# Patient Record
Sex: MALE | Race: WHITE | NOT HISPANIC OR LATINO | Employment: OTHER | URBAN - METROPOLITAN AREA
[De-identification: names, ages, dates, MRNs, and addresses within clinical notes are randomized per-mention and may not be internally consistent; named-entity substitution may affect disease eponyms.]

---

## 2017-01-13 ENCOUNTER — APPOINTMENT (OUTPATIENT)
Dept: PULMONOLOGY | Facility: HOSPICE | Age: 77
End: 2017-01-13
Payer: MEDICARE

## 2017-03-09 ENCOUNTER — TELEPHONE (OUTPATIENT)
Dept: PULMONOLOGY | Facility: HOSPICE | Age: 77
End: 2017-03-09

## 2017-03-09 NOTE — TELEPHONE ENCOUNTER
MEDICATION PRIOR AUTHORIZATION NEEDED:    1. Name of Medication: Ipratropium/Albuterol 0.5/3mg neb soln    2. Requested By (Name of Pharmacy): [facility]     3. Is insurance on file current? yes    4. What is the name & phone number of the 3rd party payor? [facility] [phone]

## 2017-03-20 NOTE — TELEPHONE ENCOUNTER
DOCUMENTATION OF PRIOR AUTH STATUS    1. Medication name and dose: Ipratropium/Albuterol neb soln    2. Name and Phone # of Prescription coverage company: [employer] [phone]    3. Date Prior Auth was submitted: 3/17/2017    4. What information was given to obtain insurance decision: Clinical notes    5. Prior Auth letter Approved or Denied: Denied, must be billed through Medicare Part B    6. Pharmacy notified: Yes    7. Patient notified: Yes

## 2017-04-11 ENCOUNTER — OFFICE VISIT (OUTPATIENT)
Dept: PULMONOLOGY | Facility: HOSPICE | Age: 77
End: 2017-04-11
Payer: MEDICARE

## 2017-04-11 VITALS
DIASTOLIC BLOOD PRESSURE: 86 MMHG | HEART RATE: 67 BPM | HEIGHT: 71 IN | RESPIRATION RATE: 16 BRPM | WEIGHT: 146 LBS | BODY MASS INDEX: 20.44 KG/M2 | SYSTOLIC BLOOD PRESSURE: 132 MMHG | OXYGEN SATURATION: 88 %

## 2017-04-11 DIAGNOSIS — J96.11 CHRONIC RESPIRATORY FAILURE WITH HYPOXIA (HCC): ICD-10-CM

## 2017-04-11 DIAGNOSIS — R91.8 ABNORMAL CT SCAN, LUNG: ICD-10-CM

## 2017-04-11 DIAGNOSIS — J44.1 CHRONIC OBSTRUCTIVE PULMONARY DISEASE WITH ACUTE EXACERBATION (HCC): ICD-10-CM

## 2017-04-11 DIAGNOSIS — R09.02 HYPOXIA: ICD-10-CM

## 2017-04-11 DIAGNOSIS — R91.8 LUNG NODULES: ICD-10-CM

## 2017-04-11 DIAGNOSIS — I15.9 SECONDARY HYPERTENSION: ICD-10-CM

## 2017-04-11 PROCEDURE — 1036F TOBACCO NON-USER: CPT | Performed by: NURSE PRACTITIONER

## 2017-04-11 PROCEDURE — 99214 OFFICE O/P EST MOD 30 MIN: CPT | Performed by: NURSE PRACTITIONER

## 2017-04-11 PROCEDURE — 4040F PNEUMOC VAC/ADMIN/RCVD: CPT | Performed by: NURSE PRACTITIONER

## 2017-04-11 PROCEDURE — G8432 DEP SCR NOT DOC, RNG: HCPCS | Performed by: NURSE PRACTITIONER

## 2017-04-11 PROCEDURE — G8420 CALC BMI NORM PARAMETERS: HCPCS | Performed by: NURSE PRACTITIONER

## 2017-04-11 PROCEDURE — 1101F PT FALLS ASSESS-DOCD LE1/YR: CPT | Mod: 8P | Performed by: NURSE PRACTITIONER

## 2017-04-11 NOTE — PROGRESS NOTES
Chief Complaint   Patient presents with   • Follow-Up       HPI:  David Cho is a 76 y.o. year old male here today for follow-up on     Last OV 12/28/16 he had COPD exacerbation - he was treated with prednisone taper, doxycycline and started on Stiolto. CT scan of chest was ordered but not completed. CNOX was not completed. His wife has been ill. He presented today in office with low oxygen levels at 87% and patient benefited from 2-3L oxygen with O2 sats >90%. He would benefit from oxygen use 24/7. CXR 12/28/16 indicates no acute cardiopulmonary process. He quit smoking October 2016. Today he notes improved dyspnea with Stiolto use. He has been requiring nebulizer 2-3x's daily and ARNALDO 1-2x's at night. He notes continued frequent wakening at night and dyspnea. He tries to sleep elevated. We will start supplemental oxygen at this time and consider sleep study based on his symptoms. He has noted wheezing mainly at night. He denies fever, chills, night sweats, chest pain, ankle swelling or hemoptysis. He notes dyspnea on exertion/at night with chronic cough with light green phlegm production which is baseline. He denies dizziness/headaches on a regular base. He denies GERD.    Brief history from BEVERLEY Smith's last OV note:  HPI: This patient is a 76 y.o. male, who presents for hospital follow-up from recent COPD exacerbation. He has a history of abnormal CT with pulmonary nodules. He is a former smoker one pack a day for 50 years and quit in August. CT scan July 14, 2016 showed multiple pulmonary nodules. The largest of which was 10 mm in diameter at the left apex. Fortunately PET scan shows no abnormal uptake associated with these lesions. Repeat CT scan is ordered for February 2017.    In regards to the patient's recent hospitalization, he was seen at [facility] and hospitalized October 4 - October 6, 2016 for COPD exacerbation, hypoxia & hypertensive urgency. Treated with IV steroids and briefly with antibiotics.  "Chest x-ray showed no evidence of pneumonia. He was discharged with DuoNeb. PFTs September 16, 2016 indicated stage III COPD with an FEV1 of 1.56 L 53% predicted, FEV1 FVC ratio of 50, DLCO 91% predicted, no significant bronchodilator response. He has tried multiple respiratory inhalers in the past. He has discontinued them due to cost and lack of subjective benefit.       ROS: As per HPI and otherwise negative if not stated.    Past Medical History   Diagnosis Date   • Hypertension    • Enlarged prostate    • Cholesterol serum elevated    • Depression        Past Surgical History   Procedure Laterality Date   • Other  appendectomy   • Appendectomy         History reviewed. No pertinent family history.    Social History     Social History   • Marital Status:      Spouse Name: N/A   • Number of Children: N/A   • Years of Education: N/A     Occupational History   • Not on file.     Social History Main Topics   • Smoking status: Former Smoker -- 1.00 packs/day for 50 years     Types: Cigarettes     Quit date: 08/12/2016   • Smokeless tobacco: Never Used   • Alcohol Use: Yes      Comment: daily beer   • Drug Use: No   • Sexual Activity: Not on file     Other Topics Concern   • Not on file     Social History Narrative       Allergies as of 04/11/2017   • (No Known Allergies)        @Vital signs for this encounter:  Filed Vitals:    04/11/17 1111   Height: 1.803 m (5' 10.98\")   Weight: 66.225 kg (146 lb)   Weight % change since last entry.: 0 %   BP: 132/86   Pulse: 67   BMI (Calculated): 20.37   Resp: 16   O2 sat % room air: 87 %       Current medications as of today   Current Outpatient Prescriptions   Medication Sig Dispense Refill   • buPROPion (WELLBUTRIN XL) 150 MG XL tablet TK 1 T PO QD  10   • Tiotropium Bromide-Olodaterol (STIOLTO RESPIMAT) 2.5-2.5 MCG/ACT Aero Soln Take 2 Puffs by mouth every day. 1 Inhaler 5   • ipratropium-albuterol (DUONEB) 0.5-2.5 (3) MG/3ML nebulizer solution 3 mL by Nebulization " route every four hours as needed for Shortness of Breath. 360 mL 3   • albuterol (VENTOLIN HFA) 108 (90 BASE) MCG/ACT Aero Soln inhalation aerosol Inhale 2 Puffs by mouth every four hours as needed for Shortness of Breath (Wheezing). 1 Inhaler 5   • metoprolol (LOPRESSOR) 25 MG Tab Take 1 Tab by mouth 2 Times a Day. 60 Tab 11   • albuterol 108 (90 BASE) MCG/ACT Aero Soln inhalation aerosol Inhale 2 Puffs by mouth every 6 hours as needed for Shortness of Breath. 8.5 g 3   • aspirin (ASA) 81 MG Chew Tab chewable tablet Take 1 Tab by mouth every day. 100 Tab 11   • buPROPion (WELLBUTRIN XL) 300 MG XL tablet Take 300 mg by mouth every morning.     • enalapril (VASOTEC) 20 MG tablet Take 20 mg by mouth every day.     • vitamin D, Ergocalciferol, (DRISDOL) 79019 UNITS Cap capsule Take 50,000 Units by mouth every 7 days. On Mon     • lovastatin (MEVACOR) 20 MG TABS Take 20 mg by mouth every day.     • predniSONE (DELTASONE) 10 MG Tab Take 30mg x 5 days, then take 20mg x 5 days, then take 10mg x 5 days, with food, then discontinue. (Patient not taking: Reported on 4/11/2017) 30 Tab 0   • predniSONE (DELTASONE) 10 MG Tab Take 30mg x 5 days, then take 20mg x 5 days, then take 10mg x 5 days, with food, then discontinue. (Patient not taking: Reported on 4/11/2017) 30 Tab 0   • doxycycline (VIBRAMYCIN) 100 MG Cap Take 1 Cap by mouth 2 times a day. Take until gone. (Patient not taking: Reported on 4/11/2017) 20 Cap 0   • guaifenesin DM (ROBITUSSIN DM) 100-10 MG/5ML Syrup syrup Take 10 mL by mouth every 6 hours as needed for Cough. 450 mL 1   • MethylPREDNISolone (MEDROL DOSEPAK) 4 MG Tablet Therapy Pack Use as directed (Patient not taking: Reported on 4/11/2017) 1 Kit 0     No current facility-administered medications for this visit.         Physical Exam:   Gen:           Alert and oriented, No apparent distress. Mood and affect appropriate, normal interaction with examiner.  Eyes:          PERRL, EOM intact, sclere white,  conjunctive moist. Glasses.  Ears:          Not examined.   Hearing:     Grossly intact.  Nose:          Normal, no lesions or deformities.  Dentition:    Good dentition.  Oropharynx:   Tongue normal, posterior pharynx without erythema or exudate.  Mallampati Classification: 3  Neck:        Supple, trachea midline, no masses.  Respiratory Effort: No intercostal retractions or use of accessory muscles.   Lung Auscultation:      Diminished t/o but bilateral upper lobes with expiratory wheeze; no rales.  CV:            Regular rate and rhythm. No murmurs, rubs or gallops.  Abd:           Not examined. Thin.  Lymphadenopathy: Not examined.  Gait and Station: Normal.  Digits and Nails: No clubbing, cyanosis, petechiae, or nodes.   Cranial Nerves: II-XII grossly intact.  Skin:        No rashes, lesions or ulcers noted.               Ext:           No cyanosis or edema.      Assessment:  1. Chronic obstructive pulmonary disease with acute exacerbation (CMS-HCC)     2. Chronic respiratory failure with hypoxia (CMS-HCC)     3. Hypoxia     4. Lung nodules     5. Abnormal CT scan, lung     6. Secondary hypertension         Immunizations:    Flu:10/6/16  Pneumovax 23:due 10/2017  Prevnar 13:10/6/16    Plan:  1. Continue Stiolto 2 puffs QD. Add Arnuity 100 1 puff QD. Rx, samples and RX given. Rinse mouth. Use nebulizer q4hrs as needed - use first thing in AM followed by maintenance inhalers. Reviewed appropriate ARNALDO vs. Nebulizer use.  2. CT of chest now.  3. DME order STAT o2 set up for 2-3L on exertion and 3L at night. Advised patient to  pulse oximeter to watch oxygen levels and keep >90%.  4. Discussed respiratory hygiene.  5. Encouraged routine walking.  6. Follow up in 5 weeks with CT scan, sooner if needed.

## 2017-04-11 NOTE — MR AVS SNAPSHOT
"        David Cho   2017 11:20 AM   Office Visit   MRN: [id number]    Department:  [facility]   Dept Phone:  [phone]    Description:  Male : 1940   Provider:  NEW Gomez           Reason for Visit     Follow-Up           Allergies as of 2017     No Known Allergies      You were diagnosed with     Chronic obstructive pulmonary disease with acute exacerbation (CMS-[facility])   [910489]       Chronic respiratory failure with hypoxia (CMS-[facility])   [826278]       Hypoxia   [466204]       Lung nodules   [507597]       Abnormal CT scan, lung   [283144]       Secondary hypertension   [2631813]         Vital Signs     Blood Pressure Pulse Respirations Height Weight Body Mass Index    132/86 mmHg 67 16 1.803 m (5' 10.98\") 66.225 kg (146 lb) 20.37 kg/m2    Oxygen Saturation Smoking Status                88% Former Smoker          Basic Information     Date Of Birth Sex Race Ethnicity Preferred Language    1940 Male Unknown Non- English      Problem List              ICD-10-CM Priority Class Noted - Resolved    Bacteremia due to Staphylococcus R78.81 High  10/5/2016 - Present    Lung nodules R91.8 Low  10/5/2016 - Present    Dyslipidemia E78.5   10/5/2016 - Present    Chronic obstructive pulmonary disease with acute exacerbation (CMS-[facility]) J44.1   10/10/2016 - Present    Hypertension I10   10/10/2016 - Present    Snoring R06.83   10/10/2016 - Present    Abnormal CT scan, lung R91.8   2017 - Present    Hypoxia R09.02   2017 - Present    Chronic respiratory failure with hypoxia (CMS-[facility]) J96.11   2017 - Present      Health Maintenance        Date Due Completion Dates    IMM DTaP/Tdap/Td Vaccine (1 - Tdap) 10/8/1959 ---    COLONOSCOPY 10/8/1990 ---    IMM ZOSTER VACCINE 10/8/2000 ---    IMM PNEUMOCOCCAL 65+ (ADULT) LOW/MEDIUM RISK SERIES (2 of 2 - PPSV23) 10/6/2017 10/6/2016            Current Immunizations     13-VALENT PCV PREVNAR 10/6/2016  4:32 PM   " Influenza TIV (IM) 10/1/2015, 11/1/2011  6:36 AM    Influenza Vaccine Adult HD 10/6/2016  4:09 PM      Below and/or attached are the medications your provider expects you to take. Review all of your home medications and newly ordered medications with your provider and/or pharmacist. Follow medication instructions as directed by your provider and/or pharmacist. Please keep your medication list with you and share with your provider. Update the information when medications are discontinued, doses are changed, or new medications (including over-the-counter products) are added; and carry medication information at all times in the event of emergency situations     Allergies:  No Known Allergies          Medications  Valid as of: April 11, 2017 - 12:14 PM    Generic Name Brand Name Tablet Size Instructions for use    Albuterol Sulfate (Aero Soln) albuterol 108 (90 BASE) MCG/ACT Inhale 2 Puffs by mouth every 6 hours as needed for Shortness of Breath.        Albuterol Sulfate (Aero Soln) albuterol 108 (90 BASE) MCG/ACT Inhale 2 Puffs by mouth every four hours as needed for Shortness of Breath (Wheezing).        Aspirin (Chew Tab) ASA 81 MG Take 1 Tab by mouth every day.        BuPROPion HCl (TABLET SR 24 HR) WELLBUTRIN  MG Take 300 mg by mouth every morning.        BuPROPion HCl (TABLET SR 24 HR) WELLBUTRIN  MG TK 1 T PO QD        Dextromethorphan-Guaifenesin (Syrup) ROBITUSSIN -10 MG/5ML Take 10 mL by mouth every 6 hours as needed for Cough.        Doxycycline Hyclate (Cap) VIBRAMYCIN 100 MG Take 1 Cap by mouth 2 times a day. Take until gone.        Enalapril Maleate (Tab) VASOTEC 20 MG Take 20 mg by mouth every day.        Ergocalciferol (Cap) DRISDOL 13226 UNITS Take 50,000 Units by mouth every 7 days. On Mon        Fluticasone Furoate (AEROSOL POWDER, BREATH ACTIVATED) Fluticasone Furoate 100 MCG/ACT Inhale 1 Puff by mouth every day. Rinse mouth after each use.        Ipratropium-Albuterol (Solution)  DUONEB 0.5-2.5 (3) MG/3ML 3 mL by Nebulization route every four hours as needed for Shortness of Breath.        Lovastatin (Tab) MEVACOR 20 MG Take 20 mg by mouth every day.        MethylPREDNISolone (Tablet Therapy Pack) MEDROL DOSEPAK 4 MG Use as directed        Metoprolol Tartrate (Tab) LOPRESSOR 25 MG Take 1 Tab by mouth 2 Times a Day.        PredniSONE (Tab) DELTASONE 10 MG Take 30mg x 5 days, then take 20mg x 5 days, then take 10mg x 5 days, with food, then discontinue.        PredniSONE (Tab) DELTASONE 10 MG Take 30mg x 5 days, then take 20mg x 5 days, then take 10mg x 5 days, with food, then discontinue.        Tiotropium Bromide-Olodaterol (Aero Soln) Tiotropium Bromide-Olodaterol 2.5-2.5 MCG/ACT Take 2 Puffs by mouth every day.        .                 Medicines prescribed today were sent to:     [facility] [address] [place] [address] & [address] [address]    Phone: [phone] Fax: [phone]    Open 24 Hours?: No      Medication refill instructions:       If your prescription bottle indicates you have medication refills left, it is not necessary to call your provider’s office. Please contact your pharmacy and they will refill your medication.    If your prescription bottle indicates you do not have any refills left, you may request refills at any time through one of the following ways: The online [employer] system (except Urgent Care), by calling your provider’s office, or by asking your pharmacy to contact your provider’s office with a refill request. Medication refills are processed only during regular business hours and may not be available until the next business day. Your provider may request additional information or to have a follow-up visit with you prior to refilling your medication.   *Please Note: Medication refills are assigned a new Rx number when refilled electronically. Your pharmacy may indicate that no refills were  authorized even though a new prescription for the same medication is available at the pharmacy. Please request the medicine by name with the pharmacy before contacting your provider for a refill.        Instructions    1. Continue Stiolto 2 puffs once daily. Add Arnuity inhaler 1 puff once daily. RX, samples and instruction given.  2. Nebulizer first thing in the morning, followed by regular inhalers and then use every 4-6hrs as needed.  3. Complete CT scan now.  4. Start oxygen 2-3L when walking and 3L at night.  pulse oximeter to watch oxygen levels during the day.  5. Follow up after CT scan, sooner if needed.          [employer] Access Code: Activation code not generated  Current [employer] Status: Active

## 2017-04-11 NOTE — PATIENT INSTRUCTIONS
1. Continue Stiolto 2 puffs once daily. Add Arnuity inhaler 1 puff once daily. RX, samples and instruction given.  2. Nebulizer first thing in the morning, followed by regular inhalers and then use every 4-6hrs as needed.  3. Complete CT scan now.  4. Start oxygen 2-3L when walking and 3L at night.  pulse oximeter to watch oxygen levels during the day.  5. Follow up after CT scan, sooner if needed.

## 2017-04-18 DIAGNOSIS — J44.1 CHRONIC OBSTRUCTIVE PULMONARY DISEASE WITH ACUTE EXACERBATION (HCC): ICD-10-CM

## 2017-04-18 RX ORDER — GUAIFENESIN/DEXTROMETHORPHAN 100-10MG/5
10 SYRUP ORAL EVERY 8 HOURS PRN
Qty: 450 ML | Refills: 0 | Status: SHIPPED | OUTPATIENT
Start: 2017-04-18 | End: 2017-08-31 | Stop reason: SDUPTHER

## 2017-04-18 NOTE — TELEPHONE ENCOUNTER
----- Message from Your Healthcare Team sent at 4/17/2017 10:38 PM PDT -----  Regarding: Prescription Question  Contact: [phone]  guaifenesin -10 MG/5ML Syrp syrup   My pharmacy told me to contact you if I wanted a refill.

## 2017-04-18 NOTE — TELEPHONE ENCOUNTER
Have we ever prescribed this med? Yes.  If yes, what date? 12/28/16    Last OV: 04/11/17-[facility]    Next OV: None scheduled-due middle of May    DX: COPD    Medications:   Requested Prescriptions     Pending Prescriptions Disp Refills   • guaifenesin DM (ROBITUSSIN DM) 100-10 MG/5ML Syrup syrup 300 mL 0     Sig: Take 10 mL by mouth every 6 hours as needed for Cough.

## 2017-04-28 ENCOUNTER — HOSPITAL ENCOUNTER (OUTPATIENT)
Dept: RADIOLOGY | Facility: MEDICAL CENTER | Age: 77
End: 2017-04-28
Attending: NURSE PRACTITIONER
Payer: MEDICARE

## 2017-04-28 DIAGNOSIS — R91.8 LUNG NODULES: ICD-10-CM

## 2017-04-28 PROCEDURE — 71250 CT THORAX DX C-: CPT

## 2017-05-10 ENCOUNTER — OFFICE VISIT (OUTPATIENT)
Dept: PULMONOLOGY | Facility: HOSPICE | Age: 77
End: 2017-05-10
Payer: MEDICARE

## 2017-05-10 VITALS
HEIGHT: 71 IN | SYSTOLIC BLOOD PRESSURE: 104 MMHG | HEART RATE: 64 BPM | OXYGEN SATURATION: 94 % | BODY MASS INDEX: 21.7 KG/M2 | RESPIRATION RATE: 16 BRPM | WEIGHT: 155 LBS | TEMPERATURE: 98.4 F | DIASTOLIC BLOOD PRESSURE: 80 MMHG

## 2017-05-10 DIAGNOSIS — R91.8 ABNORMAL CT SCAN, LUNG: ICD-10-CM

## 2017-05-10 DIAGNOSIS — J96.11 CHRONIC RESPIRATORY FAILURE WITH HYPOXIA (HCC): ICD-10-CM

## 2017-05-10 PROCEDURE — 1036F TOBACCO NON-USER: CPT | Performed by: INTERNAL MEDICINE

## 2017-05-10 PROCEDURE — 4040F PNEUMOC VAC/ADMIN/RCVD: CPT | Performed by: INTERNAL MEDICINE

## 2017-05-10 PROCEDURE — G8420 CALC BMI NORM PARAMETERS: HCPCS | Performed by: INTERNAL MEDICINE

## 2017-05-10 PROCEDURE — G8432 DEP SCR NOT DOC, RNG: HCPCS | Performed by: INTERNAL MEDICINE

## 2017-05-10 PROCEDURE — 99214 OFFICE O/P EST MOD 30 MIN: CPT | Performed by: INTERNAL MEDICINE

## 2017-05-10 PROCEDURE — 1101F PT FALLS ASSESS-DOCD LE1/YR: CPT | Performed by: INTERNAL MEDICINE

## 2017-05-10 RX ORDER — IPRATROPIUM BROMIDE AND ALBUTEROL SULFATE 2.5; .5 MG/3ML; MG/3ML
3 SOLUTION RESPIRATORY (INHALATION) EVERY 4 HOURS PRN
Qty: 360 ML | Refills: 3 | Status: SHIPPED | OUTPATIENT
Start: 2017-05-10 | End: 2020-02-13

## 2017-05-10 NOTE — MR AVS SNAPSHOT
"        David Cho   5/10/2017 10:20 AM   Office Visit   MRN: [id number]    Department:  [facility]   Dept Phone:  [phone]    Description:  Male : 1940   Provider:  Dahiana Manzano M.D.           Reason for Visit     Follow-Up CT results      Allergies as of 5/10/2017     No Known Allergies      You were diagnosed with     Chronic respiratory failure with hypoxia (CMS-HCC)   [257134]       Abnormal CT scan, lung   [408476]         Vital Signs     Blood Pressure Pulse Temperature Respirations Height Weight    104/80 mmHg 64 36.9 °C (98.4 °F) 16 1.803 m (5' 10.98\") 70.308 kg (155 lb)    Body Mass Index Oxygen Saturation Smoking Status             21.63 kg/m2 94% Former Smoker         Basic Information     Date Of Birth Sex Race Ethnicity Preferred Language    1940 Male Unknown Non- English      Problem List              ICD-10-CM Priority Class Noted - Resolved    Bacteremia due to Staphylococcus R78.81 High  10/5/2016 - Present    Lung nodules R91.8 Low  10/5/2016 - Present    Dyslipidemia E78.5   10/5/2016 - Present    Chronic obstructive pulmonary disease with acute exacerbation (CMS-HCC) J44.1   10/10/2016 - Present    Hypertension I10   10/10/2016 - Present    Snoring R06.83   10/10/2016 - Present    Abnormal CT scan, lung R91.8   2017 - Present    Hypoxia R09.02   2017 - Present    Chronic respiratory failure with hypoxia (CMS-HCC) J96.11   2017 - Present      Health Maintenance        Date Due Completion Dates    IMM DTaP/Tdap/Td Vaccine (1 - Tdap) 10/8/1959 ---    COLONOSCOPY 10/8/1990 ---    IMM ZOSTER VACCINE 10/8/2000 ---    IMM PNEUMOCOCCAL 65+ (ADULT) LOW/MEDIUM RISK SERIES (2 of 2 - PPSV23) 10/6/2017 10/6/2016            Current Immunizations     13-VALENT PCV PREVNAR 10/6/2016  4:32 PM    Influenza TIV (IM) 10/1/2015, 2011  6:36 AM    Influenza Vaccine Adult HD 10/6/2016  4:09 PM      Below and/or attached are the medications your provider expects " you to take. Review all of your home medications and newly ordered medications with your provider and/or pharmacist. Follow medication instructions as directed by your provider and/or pharmacist. Please keep your medication list with you and share with your provider. Update the information when medications are discontinued, doses are changed, or new medications (including over-the-counter products) are added; and carry medication information at all times in the event of emergency situations     Allergies:  No Known Allergies          Medications  Valid as of: May 10, 2017 - 10:52 AM    Generic Name Brand Name Tablet Size Instructions for use    Albuterol Sulfate (Aero Soln) albuterol 108 (90 BASE) MCG/ACT Inhale 2 Puffs by mouth every 6 hours as needed for Shortness of Breath.        Albuterol Sulfate (Aero Soln) albuterol 108 (90 BASE) MCG/ACT Inhale 2 Puffs by mouth every four hours as needed for Shortness of Breath (Wheezing).        Aspirin (Chew Tab) ASA 81 MG Take 1 Tab by mouth every day.        BuPROPion HCl (TABLET SR 24 HR) WELLBUTRIN  MG Take 300 mg by mouth every morning.        BuPROPion HCl (TABLET SR 24 HR) WELLBUTRIN  MG TK 1 T PO QD        Dextromethorphan-Guaifenesin (Syrup) ROBITUSSIN -10 MG/5ML Take 10 mL by mouth every 8 hours as needed for Cough.        Doxycycline Hyclate (Cap) VIBRAMYCIN 100 MG Take 1 Cap by mouth 2 times a day. Take until gone.        Enalapril Maleate (Tab) VASOTEC 20 MG Take 20 mg by mouth every day.        Ergocalciferol (Cap) DRISDOL 42601 UNITS Take 50,000 Units by mouth every 7 days. On Mon        Fluticasone Furoate (AEROSOL POWDER, BREATH ACTIVATED) Fluticasone Furoate 100 MCG/ACT Inhale 1 Puff by mouth every day. Rinse mouth after each use.        Ipratropium-Albuterol (Solution) DUONEB 0.5-2.5 (3) MG/3ML 3 mL by Nebulization route every four hours as needed for Shortness of Breath.        Lovastatin (Tab) MEVACOR 20 MG Take 20 mg by mouth every  day.        MethylPREDNISolone (Tablet Therapy Pack) MEDROL DOSEPAK 4 MG Use as directed        Metoprolol Tartrate (Tab) LOPRESSOR 25 MG Take 1 Tab by mouth 2 Times a Day.        PredniSONE (Tab) DELTASONE 10 MG Take 30mg x 5 days, then take 20mg x 5 days, then take 10mg x 5 days, with food, then discontinue.        PredniSONE (Tab) DELTASONE 10 MG Take 30mg x 5 days, then take 20mg x 5 days, then take 10mg x 5 days, with food, then discontinue.        Tiotropium Bromide-Olodaterol (Aero Soln) Tiotropium Bromide-Olodaterol 2.5-2.5 MCG/ACT Take 2 Puffs by mouth every day.        Tiotropium Bromide-Olodaterol (Aero Soln) Tiotropium Bromide-Olodaterol 2.5-2.5 MCG/ACT Take 2 Puffs by mouth every day.        .                 Medicines prescribed today were sent to:     [facility] [address] [place] [address] & [address] [address]    Phone: [phone] Fax: [phone]    Open 24 Hours?: No      Medication refill instructions:       If your prescription bottle indicates you have medication refills left, it is not necessary to call your provider’s office. Please contact your pharmacy and they will refill your medication.    If your prescription bottle indicates you do not have any refills left, you may request refills at any time through one of the following ways: The online [employer] system (except Urgent Care), by calling your provider’s office, or by asking your pharmacy to contact your provider’s office with a refill request. Medication refills are processed only during regular business hours and may not be available until the next business day. Your provider may request additional information or to have a follow-up visit with you prior to refilling your medication.   *Please Note: Medication refills are assigned a new Rx number when refilled electronically. Your pharmacy may indicate that no refills were authorized even though a new prescription for  the same medication is available at the pharmacy. Please request the medicine by name with the pharmacy before contacting your provider for a refill.        Instructions    Refilled meds; needs Pneumovax 23 in Oct 2017; CT chest in 2018 re nodules to complete 2 yr surveillance, stable to date          MyChart Access Code: Activation code not generated  Current [employer] Status: Active

## 2017-05-10 NOTE — PATIENT INSTRUCTIONS
Refilled meds; needs Pneumovax 23 in Oct 2017; CT chest in 2018 re nodules to complete 2 yr surveillance, stable to date    This gentleman has COPD, has done very well with our Arnuity ellipta, stiolto, and nebulized albuterol all taken in the morning. Oxygen continuous nighttime and when necessary exercise and dyspnea. Self monitored saturations do drop below 90%.    I reviewed his immunization history, he will need Pneumovax 23 in the fall. He's had Prevnar. Otherwise doing well, on maintenance, recheck in 6 months.

## 2017-05-10 NOTE — PROGRESS NOTES
David Cho is a 76 y.o. male here for COPD and bronchospasm on oxygen. Patient was referred by his primary care doctor.    History of Present Illness:      This gentleman has COPD, has done very well with our Arnuity ellipta, stiolto, and nebulized albuterol all taken in the morning. Oxygen continuous nighttime and when necessary exercise and dyspnea. Self monitored saturations do drop below 90%.    I reviewed his immunization history, he will need Pneumovax 23 in the fall. He's had Prevnar. Otherwise doing well, on maintenance, recheck in 6 months.    Constitutional ROS: No unexpected change in weight, No unexplained fevers, sweats, or chills  Eyes: No change in vision or blurring or double vision  Mouth/Throat ROS: No sore throat, No recent change in voice or hoarseness  Pulmonary ROS: See present history for pertinent positives  Cardiovascular ROS: No chest pain  Gastrointestinal ROS: No abdominal pain, No abdominal bloating or early satiety  Musculoskeletal/Extremities ROS: No clubbing, No cyanosis  Hematologic/Lymphatic ROS: No abnormal bleeding  Neurologic ROS: No weakness  Psychiatric ROS: No depression  Allergic/Immunologic: No rhinitis or urticaria     Current Outpatient Prescriptions   Medication Sig Dispense Refill   • Fluticasone Furoate (ARNUITY ELLIPTA) 100 MCG/ACT AEROSOL POWDER, BREATH ACTIVATED Inhale 1 Puff by mouth every day. Rinse mouth after each use. 1 Each 5   • ipratropium-albuterol (DUONEB) 0.5-2.5 (3) MG/3ML nebulizer solution 3 mL by Nebulization route every four hours as needed for Shortness of Breath. 360 mL 3   • Tiotropium Bromide-Olodaterol (STIOLTO RESPIMAT) 2.5-2.5 MCG/ACT Aero Soln Take 2 Puffs by mouth every day. 1 Inhaler 5   • guaifenesin DM (ROBITUSSIN DM) 100-10 MG/5ML Syrup syrup Take 10 mL by mouth every 8 hours as needed for Cough. 450 mL 0   • buPROPion (WELLBUTRIN XL) 150 MG XL tablet TK 1 T PO QD  10   • Tiotropium Bromide-Olodaterol (STIOLTO RESPIMAT) 2.5-2.5  MCG/ACT Aero Soln Take 2 Puffs by mouth every day. 1 Inhaler 5   • albuterol (VENTOLIN HFA) 108 (90 BASE) MCG/ACT Aero Soln inhalation aerosol Inhale 2 Puffs by mouth every four hours as needed for Shortness of Breath (Wheezing). 1 Inhaler 5   • metoprolol (LOPRESSOR) 25 MG Tab Take 1 Tab by mouth 2 Times a Day. 60 Tab 11   • enalapril (VASOTEC) 20 MG tablet Take 20 mg by mouth every day.     • vitamin D, Ergocalciferol, (DRISDOL) 85182 UNITS Cap capsule Take 50,000 Units by mouth every 7 days. On Mon     • lovastatin (MEVACOR) 20 MG TABS Take 20 mg by mouth every day.     • predniSONE (DELTASONE) 10 MG Tab Take 30mg x 5 days, then take 20mg x 5 days, then take 10mg x 5 days, with food, then discontinue. (Patient not taking: Reported on 4/11/2017) 30 Tab 0   • predniSONE (DELTASONE) 10 MG Tab Take 30mg x 5 days, then take 20mg x 5 days, then take 10mg x 5 days, with food, then discontinue. (Patient not taking: Reported on 4/11/2017) 30 Tab 0   • doxycycline (VIBRAMYCIN) 100 MG Cap Take 1 Cap by mouth 2 times a day. Take until gone. (Patient not taking: Reported on 4/11/2017) 20 Cap 0   • albuterol 108 (90 BASE) MCG/ACT Aero Soln inhalation aerosol Inhale 2 Puffs by mouth every 6 hours as needed for Shortness of Breath. (Patient not taking: Reported on 5/10/2017) 8.5 g 3   • MethylPREDNISolone (MEDROL DOSEPAK) 4 MG Tablet Therapy Pack Use as directed (Patient not taking: Reported on 4/11/2017) 1 Kit 0   • aspirin (ASA) 81 MG Chew Tab chewable tablet Take 1 Tab by mouth every day. (Patient not taking: Reported on 5/10/2017) 100 Tab 11   • buPROPion (WELLBUTRIN XL) 300 MG XL tablet Take 300 mg by mouth every morning.       No current facility-administered medications for this visit.       Social History   Substance Use Topics   • Smoking status: Former Smoker -- 1.00 packs/day for 50 years     Types: Cigarettes     Quit date: 08/12/2016   • Smokeless tobacco: Never Used   • Alcohol Use: Yes      Comment: daily beer     "    Past Medical History   Diagnosis Date   • Hypertension    • Enlarged prostate    • Cholesterol serum elevated    • Depression        Past Surgical History   Procedure Laterality Date   • Other  appendectomy   • Appendectomy         Allergies: Review of patient's allergies indicates no known allergies.    Family History   Problem Relation Age of Onset   • Heart Disease Mother        Physical Examination    Filed Vitals:    05/10/17 1023   Height: 1.803 m (5' 10.98\")   Weight: 70.308 kg (155 lb)   Weight % change since last entry.: 0 %   BP: 104/80   Pulse: 64   BMI (Calculated): 21.63   Resp: 16   Temp: 36.9 °C (98.4 °F)       General Appearance: alert, no distress  Skin: Skin color, texture, turgor normal. No rashes or lesions.  Eyes: negative  Oropharynx: Lips, mucosa, and tongue normal. Teeth and gums normal. Oropharynx moist and without lesion  Lungs: positive findings: Diminished but clear, expiratory wheeze on forced  Heart: negative. RRR without murmur, gallop, or rubs.  No ectopy.  Abdomen: Abdomen soft, non-tender. BS normal. No masses,  No organomegaly  Extremities: Extremities normal. No deformities, edema, or skin discoloration  Peripheral Pulses: Normal  Neurologic: intact  No unusual cervical or supraclavicular adenopathy    I (soft palate, uvula, fauces, tonsillar pillars visible)    Imaging: Current CT scan shows stability of the previously identified nodules, recheck once more at one year to ensure two-year stability    PFTS: None      Assessment and Plan  1. Chronic respiratory failure with hypoxia (CMS-[facility])  - Fluticasone Furoate (ARNUITY ELLIPTA) 100 MCG/ACT AEROSOL POWDER, BREATH ACTIVATED; Inhale 1 Puff by mouth every day. Rinse mouth after each use.  Dispense: 1 Each; Refill: 5  - ipratropium-albuterol (DUONEB) 0.5-2.5 (3) MG/3ML nebulizer solution; 3 mL by Nebulization route every four hours as needed for Shortness of Breath.  Dispense: 360 mL; Refill: 3  - Tiotropium Bromide-Olodaterol " (STIOLTO RESPIMAT) 2.5-2.5 MCG/ACT Aero Soln; Take 2 Puffs by mouth every day.  Dispense: 1 Inhaler; Refill: 5    2. Abnormal CT scan, lung    This gentleman has COPD, has done very well with our Arnuity ellipta, stiolto, and nebulized albuterol all taken in the morning. Oxygen continuous nighttime and when necessary exercise and dyspnea. Self monitored saturations do drop below 90%.    I reviewed his immunization history, he will need Pneumovax 23 in the fall. He's had Prevnar. Otherwise doing well, on maintenance, recheck in 6 months.  Followup Return in about 6 months (around 11/10/2017) for With MD or APRN.

## 2017-08-31 DIAGNOSIS — J44.1 CHRONIC OBSTRUCTIVE PULMONARY DISEASE WITH ACUTE EXACERBATION (HCC): ICD-10-CM

## 2017-09-05 NOTE — TELEPHONE ENCOUNTER
Caller Name:  Peter Anderson  Call Back Number: [phone] (home)      Patient approves a detailed voicemail message: yes    Patient's symptoms    Consititutional: productive cough    Fever: absent    Cough: productive of clear sputum    Is this a new symptom: Yes    Onset of symptom: several days ago    Frequency of symptom: rare    Has the patient tried anything to resolve symptoms: Yes    Has the patient taken their nebulizer/rescue inhaler: yes    Additional details: none    Action taken per Active Symptom guide: Same day appointment scheduled    Patient agrees to recommended action per active symptom guide

## 2017-12-11 DIAGNOSIS — J96.11 CHRONIC RESPIRATORY FAILURE WITH HYPOXIA (HCC): ICD-10-CM

## 2017-12-11 RX ORDER — FLUTICASONE FUROATE 100 UG/1
POWDER RESPIRATORY (INHALATION)
Qty: 1 EACH | Refills: 1 | Status: SHIPPED | OUTPATIENT
Start: 2017-12-11 | End: 2018-01-17 | Stop reason: SDUPTHER

## 2017-12-11 NOTE — TELEPHONE ENCOUNTER
Have we ever prescribed this med? Yes.  If yes, what date? 05/10/2017    Last OV: 05/10/2017 - Dr. Manzano    Next OV: None scheduled; was to follow up November 2017    DX: Chronic Respiratory Failure    Medications: Arnuity

## 2017-12-28 DIAGNOSIS — J44.9 CHRONIC OBSTRUCTIVE PULMONARY DISEASE, UNSPECIFIED COPD TYPE (HCC): ICD-10-CM

## 2017-12-28 RX ORDER — ALBUTEROL SULFATE 90 UG/1
AEROSOL, METERED RESPIRATORY (INHALATION)
Qty: 1 INHALER | Refills: 0 | Status: SHIPPED | OUTPATIENT
Start: 2017-12-28 | End: 2018-02-25 | Stop reason: SDUPTHER

## 2017-12-28 NOTE — TELEPHONE ENCOUNTER
Have we ever prescribed this med? Yes.  If yes, what date? 12/28/2016    Last OV: 05/10/2017 - Dr. Manzano    Next OV: none scheduled; was to follow up November 2017    DX: COPD    Medications: Ventolin

## 2018-02-25 DIAGNOSIS — J44.9 CHRONIC OBSTRUCTIVE PULMONARY DISEASE, UNSPECIFIED COPD TYPE (HCC): ICD-10-CM

## 2018-02-26 RX ORDER — ALBUTEROL SULFATE 90 UG/1
AEROSOL, METERED RESPIRATORY (INHALATION)
Qty: 1 INHALER | Refills: 1 | Status: SHIPPED | OUTPATIENT
Start: 2018-02-26 | End: 2018-02-28

## 2018-02-26 NOTE — TELEPHONE ENCOUNTER
Have we ever prescribed this med? Yes.  If yes, what date? 12/28/17    Last OV: 5/10/17- 6mth fu    Next OV: No pending apt scheduled    DX: Chronic obstructive pulmonary disease, unspecified COPD type (CMS-HCC) (J44.9    Medications: VENTOLIN  (90 Base) MCG/ACT Aero Soln inhalation aerosol

## 2018-02-28 ENCOUNTER — OFFICE VISIT (OUTPATIENT)
Dept: MEDICAL GROUP | Facility: MEDICAL CENTER | Age: 78
End: 2018-02-28
Payer: MEDICARE

## 2018-02-28 VITALS
WEIGHT: 155 LBS | TEMPERATURE: 97.8 F | OXYGEN SATURATION: 93 % | HEART RATE: 70 BPM | DIASTOLIC BLOOD PRESSURE: 60 MMHG | HEIGHT: 71 IN | BODY MASS INDEX: 21.7 KG/M2 | SYSTOLIC BLOOD PRESSURE: 100 MMHG

## 2018-02-28 DIAGNOSIS — I10 ESSENTIAL HYPERTENSION: ICD-10-CM

## 2018-02-28 DIAGNOSIS — E78.5 DYSLIPIDEMIA: ICD-10-CM

## 2018-02-28 DIAGNOSIS — R35.1 NOCTURIA: ICD-10-CM

## 2018-02-28 DIAGNOSIS — J96.11 CHRONIC RESPIRATORY FAILURE WITH HYPOXIA (HCC): ICD-10-CM

## 2018-02-28 PROBLEM — F32.9 MAJOR DEPRESSIVE DISORDER: Status: ACTIVE | Noted: 2018-02-28

## 2018-02-28 PROBLEM — R09.02 HYPOXIA: Status: RESOLVED | Noted: 2017-04-11 | Resolved: 2018-02-28

## 2018-02-28 PROCEDURE — 99204 OFFICE O/P NEW MOD 45 MIN: CPT | Performed by: PHYSICIAN ASSISTANT

## 2018-02-28 RX ORDER — FINASTERIDE 5 MG/1
5 TABLET, FILM COATED ORAL DAILY
Status: SHIPPED | COMMUNITY
End: 2018-07-05 | Stop reason: SDUPTHER

## 2018-02-28 ASSESSMENT — PATIENT HEALTH QUESTIONNAIRE - PHQ9: CLINICAL INTERPRETATION OF PHQ2 SCORE: 0

## 2018-02-28 NOTE — ASSESSMENT & PLAN NOTE
Patient is currently on Wellbutrin  MG daily.  States without the medicine he is a very grumpy man. Denies any side effects from Wellbutrin. Denies any homicidal or suicidal ideations.

## 2018-02-28 NOTE — PROGRESS NOTES
David Cho is a 77 y.o. new male here for establishing care.  HPI:    Chronic respiratory failure with hypoxia (CMS-HCC)  Patient used to be a heavy smoking or 30-pack-year stopped in 2016. Patient does have COPD and is currently taking Duoneb and Arnuity daily and albuterol as needed. Patient has shortness of breath and fatigue and tiredness which are new problems. Denies any fevers or chills, new cough.    Hypertension  Pt has known HTN, controlled with current medicines enalapril 20 daily. He denies HA, blurred vision, dizziness, palpitations, or chest pain, lower extremity edema.      Dyslipidemia  Currently on lovastatin 20 mg qd w/o any SE or mylagia.  Denies SOP, CP      Major depressive disorder  Patient is currently on Wellbutrin  MG daily.  States without the medicine he is a very grumpy man. Denies any side effects from Wellbutrin. Denies any homicidal or suicidal ideations.    Current medicines (including changes today)  Current Outpatient Prescriptions   Medication Sig Dispense Refill   • finasteride (PROSCAR) 5 MG Tab Take 5 mg by mouth every day.     • ARNUITY ELLIPTA 100 MCG/ACT AEROSOL POWDER, BREATH ACTIVATED INHALE ONE PUFF BY MOUTH EVERY DAY RINSE MOUTH AFTER USE 1 Each 5   • ipratropium-albuterol (DUONEB) 0.5-2.5 (3) MG/3ML nebulizer solution 3 mL by Nebulization route every four hours as needed for Shortness of Breath. 360 mL 3   • buPROPion (WELLBUTRIN XL) 150 MG XL tablet TK 1 T PO QD  10   • albuterol 108 (90 BASE) MCG/ACT Aero Soln inhalation aerosol Inhale 2 Puffs by mouth every 6 hours as needed for Shortness of Breath. 8.5 g 3   • enalapril (VASOTEC) 20 MG tablet Take 20 mg by mouth every day.     • lovastatin (MEVACOR) 20 MG TABS Take 20 mg by mouth every day.     • Tiotropium Bromide-Olodaterol (STIOLTO RESPIMAT) 2.5-2.5 MCG/ACT Aero Soln Take 2 Puffs by mouth every day. 1 Inhaler 5   • predniSONE (DELTASONE) 10 MG Tab Take 30mg x 5 days, then take 20mg x 5 days, then take  10mg x 5 days, with food, then discontinue. (Patient not taking: Reported on 2017) 30 Tab 0   • doxycycline (VIBRAMYCIN) 100 MG Cap Take 1 Cap by mouth 2 times a day. Take until gone. (Patient not taking: Reported on 2017) 20 Cap 0   • metoprolol (LOPRESSOR) 25 MG Tab Take 1 Tab by mouth 2 Times a Day. 60 Tab 11   • aspirin (ASA) 81 MG Chew Tab chewable tablet Take 1 Tab by mouth every day. (Patient not taking: Reported on 5/10/2017) 100 Tab 11   • buPROPion (WELLBUTRIN XL) 300 MG XL tablet Take 300 mg by mouth every morning.       No current facility-administered medications for this visit.      He  has a past medical history of Cholesterol serum elevated; COPD (chronic obstructive pulmonary disease) (CMS-HCC); Depression; Enlarged prostate; and Hypertension.  He  has a past surgical history that includes other (appendectomy) and appendectomy.  Social History   Substance Use Topics   • Smoking status: Former Smoker     Packs/day: 1.00     Years: 50.00     Types: Cigarettes     Quit date: 2016   • Smokeless tobacco: Never Used   • Alcohol use Yes      Comment: daily beer     Social History     Social History Narrative   • No narrative on file     Family History   Problem Relation Age of Onset   • Heart Disease Mother      Family Status   Relation Status   • Mother    • Father        Past medical, surgical, family, and social history is reviewed in [facility] chart by me today.   Medications and allergies reviewed in Epic chart by me today.       ROS  General:  No fevers or chills,  Eyes: no change in vision.   ENT:         Ears: pos hearing loss      Nose :No epitaxis        Mouth/ throat:. No sore throat    GI: no nausea, no vomiting, no diarrhea or constipations. Bowel regular and normal. No melena or hematochezia. No hematemesis  :  pos Frequency, no dysuria, no hematuria.   MS:  Negative for muscle weakness.  Skin: no rashes or abnormal lesions.   Neuro: No seizures, no tingling or  "numbness.   Endo: no polyuria, no polydypsia, no cold intolerance, no heat intolerance  Hem: no easy bruising.    As documented in history of present illness               Objective:     Blood pressure 100/60, pulse 70, temperature 36.6 °C (97.8 °F), height 1.803 m (5' 10.98\"), weight 70.3 kg (155 lb), SpO2 93 %. Body mass index is 21.63 kg/m².  Physical Exam:    Constitutional: Well-developed and well-nourished. No distress.   Skin: Skin is warm and dry. Numerous black head comedos over face.  Nail: w/o onychomycosis   Head: Atraumatic without lesions.  Eyes: Equal, round and reactive, conjunctiva clear,sclera non icteric, lids normal.  Ears:  External ears unremarkable. Tympanic membranes clear and intact.  Nose: Nares patent.  Turbinates without erythema nor edema. No discharge.    Mouth/Throat: Tongue normal. Oropharynx is clear and moist. Posterior pharynx without erythema or exudates.  Neck: Supple, trachea midline.  No thyromegaly present. No lymphadenopathy--cervical or supraclavicular  Cardiovascular: Regular rate and rhythm, without any murmurs.  No lower extremity edema. Cap refill < 3sec  Lung:  Clear to auscultation throughout w/o any wheeze or rhonchi.   Abdomen: Soft, non tender, and without distention. No CVA tenderness  Musculoskeletal: All major joints without swelling  Neurological: speech normal, mental status intact, gait grossly normal  Psychiatric:   Alert and oriented x3, normal affect and mood and behavior    Assessment and Plan:   The following treatment plan was discussed    1. Nocturia  Continue finasteride    2. Chronic respiratory failure with hypoxia (CMS-HCC)  Stable, continue current meds    3. essential hypertension  Controlled, continue current meds      4. Dyslipidemia  Continue lovastatin    Labs were done Oct 2017.    Followup: Return if symptoms worsen or fail to improve.         Please note that this dictation was created using voice recognition software. I have made every " reasonable attempt to correct obvious errors, but I expect that there are errors of grammar and possibly content that I did not discover before finalizing the note

## 2018-02-28 NOTE — ASSESSMENT & PLAN NOTE
Patient used to be a heavy smoking or 30-pack-year stopped in 2016. Patient does have COPD and is currently taking Duoneb and Arnuity daily and albuterol as needed. Patient has shortness of breath and fatigue and tiredness which are new problems. Denies any fevers or chills, new cough.

## 2018-02-28 NOTE — ASSESSMENT & PLAN NOTE
Pt has known HTN, controlled with current medicines enalapril 20 daily. He denies HA, blurred vision, dizziness, palpitations, or chest pain, lower extremity edema.

## 2018-05-26 DIAGNOSIS — J44.1 CHRONIC OBSTRUCTIVE PULMONARY DISEASE WITH ACUTE EXACERBATION (HCC): ICD-10-CM

## 2018-05-29 RX ORDER — IPRATROPIUM BROMIDE AND ALBUTEROL SULFATE 2.5; .5 MG/3ML; MG/3ML
SOLUTION RESPIRATORY (INHALATION)
Qty: 360 ML | Refills: 0 | Status: SHIPPED | OUTPATIENT
Start: 2018-05-29 | End: 2018-08-08

## 2018-05-29 NOTE — TELEPHONE ENCOUNTER
Have we ever prescribed this med? Yes.  If yes, what date? 5/10/17    Last OV: 5/10/17    Next OV: No pending apt scheduled    DX: Chronic respiratory failure with hypoxia (HCC) (J96.11    Medications: ipratropium-albuterol (DUONEB) 0.5-2.5 (3) MG/3ML nebulizer solution

## 2018-05-31 ENCOUNTER — TELEPHONE (OUTPATIENT)
Dept: PULMONOLOGY | Facility: HOSPICE | Age: 78
End: 2018-05-31

## 2018-05-31 NOTE — TELEPHONE ENCOUNTER
DOCUMENTATION OF PRIOR AUTH STATUS    1. Medication name and dose: Duoneb    2. Name and Phone # of Prescription coverage company: [employer] [phone]    3. Date Prior Auth was submitted: 5/29/2018    4. What information was given to obtain insurance decision: Clinical notes    5. Prior Auth letter Approved or Denied: Denied, must be billed to Medicare Part B    6. Pharmacy notified: No    7. Patient notified: No

## 2018-05-31 NOTE — TELEPHONE ENCOUNTER
MEDICATION PRIOR AUTHORIZATION NEEDED:    1. Name of Medication: Duoneb    2. Requested By (Name of Pharmacy): [facility]     3. Is insurance on file current? yes    4. What is the name & phone number of the 3rd party payor? [facility] [phone]

## 2018-07-06 RX ORDER — ENALAPRIL MALEATE 20 MG/1
20 TABLET ORAL DAILY
Qty: 90 TAB | Refills: 1 | Status: SHIPPED | OUTPATIENT
Start: 2018-07-06 | End: 2019-01-06 | Stop reason: SDUPTHER

## 2018-07-06 RX ORDER — FINASTERIDE 5 MG/1
5 TABLET, FILM COATED ORAL DAILY
Qty: 90 TAB | Refills: 1 | Status: SHIPPED | OUTPATIENT
Start: 2018-07-06 | End: 2019-01-06 | Stop reason: SDUPTHER

## 2018-07-06 RX ORDER — LOVASTATIN 20 MG/1
20 TABLET ORAL DAILY
Qty: 90 TAB | Refills: 1 | Status: SHIPPED | OUTPATIENT
Start: 2018-07-06 | End: 2019-01-06 | Stop reason: SDUPTHER

## 2018-07-06 NOTE — TELEPHONE ENCOUNTER
Please inform patient that he is due for Labs and Blood work in Oct. Contact our office to order blood work before coming in for a visit in [place].     Cassie Au P.A.-C.

## 2018-07-18 DIAGNOSIS — J44.9 CHRONIC OBSTRUCTIVE PULMONARY DISEASE, UNSPECIFIED COPD TYPE (HCC): ICD-10-CM

## 2018-07-18 RX ORDER — ALBUTEROL SULFATE 90 UG/1
AEROSOL, METERED RESPIRATORY (INHALATION)
Qty: 1 INHALER | Refills: 0 | Status: SHIPPED | OUTPATIENT
Start: 2018-07-18 | End: 2018-09-20 | Stop reason: SDUPTHER

## 2018-07-18 NOTE — TELEPHONE ENCOUNTER
Have we ever prescribed this med? Yes.  If yes, what date?     Last OV: 05/10/2017 - Dr. Manzano     Next OV: none scheduled was to follow up in 6 months     DX: Chronic Resp Failure    Medications: Ventolin     1 fill - needs OV for further refills

## 2018-08-08 ENCOUNTER — OFFICE VISIT (OUTPATIENT)
Dept: PULMONOLOGY | Facility: HOSPICE | Age: 78
End: 2018-08-08
Payer: MEDICARE

## 2018-08-08 VITALS
HEART RATE: 75 BPM | HEIGHT: 71 IN | SYSTOLIC BLOOD PRESSURE: 122 MMHG | DIASTOLIC BLOOD PRESSURE: 83 MMHG | TEMPERATURE: 98.1 F | BODY MASS INDEX: 21.98 KG/M2 | WEIGHT: 157 LBS | RESPIRATION RATE: 16 BRPM | OXYGEN SATURATION: 95 %

## 2018-08-08 DIAGNOSIS — J44.9 CHRONIC OBSTRUCTIVE PULMONARY DISEASE, UNSPECIFIED COPD TYPE (HCC): ICD-10-CM

## 2018-08-08 DIAGNOSIS — G47.34 NOCTURNAL HYPOXEMIA: ICD-10-CM

## 2018-08-08 DIAGNOSIS — Z87.891 FORMER SMOKER: ICD-10-CM

## 2018-08-08 DIAGNOSIS — R91.8 ABNORMAL CT SCAN, LUNG: ICD-10-CM

## 2018-08-08 PROBLEM — J96.11 CHRONIC RESPIRATORY FAILURE WITH HYPOXIA (HCC): Status: RESOLVED | Noted: 2017-04-11 | Resolved: 2018-08-08

## 2018-08-08 PROCEDURE — 90732 PPSV23 VACC 2 YRS+ SUBQ/IM: CPT | Performed by: NURSE PRACTITIONER

## 2018-08-08 PROCEDURE — 99214 OFFICE O/P EST MOD 30 MIN: CPT | Mod: 25 | Performed by: NURSE PRACTITIONER

## 2018-08-08 PROCEDURE — G0009 ADMIN PNEUMOCOCCAL VACCINE: HCPCS | Performed by: NURSE PRACTITIONER

## 2018-08-08 NOTE — PROCEDURES
Multi-Ox Readings  Multi Ox #1 Room air   O2 sat % at rest 93   O2 sat % on exertion 90   O2 sat average on exertion 93   Multi Ox #2     O2 sat % at rest     O2 sat % on exertion     O2 sat average on exertion       Oxygen Use     Oxygen Frequency     Duration of need     Is the patient mobile within the home?     CPAP Use?     BIPAP Use?     Servo Titration

## 2018-08-08 NOTE — PROGRESS NOTES
Chief Complaint   Patient presents with   • Follow-Up     6 months/ med refill        HPI:  David Cho is a 77 y.o. year old male here today for follow-up on COPD.  Patient establish care 5/10/2017.  Former smoker, quit in 2016 with a 50-pack-year history.  PFT 2016 indicated FEV1 1.56 L or 53%, FEV1/FVC ratio 50, %, % and a DLCO of 91%.  He was placed on oxygen in the past and continues to have a concentrator. Multi ox today indicated no desaturations at rest or on exertion.  He is currently using Arnuity 1 puff QD and stopped Stiolto due to cost. He found these effective though.  He uses his nebulizer twice daily routinely.  He also has an albuterol HFA inhaler on him.  Today he notes dyspnea when going up stairs or inclines.  He tolerates flat ground well.  He notes a mild cough with clear phlegm production.  No chest tightness or wheezing.  He notes a history of sinus congestion at times due to a prior broken nose.  He denies pedal edema.  He overall feels well.  He has no family history of lung cancer.    Patient completed a CT scan of chest 2017 indicatin.  Stable ill-defined spiculated groundglass type nodule medial left lung apex with pleural extension suggesting this is post inflammatory scarring.  2.  Stable additional right upper lobe, right middle lobe and left lower lobe nodules, also probably post inflammatory.  3.  There is atherosclerosis with extensive coronary artery calcification.    He has not had follow-up on this.  We will perform CT scan of chest now.      ROS: As per HPI and otherwise negative if not stated.    Past Medical History:   Diagnosis Date   • Cholesterol serum elevated    • COPD (chronic obstructive pulmonary disease) (HCC)    • Depression    • Enlarged prostate    • Hypertension        Past Surgical History:   Procedure Laterality Date   • APPENDECTOMY     • OTHER  appendectomy       Family History   Problem Relation Age of Onset   • Heart  "Disease Mother        Social History     Social History   • Marital status:      Spouse name: N/A   • Number of children: N/A   • Years of education: N/A     Occupational History   • Not on file.     Social History Main Topics   • Smoking status: Former Smoker     Packs/day: 1.00     Years: 50.00     Types: Cigarettes     Quit date: 8/12/2016   • Smokeless tobacco: Never Used   • Alcohol use Yes      Comment: daily beer   • Drug use: No   • Sexual activity: Not on file     Other Topics Concern   • Not on file     Social History Narrative   • No narrative on file       Allergies as of 08/08/2018   • (No Known Allergies)        @Vital signs for this encounter:  Vitals:    08/08/18 0808   Height: 1.803 m (5' 10.98\")   Weight: 71.2 kg (157 lb)   Weight % change since last entry.: 0 %   BP: 122/83   Pulse: 75   BMI (Calculated): 21.91   Resp: 16   Temp: 36.7 °C (98.1 °F)   O2 sat % room air: 95 %       Current medications as of today   Current Outpatient Prescriptions   Medication Sig Dispense Refill   • Fluticasone-Umeclidin-Vilant (TRELEGY ELLIPTA) 100-62.5-25 MCG/INH AEROSOL POWDER, BREATH ACTIVATED Inhale 1 Puff by mouth every day. 1 Each 11   • VENTOLIN  (90 Base) MCG/ACT Aero Soln inhalation aerosol INHALE 2 PUFFS BY MOUTH EVERY 4 HOURS AS NEEDED FOR SHORTNESS OF BREATH 1 Inhaler 0   • finasteride (PROSCAR) 5 MG Tab Take 1 Tab by mouth every day. 90 Tab 1   • enalapril (VASOTEC) 20 MG tablet Take 1 Tab by mouth every day. 90 Tab 1   • lovastatin (MEVACOR) 20 MG Tab Take 1 Tab by mouth every day. 90 Tab 1   • ARNUITY ELLIPTA 100 MCG/ACT AEROSOL POWDER, BREATH ACTIVATED INHALE ONE PUFF BY MOUTH EVERY DAY RINSE MOUTH AFTER USE 1 Each 5   • ipratropium-albuterol (DUONEB) 0.5-2.5 (3) MG/3ML nebulizer solution 3 mL by Nebulization route every four hours as needed for Shortness of Breath. 360 mL 3   • albuterol 108 (90 BASE) MCG/ACT Aero Soln inhalation aerosol Inhale 2 Puffs by mouth every 6 hours as needed " for Shortness of Breath. 8.5 g 3   • buPROPion (WELLBUTRIN XL) 150 MG XL tablet TK 1 T PO QD  10     No current facility-administered medications for this visit.          Physical Exam:   Gen:           Alert and oriented, No apparent distress. Mood and affect appropriate, normal interaction with examiner.  Eyes:          PERRL, EOM intact, sclere white, conjunctive moist.  Ears:          Not examined.   Hearing:     Grossly intact.  Nose:          Normal, no lesions or deformities.  Dentition:    Good dentition.  Oropharynx:   Tongue normal, posterior pharynx without erythema or exudate.  Mallampati Classification: 2/3  Neck:        Supple, trachea midline, no masses.  Respiratory Effort: No intercostal retractions or use of accessory muscles.   Lung Auscultation:      Clear to auscultation bilaterally but slightly diminished t/o; no rales, rhonchi or wheezing.  CV:            Regular rate and rhythm. No murmurs, rubs or gallops.  Abd:           Not examined.   Lymphadenopathy: Not examined.  Gait and Station: Normal.  Digits and Nails: No clubbing, cyanosis, petechiae, or nodes.   Cranial Nerves: II-XII grossly intact.  Skin:        No rashes, lesions or ulcers noted.               Ext:           No cyanosis or edema.      Assessment:  1. Chronic obstructive pulmonary disease, unspecified COPD type (HCC)  PNEUMOCOCCAL POLYSACCHARIDE VACCINE 23-VALENT =>3YO SQ/IM    AMB PULMONARY FUNCTION TEST/LAB    AMB MULTIPLE OXIMETRY    AMB MULTIPLE OXIMETRY   2. Abnormal CT scan, lung  CT-CHEST (THORAX) W/O   3. Former smoker  AMB PULMONARY FUNCTION TEST/LAB   4. BMI 21.0-21.9, adult         Immunizations:    Flu:11/2017  Pneumovax 23:given today  Prevnar 13:10/2016    Plan:  1.  Pneumovax 23 given today.  2.  Start Trelegy 1 puff daily.  Stop R annuity.  Continue nebulizer and albuterol HFA inhaler as needed.  Rx with coupon given.  Patient advised to call office if he feels he is having side effects.  3.  Discussed  respiratory hygiene.  4. DME CNOX on RA now. May call results. Patient is unsure if he should be on nocturnal oxygen therapy.  5.  CT scan of chest now without contrast.  Follow-up on previous pulmonary nodules noted.  6.  PFT at next office visit.  7.  Follow-up in 6 weeks with CT scan of chest/CNOX/PFT, sooner if needed.

## 2018-08-20 ENCOUNTER — HOSPITAL ENCOUNTER (OUTPATIENT)
Dept: RADIOLOGY | Facility: MEDICAL CENTER | Age: 78
End: 2018-08-20
Attending: NURSE PRACTITIONER
Payer: MEDICARE

## 2018-08-20 DIAGNOSIS — G47.34 NOCTURNAL HYPOXIA: ICD-10-CM

## 2018-08-20 DIAGNOSIS — R91.8 ABNORMAL CT SCAN, LUNG: ICD-10-CM

## 2018-08-20 PROCEDURE — 71250 CT THORAX DX C-: CPT

## 2018-08-27 ENCOUNTER — TELEPHONE (OUTPATIENT)
Dept: PULMONOLOGY | Facility: HOSPICE | Age: 78
End: 2018-08-27

## 2018-08-27 DIAGNOSIS — J44.9 CHRONIC OBSTRUCTIVE PULMONARY DISEASE, UNSPECIFIED COPD TYPE (HCC): ICD-10-CM

## 2018-08-27 DIAGNOSIS — G47.33 OBSTRUCTIVE SLEEP APNEA: ICD-10-CM

## 2018-08-27 NOTE — TELEPHONE ENCOUNTER
Please sign for DC of daytime o2 since he did not qualify at last OV    We have send an updated order for his nocturnal but they are still requesting this.

## 2018-08-30 DIAGNOSIS — J96.11 CHRONIC RESPIRATORY FAILURE WITH HYPOXIA (HCC): ICD-10-CM

## 2018-08-31 RX ORDER — FLUTICASONE FUROATE 100 UG/1
POWDER RESPIRATORY (INHALATION)
Qty: 1 EACH | Refills: 5 | Status: SHIPPED | OUTPATIENT
Start: 2018-08-31 | End: 2018-12-23

## 2018-08-31 NOTE — TELEPHONE ENCOUNTER
Have we ever prescribed this med? Yes.  If yes, what date? 01/18/2018    Last OV: 08/08/2018-Wili     Next OV: 10/24/2018-Wili    DX: Chronic respiratory failure with hypoxia     Medications:   Requested Prescriptions     Pending Prescriptions Disp Refills   • ARNUITY ELLIPTA 100 MCG/ACT AEROSOL POWDER, BREATH ACTIVATED [Pharmacy Med Name: ARNUITY ELLIPTA 100MCG ORAL INH 30] 1 Each 0     Sig: INHALE 1 PUFF BY MOUTH EVERY DAY. RINSE MOUTH AFTER USE

## 2018-09-18 DIAGNOSIS — J44.1 CHRONIC OBSTRUCTIVE PULMONARY DISEASE WITH ACUTE EXACERBATION (HCC): ICD-10-CM

## 2018-09-18 RX ORDER — IPRATROPIUM BROMIDE AND ALBUTEROL SULFATE 2.5; .5 MG/3ML; MG/3ML
SOLUTION RESPIRATORY (INHALATION)
Qty: 360 ML | Refills: 3 | Status: SHIPPED | OUTPATIENT
Start: 2018-09-18 | End: 2018-12-23

## 2018-09-18 NOTE — TELEPHONE ENCOUNTER
Have we ever prescribed this med? Yes.  If yes, what date? 5/10/17    Last OV: 8/8/18    Next OV: 10/24/18    DX: Chronic respiratory failure with hypoxia (HCC) (J96.11)    Medications: ipratropium-albuterol (DUONEB) 0.5-2.5 (3) MG/3ML nebulizer solution

## 2018-09-20 DIAGNOSIS — J44.9 CHRONIC OBSTRUCTIVE PULMONARY DISEASE, UNSPECIFIED COPD TYPE (HCC): ICD-10-CM

## 2018-09-20 NOTE — TELEPHONE ENCOUNTER
Caller Name: Peter Javon Anderson                 Call Back Number: [phone] (home)         Patient approves a detailed voicemail message: N\A    Have we ever prescribed this med? Yes.  If yes, what date? 7/18/18    Last OV: 8/20/18 ETHEL DOCKERY     Next OV: 10/24/18 ETHEL DOCKERY     DX: COPD     Medications:  Current Outpatient Prescriptions   Medication Sig Dispense Refill   • ipratropium-albuterol (DUONEB) 0.5-2.5 (3) MG/3ML nebulizer solution INHALE 3 ML( 1 VIAL) VIA NEBULIZER EVERY 4 HOURS AS NEEDED FOR SHORTNESS OF BREATH 360 mL 3   • ARNUITY ELLIPTA 100 MCG/ACT AEROSOL POWDER, BREATH ACTIVATED INHALE 1 PUFF BY MOUTH EVERY DAY. RINSE MOUTH AFTER USE 1 Each 5   • Fluticasone-Umeclidin-Vilant (TRELEGY ELLIPTA) 100-62.5-25 MCG/INH AEROSOL POWDER, BREATH ACTIVATED Inhale 1 Puff by mouth every day. 1 Each 11   • VENTOLIN  (90 Base) MCG/ACT Aero Soln inhalation aerosol INHALE 2 PUFFS BY MOUTH EVERY 4 HOURS AS NEEDED FOR SHORTNESS OF BREATH 1 Inhaler 0   • finasteride (PROSCAR) 5 MG Tab Take 1 Tab by mouth every day. 90 Tab 1   • enalapril (VASOTEC) 20 MG tablet Take 1 Tab by mouth every day. 90 Tab 1   • lovastatin (MEVACOR) 20 MG Tab Take 1 Tab by mouth every day. 90 Tab 1   • ipratropium-albuterol (DUONEB) 0.5-2.5 (3) MG/3ML nebulizer solution 3 mL by Nebulization route every four hours as needed for Shortness of Breath. 360 mL 3   • buPROPion (WELLBUTRIN XL) 150 MG XL tablet TK 1 T PO QD  10   • albuterol 108 (90 BASE) MCG/ACT Aero Soln inhalation aerosol Inhale 2 Puffs by mouth every 6 hours as needed for Shortness of Breath. 8.5 g 3     No current facility-administered medications for this visit.

## 2018-09-21 RX ORDER — ALBUTEROL SULFATE 90 UG/1
AEROSOL, METERED RESPIRATORY (INHALATION)
Qty: 1 INHALER | Refills: 5 | Status: SHIPPED | OUTPATIENT
Start: 2018-09-21 | End: 2020-01-27

## 2018-10-24 ENCOUNTER — NON-PROVIDER VISIT (OUTPATIENT)
Dept: PULMONOLOGY | Facility: HOSPICE | Age: 78
End: 2018-10-24
Payer: MEDICARE

## 2018-10-24 ENCOUNTER — OFFICE VISIT (OUTPATIENT)
Dept: PULMONOLOGY | Facility: HOSPICE | Age: 78
End: 2018-10-24
Payer: MEDICARE

## 2018-10-24 VITALS
RESPIRATION RATE: 16 BRPM | BODY MASS INDEX: 21.56 KG/M2 | HEIGHT: 71 IN | DIASTOLIC BLOOD PRESSURE: 89 MMHG | WEIGHT: 154 LBS | OXYGEN SATURATION: 94 % | SYSTOLIC BLOOD PRESSURE: 122 MMHG | TEMPERATURE: 98.1 F | HEART RATE: 86 BPM

## 2018-10-24 DIAGNOSIS — Z87.891 FORMER SMOKER: ICD-10-CM

## 2018-10-24 DIAGNOSIS — R91.8 ABNORMAL CT SCAN, LUNG: ICD-10-CM

## 2018-10-24 DIAGNOSIS — J44.9 CHRONIC OBSTRUCTIVE PULMONARY DISEASE, UNSPECIFIED COPD TYPE (HCC): ICD-10-CM

## 2018-10-24 DIAGNOSIS — G47.34 NOCTURNAL HYPOXIA: ICD-10-CM

## 2018-10-24 DIAGNOSIS — R91.8 LUNG NODULES: ICD-10-CM

## 2018-10-24 DIAGNOSIS — J44.9 CHRONIC OBSTRUCTIVE PULMONARY DISEASE, UNSPECIFIED COPD TYPE (HCC): Chronic | ICD-10-CM

## 2018-10-24 PROCEDURE — 99214 OFFICE O/P EST MOD 30 MIN: CPT | Performed by: NURSE PRACTITIONER

## 2018-10-24 RX ORDER — INFLUENZA A VIRUS A/MICHIGAN/45/2015 X-275 (H1N1) ANTIGEN (FORMALDEHYDE INACTIVATED), INFLUENZA A VIRUS A/SINGAPORE/INFIMH-16-0019/2016 IVR-186 (H3N2) ANTIGEN (FORMALDEHYDE INACTIVATED), AND INFLUENZA B VIRUS B/MARYLAND/15/2016 BX-69A (A B/COLORADO/6/2017-LIKE VIRUS) ANTIGEN (FORMALDEHYDE INACTIVATED) 60; 60; 60 UG/.5ML; UG/.5ML; UG/.5ML
INJECTION, SUSPENSION INTRAMUSCULAR
Refills: 0 | COMMUNITY
Start: 2018-10-11 | End: 2018-12-23

## 2018-10-24 ASSESSMENT — PULMONARY FUNCTION TESTS
FEV1_PERCENT_PREDICTED: 64
FEV1: 1.71
FVC_LLN: 3.29
FEV1/FVC: 50
FEV1_PREDICTED: 2.82
FEV1/FVC_PERCENT_PREDICTED: 74
FVC_PERCENT_PREDICTED: 85
FVC_PREDICTED: 3.94
FVC: 3.39
FEV1/FVC_PERCENT_PREDICTED: 75
FVC_PERCENT_PREDICTED: 87
FEV1_PERCENT_CHANGE: -1
FEV1: 1.81
FEV1/FVC_PERCENT_PREDICTED: 69
FEV1_LLN: 2.35
FEV1/FVC_PERCENT_PREDICTED: 72
FEV1_PERCENT_PREDICTED: 60
FEV1/FVC_PERCENT_CHANGE: 7
FEV1/FVC_PERCENT_LLN: 60
FEV1/FVC: 53.39
FVC: 3.44
FEV1_PERCENT_CHANGE: 5
FEV1/FVC: 50
FEV1/FVC_PERCENT_CHANGE: -500
FEV1/FVC_PREDICTED: 72
FEV1/FVC: 53
FEV1/FVC_PERCENT_PREDICTED: 69

## 2018-10-24 NOTE — PROCEDURES
Good patient effort & cooperation.  The results of this test meet the ATS/ERS standards for acceptability and repeatability.  Predicted equations for Spirometry are N-Heidy II, ITS for lung volumes, and [facility] for DLCO.  The DLCO was uncorrected for Hgb.  A bronchodilator of Ventolin HFA- 2puffs via spacer were administered.  DLCO was performed during dilation period.    Spirometry:  FVC was 3.39 L, 85% predicted  FEV1 was 1.81 L, 64% of predicted  FEV1/FVC ratio was 53%  Flow volume loop was consistent with obstruction  There was no significant response to bronchodilators    Lung volumes showed residual volume of 4.21 L, 164% of predicted  Total lung capacity was normal at 109% predicted    Diffusion capacity was low normal at 81% predicted    Impression:  The patient has moderate obstructive lung disease probably secondary to COPD given the patient history of extensive smoking.  Clinical correlation is required.

## 2018-10-24 NOTE — PROGRESS NOTES
Chief Complaint   Patient presents with   • Follow-Up       HPI:  David Cho is a 78 y.o. year old male here today for follow-up on COPD.  Patient established care 5/10/2017.  Former smoker, quit in 2016 with a 50-pack-year history.  PFT 2016 indicated FEV1 1.56 L or 53%, FEV1/FVC ratio 50, %, % and a DLCO of 91%.  Updated PFT 10/24/2018 indicates FEV1 1.71 L or 60%, FEV1/FVC ratio 50%, %, %, and a DLCO of 81%.  Patient had significant bronchodilator response in small airways. He is currently using Arnuity 1 puff QD and stopped Stiolto due to cost. He found these effective though.  He uses his nebulizer twice daily routinely.  He also has an albuterol HFA inhaler on him.  He attempted to try Trelegy 1 puff daily with sample coupon but pharmacy denied it.  He still would like to try this. Today he notes dyspnea when going up stairs or inclines.  He tolerates flat ground well.    He denies cough, phlegm, wheezing or chest tightness.  He notes a history of sinus congestion at times due to a prior broken nose.  He denies pedal edema.  He overall feels well.  He has no family history of lung cancer.     Patient completed a CT scan of chest 2017 indicatin.  Stable ill-defined spiculated groundglass type nodule medial left lung apex with pleural extension suggesting this is post inflammatory scarring.  2.  Stable additional right upper lobe, right middle lobe and left lower lobe nodules, also probably post inflammatory.  3.  There is atherosclerosis with extensive coronary artery calcification.    Updated CT scan 2018 indicates unchanged size of multiple bilateral pulmonary nodules, largest measuring 7 mm.  Given lack of interval growth since 2016 these are likely benign.  Emphysema noted.  Atherosclerosis noted.  I reviewed findings with patient.  Due to his recent smoking cessation annual CT scan is recommended.  This will be due 2019.    Overnight  "oximetry 8/15/2018 on room air indicated persistent low oxygen levels less than 89% for 423 minutes of night.  UYEN events of 384.  Basal SPO2 of 84%.  Patient was started on 2 L oxygen nightly.  He feels he sleeps wellness.  He continues to wake 3 times nightly which she is always been.  He feels he has urinary issues with his prostate.  He denies daytime fatigue or napping.  He notes an occasional headache but not regularly in the morning.  He declines sleep testing at this point.  We reviewed pathophysiology and he will consider it.      ROS: As per HPI and otherwise negative if not stated.    Past Medical History:   Diagnosis Date   • Cholesterol serum elevated    • COPD (chronic obstructive pulmonary disease) (HCC)    • Depression    • Enlarged prostate    • Hypertension        Past Surgical History:   Procedure Laterality Date   • APPENDECTOMY     • OTHER  appendectomy       Family History   Problem Relation Age of Onset   • Heart Disease Mother        Social History     Social History   • Marital status:      Spouse name: N/A   • Number of children: N/A   • Years of education: N/A     Occupational History   • Not on file.     Social History Main Topics   • Smoking status: Former Smoker     Packs/day: 1.00     Years: 50.00     Types: Cigarettes     Quit date: 8/12/2016   • Smokeless tobacco: Never Used   • Alcohol use Yes      Comment: daily beer   • Drug use: No   • Sexual activity: Not on file     Other Topics Concern   • Not on file     Social History Narrative   • No narrative on file       Allergies as of 10/24/2018   • (No Known Allergies)        @Vital signs for this encounter:  Vitals:    10/24/18 1127   Height: 1.803 m (5' 10.98\")   Weight: 69.9 kg (154 lb)   Weight % change since last entry.: 0 %   BP: 122/89   Pulse: 86   BMI (Calculated): 21.49   Resp: 16   Temp: 36.7 °C (98.1 °F)   TempSrc: Temporal       Current medications as of today   Current Outpatient Prescriptions   Medication Sig " Dispense Refill   • umeclidinium-vilanterol (ANORO ELLIPTA) 62.5-25 MCG/INH AEROSOL POWDER, BREATH ACTIVATED inhaler Inhale 1 Puff by mouth every day. 1 Inhaler 11   • Fluticasone-Umeclidin-Vilant (TRELEGY ELLIPTA) 100-62.5-25 MCG/INH AEROSOL POWDER, BREATH ACTIVATED Inhale 1 Puff by mouth every day. 1 Each 11   • ARNUITY ELLIPTA 100 MCG/ACT AEROSOL POWDER, BREATH ACTIVATED INHALE 1 PUFF BY MOUTH EVERY DAY. RINSE MOUTH AFTER USE 1 Each 5   • finasteride (PROSCAR) 5 MG Tab Take 1 Tab by mouth every day. 90 Tab 1   • enalapril (VASOTEC) 20 MG tablet Take 1 Tab by mouth every day. 90 Tab 1   • lovastatin (MEVACOR) 20 MG Tab Take 1 Tab by mouth every day. 90 Tab 1   • buPROPion (WELLBUTRIN XL) 150 MG XL tablet TK 1 T PO QD  10   • albuterol 108 (90 BASE) MCG/ACT Aero Soln inhalation aerosol Inhale 2 Puffs by mouth every 6 hours as needed for Shortness of Breath. 8.5 g 3   • FLUZONE HIGH-DOSE 0.5 ML Suspension Prefilled Syringe injection ADM 0.5ML IM UTD  0   • VENTOLIN  (90 Base) MCG/ACT Aero Soln inhalation aerosol INHALE 2 PUFFS BY MOUTH EVERY 4 HOURS AS NEEDED SHORTNESS OF BREATH 1 Inhaler 5   • ipratropium-albuterol (DUONEB) 0.5-2.5 (3) MG/3ML nebulizer solution INHALE 3 ML( 1 VIAL) VIA NEBULIZER EVERY 4 HOURS AS NEEDED FOR SHORTNESS OF BREATH 360 mL 3   • ipratropium-albuterol (DUONEB) 0.5-2.5 (3) MG/3ML nebulizer solution 3 mL by Nebulization route every four hours as needed for Shortness of Breath. 360 mL 3     No current facility-administered medications for this visit.          Physical Exam:   Gen:           Alert and oriented, No apparent distress. Mood and affect appropriate, normal interaction with examiner.  Eyes:          PERRL, EOM intact, sclere white, conjunctive moist.  Ears:          Not examined.   Hearing:     Grossly intact.  Nose:          Normal, no lesions or deformities.  Dentition:    Good dentition.  Oropharynx:   Tongue normal, posterior pharynx without erythema or  exudate.  Mallampati Classification: 2/3  Neck:        Supple, trachea midline, no masses.  Respiratory Effort: No intercostal retractions or use of accessory muscles.   Lung Auscultation:      Clear to auscultation bilaterally; no rales, rhonchi or wheezing.  CV:            Regular rate and rhythm. No murmurs, rubs or gallops.  Abd:           Not examined.   Lymphadenopathy: Not examined.  Gait and Station: Normal.  Digits and Nails: No clubbing, cyanosis, petechiae, or nodes.   Cranial Nerves: II-XII grossly intact.  Skin:        No rashes, lesions or ulcers noted.               Ext:           No cyanosis or edema.      Assessment:  1. Chronic obstructive pulmonary disease, unspecified COPD type (HCC)     2. Abnormal CT scan, lung     3. Lung nodules     4. Nocturnal hypoxia     5. Former smoker     6. BMI 21.0-21.9, adult         Immunizations:    [id number]  Pneumovax 23:2018  Prevnar 13:2016    Plan:  1.  Rx with coupon for Trelegy 1 puff daily.  If patient finds beneficial, we will initiate prior Auth to help with coverage.  If this is denied, will start patient on Anoro 1 puff daily in R annuity 1 puff daily.  Continue nebulizer and ARNALDO as needed.  Prescriptions given to patient.  2.  Continue nocturnal oxygen at 2 L/min.  Patient noted dryness to nose.  Add saline spray prior to bedtime.  Patient will continue to benefit from oxygen therapy.  Patient will consider home sleep study.  3.  CT scan of chest due in 1 year August 2019 for continued monitoring.  Patient recently quit smoking 2016.  Nodules have been stable for 2 years.  4.  Discussed sleep nursery hygiene.  5.  Follow-up in 6 months, sooner if needed.  If patient has issues with his prescription coverage, advised to contact office right away.    Please note that this dictation was created using voice recognition software. I have made every reasonable attempt to correct obvious errors, but it is possible there are errors of grammar and possibly  content that I did not discover before finalizing the note.

## 2018-11-02 RX ORDER — BUPROPION HYDROCHLORIDE 150 MG/1
150 TABLET ORAL EVERY MORNING
Qty: 90 TAB | Refills: 1 | Status: SHIPPED | OUTPATIENT
Start: 2018-11-02 | End: 2019-04-22 | Stop reason: SDUPTHER

## 2018-11-04 PROCEDURE — 94729 DIFFUSING CAPACITY: CPT | Performed by: INTERNAL MEDICINE

## 2018-11-04 PROCEDURE — 94060 EVALUATION OF WHEEZING: CPT | Performed by: INTERNAL MEDICINE

## 2018-11-04 PROCEDURE — 94726 PLETHYSMOGRAPHY LUNG VOLUMES: CPT | Performed by: INTERNAL MEDICINE

## 2018-12-23 ENCOUNTER — APPOINTMENT (OUTPATIENT)
Dept: RADIOLOGY | Facility: MEDICAL CENTER | Age: 78
DRG: 193 | End: 2018-12-23
Payer: MEDICARE

## 2018-12-23 ENCOUNTER — APPOINTMENT (OUTPATIENT)
Dept: RADIOLOGY | Facility: MEDICAL CENTER | Age: 78
DRG: 193 | End: 2018-12-23
Attending: EMERGENCY MEDICINE
Payer: MEDICARE

## 2018-12-23 ENCOUNTER — HOSPITAL ENCOUNTER (INPATIENT)
Facility: MEDICAL CENTER | Age: 78
LOS: 5 days | DRG: 193 | End: 2018-12-28
Attending: EMERGENCY MEDICINE | Admitting: HOSPITALIST
Payer: MEDICARE

## 2018-12-23 DIAGNOSIS — J44.1 ACUTE EXACERBATION OF CHRONIC OBSTRUCTIVE PULMONARY DISEASE (COPD) (HCC): ICD-10-CM

## 2018-12-23 DIAGNOSIS — J96.21 ACUTE ON CHRONIC RESPIRATORY FAILURE WITH HYPOXIA (HCC): ICD-10-CM

## 2018-12-23 DIAGNOSIS — J18.9 PNEUMONIA OF BOTH LUNGS DUE TO INFECTIOUS ORGANISM, UNSPECIFIED PART OF LUNG: ICD-10-CM

## 2018-12-23 DIAGNOSIS — J44.1 COPD WITH ACUTE EXACERBATION (HCC): ICD-10-CM

## 2018-12-23 PROBLEM — R65.10 SIRS (SYSTEMIC INFLAMMATORY RESPONSE SYNDROME) (HCC): Status: ACTIVE | Noted: 2018-12-23

## 2018-12-23 LAB
ALBUMIN SERPL BCP-MCNC: 4 G/DL (ref 3.2–4.9)
ALBUMIN/GLOB SERPL: 1.5 G/DL
ALP SERPL-CCNC: 67 U/L (ref 30–99)
ALT SERPL-CCNC: 6 U/L (ref 2–50)
ANION GAP SERPL CALC-SCNC: 6 MMOL/L (ref 0–11.9)
AST SERPL-CCNC: 19 U/L (ref 12–45)
BASOPHILS # BLD AUTO: 0.9 % (ref 0–1.8)
BASOPHILS # BLD: 0.07 K/UL (ref 0–0.12)
BILIRUB SERPL-MCNC: 0.6 MG/DL (ref 0.1–1.5)
BLOOD CULTURE HOLD CXBCH: NORMAL
BNP SERPL-MCNC: 313 PG/ML (ref 0–100)
BUN SERPL-MCNC: 17 MG/DL (ref 8–22)
CALCIUM SERPL-MCNC: 9.5 MG/DL (ref 8.5–10.5)
CHLORIDE SERPL-SCNC: 96 MMOL/L (ref 96–112)
CO2 SERPL-SCNC: 32 MMOL/L (ref 20–33)
CREAT SERPL-MCNC: 0.89 MG/DL (ref 0.5–1.4)
EKG IMPRESSION: NORMAL
EOSINOPHIL # BLD AUTO: 0.52 K/UL (ref 0–0.51)
EOSINOPHIL NFR BLD: 6.3 % (ref 0–6.9)
ERYTHROCYTE [DISTWIDTH] IN BLOOD BY AUTOMATED COUNT: 44.5 FL (ref 35.9–50)
GLOBULIN SER CALC-MCNC: 2.6 G/DL (ref 1.9–3.5)
GLUCOSE SERPL-MCNC: 102 MG/DL (ref 65–99)
HCT VFR BLD AUTO: 38.2 % (ref 42–52)
HGB BLD-MCNC: 12.6 G/DL (ref 14–18)
IMM GRANULOCYTES # BLD AUTO: 0.05 K/UL (ref 0–0.11)
IMM GRANULOCYTES NFR BLD AUTO: 0.6 % (ref 0–0.9)
LYMPHOCYTES # BLD AUTO: 1.79 K/UL (ref 1–4.8)
LYMPHOCYTES NFR BLD: 21.8 % (ref 22–41)
MCH RBC QN AUTO: 31.6 PG (ref 27–33)
MCHC RBC AUTO-ENTMCNC: 33 G/DL (ref 33.7–35.3)
MCV RBC AUTO: 95.7 FL (ref 81.4–97.8)
MONOCYTES # BLD AUTO: 1.26 K/UL (ref 0–0.85)
MONOCYTES NFR BLD AUTO: 15.3 % (ref 0–13.4)
NEUTROPHILS # BLD AUTO: 4.53 K/UL (ref 1.82–7.42)
NEUTROPHILS NFR BLD: 55.1 % (ref 44–72)
NRBC # BLD AUTO: 0 K/UL
NRBC BLD-RTO: 0 /100 WBC
PLATELET # BLD AUTO: 326 K/UL (ref 164–446)
PMV BLD AUTO: 8.9 FL (ref 9–12.9)
POTASSIUM SERPL-SCNC: 3.7 MMOL/L (ref 3.6–5.5)
PROT SERPL-MCNC: 6.6 G/DL (ref 6–8.2)
RBC # BLD AUTO: 3.99 M/UL (ref 4.7–6.1)
SODIUM SERPL-SCNC: 134 MMOL/L (ref 135–145)
TROPONIN I SERPL-MCNC: 0.02 NG/ML (ref 0–0.04)
WBC # BLD AUTO: 8.2 K/UL (ref 4.8–10.8)

## 2018-12-23 PROCEDURE — 770006 HCHG ROOM/CARE - MED/SURG/GYN SEMI*

## 2018-12-23 PROCEDURE — 84484 ASSAY OF TROPONIN QUANT: CPT

## 2018-12-23 PROCEDURE — 96367 TX/PROPH/DG ADDL SEQ IV INF: CPT

## 2018-12-23 PROCEDURE — 96375 TX/PRO/DX INJ NEW DRUG ADDON: CPT

## 2018-12-23 PROCEDURE — 700111 HCHG RX REV CODE 636 W/ 250 OVERRIDE (IP): Performed by: EMERGENCY MEDICINE

## 2018-12-23 PROCEDURE — 96365 THER/PROPH/DIAG IV INF INIT: CPT

## 2018-12-23 PROCEDURE — 700105 HCHG RX REV CODE 258: Performed by: HOSPITALIST

## 2018-12-23 PROCEDURE — 85025 COMPLETE CBC W/AUTO DIFF WBC: CPT

## 2018-12-23 PROCEDURE — 99285 EMERGENCY DEPT VISIT HI MDM: CPT

## 2018-12-23 PROCEDURE — 93005 ELECTROCARDIOGRAM TRACING: CPT | Performed by: EMERGENCY MEDICINE

## 2018-12-23 PROCEDURE — 71045 X-RAY EXAM CHEST 1 VIEW: CPT

## 2018-12-23 PROCEDURE — 99223 1ST HOSP IP/OBS HIGH 75: CPT | Performed by: HOSPITALIST

## 2018-12-23 PROCEDURE — 93005 ELECTROCARDIOGRAM TRACING: CPT

## 2018-12-23 PROCEDURE — 304561 HCHG STAT O2

## 2018-12-23 PROCEDURE — 700105 HCHG RX REV CODE 258: Performed by: EMERGENCY MEDICINE

## 2018-12-23 PROCEDURE — 83880 ASSAY OF NATRIURETIC PEPTIDE: CPT

## 2018-12-23 PROCEDURE — 80053 COMPREHEN METABOLIC PANEL: CPT

## 2018-12-23 PROCEDURE — 87040 BLOOD CULTURE FOR BACTERIA: CPT

## 2018-12-23 PROCEDURE — 36415 COLL VENOUS BLD VENIPUNCTURE: CPT

## 2018-12-23 RX ORDER — PREDNISONE 20 MG/1
40 TABLET ORAL DAILY
Status: DISCONTINUED | OUTPATIENT
Start: 2018-12-24 | End: 2018-12-25

## 2018-12-23 RX ORDER — ONDANSETRON 2 MG/ML
4 INJECTION INTRAMUSCULAR; INTRAVENOUS EVERY 4 HOURS PRN
Status: DISCONTINUED | OUTPATIENT
Start: 2018-12-23 | End: 2018-12-28 | Stop reason: HOSPADM

## 2018-12-23 RX ORDER — AMOXICILLIN 250 MG
2 CAPSULE ORAL 2 TIMES DAILY
Status: DISCONTINUED | OUTPATIENT
Start: 2018-12-23 | End: 2018-12-28 | Stop reason: HOSPADM

## 2018-12-23 RX ORDER — GUAIFENESIN/DEXTROMETHORPHAN 100-10MG/5
10 SYRUP ORAL EVERY 6 HOURS PRN
Status: DISCONTINUED | OUTPATIENT
Start: 2018-12-23 | End: 2018-12-28 | Stop reason: HOSPADM

## 2018-12-23 RX ORDER — SODIUM CHLORIDE 9 MG/ML
1000 INJECTION, SOLUTION INTRAVENOUS ONCE
Status: COMPLETED | OUTPATIENT
Start: 2018-12-23 | End: 2018-12-23

## 2018-12-23 RX ORDER — ENALAPRIL MALEATE 10 MG/1
20 TABLET ORAL DAILY
Status: DISCONTINUED | OUTPATIENT
Start: 2018-12-24 | End: 2018-12-28 | Stop reason: HOSPADM

## 2018-12-23 RX ORDER — AZITHROMYCIN 250 MG/1
250 TABLET, FILM COATED ORAL EVERY 24 HOURS
Status: COMPLETED | OUTPATIENT
Start: 2018-12-24 | End: 2018-12-27

## 2018-12-23 RX ORDER — ACETAMINOPHEN 325 MG/1
650 TABLET ORAL EVERY 6 HOURS PRN
Status: DISCONTINUED | OUTPATIENT
Start: 2018-12-23 | End: 2018-12-28 | Stop reason: HOSPADM

## 2018-12-23 RX ORDER — METHYLPREDNISOLONE SODIUM SUCCINATE 125 MG/2ML
125 INJECTION, POWDER, LYOPHILIZED, FOR SOLUTION INTRAMUSCULAR; INTRAVENOUS ONCE
Status: COMPLETED | OUTPATIENT
Start: 2018-12-23 | End: 2018-12-23

## 2018-12-23 RX ORDER — HEPARIN SODIUM 5000 [USP'U]/ML
5000 INJECTION, SOLUTION INTRAVENOUS; SUBCUTANEOUS EVERY 8 HOURS
Status: DISCONTINUED | OUTPATIENT
Start: 2018-12-24 | End: 2018-12-28 | Stop reason: HOSPADM

## 2018-12-23 RX ORDER — ONDANSETRON 4 MG/1
4 TABLET, ORALLY DISINTEGRATING ORAL EVERY 4 HOURS PRN
Status: DISCONTINUED | OUTPATIENT
Start: 2018-12-23 | End: 2018-12-28 | Stop reason: HOSPADM

## 2018-12-23 RX ORDER — AZITHROMYCIN 500 MG/1
500 INJECTION, POWDER, LYOPHILIZED, FOR SOLUTION INTRAVENOUS ONCE
Status: COMPLETED | OUTPATIENT
Start: 2018-12-23 | End: 2018-12-23

## 2018-12-23 RX ORDER — BISACODYL 10 MG
10 SUPPOSITORY, RECTAL RECTAL
Status: DISCONTINUED | OUTPATIENT
Start: 2018-12-23 | End: 2018-12-28 | Stop reason: HOSPADM

## 2018-12-23 RX ORDER — FINASTERIDE 5 MG/1
5 TABLET, FILM COATED ORAL DAILY
Status: DISCONTINUED | OUTPATIENT
Start: 2018-12-24 | End: 2018-12-28 | Stop reason: HOSPADM

## 2018-12-23 RX ORDER — LOVASTATIN 20 MG/1
20 TABLET ORAL DAILY
Status: DISCONTINUED | OUTPATIENT
Start: 2018-12-24 | End: 2018-12-28 | Stop reason: HOSPADM

## 2018-12-23 RX ORDER — POLYETHYLENE GLYCOL 3350 17 G/17G
1 POWDER, FOR SOLUTION ORAL
Status: DISCONTINUED | OUTPATIENT
Start: 2018-12-23 | End: 2018-12-28 | Stop reason: HOSPADM

## 2018-12-23 RX ORDER — AZITHROMYCIN 250 MG/1
500 TABLET, FILM COATED ORAL ONCE
Status: DISCONTINUED | OUTPATIENT
Start: 2018-12-23 | End: 2018-12-23

## 2018-12-23 RX ORDER — BUPROPION HYDROCHLORIDE 150 MG/1
150 TABLET, EXTENDED RELEASE ORAL EVERY MORNING
Status: DISCONTINUED | OUTPATIENT
Start: 2018-12-24 | End: 2018-12-28 | Stop reason: HOSPADM

## 2018-12-23 RX ORDER — SODIUM CHLORIDE 9 MG/ML
500 INJECTION, SOLUTION INTRAVENOUS
Status: DISCONTINUED | OUTPATIENT
Start: 2018-12-23 | End: 2018-12-28 | Stop reason: HOSPADM

## 2018-12-23 RX ADMIN — AZITHROMYCIN MONOHYDRATE 500 MG: 500 INJECTION, POWDER, LYOPHILIZED, FOR SOLUTION INTRAVENOUS at 21:32

## 2018-12-23 RX ADMIN — SODIUM CHLORIDE 3 G: 900 INJECTION INTRAVENOUS at 20:52

## 2018-12-23 RX ADMIN — METHYLPREDNISOLONE SODIUM SUCCINATE 125 MG: 125 INJECTION, POWDER, FOR SOLUTION INTRAMUSCULAR; INTRAVENOUS at 20:37

## 2018-12-23 RX ADMIN — SODIUM CHLORIDE 1000 ML: 9 INJECTION, SOLUTION INTRAVENOUS at 23:08

## 2018-12-23 ASSESSMENT — PATIENT HEALTH QUESTIONNAIRE - PHQ9
2. FEELING DOWN, DEPRESSED, IRRITABLE, OR HOPELESS: NOT AT ALL
SUM OF ALL RESPONSES TO PHQ9 QUESTIONS 1 AND 2: 0
1. LITTLE INTEREST OR PLEASURE IN DOING THINGS: NOT AT ALL

## 2018-12-23 ASSESSMENT — PAIN SCALES - GENERAL: PAINLEVEL_OUTOF10: 0

## 2018-12-24 LAB
ANION GAP SERPL CALC-SCNC: 8 MMOL/L (ref 0–11.9)
BUN SERPL-MCNC: 18 MG/DL (ref 8–22)
CALCIUM SERPL-MCNC: 8.9 MG/DL (ref 8.5–10.5)
CHLORIDE SERPL-SCNC: 97 MMOL/L (ref 96–112)
CO2 SERPL-SCNC: 29 MMOL/L (ref 20–33)
CREAT SERPL-MCNC: 0.79 MG/DL (ref 0.5–1.4)
ERYTHROCYTE [DISTWIDTH] IN BLOOD BY AUTOMATED COUNT: 45.1 FL (ref 35.9–50)
GLUCOSE SERPL-MCNC: 163 MG/DL (ref 65–99)
HCT VFR BLD AUTO: 38.1 % (ref 42–52)
HGB BLD-MCNC: 12.4 G/DL (ref 14–18)
MCH RBC QN AUTO: 31.2 PG (ref 27–33)
MCHC RBC AUTO-ENTMCNC: 32.5 G/DL (ref 33.7–35.3)
MCV RBC AUTO: 96 FL (ref 81.4–97.8)
PLATELET # BLD AUTO: 330 K/UL (ref 164–446)
PMV BLD AUTO: 9.3 FL (ref 9–12.9)
POTASSIUM SERPL-SCNC: 4.1 MMOL/L (ref 3.6–5.5)
RBC # BLD AUTO: 3.97 M/UL (ref 4.7–6.1)
SODIUM SERPL-SCNC: 134 MMOL/L (ref 135–145)
WBC # BLD AUTO: 7.1 K/UL (ref 4.8–10.8)

## 2018-12-24 PROCEDURE — 700102 HCHG RX REV CODE 250 W/ 637 OVERRIDE(OP): Performed by: HOSPITALIST

## 2018-12-24 PROCEDURE — 80048 BASIC METABOLIC PNL TOTAL CA: CPT

## 2018-12-24 PROCEDURE — 99232 SBSQ HOSP IP/OBS MODERATE 35: CPT | Performed by: HOSPITALIST

## 2018-12-24 PROCEDURE — A9270 NON-COVERED ITEM OR SERVICE: HCPCS | Performed by: HOSPITALIST

## 2018-12-24 PROCEDURE — 770006 HCHG ROOM/CARE - MED/SURG/GYN SEMI*

## 2018-12-24 PROCEDURE — 85027 COMPLETE CBC AUTOMATED: CPT

## 2018-12-24 PROCEDURE — 700101 HCHG RX REV CODE 250: Performed by: HOSPITALIST

## 2018-12-24 PROCEDURE — 700111 HCHG RX REV CODE 636 W/ 250 OVERRIDE (IP): Performed by: HOSPITALIST

## 2018-12-24 PROCEDURE — 94640 AIRWAY INHALATION TREATMENT: CPT

## 2018-12-24 PROCEDURE — 94760 N-INVAS EAR/PLS OXIMETRY 1: CPT

## 2018-12-24 PROCEDURE — 700105 HCHG RX REV CODE 258: Performed by: HOSPITALIST

## 2018-12-24 PROCEDURE — 87205 SMEAR GRAM STAIN: CPT

## 2018-12-24 PROCEDURE — 87070 CULTURE OTHR SPECIMN AEROBIC: CPT

## 2018-12-24 PROCEDURE — 36415 COLL VENOUS BLD VENIPUNCTURE: CPT

## 2018-12-24 RX ORDER — IPRATROPIUM BROMIDE AND ALBUTEROL SULFATE 2.5; .5 MG/3ML; MG/3ML
3 SOLUTION RESPIRATORY (INHALATION)
Status: DISCONTINUED | OUTPATIENT
Start: 2018-12-24 | End: 2018-12-25

## 2018-12-24 RX ADMIN — AMPICILLIN SODIUM AND SULBACTAM SODIUM 3 G: 2; 1 INJECTION, POWDER, FOR SOLUTION INTRAMUSCULAR; INTRAVENOUS at 12:26

## 2018-12-24 RX ADMIN — PREDNISONE 40 MG: 20 TABLET ORAL at 05:49

## 2018-12-24 RX ADMIN — FINASTERIDE 5 MG: 5 TABLET, FILM COATED ORAL at 05:48

## 2018-12-24 RX ADMIN — LOVASTATIN 20 MG: 20 TABLET ORAL at 05:49

## 2018-12-24 RX ADMIN — STANDARDIZED SENNA CONCENTRATE AND DOCUSATE SODIUM 2 TABLET: 8.6; 5 TABLET, FILM COATED ORAL at 05:50

## 2018-12-24 RX ADMIN — AZITHROMYCIN 250 MG: 250 TABLET, FILM COATED ORAL at 05:46

## 2018-12-24 RX ADMIN — AMPICILLIN SODIUM AND SULBACTAM SODIUM 3 G: 2; 1 INJECTION, POWDER, FOR SOLUTION INTRAMUSCULAR; INTRAVENOUS at 03:48

## 2018-12-24 RX ADMIN — IPRATROPIUM BROMIDE AND ALBUTEROL SULFATE 3 ML: .5; 3 SOLUTION RESPIRATORY (INHALATION) at 11:21

## 2018-12-24 RX ADMIN — BUPROPION HYDROCHLORIDE 150 MG: 150 TABLET, EXTENDED RELEASE ORAL at 05:46

## 2018-12-24 RX ADMIN — IPRATROPIUM BROMIDE AND ALBUTEROL SULFATE 3 ML: .5; 3 SOLUTION RESPIRATORY (INHALATION) at 14:15

## 2018-12-24 RX ADMIN — HEPARIN SODIUM 5000 UNITS: 5000 INJECTION, SOLUTION INTRAVENOUS; SUBCUTANEOUS at 05:49

## 2018-12-24 RX ADMIN — IPRATROPIUM BROMIDE AND ALBUTEROL SULFATE 3 ML: .5; 3 SOLUTION RESPIRATORY (INHALATION) at 06:25

## 2018-12-24 RX ADMIN — AMPICILLIN SODIUM AND SULBACTAM SODIUM 3 G: 2; 1 INJECTION, POWDER, FOR SOLUTION INTRAMUSCULAR; INTRAVENOUS at 17:19

## 2018-12-24 RX ADMIN — IPRATROPIUM BROMIDE AND ALBUTEROL SULFATE 3 ML: .5; 3 SOLUTION RESPIRATORY (INHALATION) at 20:19

## 2018-12-24 RX ADMIN — ENALAPRIL MALEATE 20 MG: 10 TABLET ORAL at 05:48

## 2018-12-24 ASSESSMENT — ENCOUNTER SYMPTOMS
BLURRED VISION: 0
DIARRHEA: 0
VOMITING: 0
FOCAL WEAKNESS: 0
COUGH: 1
DIZZINESS: 0
DEPRESSION: 0
SPUTUM PRODUCTION: 1
CHILLS: 0
FEVER: 0
PHOTOPHOBIA: 0
TINGLING: 0
HEADACHES: 0
WHEEZING: 0
SHORTNESS OF BREATH: 1
DOUBLE VISION: 0
NAUSEA: 0
PALPITATIONS: 0
MYALGIAS: 0
SORE THROAT: 0
ABDOMINAL PAIN: 0
WHEEZING: 1

## 2018-12-24 ASSESSMENT — LIFESTYLE VARIABLES
EVER FELT BAD OR GUILTY ABOUT YOUR DRINKING: YES
EVER HAD A DRINK FIRST THING IN THE MORNING TO STEADY YOUR NERVES TO GET RID OF A HANGOVER: NO
HAVE YOU EVER FELT YOU SHOULD CUT DOWN ON YOUR DRINKING: YES
TOTAL SCORE: 3
EVER_SMOKED: YES
ALCOHOL_USE: YES
TOTAL SCORE: 3
HOW MANY TIMES IN THE PAST YEAR HAVE YOU HAD 5 OR MORE DRINKS IN A DAY: 20
ON A TYPICAL DAY WHEN YOU DRINK ALCOHOL HOW MANY DRINKS DO YOU HAVE: 3
TOTAL SCORE: 3
CONSUMPTION TOTAL: POSITIVE
HAVE PEOPLE ANNOYED YOU BY CRITICIZING YOUR DRINKING: YES
AVERAGE NUMBER OF DAYS PER WEEK YOU HAVE A DRINK CONTAINING ALCOHOL: 7
DOES PATIENT WANT TO STOP DRINKING: NO

## 2018-12-24 ASSESSMENT — COGNITIVE AND FUNCTIONAL STATUS - GENERAL
MOBILITY SCORE: 22
STANDING UP FROM CHAIR USING ARMS: A LITTLE
SUGGESTED CMS G CODE MODIFIER DAILY ACTIVITY: CH
SUGGESTED CMS G CODE MODIFIER MOBILITY: CJ
DAILY ACTIVITIY SCORE: 24
CLIMB 3 TO 5 STEPS WITH RAILING: A LITTLE

## 2018-12-24 ASSESSMENT — PATIENT HEALTH QUESTIONNAIRE - PHQ9
2. FEELING DOWN, DEPRESSED, IRRITABLE, OR HOPELESS: NOT AT ALL
1. LITTLE INTEREST OR PLEASURE IN DOING THINGS: NOT AT ALL
SUM OF ALL RESPONSES TO PHQ9 QUESTIONS 1 AND 2: 0

## 2018-12-24 ASSESSMENT — COPD QUESTIONNAIRES
DO YOU EVER COUGH UP ANY MUCUS OR PHLEGM?: NO/ONLY WITH OCCASIONAL COLDS OR INFECTIONS
DURING THE PAST 4 WEEKS HOW MUCH DID YOU FEEL SHORT OF BREATH: NONE/LITTLE OF THE TIME
HAVE YOU SMOKED AT LEAST 100 CIGARETTES IN YOUR ENTIRE LIFE: YES
COPD SCREENING SCORE: 4

## 2018-12-24 ASSESSMENT — PAIN SCALES - GENERAL
PAINLEVEL_OUTOF10: 0

## 2018-12-24 NOTE — ASSESSMENT & PLAN NOTE
-History of COPD on nocturnal O2 of 2 L nasal cannula  -IV Lasix  -Home O2 evaluation -patient qualified for continuous O2  -Inhalers  -Guaifenesin  -Singulair  -RT protocol  -IV steroids switched to PO today - Medrol dose-felisa  -ABX  -IS every hour while awake  -encourage ambulation

## 2018-12-24 NOTE — ED TRIAGE NOTES
"Chief Complaint   Patient presents with   • Shortness of Breath     hx of COPD. 84% on RA on arrival, 2L NC applied with improvement to 92%. usually wears O2 only at night.    • Cough     x1 week, productive green sputum     Pt to triage for above. Denies fevers. NAD noted.     Pt to senior lounge. Educated on triage process and to inform staff of any changes.     Pulse (!) 105   Temp 36.6 °C (97.9 °F) (Temporal)   Resp 17   Ht 1.803 m (5' 11\")   Wt 72.8 kg (160 lb 7.9 oz)   SpO2 92%   BMI 22.38 kg/m²     "

## 2018-12-24 NOTE — CARE PLAN
Problem: Safety  Goal: Will remain free from injury  Outcome: PROGRESSING AS EXPECTED  Pt educated on fall risk. Pt demonstrates understanding    Problem: Bowel/Gastric:  Goal: Normal bowel function is maintained or improved  Outcome: PROGRESSING AS EXPECTED  Pt encouraged to drink fluids and ambulate to stimulate a BM, pt verbalizes understanding.

## 2018-12-24 NOTE — ED PROVIDER NOTES
ED Provider Note    CHIEF COMPLAINT  Chief Complaint   Patient presents with   • Shortness of Breath     hx of COPD. 84% on RA on arrival, 2L NC applied with improvement to 92%. usually wears O2 only at night.    • Cough     x1 week, productive green sputum       HPI  David Cho is a 78 y.o. male who presents for evaluation of productive cough and shortness of breath present for the past week and worsening, history of severe COPD has been increasing his oxygen requirements at home reporting room air oxygen saturation is around 80% with his home pulse oximeter.  His cough is productive of a dark green sputum, he has not had a fever or chest pain, he has had no abdominal pain or vomiting.  No unilateral leg pain or swelling, he is been using his inhaler with decreasing efficacy, no other specific complaints offered by the patient at this time.    REVIEW OF SYSTEMS  Negative for fever, rash, chest pain, abdominal pain, nausea, vomiting, diarrhea, headache, focal weakness, focal numbness, focal tingling, back pain. All other systems are negative.     PAST MEDICAL HISTORY  Past Medical History:   Diagnosis Date   • Cholesterol serum elevated    • COPD (chronic obstructive pulmonary disease) (HCC)    • Depression    • Enlarged prostate    • Hypertension        FAMILY HISTORY  Family History   Problem Relation Age of Onset   • Heart Disease Mother        SOCIAL HISTORY  Social History   Substance Use Topics   • Smoking status: Former Smoker     Packs/day: 1.00     Years: 50.00     Types: Cigarettes     Quit date: 8/12/2016   • Smokeless tobacco: Never Used   • Alcohol use Yes      Comment: daily beer       SURGICAL HISTORY  Past Surgical History:   Procedure Laterality Date   • APPENDECTOMY     • OTHER  appendectomy       CURRENT MEDICATIONS  I personally reviewed the medication list in the charting documentation.     ALLERGIES  No Known Allergies    MEDICAL RECORD  I have reviewed patient's medical record and  "pertinent results are listed above.      PHYSICAL EXAM  VITAL SIGNS: /59   Pulse 84   Temp 36.6 °C (97.9 °F) (Temporal)   Resp (!) 28   Ht 1.803 m (5' 11\")   Wt 72.8 kg (160 lb 7.9 oz)   SpO2 93%   BMI 22.38 kg/m²    Constitutional: Well appearing patient in no acute distress.  Not toxic, nor ill in appearance.  HENT: Mucus membranes moist.    Eyes: No scleral icterus. Normal conjunctiva   Neck: Supple, comfortable, nonpainful range of motion.   Cardiovascular: Regular heart rate and rhythm.   Thorax & Lungs: A few scattered expiratory wheezes, slightly increased respiratory effort but no distress, scattered crackles  Abdomen: Soft, with no tenderness, rebound nor guarding.  No mass or pulsatile mass appreciated.  Skin: Warm, dry. No rash appreciated  Extremities/Musculoskeletal: No sign of trauma. No asymmetric calf tenderness, erythema or edema. Normal range of motion   Neurologic: Alert & oriented. No focal deficits observed.   Psychiatric: Normal affect appropriate for the clinical situation.    DIAGNOSTIC STUDIES / PROCEDURES    EKG  12 Lead EKG interpreted by me to show:  Rate 82  Rhythm: Normal sinus rhythm  Axis: Normal  TX and QRS Intervals: Normal  T waves: No acute changes  ST segments: No acute changes  Ectopy: None.  My impression of this EKG: Does not indicate acute ischemia at this time.      LABS  Results for orders placed or performed during the hospital encounter of 12/23/18   CBC WITH DIFFERENTIAL   Result Value Ref Range    WBC 8.2 4.8 - 10.8 K/uL    RBC 3.99 (L) 4.70 - 6.10 M/uL    Hemoglobin 12.6 (L) 14.0 - 18.0 g/dL    Hematocrit 38.2 (L) 42.0 - 52.0 %    MCV 95.7 81.4 - 97.8 fL    MCH 31.6 27.0 - 33.0 pg    MCHC 33.0 (L) 33.7 - 35.3 g/dL    RDW 44.5 35.9 - 50.0 fL    Platelet Count 326 164 - 446 K/uL    MPV 8.9 (L) 9.0 - 12.9 fL    Neutrophils-Polys 55.10 44.00 - 72.00 %    Lymphocytes 21.80 (L) 22.00 - 41.00 %    Monocytes 15.30 (H) 0.00 - 13.40 %    Eosinophils 6.30 0.00 - 6.90 " %    Basophils 0.90 0.00 - 1.80 %    Immature Granulocytes 0.60 0.00 - 0.90 %    Nucleated RBC 0.00 /100 WBC    Neutrophils (Absolute) 4.53 1.82 - 7.42 K/uL    Lymphs (Absolute) 1.79 1.00 - 4.80 K/uL    Monos (Absolute) 1.26 (H) 0.00 - 0.85 K/uL    Eos (Absolute) 0.52 (H) 0.00 - 0.51 K/uL    Baso (Absolute) 0.07 0.00 - 0.12 K/uL    Immature Granulocytes (abs) 0.05 0.00 - 0.11 K/uL    NRBC (Absolute) 0.00 K/uL   COMP METABOLIC PANEL   Result Value Ref Range    Sodium 134 (L) 135 - 145 mmol/L    Potassium 3.7 3.6 - 5.5 mmol/L    Chloride 96 96 - 112 mmol/L    Co2 32 20 - 33 mmol/L    Anion Gap 6.0 0.0 - 11.9    Glucose 102 (H) 65 - 99 mg/dL    Bun 17 8 - 22 mg/dL    Creatinine 0.89 0.50 - 1.40 mg/dL    Calcium 9.5 8.5 - 10.5 mg/dL    AST(SGOT) 19 12 - 45 U/L    ALT(SGPT) 6 2 - 50 U/L    Alkaline Phosphatase 67 30 - 99 U/L    Total Bilirubin 0.6 0.1 - 1.5 mg/dL    Albumin 4.0 3.2 - 4.9 g/dL    Total Protein 6.6 6.0 - 8.2 g/dL    Globulin 2.6 1.9 - 3.5 g/dL    A-G Ratio 1.5 g/dL   TROPONIN   Result Value Ref Range    Troponin I 0.02 0.00 - 0.04 ng/mL   ESTIMATED GFR   Result Value Ref Range    GFR If African American >60 >60 mL/min/1.73 m 2    GFR If Non African American >60 >60 mL/min/1.73 m 2   BLOOD CULTURE,HOLD   Result Value Ref Range    Blood Culture Hold Collected    EKG   Result Value Ref Range    Report       [facility] Emergency Dept.    Test Date:  2018  Pt Name:    ENRICO ZARAGOZA               Department: ER  MRN:        [id number]                      Room:        17  Gender:     Male                         Technician: [id number]  :        [id number]                   Requested By:ER TRIAGE PROTOCOL  Order #:    [id number]                    Reading MD:    Measurements  Intervals                                Axis  Rate:       82                           P:          28  MD:         168                          QRS:        24  QRSD:       104                          T:           48  QT:         396  QTc:        463    Interpretive Statements  SINUS RHYTHM  PROBABLE LEFT ATRIAL ABNORMALITY  ARTIFACT IN LEAD(S) I,II,aVR,aVL,V2,V3,V4,V5,V6  Compared to ECG 10/04/2016 21:19:17  Intraventricular conduction delay no longer present  Myocardial infarct finding no longer present          RADIOLOGY  DX-CHEST-PORTABLE (1 VIEW)   Final Result         1. Bibasilar opacities, which may represent multifocal pneumonia. No pleural effusions.   2. COPD.            COURSE & MEDICAL DECISION MAKING  I have reviewed any medical record information, laboratory studies and radiographic results as noted above.  Differential diagnoses includes: COPD with exacerbation, pneumonia, ACS, CHF, pleural effusion, fluid overload, anemia, dehydration    Encounter Summary: This is a 78 y.o. male with 1 week of progressive productive cough and shortness of breath with a history of COPD, on exam he does not appear acutely ill but does have findings concerning for pneumonia, he has had increasing oxygen requirements at home, his chest x-ray is concerning for pneumonia, he will be treated with broad-spectrum antibiotics and Solu-Medrol, he will be admitted to the hospital for further evaluation.      DISPOSITION: Admit in guarded condition      FINAL IMPRESSION  1. Pneumonia of both lungs due to infectious organism, unspecified part of lung    2. Acute exacerbation of chronic obstructive pulmonary disease (COPD) (HCC)    3. Acute on chronic respiratory failure with hypoxia (HCC)           This dictation was created using voice recognition software. The accuracy of the dictation is limited to the abilities of the software. I expect there may be some errors of grammar and possibly content. The nursing notes were reviewed and certain aspects of this information were incorporated into this note.    Electronically signed by: Etienne Horvath, 12/23/2018 8:31 PM

## 2018-12-24 NOTE — PROGRESS NOTES
[facility] Medicine Daily Progress Note    Date of Service  12/24/2018    Chief Complaint  78 y.o. male admitted 12/23/2018 with SOB and cough for 1 week.     Hospital Course    Patient presented to the emergency room with cough that is been going on for approximately a week associated with yellow sputum and worsening shortness of breath.  Patient has known history of COPD, and wears oxygen at night at 2 L.  Patient has felt short of breath during the day and has been having oxygen on continuous for the last few days, which has worried him and so he is presented to the emergency room for further evaluation.       Interval Problem Update  12/24- Patient states he feels some improvement with antibiotics, steroids and nebulizer. Continues to require increased oxygenation to maintain saturations. Will attempt to wean as tolerated. Patient states he feels he is finally starting to cough up sputum. Will send sample. Ambulate patient to determine oxygen needs. Continue overnight monitoring and treatment.     Consultants/Specialty  None     Code Status  Full    Disposition  Likely home with possible need for increased baseline O2     Review of Systems  Review of Systems   Constitutional: Negative for chills and fever.   HENT: Positive for congestion. Negative for sore throat.    Eyes: Negative for blurred vision and double vision.   Respiratory: Positive for cough, sputum production, shortness of breath and wheezing.    Cardiovascular: Negative for chest pain and leg swelling.   Gastrointestinal: Negative for abdominal pain, nausea and vomiting.   Genitourinary: Negative for dysuria, frequency and urgency.   Musculoskeletal: Negative for myalgias.   Neurological: Negative for dizziness and headaches.   Psychiatric/Behavioral: Negative for depression.        Physical Exam  Temp:  [36.3 °C (97.4 °F)-36.7 °C (98 °F)] 36.4 °C (97.5 °F)  Pulse:  [] 94  Resp:  [14-28] 18  BP: (106-152)/(59-82) 152/77    Physical Exam    Constitutional: He is oriented to person, place, and time. He appears well-developed and well-nourished. He is active and cooperative. No distress. Nasal cannula in place.   Pleasant gentleman resting in bed.    HENT:   Head: Normocephalic.   Mouth/Throat: Oropharynx is clear and moist.   Eyes: Conjunctivae and lids are normal.   Neck: Neck supple.   Cardiovascular: Normal rate, regular rhythm and normal heart sounds.  Exam reveals no gallop.    Pulmonary/Chest: No respiratory distress. He has decreased breath sounds.   Dimished and tight on auscultation    Abdominal: Soft. Normal appearance. He exhibits no distension. There is no tenderness.   Musculoskeletal:   DAMON   Neurological: He is alert and oriented to person, place, and time.   Skin: Skin is warm and dry. He is not diaphoretic.   Psychiatric: He has a normal mood and affect. His speech is normal and behavior is normal.   Nursing note and vitals reviewed.      Fluids    Intake/Output Summary (Last 24 hours) at 12/24/18 1053  Last data filed at 12/24/18 0900   Gross per 24 hour   Intake              400 ml   Output                0 ml   Net              400 ml       Laboratory  Recent Labs      12/23/18 1914 12/24/18 0226   WBC  8.2  7.1   RBC  3.99*  3.97*   HEMOGLOBIN  12.6*  12.4*   HEMATOCRIT  38.2*  38.1*   MCV  95.7  96.0   MCH  31.6  31.2   MCHC  33.0*  32.5*   RDW  44.5  45.1   PLATELETCT  326  330   MPV  8.9*  9.3     Recent Labs      12/23/18 1914 12/24/18 0226   SODIUM  134*  134*   POTASSIUM  3.7  4.1   CHLORIDE  96  97   CO2  32  29   GLUCOSE  102*  163*   BUN  17  18   CREATININE  0.89  0.79   CALCIUM  9.5  8.9         Recent Labs      12/23/18 1914   BNPBTYPENAT  313*           Imaging  DX-CHEST-PORTABLE (1 VIEW)   Final Result         1. Bibasilar opacities, which may represent multifocal pneumonia. No pleural effusions.   2. COPD.           Assessment/Plan  * COPD with acute exacerbation (HCC)   Assessment & Plan    Patient has  a known history of COPD, he quit smoking 2 years ago.     Baseline, he uses oxygen nocturnally only at 2 L.    RT protocol  Antibiotic therapy  Prednisone  Wean O2 as tolerated   Encourage ambulation and IS      SIRS (systemic inflammatory response syndrome) (HCC)   Assessment & Plan    Improving with antibiotic and steroid treatment  Afebrile  Continue to monitor   No leukocytosis      CAP (community acquired pneumonia)   Assessment & Plan    Sputum culture pending  IV antibiotics   Mucinex  O2 as needed   IS and encourage ambulation     Hypertension- (present on admission)   Assessment & Plan    Blood pressures currently normotensive, okay to continue home enalapril.     Dyslipidemia- (present on admission)   Assessment & Plan    This is chronic and stable, continue home Mevacor.          VTE prophylaxis: heparin

## 2018-12-24 NOTE — PROGRESS NOTES
· 2 RN skin check completed with MAGY Marvin.  · Devices in place: PIV.  · Skin assessed under devices : yes.  · Confirmed pressure ulcers found: none.  · New potential pressure ulcers noted: n/a.     Skin intact, no open areas noted.

## 2018-12-24 NOTE — PROGRESS NOTES
Assumed care of pt this am. Pt is A&O x4. Pt denies pain. Pt ambulates independently, ambulation observed by RN. Pt encouraged to voice needs, pt verbalized understanding.

## 2018-12-24 NOTE — H&P
Hospital Medicine History & Physical Note    Date of Service  12/23/2018    Primary Care Physician  Cassie Au P.A.-C.    Consultants  None    Code Status  Full    Chief Complaint  Chief Complaint   Patient presents with   • Shortness of Breath     hx of COPD. 84% on RA on arrival, 2L NC applied with improvement to 92%. usually wears O2 only at night.    • Cough     x1 week, productive green sputum       History of Presenting Illness  78 y.o. male who presented on 12/23/2018 with cough and shortness of breath.  The patient states that his symptoms began 1 week ago.  His wife has also been ill with similar symptoms.  He has developed a cough which has been productive of yellow sputum and along with this has had worsening shortness of breath.  He reports that he carries a history of COPD and typically uses nocturnal oxygen at 2 L.  He otherwise does not require supplemental oxygen in the daytime.  He placed himself on his oxygen at home because of his symptoms and noted that he had to increase levels gradually.  He otherwise denies any fevers or chills, headache, chest pain, stomach pain, diarrhea or dysuria.  Patient states that he has been trying to put off coming to the hospital or to be seen by physician but today, cannot tolerate the breathing issues and brought himself to be seen.      Review of Systems  Review of Systems   Constitutional: Negative for chills and fever.   HENT: Negative for congestion and sore throat.    Eyes: Negative for photophobia.   Respiratory: Positive for cough, sputum production and shortness of breath. Negative for wheezing.    Cardiovascular: Negative for chest pain and palpitations.   Gastrointestinal: Negative for abdominal pain, diarrhea, nausea and vomiting.   Genitourinary: Negative for dysuria.   Musculoskeletal: Negative for myalgias.   Skin: Negative.    Neurological: Negative for dizziness, tingling, focal weakness and headaches.   Psychiatric/Behavioral: Negative  for depression and suicidal ideas.       Past Medical History  Past Medical History:   Diagnosis Date   • Cholesterol serum elevated    • COPD (chronic obstructive pulmonary disease) (HCC)    • Depression    • Enlarged prostate    • Hypertension        Surgical History  Past Surgical History:   Procedure Laterality Date   • APPENDECTOMY     • OTHER  appendectomy       Family History  Family History   Problem Relation Age of Onset   • Heart Disease Mother        Social History  Social History   Substance Use Topics   • Smoking status: Former Smoker     Packs/day: 1.00     Years: 50.00     Types: Cigarettes     Quit date: 2016   • Smokeless tobacco: Never Used   • Alcohol use Yes      Comment: daily beer       Allergies  No Known Allergies    Medications  No current facility-administered medications on file prior to encounter.      Current Outpatient Prescriptions on File Prior to Encounter   Medication Sig Dispense Refill   • buPROPion (WELLBUTRIN XL) 150 MG XL tablet Take 1 Tab by mouth every morning. 90 Tab 1   • umeclidinium-vilanterol (ANORO ELLIPTA) 62.5-25 MCG/INH AEROSOL POWDER, BREATH ACTIVATED inhaler Inhale 1 Puff by mouth every day. 1 Inhaler 11   • VENTOLIN  (90 Base) MCG/ACT Aero Soln inhalation aerosol INHALE 2 PUFFS BY MOUTH EVERY 4 HOURS AS NEEDED SHORTNESS OF BREATH 1 Inhaler 5   • finasteride (PROSCAR) 5 MG Tab Take 1 Tab by mouth every day. 90 Tab 1   • enalapril (VASOTEC) 20 MG tablet Take 1 Tab by mouth every day. 90 Tab 1   • lovastatin (MEVACOR) 20 MG Tab Take 1 Tab by mouth every day. 90 Tab 1   • ipratropium-albuterol (DUONEB) 0.5-2.5 (3) MG/3ML nebulizer solution 3 mL by Nebulization route every four hours as needed for Shortness of Breath. 360 mL 3       Physical Exam  Hemodynamics  Temp (24hrs), Av.6 °C (97.9 °F), Min:36.6 °C (97.9 °F), Max:36.6 °C (97.9 °F)   Temperature: 36.6 °C (97.9 °F)  Pulse  Av.5  Min: 84  Max: 105 Heart Rate (Monitored): 95  Blood Pressure :  133/59, NIBP: 147/73      Respiratory      Respiration: (!) 21, Pulse Oximetry: 95 %             Physical Exam   Constitutional: He is oriented to person, place, and time. No distress.   HENT:   Head: Normocephalic and atraumatic.   Right Ear: External ear normal.   Left Ear: External ear normal.   Eyes: EOM are normal. Right eye exhibits no discharge. Left eye exhibits no discharge.   Neck: Neck supple. No JVD present.   Cardiovascular: Normal rate, regular rhythm and normal heart sounds.    Pulmonary/Chest: Effort normal. No respiratory distress. He has wheezes. He exhibits no tenderness.   Diminished diffusely   Abdominal: Soft. Bowel sounds are normal. He exhibits no distension. There is no tenderness.   Musculoskeletal: He exhibits no edema.   Neurological: He is alert and oriented to person, place, and time. No cranial nerve deficit.   Skin: Skin is dry. He is not diaphoretic. No erythema.   Psychiatric: He has a normal mood and affect. His behavior is normal.   Nursing note and vitals reviewed.    Capillary refill less than 3 seconds, distal pulses intact    Laboratory:  Recent Labs      12/23/18 1914   WBC  8.2   RBC  3.99*   HEMOGLOBIN  12.6*   HEMATOCRIT  38.2*   MCV  95.7   MCH  31.6   MCHC  33.0*   RDW  44.5   PLATELETCT  326   MPV  8.9*     Recent Labs      12/23/18 1914   SODIUM  134*   POTASSIUM  3.7   CHLORIDE  96   CO2  32   GLUCOSE  102*   BUN  17   CREATININE  0.89   CALCIUM  9.5     Recent Labs      12/23/18 1914   ALTSGPT  6   ASTSGOT  19   ALKPHOSPHAT  67   TBILIRUBIN  0.6   GLUCOSE  102*         Recent Labs      12/23/18 1914   BNPBTYPENAT  313*         Lab Results   Component Value Date    TROPONINI 0.02 12/23/2018       Imaging  Dx-chest-portable (1 View)    Result Date: 12/23/2018 12/23/2018 8:10 PM HISTORY/REASON FOR EXAM:  Shortness of Breath. TECHNIQUE/EXAM DESCRIPTION AND NUMBER OF VIEWS: Single portable view of the chest. COMPARISON: 12/28/2016 FINDINGS: LUNGS: Hyperinflation.  Bibasilar opacities. No pleural effusions. PNEUMOTHORAX: None. LINES AND TUBES: None. MEDIASTINUM: No cardiomegaly. Atherosclerosis. MUSCULOSKELETAL STRUCTURES: No acute fracture. Chronic left clavicular deformity.     1. Bibasilar opacities, which may represent multifocal pneumonia. No pleural effusions. 2. COPD.        Assessment/Plan:  Anticipate that patient will need greater than 2 midnights for management of the discussed medical issues.    * COPD with acute exacerbation (HCC)   Assessment & Plan    Patient has a known history of COPD, he quit smoking 2 years ago.  At baseline, he uses oxygen nocturnally only at 2 L.  His current exacerbation is secondary to underlying and pneumonia.  He will be admitted to the hospital and started on respiratory therapy protocol.  I will treat his underlying infection and also start him on IV steroids for his wheezing.  We will plan to wean his oxygen as tolerated.     SIRS (systemic inflammatory response syndrome) ([facility])   Assessment & Plan    The patient is tachycardic and hypoxic otherwise he has no leukocytosis, he is afebrile, and his blood pressure stable.  Continue treatment as noted.     CAP (community acquired pneumonia)   Assessment & Plan    Chest x-ray shows a multifocal pneumonia.  I am checking a sputum sample as well as a pro-calcitonin level.  He has associated COPD exacerbation with hypoxia therefore he will be treated for COPD in addition to underlying infection I started him on IV Unasyn and azithromycin and will monitor sputum cultures as well as blood cultures for speciation and sensitivities.     Hypertension- (present on admission)   Assessment & Plan    Blood pressures currently normotensive, okay to continue home enalapril.     Dyslipidemia- (present on admission)   Assessment & Plan    This is chronic and stable, continue home Mevacor.         Prophylaxis: Heparin for DVT prophylaxis, no PPI indicated, bowel protocol as needed

## 2018-12-24 NOTE — ED NOTES
Med rec complete per pt at bedside   NKDA  No oral ABX within last 30 days   Pt has been inhaler trials, currently on Anoro Ellipta but does state the Trelegy Ellipta worked best for him so far

## 2018-12-24 NOTE — FLOWSHEET NOTE
12/24/18 1415   Events/Summary/Plan   Events/Summary/Plan SVN   Non-Invasive Resp Device Site Inspection Completed Intact   Interdisciplinary Plan of Care-Goals (Indications)   Bronchodilator Indications History / Diagnosis   Interdisciplinary Plan of Care-Outcomes    Bronchodilator Outcome Improved Patient Appearance with Decreased use of Accessory Muscles   Education   Education Yes - Pt. / Family has been Instructed in use of Respiratory Medications and Adverse Reactions   RT Assessment of Delivered Medications   Evaluation of Medication Delivery Daily Yes-- Pt /Family has been Instructed in use of Respiratory Medications and Adverse Reactions   SVN Group   #SVN Performed Yes   Given By: Mouthpiece   Respiratory WDL   Respiratory (WDL) X   Chest Exam   Work Of Breathing / Effort Mild   Respiration 18   Pulse 92   Breath Sounds   RUL Breath Sounds Clear   RML Breath Sounds Clear   RLL Breath Sounds Diminished   KRISTY Breath Sounds Clear   LLL Breath Sounds Diminished;Fine Crackles   Oxygen   O2 (LPM) 1   O2 Daily Delivery Respiratory  Silicone Nasal Cannula

## 2018-12-24 NOTE — PROGRESS NOTES
LATE ENTRY:     Pt admitted from ED, on unit via wheelchair, ambulated from wheelchair to bed with steady gait. No report of pain. IVF started. IV ABX administered, no s/sx adverse rxn. Pt resting quietly in bed. Call light within reach, personal belongings available, bed in lowest position, treaded socks on, and hourly rounding in place.

## 2018-12-25 ENCOUNTER — APPOINTMENT (OUTPATIENT)
Dept: RADIOLOGY | Facility: MEDICAL CENTER | Age: 78
DRG: 193 | End: 2018-12-25
Attending: HOSPITALIST
Payer: MEDICARE

## 2018-12-25 PROBLEM — E87.6 HYPOKALEMIA: Status: ACTIVE | Noted: 2018-12-25

## 2018-12-25 PROBLEM — R00.0 TACHYCARDIA: Status: ACTIVE | Noted: 2018-12-25

## 2018-12-25 PROBLEM — E83.42 HYPOMAGNESEMIA: Status: ACTIVE | Noted: 2018-12-25

## 2018-12-25 LAB
ANION GAP SERPL CALC-SCNC: 9 MMOL/L (ref 0–11.9)
BUN SERPL-MCNC: 19 MG/DL (ref 8–22)
CALCIUM SERPL-MCNC: 9.4 MG/DL (ref 8.5–10.5)
CHLORIDE SERPL-SCNC: 99 MMOL/L (ref 96–112)
CO2 SERPL-SCNC: 28 MMOL/L (ref 20–33)
CREAT SERPL-MCNC: 0.82 MG/DL (ref 0.5–1.4)
EKG IMPRESSION: NORMAL
ERYTHROCYTE [DISTWIDTH] IN BLOOD BY AUTOMATED COUNT: 46 FL (ref 35.9–50)
GLUCOSE SERPL-MCNC: 130 MG/DL (ref 65–99)
GRAM STN SPEC: NORMAL
HCT VFR BLD AUTO: 35.1 % (ref 42–52)
HGB BLD-MCNC: 11.6 G/DL (ref 14–18)
MAGNESIUM SERPL-MCNC: 1.8 MG/DL (ref 1.5–2.5)
MCH RBC QN AUTO: 31.7 PG (ref 27–33)
MCHC RBC AUTO-ENTMCNC: 33 G/DL (ref 33.7–35.3)
MCV RBC AUTO: 95.9 FL (ref 81.4–97.8)
PLATELET # BLD AUTO: 366 K/UL (ref 164–446)
PMV BLD AUTO: 8.8 FL (ref 9–12.9)
POTASSIUM SERPL-SCNC: 3.4 MMOL/L (ref 3.6–5.5)
PROCALCITONIN SERPL-MCNC: 0.05 NG/ML
RBC # BLD AUTO: 3.66 M/UL (ref 4.7–6.1)
SIGNIFICANT IND 70042: NORMAL
SITE SITE: NORMAL
SODIUM SERPL-SCNC: 136 MMOL/L (ref 135–145)
SOURCE SOURCE: NORMAL
WBC # BLD AUTO: 11.2 K/UL (ref 4.8–10.8)

## 2018-12-25 PROCEDURE — 80048 BASIC METABOLIC PNL TOTAL CA: CPT

## 2018-12-25 PROCEDURE — 94760 N-INVAS EAR/PLS OXIMETRY 1: CPT

## 2018-12-25 PROCEDURE — 99233 SBSQ HOSP IP/OBS HIGH 50: CPT | Performed by: HOSPITALIST

## 2018-12-25 PROCEDURE — A9270 NON-COVERED ITEM OR SERVICE: HCPCS | Performed by: HOSPITALIST

## 2018-12-25 PROCEDURE — 700111 HCHG RX REV CODE 636 W/ 250 OVERRIDE (IP): Performed by: HOSPITALIST

## 2018-12-25 PROCEDURE — 700101 HCHG RX REV CODE 250: Performed by: HOSPITALIST

## 2018-12-25 PROCEDURE — 36415 COLL VENOUS BLD VENIPUNCTURE: CPT

## 2018-12-25 PROCEDURE — 770006 HCHG ROOM/CARE - MED/SURG/GYN SEMI*

## 2018-12-25 PROCEDURE — 700111 HCHG RX REV CODE 636 W/ 250 OVERRIDE (IP): Performed by: NURSE PRACTITIONER

## 2018-12-25 PROCEDURE — 700102 HCHG RX REV CODE 250 W/ 637 OVERRIDE(OP): Performed by: HOSPITALIST

## 2018-12-25 PROCEDURE — 85027 COMPLETE CBC AUTOMATED: CPT

## 2018-12-25 PROCEDURE — 84145 PROCALCITONIN (PCT): CPT

## 2018-12-25 PROCEDURE — 93010 ELECTROCARDIOGRAM REPORT: CPT | Performed by: INTERNAL MEDICINE

## 2018-12-25 PROCEDURE — 71045 X-RAY EXAM CHEST 1 VIEW: CPT

## 2018-12-25 PROCEDURE — 94640 AIRWAY INHALATION TREATMENT: CPT

## 2018-12-25 PROCEDURE — 83735 ASSAY OF MAGNESIUM: CPT

## 2018-12-25 PROCEDURE — 93005 ELECTROCARDIOGRAM TRACING: CPT | Performed by: HOSPITALIST

## 2018-12-25 PROCEDURE — 700105 HCHG RX REV CODE 258: Performed by: HOSPITALIST

## 2018-12-25 RX ORDER — POTASSIUM CHLORIDE 20 MEQ/1
20 TABLET, EXTENDED RELEASE ORAL ONCE
Status: COMPLETED | OUTPATIENT
Start: 2018-12-25 | End: 2018-12-25

## 2018-12-25 RX ORDER — MONTELUKAST SODIUM 10 MG/1
10 TABLET ORAL NIGHTLY
Status: DISCONTINUED | OUTPATIENT
Start: 2018-12-25 | End: 2018-12-28 | Stop reason: HOSPADM

## 2018-12-25 RX ORDER — MAGNESIUM SULFATE HEPTAHYDRATE 40 MG/ML
2 INJECTION, SOLUTION INTRAVENOUS ONCE
Status: COMPLETED | OUTPATIENT
Start: 2018-12-25 | End: 2018-12-25

## 2018-12-25 RX ORDER — FUROSEMIDE 10 MG/ML
40 INJECTION INTRAMUSCULAR; INTRAVENOUS
Status: DISCONTINUED | OUTPATIENT
Start: 2018-12-25 | End: 2018-12-28 | Stop reason: HOSPADM

## 2018-12-25 RX ORDER — TIOTROPIUM BROMIDE 18 UG/1
1 CAPSULE ORAL; RESPIRATORY (INHALATION) DAILY
Status: DISCONTINUED | OUTPATIENT
Start: 2018-12-25 | End: 2018-12-27

## 2018-12-25 RX ORDER — BUDESONIDE 0.5 MG/2ML
0.5 INHALANT ORAL
Status: DISCONTINUED | OUTPATIENT
Start: 2018-12-25 | End: 2018-12-27

## 2018-12-25 RX ORDER — METHYLPREDNISOLONE SODIUM SUCCINATE 125 MG/2ML
62.5 INJECTION, POWDER, LYOPHILIZED, FOR SOLUTION INTRAMUSCULAR; INTRAVENOUS EVERY 6 HOURS
Status: DISCONTINUED | OUTPATIENT
Start: 2018-12-25 | End: 2018-12-27

## 2018-12-25 RX ORDER — HYDRALAZINE HYDROCHLORIDE 20 MG/ML
10 INJECTION INTRAMUSCULAR; INTRAVENOUS EVERY 6 HOURS PRN
Status: DISCONTINUED | OUTPATIENT
Start: 2018-12-25 | End: 2018-12-28 | Stop reason: HOSPADM

## 2018-12-25 RX ORDER — FUROSEMIDE 10 MG/ML
20 INJECTION INTRAMUSCULAR; INTRAVENOUS
Status: DISCONTINUED | OUTPATIENT
Start: 2018-12-25 | End: 2018-12-25

## 2018-12-25 RX ORDER — LEVALBUTEROL INHALATION SOLUTION 0.63 MG/3ML
0.63 SOLUTION RESPIRATORY (INHALATION)
Status: DISCONTINUED | OUTPATIENT
Start: 2018-12-25 | End: 2018-12-26

## 2018-12-25 RX ADMIN — LEVALBUTEROL HYDROCHLORIDE 0.63 MG: 0.63 SOLUTION RESPIRATORY (INHALATION) at 14:49

## 2018-12-25 RX ADMIN — PREDNISONE 40 MG: 20 TABLET ORAL at 05:38

## 2018-12-25 RX ADMIN — HYDRALAZINE HYDROCHLORIDE 10 MG: 20 INJECTION INTRAMUSCULAR; INTRAVENOUS at 21:41

## 2018-12-25 RX ADMIN — BUPROPION HYDROCHLORIDE 150 MG: 150 TABLET, EXTENDED RELEASE ORAL at 05:39

## 2018-12-25 RX ADMIN — IPRATROPIUM BROMIDE AND ALBUTEROL SULFATE 3 ML: .5; 3 SOLUTION RESPIRATORY (INHALATION) at 07:34

## 2018-12-25 RX ADMIN — BUDESONIDE 0.5 MG: 0.5 SUSPENSION RESPIRATORY (INHALATION) at 20:39

## 2018-12-25 RX ADMIN — FINASTERIDE 5 MG: 5 TABLET, FILM COATED ORAL at 05:38

## 2018-12-25 RX ADMIN — AMPICILLIN SODIUM AND SULBACTAM SODIUM 3 G: 2; 1 INJECTION, POWDER, FOR SOLUTION INTRAMUSCULAR; INTRAVENOUS at 05:38

## 2018-12-25 RX ADMIN — AZITHROMYCIN 250 MG: 250 TABLET, FILM COATED ORAL at 05:39

## 2018-12-25 RX ADMIN — LOVASTATIN 20 MG: 20 TABLET ORAL at 05:39

## 2018-12-25 RX ADMIN — POTASSIUM CHLORIDE 20 MEQ: 1500 TABLET, EXTENDED RELEASE ORAL at 08:35

## 2018-12-25 RX ADMIN — METOPROLOL TARTRATE 12.5 MG: 25 TABLET, FILM COATED ORAL at 15:34

## 2018-12-25 RX ADMIN — METHYLPREDNISOLONE SODIUM SUCCINATE 62.5 MG: 125 INJECTION, POWDER, FOR SOLUTION INTRAMUSCULAR; INTRAVENOUS at 11:33

## 2018-12-25 RX ADMIN — TIOTROPIUM BROMIDE 1 CAPSULE: 18 CAPSULE ORAL; RESPIRATORY (INHALATION) at 17:26

## 2018-12-25 RX ADMIN — MAGNESIUM SULFATE 2 G: 2 INJECTION INTRAVENOUS at 14:46

## 2018-12-25 RX ADMIN — AMPICILLIN SODIUM AND SULBACTAM SODIUM 3 G: 2; 1 INJECTION, POWDER, FOR SOLUTION INTRAMUSCULAR; INTRAVENOUS at 00:00

## 2018-12-25 RX ADMIN — IPRATROPIUM BROMIDE AND ALBUTEROL SULFATE 3 ML: .5; 3 SOLUTION RESPIRATORY (INHALATION) at 01:33

## 2018-12-25 RX ADMIN — METHYLPREDNISOLONE SODIUM SUCCINATE 62.5 MG: 125 INJECTION, POWDER, FOR SOLUTION INTRAMUSCULAR; INTRAVENOUS at 17:27

## 2018-12-25 RX ADMIN — AMPICILLIN SODIUM AND SULBACTAM SODIUM 3 G: 2; 1 INJECTION, POWDER, FOR SOLUTION INTRAMUSCULAR; INTRAVENOUS at 11:41

## 2018-12-25 RX ADMIN — AMPICILLIN SODIUM AND SULBACTAM SODIUM 3 G: 2; 1 INJECTION, POWDER, FOR SOLUTION INTRAMUSCULAR; INTRAVENOUS at 17:40

## 2018-12-25 RX ADMIN — ENALAPRIL MALEATE 20 MG: 10 TABLET ORAL at 05:39

## 2018-12-25 RX ADMIN — LEVALBUTEROL HYDROCHLORIDE 0.63 MG: 0.63 SOLUTION RESPIRATORY (INHALATION) at 20:38

## 2018-12-25 RX ADMIN — FUROSEMIDE 40 MG: 10 INJECTION, SOLUTION INTRAVENOUS at 17:34

## 2018-12-25 RX ADMIN — MONTELUKAST SODIUM 10 MG: 10 TABLET, FILM COATED ORAL at 21:29

## 2018-12-25 ASSESSMENT — ENCOUNTER SYMPTOMS
SHORTNESS OF BREATH: 1
FEVER: 0
DIZZINESS: 0
VOMITING: 0
HEADACHES: 0
MUSCULOSKELETAL NEGATIVE: 1
PALPITATIONS: 1
STRIDOR: 0
SPUTUM PRODUCTION: 1
COUGH: 1
NAUSEA: 0
CHILLS: 0
ABDOMINAL PAIN: 0
WEAKNESS: 1
MEMORY LOSS: 0
WHEEZING: 0
SORE THROAT: 0
NERVOUS/ANXIOUS: 0

## 2018-12-25 ASSESSMENT — PAIN SCALES - GENERAL
PAINLEVEL_OUTOF10: 0

## 2018-12-25 ASSESSMENT — PATIENT HEALTH QUESTIONNAIRE - PHQ9
SUM OF ALL RESPONSES TO PHQ9 QUESTIONS 1 AND 2: 0
1. LITTLE INTEREST OR PLEASURE IN DOING THINGS: NOT AT ALL
2. FEELING DOWN, DEPRESSED, IRRITABLE, OR HOPELESS: NOT AT ALL

## 2018-12-25 NOTE — PROGRESS NOTES
Report received by MAGY Lua. Assumed care of pt. Assessment complete. Whiteboard updated. Pt A&Ox4, VSS. Pt in no apparent signs of distress. Plan of care discussed. Call light within reach,  bed in lowest position, and pt has no further questions at this time.

## 2018-12-25 NOTE — CARE PLAN
Problem: Communication  Goal: The ability to communicate needs accurately and effectively will improve  Outcome: PROGRESSING AS EXPECTED  Pt encouraged to voice when he is more short of breath or not feeling well, pt verbalized understanding.     Problem: Safety  Goal: Will remain free from falls  Outcome: PROGRESSING AS EXPECTED  Pt aware of fall risk, pt calls for assistance appropriately.

## 2018-12-25 NOTE — RESPIRATORY CARE
COPD Education by COPD Clinical Educator  12/24/2018 at 10:30 AM by Ashley Shanks    Patient interviewed by COPD education team.  Patient refused full COPD program at this time, but agreed to short intervention.  A comprehensive packet including information about COPD, treatments, and smoking cessation given.

## 2018-12-25 NOTE — CARE PLAN
Problem: Safety  Goal: Will remain free from injury  Outcome: PROGRESSING AS EXPECTED  Fall precautions in place. Treaded socks on pt. Appropriate signs on doorway. IV pole on same side as bathroom. Bedrails up. Bed in lowest position and locked.  Call light and phone within reach. Patient educated on importance of calling nurses before getting out of bed, verbalizes understanding.     Problem: Respiratory:  Goal: Respiratory status will improve  Outcome: PROGRESSING AS EXPECTED  Will titrate down o2 as tolerated for pt.

## 2018-12-25 NOTE — PROGRESS NOTES
"Hospital Medicine Daily Progress Note    Date of Service  12/25/2018    Chief Complaint  78 y.o. male with PMH COPD on nocturnal O2, HTN and dyslipidemia admitted 12/23/2018 with SOB and cough.    Hospital Course    Chest x-ray consistent with pneumonia.  Treated with IV antibiotics and IV steroids.      Interval Problem Update  -He reports worsening KUHN from yesterday \"I cannot even walk to the sink to brush my teeth without getting really SOB\".  Lung sounds very diminished without wheezing.  Tachycardia during activity  -PO to IV steroids  -Less sputum production  -Hypomagnesemia -replacing  -Hypokalemia -replacing    Consultants/Specialty  NA    Code Status  FULL    Disposition  Likely to return home when medically cleared for discharge.    Review of Systems  Review of Systems   Constitutional: Positive for malaise/fatigue. Negative for chills and fever.   HENT: Negative for sore throat.    Respiratory: Positive for cough, sputum production and shortness of breath. Negative for wheezing and stridor.    Cardiovascular: Positive for palpitations. Negative for chest pain.   Gastrointestinal: Negative for abdominal pain, nausea and vomiting.   Genitourinary: Negative for dysuria and hematuria.   Musculoskeletal: Negative.    Neurological: Positive for weakness. Negative for dizziness and headaches.   Psychiatric/Behavioral: Negative for memory loss. The patient is not nervous/anxious.    All other systems reviewed and are negative.       Physical Exam  Temp:  [36.3 °C (97.4 °F)-37.4 °C (99.3 °F)] 36.6 °C (97.9 °F)  Pulse:  [] 110  Resp:  [16-22] 18  BP: (119-169)/() 145/100    Physical Exam   Constitutional: He is oriented to person, place, and time. Vital signs are normal. He appears well-developed and well-nourished. He is cooperative.  Non-toxic appearance. He has a sickly appearance. No distress. Nasal cannula in place.   HENT:   Head: Normocephalic.   Right Ear: Hearing normal.   Left Ear: Hearing " normal.   Nose: Nose normal.   Mouth/Throat: Oropharynx is clear and moist and mucous membranes are normal.   Eyes: Conjunctivae are normal. No scleral icterus.   Neck: Phonation normal. No JVD present.   Cardiovascular: Intact distal pulses.  Tachycardia present.  Exam reveals distant heart sounds.    No edema   Pulmonary/Chest: He has decreased breath sounds.   Lung sounds very diminished all fields without wheezes  KUHN   Abdominal: Soft. Normal appearance. There is no tenderness. There is no rebound.   Musculoskeletal: Normal range of motion.   Neurological: He is alert and oriented to person, place, and time. He has normal strength. GCS eye subscore is 4. GCS verbal subscore is 5. GCS motor subscore is 6.   DAMON 5/5   Skin: Skin is warm and dry. No cyanosis. Nails show no clubbing.   Psychiatric: He has a normal mood and affect. Judgment normal. Cognition and memory are normal.   Nursing note and vitals reviewed.      Fluids    Intake/Output Summary (Last 24 hours) at 12/25/18 1232  Last data filed at 12/25/18 0900   Gross per 24 hour   Intake             1158 ml   Output                0 ml   Net             1158 ml       Laboratory  Recent Labs      12/23/18 1914 12/24/18 0226 12/25/18   0207   WBC  8.2  7.1  11.2*   RBC  3.99*  3.97*  3.66*   HEMOGLOBIN  12.6*  12.4*  11.6*   HEMATOCRIT  38.2*  38.1*  35.1*   MCV  95.7  96.0  95.9   MCH  31.6  31.2  31.7   MCHC  33.0*  32.5*  33.0*   RDW  44.5  45.1  46.0   PLATELETCT  326  330  366   MPV  8.9*  9.3  8.8*     Recent Labs      12/23/18 1914 12/24/18 0226  12/25/18   0207   SODIUM  134*  134*  136   POTASSIUM  3.7  4.1  3.4*   CHLORIDE  96  97  99   CO2  32  29  28   GLUCOSE  102*  163*  130*   BUN  17 18  19   CREATININE  0.89  0.79  0.82   CALCIUM  9.5  8.9  9.4         Recent Labs      12/23/18 1914   BNPBTYPENAT  313*           Imaging  DX-CHEST-PORTABLE (1 VIEW)   Final Result         1. Bibasilar opacities, which may represent multifocal  pneumonia. No pleural effusions.   2. COPD.           Assessment/Plan  * COPD with acute exacerbation (HCC)- (present on admission)   Assessment & Plan    -History of COPD on nocturnal O2 of 2 L nasal cannula  -He reports minimal improvement today.  P.o. steroids switched to IV  -RT protocol  -IV ABX for CAP  -Incentive spirometry  -Supplemental O2 to keep sats greater than 92%       SIRS (systemic inflammatory response syndrome) (HCC)- (present on admission)   Assessment & Plan    -He reports some improvement yesterday, however this morning he has increased KUHN  -P.O. steroids switched to IV  -Afebrile  -IV ABX  -Continue to monitor closely     CAP (community acquired pneumonia)- (present on admission)   Assessment & Plan    -Mild bump in WBC likely related to steroids  -Sputum culture pending  -Pro-calcitonin pending  -Continue IV ABX  -Switch p.o. to IV steroids  -Mucinex as needed  -Supplemental O2 to keep sats greater than 92%  -IS     Hypomagnesemia   Assessment & Plan    -Replacing  -Follow labs     Hypokalemia- (present on admission)   Assessment & Plan    -Replacing  -Follow labs     Tachycardia   Assessment & Plan    -Likely related to exertion and SOB.  Returns to baseline after 2-3 minutes of rest following activity  -As above     Hypertension- (present on admission)   Assessment & Plan    -Controlled on home enalapril     Dyslipidemia- (present on admission)   Assessment & Plan    -Continue home lovastatin          VTE prophylaxis: Heparin     NEW Raymundo

## 2018-12-25 NOTE — PROGRESS NOTES
MD paged at 3:08 pm to update on pt's MEWS score of 7  RR 24 /107. RN spoke to MD at 3:11 pm, Per MD orders for EKG only given. Order placed. RN will page him for updates

## 2018-12-25 NOTE — PROGRESS NOTES
Notified on call hospitalist of K+ level of 3.4. No new orders received at this time. Will let day team address.

## 2018-12-26 ENCOUNTER — APPOINTMENT (OUTPATIENT)
Dept: CARDIOLOGY | Facility: MEDICAL CENTER | Age: 78
DRG: 193 | End: 2018-12-26
Attending: HOSPITALIST
Payer: MEDICARE

## 2018-12-26 LAB
ANION GAP SERPL CALC-SCNC: 8 MMOL/L (ref 0–11.9)
BACTERIA SPEC RESP CULT: NORMAL
BASOPHILS # BLD AUTO: 0.4 % (ref 0–1.8)
BASOPHILS # BLD: 0.04 K/UL (ref 0–0.12)
BUN SERPL-MCNC: 17 MG/DL (ref 8–22)
CALCIUM SERPL-MCNC: 9.5 MG/DL (ref 8.5–10.5)
CHLORIDE SERPL-SCNC: 96 MMOL/L (ref 96–112)
CO2 SERPL-SCNC: 30 MMOL/L (ref 20–33)
CREAT SERPL-MCNC: 0.73 MG/DL (ref 0.5–1.4)
EOSINOPHIL # BLD AUTO: 0 K/UL (ref 0–0.51)
EOSINOPHIL NFR BLD: 0 % (ref 0–6.9)
ERYTHROCYTE [DISTWIDTH] IN BLOOD BY AUTOMATED COUNT: 43.8 FL (ref 35.9–50)
GLUCOSE SERPL-MCNC: 153 MG/DL (ref 65–99)
GRAM STN SPEC: NORMAL
HCT VFR BLD AUTO: 35.3 % (ref 42–52)
HGB BLD-MCNC: 12.2 G/DL (ref 14–18)
IMM GRANULOCYTES # BLD AUTO: 0.29 K/UL (ref 0–0.11)
IMM GRANULOCYTES NFR BLD AUTO: 2.8 % (ref 0–0.9)
LV EJECT FRACT  99904: 60
LV EJECT FRACT MOD 2C 99903: 54.68
LV EJECT FRACT MOD 4C 99902: 70.26
LV EJECT FRACT MOD BP 99901: 67.93
LYMPHOCYTES # BLD AUTO: 0.92 K/UL (ref 1–4.8)
LYMPHOCYTES NFR BLD: 8.9 % (ref 22–41)
MAGNESIUM SERPL-MCNC: 2.2 MG/DL (ref 1.5–2.5)
MCH RBC QN AUTO: 32.2 PG (ref 27–33)
MCHC RBC AUTO-ENTMCNC: 34.6 G/DL (ref 33.7–35.3)
MCV RBC AUTO: 93.1 FL (ref 81.4–97.8)
MONOCYTES # BLD AUTO: 0.65 K/UL (ref 0–0.85)
MONOCYTES NFR BLD AUTO: 6.3 % (ref 0–13.4)
NEUTROPHILS # BLD AUTO: 8.43 K/UL (ref 1.82–7.42)
NEUTROPHILS NFR BLD: 81.6 % (ref 44–72)
NRBC # BLD AUTO: 0 K/UL
NRBC BLD-RTO: 0 /100 WBC
PLATELET # BLD AUTO: 380 K/UL (ref 164–446)
PMV BLD AUTO: 8.6 FL (ref 9–12.9)
POTASSIUM SERPL-SCNC: 3.8 MMOL/L (ref 3.6–5.5)
RBC # BLD AUTO: 3.79 M/UL (ref 4.7–6.1)
SIGNIFICANT IND 70042: NORMAL
SITE SITE: NORMAL
SODIUM SERPL-SCNC: 134 MMOL/L (ref 135–145)
SOURCE SOURCE: NORMAL
WBC # BLD AUTO: 10.3 K/UL (ref 4.8–10.8)

## 2018-12-26 PROCEDURE — 85025 COMPLETE CBC W/AUTO DIFF WBC: CPT

## 2018-12-26 PROCEDURE — 93306 TTE W/DOPPLER COMPLETE: CPT | Mod: 26 | Performed by: INTERNAL MEDICINE

## 2018-12-26 PROCEDURE — 700111 HCHG RX REV CODE 636 W/ 250 OVERRIDE (IP): Performed by: NURSE PRACTITIONER

## 2018-12-26 PROCEDURE — 700111 HCHG RX REV CODE 636 W/ 250 OVERRIDE (IP): Performed by: HOSPITALIST

## 2018-12-26 PROCEDURE — 700101 HCHG RX REV CODE 250: Performed by: HOSPITALIST

## 2018-12-26 PROCEDURE — 83735 ASSAY OF MAGNESIUM: CPT

## 2018-12-26 PROCEDURE — 99232 SBSQ HOSP IP/OBS MODERATE 35: CPT | Performed by: HOSPITALIST

## 2018-12-26 PROCEDURE — 700102 HCHG RX REV CODE 250 W/ 637 OVERRIDE(OP): Performed by: HOSPITALIST

## 2018-12-26 PROCEDURE — 80048 BASIC METABOLIC PNL TOTAL CA: CPT

## 2018-12-26 PROCEDURE — 93306 TTE W/DOPPLER COMPLETE: CPT

## 2018-12-26 PROCEDURE — 700105 HCHG RX REV CODE 258: Performed by: HOSPITALIST

## 2018-12-26 PROCEDURE — 36415 COLL VENOUS BLD VENIPUNCTURE: CPT

## 2018-12-26 PROCEDURE — 94760 N-INVAS EAR/PLS OXIMETRY 1: CPT

## 2018-12-26 PROCEDURE — 94640 AIRWAY INHALATION TREATMENT: CPT

## 2018-12-26 PROCEDURE — A9270 NON-COVERED ITEM OR SERVICE: HCPCS | Performed by: HOSPITALIST

## 2018-12-26 PROCEDURE — 770006 HCHG ROOM/CARE - MED/SURG/GYN SEMI*

## 2018-12-26 RX ORDER — IPRATROPIUM BROMIDE AND ALBUTEROL SULFATE 2.5; .5 MG/3ML; MG/3ML
SOLUTION RESPIRATORY (INHALATION)
Status: COMPLETED
Start: 2018-12-26 | End: 2018-12-27

## 2018-12-26 RX ORDER — LEVALBUTEROL INHALATION SOLUTION 0.63 MG/3ML
0.63 SOLUTION RESPIRATORY (INHALATION)
Status: DISCONTINUED | OUTPATIENT
Start: 2018-12-26 | End: 2018-12-27

## 2018-12-26 RX ADMIN — AMPICILLIN SODIUM AND SULBACTAM SODIUM 3 G: 2; 1 INJECTION, POWDER, FOR SOLUTION INTRAMUSCULAR; INTRAVENOUS at 16:56

## 2018-12-26 RX ADMIN — LEVALBUTEROL HYDROCHLORIDE 0.63 MG: 0.63 SOLUTION RESPIRATORY (INHALATION) at 11:10

## 2018-12-26 RX ADMIN — METHYLPREDNISOLONE SODIUM SUCCINATE 62.5 MG: 125 INJECTION, POWDER, FOR SOLUTION INTRAMUSCULAR; INTRAVENOUS at 13:01

## 2018-12-26 RX ADMIN — HYDRALAZINE HYDROCHLORIDE 10 MG: 20 INJECTION INTRAMUSCULAR; INTRAVENOUS at 16:59

## 2018-12-26 RX ADMIN — HEPARIN SODIUM 5000 UNITS: 5000 INJECTION, SOLUTION INTRAVENOUS; SUBCUTANEOUS at 13:07

## 2018-12-26 RX ADMIN — FINASTERIDE 5 MG: 5 TABLET, FILM COATED ORAL at 05:30

## 2018-12-26 RX ADMIN — METHYLPREDNISOLONE SODIUM SUCCINATE 62.5 MG: 125 INJECTION, POWDER, FOR SOLUTION INTRAMUSCULAR; INTRAVENOUS at 16:56

## 2018-12-26 RX ADMIN — METOPROLOL TARTRATE 12.5 MG: 25 TABLET, FILM COATED ORAL at 16:56

## 2018-12-26 RX ADMIN — LOVASTATIN 20 MG: 20 TABLET ORAL at 05:30

## 2018-12-26 RX ADMIN — TIOTROPIUM BROMIDE 1 CAPSULE: 18 CAPSULE ORAL; RESPIRATORY (INHALATION) at 06:00

## 2018-12-26 RX ADMIN — METOPROLOL TARTRATE 12.5 MG: 25 TABLET, FILM COATED ORAL at 05:30

## 2018-12-26 RX ADMIN — ENALAPRIL MALEATE 20 MG: 10 TABLET ORAL at 05:30

## 2018-12-26 RX ADMIN — AMPICILLIN SODIUM AND SULBACTAM SODIUM 3 G: 2; 1 INJECTION, POWDER, FOR SOLUTION INTRAMUSCULAR; INTRAVENOUS at 00:00

## 2018-12-26 RX ADMIN — AMPICILLIN SODIUM AND SULBACTAM SODIUM 3 G: 2; 1 INJECTION, POWDER, FOR SOLUTION INTRAMUSCULAR; INTRAVENOUS at 05:29

## 2018-12-26 RX ADMIN — MONTELUKAST SODIUM 10 MG: 10 TABLET, FILM COATED ORAL at 21:25

## 2018-12-26 RX ADMIN — BUPROPION HYDROCHLORIDE 150 MG: 150 TABLET, EXTENDED RELEASE ORAL at 05:30

## 2018-12-26 RX ADMIN — LEVALBUTEROL HYDROCHLORIDE 0.63 MG: 0.63 SOLUTION RESPIRATORY (INHALATION) at 15:52

## 2018-12-26 RX ADMIN — METHYLPREDNISOLONE SODIUM SUCCINATE 62.5 MG: 125 INJECTION, POWDER, FOR SOLUTION INTRAMUSCULAR; INTRAVENOUS at 05:29

## 2018-12-26 RX ADMIN — AZITHROMYCIN 250 MG: 250 TABLET, FILM COATED ORAL at 05:29

## 2018-12-26 RX ADMIN — METHYLPREDNISOLONE SODIUM SUCCINATE 62.5 MG: 125 INJECTION, POWDER, FOR SOLUTION INTRAMUSCULAR; INTRAVENOUS at 00:00

## 2018-12-26 RX ADMIN — BUDESONIDE 0.5 MG: 0.5 SUSPENSION RESPIRATORY (INHALATION) at 08:53

## 2018-12-26 RX ADMIN — FUROSEMIDE 40 MG: 10 INJECTION, SOLUTION INTRAVENOUS at 05:29

## 2018-12-26 RX ADMIN — AMPICILLIN SODIUM AND SULBACTAM SODIUM 3 G: 2; 1 INJECTION, POWDER, FOR SOLUTION INTRAMUSCULAR; INTRAVENOUS at 13:00

## 2018-12-26 RX ADMIN — LEVALBUTEROL HYDROCHLORIDE 0.63 MG: 0.63 SOLUTION RESPIRATORY (INHALATION) at 08:53

## 2018-12-26 ASSESSMENT — ENCOUNTER SYMPTOMS
VOMITING: 0
SHORTNESS OF BREATH: 1
WHEEZING: 1
CHILLS: 0
WEAKNESS: 1
DIZZINESS: 0
FEVER: 0
MEMORY LOSS: 0
EYES NEGATIVE: 1
HEADACHES: 0
CLAUDICATION: 0
NAUSEA: 0
STRIDOR: 0
SORE THROAT: 0
COUGH: 1
PALPITATIONS: 0
MUSCULOSKELETAL NEGATIVE: 1
SPUTUM PRODUCTION: 1
ABDOMINAL PAIN: 0
NERVOUS/ANXIOUS: 0

## 2018-12-26 ASSESSMENT — PAIN SCALES - GENERAL
PAINLEVEL_OUTOF10: 0

## 2018-12-26 NOTE — PROGRESS NOTES
Report received by MAGY Lua. Assumed care of pt. Assessment complete. Pt A&Ox4, /105, HR=98. PRN hydralazin given. Pt in no apparent signs of distress. RT at bedside for resp. treatment. Plan of care discussed. Call light within reach, bed in lowest position, and pt has no further questions at this time.

## 2018-12-26 NOTE — PROGRESS NOTES
Report received, poc discussed, assumed care of pt.   Call light in reach, hourly rounding in place.   Pt gets up SBA.   Reg diet.  + void. LBM 12/24.   No pain.  No further needs.  Sleeping.

## 2018-12-26 NOTE — PROGRESS NOTES
"Hospital Medicine Daily Progress Note    Date of Service  12/26/2018    Chief Complaint  78 y.o. male with PMH COPD on nocturnal O2, HTN and dyslipidemia admitted 12/23/2018 with SOB and cough.    Hospital Course    Chest x-ray consistent with pneumonia.  Treated with IV antibiotics and IV steroids.      Interval Problem Update  12/26 -tachycardia resolved after starting metoprolol yesterday  -Chest x-ray showed pulmonary edema versus inflammation.  Echo showed pulmonary hypertension.  -He reported episode of wheezing at this morning, however at time of assessment his lungs were less diminished than yesterday without rhonchi, rales or wheezes  -Daily IV Lasix  -He reports much improvement of his KUHN although he still cannot ambulate around the unit without increased work of breathing and dyspnea    12/25-He reports worsening KUHN from yesterday \"I cannot even walk to the sink to brush my teeth without getting really SOB\".  Lung sounds very diminished without wheezing.  Tachycardia during activity  -PO to IV steroids  -Less sputum production  -Hypomagnesemia -replacing  -Hypokalemia -replacing    Consultants/Specialty  NA    Code Status  FULL    Disposition  Likely to DC tomorrow    Review of Systems  Review of Systems   Constitutional: Positive for malaise/fatigue. Negative for chills and fever.   HENT: Negative for sore throat.    Eyes: Negative.    Respiratory: Positive for cough, sputum production (green), shortness of breath and wheezing. Negative for stridor.    Cardiovascular: Negative for chest pain, palpitations and claudication.   Gastrointestinal: Negative for abdominal pain, nausea and vomiting.   Genitourinary: Negative for dysuria and hematuria.   Musculoskeletal: Negative.    Neurological: Positive for weakness. Negative for dizziness and headaches.   Psychiatric/Behavioral: Negative for memory loss. The patient is not nervous/anxious.    All other systems reviewed and are negative.       Physical " Exam  Temp:  [36.5 °C (97.7 °F)-37.1 °C (98.8 °F)] 36.5 °C (97.7 °F)  Pulse:  [] 84  Resp:  [16-24] 16  BP: (137-190)/() 167/90    Physical Exam   Constitutional: He is oriented to person, place, and time. Vital signs are normal. He appears well-developed and well-nourished. He is cooperative. He appears ill. No distress. Nasal cannula in place.   Sitting up at the side of the bed in no acute distress.     HENT:   Head: Normocephalic.   Right Ear: Decreased hearing is noted.   Left Ear: Decreased hearing is noted.   Nose: Nose normal.   Mouth/Throat: Oropharynx is clear and moist and mucous membranes are normal.   Eyes: Conjunctivae are normal. No scleral icterus.   Neck: Phonation normal. No JVD present.   Cardiovascular: Normal rate, regular rhythm and intact distal pulses.  Exam reveals distant heart sounds.    No edema   Pulmonary/Chest: He has decreased breath sounds. He has no wheezes. He has no rhonchi. He has no rales.   Lungs with improved aeration today compared to yesterday.    Abdominal: Soft. Normal appearance. He exhibits no distension. There is no tenderness.   Musculoskeletal: Normal range of motion.   Neurological: He is alert and oriented to person, place, and time. He has normal strength. He displays a negative Romberg sign. GCS eye subscore is 4. GCS verbal subscore is 5. GCS motor subscore is 6.   DAMON 5/5   Skin: Skin is warm and dry.   Psychiatric: He has a normal mood and affect. Cognition and memory are normal.   Nursing note and vitals reviewed.      Fluids    Intake/Output Summary (Last 24 hours) at 12/26/18 1247  Last data filed at 12/25/18 2000   Gross per 24 hour   Intake              450 ml   Output                0 ml   Net              450 ml       Laboratory  Recent Labs      12/24/18   0226  12/25/18   0207  12/26/18   0233   WBC  7.1  11.2*  10.3   RBC  3.97*  3.66*  3.79*   HEMOGLOBIN  12.4*  11.6*  12.2*   HEMATOCRIT  38.1*  35.1*  35.3*   MCV  96.0  95.9  93.1   MCH   31.2  31.7  32.2   MCHC  32.5*  33.0*  34.6   RDW  45.1  46.0  43.8   PLATELETCT  330  366  380   MPV  9.3  8.8*  8.6*     Recent Labs      12/24/18   0226  12/25/18   0207  12/26/18   0233   SODIUM  134*  136  134*   POTASSIUM  4.1  3.4*  3.8   CHLORIDE  97  99  96   CO2  29  28  30   GLUCOSE  163*  130*  153*   BUN  18  19  17   CREATININE  0.79  0.82  0.73   CALCIUM  8.9  9.4  9.5         Recent Labs      12/23/18   1914   BNPBTYPENAT  313*           Imaging  EC-ECHOCARDIOGRAM COMPLETE W/O CONT   Final Result      DX-CHEST-LIMITED (1 VIEW)   Final Result         Ill-defined opacifications in each lung have increased compared to the prior radiograph. This could indicate worsening of pulmonary edema or inflammation.      DX-CHEST-PORTABLE (1 VIEW)   Final Result         1. Bibasilar opacities, which may represent multifocal pneumonia. No pleural effusions.   2. COPD.           Assessment/Plan  * COPD with acute exacerbation (HCC)- (present on admission)   Assessment & Plan    -History of COPD on nocturnal O2 of 2 L nasal cannula  -Chest x-ray yesterday showed pulmonary edema versus inflammation.  -IV Lasix  -Home O2 evaluation  -Inhalers  -ECHO shows some pulmonary HTN  -Singulair  -RT protocol  -IV steroids  -repeat x-ray in am  -ABX  -IS every hour while awake  -Ambulate as tolerated       CAP (community acquired pneumonia)- (present on admission)   Assessment & Plan    -Chest x-ray yesterday showed pulmonary edema versus inflammation.  No crackles/rales.    -IV Lasix  -Sputum culture pending  -Pro-calcitonin (-)  -Continue IV ABX  -continue IV steroids  -Mucinex as needed  -Supplemental O2 to keep sats greater than 92%  -IS every hour while awake  -Ambulate as tolerated  -Home O2 evaluation     SIRS (systemic inflammatory response syndrome) (HCC)- (present on admission)   Assessment & Plan    -as above  -Afebrile  -No leukocytosis     Hypomagnesemia   Assessment & Plan    -Resolved after replacement  yesterday  -A.m. labs     Hypokalemia- (present on admission)   Assessment & Plan    -Stable  -IV Lasix  -A.m. labs     Tachycardia   Assessment & Plan    -Resolved after starting metoprolol yesterday     Hypertension- (present on admission)   Assessment & Plan    -Amlodipine and metoprolol     Dyslipidemia- (present on admission)   Assessment & Plan    -Continue home lovastatin          VTE prophylaxis: Heparin     NEW Raymundo

## 2018-12-26 NOTE — PROGRESS NOTES
Rechecked pt's NZ=544/82, , resp 20 and o2 sat=92% still on 2L. Pt stated that he was just back from the bathroom and was having some time to recover. Pt sitting at bed and pursed lip-breathing. Interventions all in place. Will continue to monitor.

## 2018-12-26 NOTE — PROGRESS NOTES
Walking home O2 test done. See flow sheets. Pt still requiring 2L oxygen at all times, resting and with ambulation.

## 2018-12-26 NOTE — CARE PLAN
Problem: Respiratory:  Goal: Respiratory status will improve  Outcome: PROGRESSING AS EXPECTED  Pt's lungs are diminished. Pt on 2 L N/C. RT involved. Pt mobilized up in room to promote respiratory status. Wears home O2 at night, to get walking O2 test to see if pt needs O2 during the day.     Problem: Safety  Goal: Will remain free from injury  Outcome: PROGRESSING AS EXPECTED  Pt is up self and steady.

## 2018-12-27 ENCOUNTER — PATIENT OUTREACH (OUTPATIENT)
Dept: HEALTH INFORMATION MANAGEMENT | Facility: OTHER | Age: 78
End: 2018-12-27

## 2018-12-27 PROBLEM — R00.0 TACHYCARDIA: Status: RESOLVED | Noted: 2018-12-25 | Resolved: 2018-12-27

## 2018-12-27 LAB
ANION GAP SERPL CALC-SCNC: 8 MMOL/L (ref 0–11.9)
BASOPHILS # BLD AUTO: 0.3 % (ref 0–1.8)
BASOPHILS # BLD: 0.03 K/UL (ref 0–0.12)
BUN SERPL-MCNC: 24 MG/DL (ref 8–22)
CALCIUM SERPL-MCNC: 8.9 MG/DL (ref 8.5–10.5)
CHLORIDE SERPL-SCNC: 94 MMOL/L (ref 96–112)
CO2 SERPL-SCNC: 30 MMOL/L (ref 20–33)
CREAT SERPL-MCNC: 0.74 MG/DL (ref 0.5–1.4)
EOSINOPHIL # BLD AUTO: 0.01 K/UL (ref 0–0.51)
EOSINOPHIL NFR BLD: 0.1 % (ref 0–6.9)
ERYTHROCYTE [DISTWIDTH] IN BLOOD BY AUTOMATED COUNT: 44.4 FL (ref 35.9–50)
GLUCOSE SERPL-MCNC: 144 MG/DL (ref 65–99)
HCT VFR BLD AUTO: 35.8 % (ref 42–52)
HGB BLD-MCNC: 11.8 G/DL (ref 14–18)
IMM GRANULOCYTES # BLD AUTO: 0.32 K/UL (ref 0–0.11)
IMM GRANULOCYTES NFR BLD AUTO: 2.8 % (ref 0–0.9)
LYMPHOCYTES # BLD AUTO: 1.23 K/UL (ref 1–4.8)
LYMPHOCYTES NFR BLD: 10.9 % (ref 22–41)
MAGNESIUM SERPL-MCNC: 2 MG/DL (ref 1.5–2.5)
MCH RBC QN AUTO: 31.3 PG (ref 27–33)
MCHC RBC AUTO-ENTMCNC: 33 G/DL (ref 33.7–35.3)
MCV RBC AUTO: 95 FL (ref 81.4–97.8)
MONOCYTES # BLD AUTO: 0.8 K/UL (ref 0–0.85)
MONOCYTES NFR BLD AUTO: 7.1 % (ref 0–13.4)
NEUTROPHILS # BLD AUTO: 8.86 K/UL (ref 1.82–7.42)
NEUTROPHILS NFR BLD: 78.8 % (ref 44–72)
NRBC # BLD AUTO: 0 K/UL
NRBC BLD-RTO: 0 /100 WBC
PLATELET # BLD AUTO: 403 K/UL (ref 164–446)
PMV BLD AUTO: 8.8 FL (ref 9–12.9)
POTASSIUM SERPL-SCNC: 3.7 MMOL/L (ref 3.6–5.5)
RBC # BLD AUTO: 3.77 M/UL (ref 4.7–6.1)
SODIUM SERPL-SCNC: 132 MMOL/L (ref 135–145)
WBC # BLD AUTO: 11.3 K/UL (ref 4.8–10.8)

## 2018-12-27 PROCEDURE — 99232 SBSQ HOSP IP/OBS MODERATE 35: CPT | Performed by: HOSPITALIST

## 2018-12-27 PROCEDURE — 700111 HCHG RX REV CODE 636 W/ 250 OVERRIDE (IP): Performed by: HOSPITALIST

## 2018-12-27 PROCEDURE — 770006 HCHG ROOM/CARE - MED/SURG/GYN SEMI*

## 2018-12-27 PROCEDURE — 700111 HCHG RX REV CODE 636 W/ 250 OVERRIDE (IP): Performed by: NURSE PRACTITIONER

## 2018-12-27 PROCEDURE — 700102 HCHG RX REV CODE 250 W/ 637 OVERRIDE(OP): Performed by: HOSPITALIST

## 2018-12-27 PROCEDURE — 700101 HCHG RX REV CODE 250: Performed by: HOSPITALIST

## 2018-12-27 PROCEDURE — 94640 AIRWAY INHALATION TREATMENT: CPT

## 2018-12-27 PROCEDURE — 36415 COLL VENOUS BLD VENIPUNCTURE: CPT

## 2018-12-27 PROCEDURE — 85025 COMPLETE CBC W/AUTO DIFF WBC: CPT

## 2018-12-27 PROCEDURE — A9270 NON-COVERED ITEM OR SERVICE: HCPCS | Performed by: NURSE PRACTITIONER

## 2018-12-27 PROCEDURE — A9270 NON-COVERED ITEM OR SERVICE: HCPCS | Performed by: HOSPITALIST

## 2018-12-27 PROCEDURE — 80048 BASIC METABOLIC PNL TOTAL CA: CPT

## 2018-12-27 PROCEDURE — 83735 ASSAY OF MAGNESIUM: CPT

## 2018-12-27 PROCEDURE — 700105 HCHG RX REV CODE 258: Performed by: HOSPITALIST

## 2018-12-27 PROCEDURE — 700102 HCHG RX REV CODE 250 W/ 637 OVERRIDE(OP): Performed by: NURSE PRACTITIONER

## 2018-12-27 RX ORDER — METHYLPREDNISOLONE 4 MG/1
4 TABLET ORAL
Status: DISCONTINUED | OUTPATIENT
Start: 2018-12-28 | End: 2018-12-28 | Stop reason: HOSPADM

## 2018-12-27 RX ORDER — METHYLPREDNISOLONE 4 MG/1
8 TABLET ORAL
Status: DISCONTINUED | OUTPATIENT
Start: 2018-12-27 | End: 2018-12-28 | Stop reason: HOSPADM

## 2018-12-27 RX ORDER — ALBUTEROL SULFATE 90 UG/1
2 AEROSOL, METERED RESPIRATORY (INHALATION) EVERY 4 HOURS PRN
Status: DISCONTINUED | OUTPATIENT
Start: 2018-12-27 | End: 2018-12-28 | Stop reason: HOSPADM

## 2018-12-27 RX ORDER — METHYLPREDNISOLONE 4 MG/1
4 TABLET ORAL
Status: DISCONTINUED | OUTPATIENT
Start: 2018-12-29 | End: 2018-12-28 | Stop reason: HOSPADM

## 2018-12-27 RX ORDER — IPRATROPIUM BROMIDE AND ALBUTEROL SULFATE 2.5; .5 MG/3ML; MG/3ML
3 SOLUTION RESPIRATORY (INHALATION)
Status: DISCONTINUED | OUTPATIENT
Start: 2018-12-27 | End: 2018-12-28 | Stop reason: HOSPADM

## 2018-12-27 RX ORDER — METHYLPREDNISOLONE 4 MG/1
8 TABLET ORAL
Status: COMPLETED | OUTPATIENT
Start: 2018-12-27 | End: 2018-12-27

## 2018-12-27 RX ORDER — GUAIFENESIN 600 MG/1
600 TABLET, EXTENDED RELEASE ORAL EVERY 12 HOURS
Status: DISCONTINUED | OUTPATIENT
Start: 2018-12-27 | End: 2018-12-28 | Stop reason: HOSPADM

## 2018-12-27 RX ORDER — METHYLPREDNISOLONE 4 MG/1
4 TABLET ORAL
Status: DISCONTINUED | OUTPATIENT
Start: 2018-12-27 | End: 2018-12-28 | Stop reason: HOSPADM

## 2018-12-27 RX ADMIN — METHYLPREDNISOLONE 4 MG: 4 TABLET ORAL at 21:34

## 2018-12-27 RX ADMIN — METOPROLOL TARTRATE 12.5 MG: 25 TABLET, FILM COATED ORAL at 05:35

## 2018-12-27 RX ADMIN — LOVASTATIN 20 MG: 20 TABLET ORAL at 05:34

## 2018-12-27 RX ADMIN — BUPROPION HYDROCHLORIDE 150 MG: 150 TABLET, EXTENDED RELEASE ORAL at 05:36

## 2018-12-27 RX ADMIN — METOPROLOL TARTRATE 12.5 MG: 25 TABLET, FILM COATED ORAL at 17:24

## 2018-12-27 RX ADMIN — GUAIFENESIN 600 MG: 600 TABLET, EXTENDED RELEASE ORAL at 21:41

## 2018-12-27 RX ADMIN — FINASTERIDE 5 MG: 5 TABLET, FILM COATED ORAL at 05:35

## 2018-12-27 RX ADMIN — FUROSEMIDE 40 MG: 10 INJECTION, SOLUTION INTRAVENOUS at 05:35

## 2018-12-27 RX ADMIN — BUDESONIDE 0.5 MG: 0.5 SUSPENSION RESPIRATORY (INHALATION) at 07:21

## 2018-12-27 RX ADMIN — METHYLPREDNISOLONE 4 MG: 4 TABLET ORAL at 15:43

## 2018-12-27 RX ADMIN — LEVALBUTEROL HYDROCHLORIDE 0.63 MG: 0.63 SOLUTION RESPIRATORY (INHALATION) at 07:21

## 2018-12-27 RX ADMIN — METHYLPREDNISOLONE 8 MG: 4 TABLET ORAL at 21:35

## 2018-12-27 RX ADMIN — TIOTROPIUM BROMIDE 1 CAPSULE: 18 CAPSULE ORAL; RESPIRATORY (INHALATION) at 05:34

## 2018-12-27 RX ADMIN — METHYLPREDNISOLONE SODIUM SUCCINATE 62.5 MG: 125 INJECTION, POWDER, FOR SOLUTION INTRAMUSCULAR; INTRAVENOUS at 12:07

## 2018-12-27 RX ADMIN — ENALAPRIL MALEATE 20 MG: 10 TABLET ORAL at 05:35

## 2018-12-27 RX ADMIN — MONTELUKAST SODIUM 10 MG: 10 TABLET, FILM COATED ORAL at 21:41

## 2018-12-27 RX ADMIN — AMPICILLIN SODIUM AND SULBACTAM SODIUM 3 G: 2; 1 INJECTION, POWDER, FOR SOLUTION INTRAMUSCULAR; INTRAVENOUS at 00:03

## 2018-12-27 RX ADMIN — GUAIFENESIN 600 MG: 600 TABLET, EXTENDED RELEASE ORAL at 12:08

## 2018-12-27 RX ADMIN — AMPICILLIN SODIUM AND SULBACTAM SODIUM 3 G: 2; 1 INJECTION, POWDER, FOR SOLUTION INTRAMUSCULAR; INTRAVENOUS at 05:35

## 2018-12-27 RX ADMIN — AMPICILLIN SODIUM AND SULBACTAM SODIUM 3 G: 2; 1 INJECTION, POWDER, FOR SOLUTION INTRAMUSCULAR; INTRAVENOUS at 12:06

## 2018-12-27 RX ADMIN — METHYLPREDNISOLONE 8 MG: 4 TABLET ORAL at 21:38

## 2018-12-27 RX ADMIN — AMPICILLIN SODIUM AND SULBACTAM SODIUM 3 G: 2; 1 INJECTION, POWDER, FOR SOLUTION INTRAMUSCULAR; INTRAVENOUS at 23:58

## 2018-12-27 RX ADMIN — METHYLPREDNISOLONE SODIUM SUCCINATE 62.5 MG: 125 INJECTION, POWDER, FOR SOLUTION INTRAMUSCULAR; INTRAVENOUS at 00:03

## 2018-12-27 RX ADMIN — AZITHROMYCIN 250 MG: 250 TABLET, FILM COATED ORAL at 05:34

## 2018-12-27 RX ADMIN — METHYLPREDNISOLONE SODIUM SUCCINATE 62.5 MG: 125 INJECTION, POWDER, FOR SOLUTION INTRAMUSCULAR; INTRAVENOUS at 06:00

## 2018-12-27 RX ADMIN — AMPICILLIN SODIUM AND SULBACTAM SODIUM 3 G: 2; 1 INJECTION, POWDER, FOR SOLUTION INTRAMUSCULAR; INTRAVENOUS at 17:24

## 2018-12-27 ASSESSMENT — PAIN SCALES - GENERAL
PAINLEVEL_OUTOF10: 0

## 2018-12-27 ASSESSMENT — ENCOUNTER SYMPTOMS
SPUTUM PRODUCTION: 1
CHILLS: 0
ABDOMINAL PAIN: 0
HEADACHES: 0
DIZZINESS: 0
SORE THROAT: 0
CLAUDICATION: 0
PALPITATIONS: 0
WHEEZING: 1
MUSCULOSKELETAL NEGATIVE: 1
COUGH: 1
EYES NEGATIVE: 1
MEMORY LOSS: 0
SHORTNESS OF BREATH: 1
NAUSEA: 0
NERVOUS/ANXIOUS: 0
VOMITING: 0
FEVER: 0
STRIDOR: 0
WEAKNESS: 0

## 2018-12-27 NOTE — DISCHARGE PLANNING
Received Choice form at 1525  Agency/Facility Name: [facility]   Referral sent per Choice form @ [phone]

## 2018-12-27 NOTE — DISCHARGE PLANNING
Agency/Facility Name: [facility]  Spoke To: Correspondence  Outcome: Patient has been declined stating that they do not have concentrators for patients, [facility] stated that they are already set up with the patient for night time o2, I have been trying to call them, however, there is no answer, it is stating that it is their after hours line, I have left multiple messages but still no reply.

## 2018-12-27 NOTE — PROGRESS NOTES
"Hospital Medicine Daily Progress Note    Date of Service  12/27/2018    Chief Complaint  78 y.o. male with PMH COPD on nocturnal O2, HTN and dyslipidemia admitted 12/23/2018 with SOB and cough.    Hospital Course    Chest x-ray consistent with pneumonia.  Treated with IV antibiotics and IV steroids.      Interval Problem Update  12/27 -increase in cough frequency today.  Producing light green/white thick sputum.  Guaifenesin.  -encourage ambulation  -Qualified for continuous home O2 -SS/CM to set up prior to discharge tomorrow  -IV steroids --> PO Medrol-dose JAN today    12/26 -tachycardia resolved after starting metoprolol yesterday  -Chest x-ray showed pulmonary edema versus inflammation.  Echo showed pulmonary hypertension.  -He reported episode of wheezing at this morning, however at time of assessment his lungs were less diminished than yesterday without rhonchi, rales or wheezes  -Daily IV Lasix  -He reports much improvement of his KUHN although he still cannot ambulate around the unit without increased work of breathing and dyspnea    12/25-He reports worsening KUHN from yesterday \"I cannot even walk to the sink to brush my teeth without getting really SOB\".  Lung sounds very diminished without wheezing.  Tachycardia during activity  -PO to IV steroids  -Less sputum production  -Hypomagnesemia -replacing  -Hypokalemia -replacing    Consultants/Specialty  NA    Code Status  FULL    Disposition  Plan to DC home tomorrow.     Review of Systems  Review of Systems   Constitutional: Negative for chills, fever and malaise/fatigue.   HENT: Negative for sore throat.    Eyes: Negative.    Respiratory: Positive for cough, sputum production (light green/white), shortness of breath (improving) and wheezing (mild after activity. Clears with cough.). Negative for stridor.    Cardiovascular: Negative for chest pain, palpitations and claudication.   Gastrointestinal: Negative for abdominal pain, nausea and vomiting. "   Genitourinary: Negative for dysuria and hematuria.   Musculoskeletal: Negative.    Neurological: Negative for dizziness, weakness and headaches.   Psychiatric/Behavioral: Negative for memory loss. The patient is not nervous/anxious.    All other systems reviewed and are negative.       Physical Exam  Temp:  [36.1 °C (97 °F)-36.7 °C (98.1 °F)] 36.7 °C (98 °F)  Pulse:  [73-88] 73  Resp:  [16-18] 18  BP: (119-168)/(74-92) 148/92    Physical Exam   Constitutional: He is oriented to person, place, and time. Vital signs are normal. He appears well-developed and well-nourished. He is cooperative. He appears ill. No distress. Nasal cannula in place.        HENT:   Head: Normocephalic.   Right Ear: Decreased hearing is noted.   Left Ear: Decreased hearing is noted.   Nose: Nose normal.   Mouth/Throat: Oropharynx is clear and moist and mucous membranes are normal.   Eyes: Conjunctivae and EOM are normal. No scleral icterus.   Neck: Phonation normal. No JVD present.   Cardiovascular: Normal rate, regular rhythm and intact distal pulses.  Exam reveals distant heart sounds.    No edema  Cap refill WNL   Pulmonary/Chest: No accessory muscle usage. No respiratory distress. He has decreased breath sounds. He has wheezes.   Abdominal: Soft. Normal appearance. He exhibits no distension. There is no tenderness. There is no rebound.   BM today   Musculoskeletal: Normal range of motion.   Neurological: He is alert and oriented to person, place, and time. He has normal strength. He displays a negative Romberg sign. GCS eye subscore is 4. GCS verbal subscore is 5. GCS motor subscore is 6.   DAMON 5/5   Skin: Skin is warm and dry.   Psychiatric: He has a normal mood and affect. His behavior is normal. Thought content normal. Cognition and memory are normal.   Nursing note and vitals reviewed.      Fluids    Intake/Output Summary (Last 24 hours) at 12/27/18 1409  Last data filed at 12/27/18 0830   Gross per 24 hour   Intake              640  ml   Output                0 ml   Net              640 ml       Laboratory  Recent Labs      12/25/18   0207  12/26/18   0233  12/27/18   0251   WBC  11.2*  10.3  11.3*   RBC  3.66*  3.79*  3.77*   HEMOGLOBIN  11.6*  12.2*  11.8*   HEMATOCRIT  35.1*  35.3*  35.8*   MCV  95.9  93.1  95.0   MCH  31.7  32.2  31.3   MCHC  33.0*  34.6  33.0*   RDW  46.0  43.8  44.4   PLATELETCT  366  380  403   MPV  8.8*  8.6*  8.8*     Recent Labs      12/25/18   0207  12/26/18   0233  12/27/18   0251   SODIUM  136  134*  132*   POTASSIUM  3.4*  3.8  3.7   CHLORIDE  99  96  94*   CO2  28  30  30   GLUCOSE  130*  153*  144*   BUN  19  17  24*   CREATININE  0.82  0.73  0.74   CALCIUM  9.4  9.5  8.9                   Imaging  EC-ECHOCARDIOGRAM COMPLETE W/O CONT   Final Result      DX-CHEST-LIMITED (1 VIEW)   Final Result         Ill-defined opacifications in each lung have increased compared to the prior radiograph. This could indicate worsening of pulmonary edema or inflammation.      DX-CHEST-PORTABLE (1 VIEW)   Final Result         1. Bibasilar opacities, which may represent multifocal pneumonia. No pleural effusions.   2. COPD.           Assessment/Plan  * COPD with acute exacerbation (HCC)- (present on admission)   Assessment & Plan    -History of COPD on nocturnal O2 of 2 L nasal cannula  -IV Lasix  -Home O2 evaluation -patient qualified for continuous O2  -Inhalers  -Guaifenesin  -Singulair  -RT protocol  -IV steroids switched to PO today - Medrol dose-felisa  -ABX  -IS every hour while awake  -encourage ambulation       CAP (community acquired pneumonia)- (present on admission)   Assessment & Plan    -as above     SIRS (systemic inflammatory response syndrome) (HCC)- (present on admission)   Assessment & Plan    -as above       Hypomagnesemia   Assessment & Plan    -AM labs     Hypokalemia- (present on admission)   Assessment & Plan    -Stable  -IV Lasix  -A.m. labs     Hypertension- (present on admission)   Assessment & Plan     -Amlodipine and metoprolol     Dyslipidemia- (present on admission)   Assessment & Plan    -Continue lovastatin          VTE prophylaxis: Heparin     YFN Raymundo.

## 2018-12-27 NOTE — PROGRESS NOTES
Bedside report received, pt care assumed. Pt denies any additional needs at this time. Bed in lowest position, bed alarm on pt educated on fall risk and verbalized understanding, call light within reach.

## 2018-12-27 NOTE — DISCHARGE PLANNING
Received Choice form at [phone]  Agency/Facility Name: [facility]  Referral sent per Choice form @ [phone]

## 2018-12-27 NOTE — CARE PLAN
Problem: Safety  Goal: Will remain free from injury  Outcome: PROGRESSING AS EXPECTED  Pt up to bathroom, standby assist, tolerated well, CTM    Problem: Venous Thromboembolism (VTW)/Deep Vein Thrombosis (DVT) Prevention:  Goal: Patient will participate in Venous Thrombosis (VTE)/Deep Vein Thrombosis (DVT)Prevention Measures  Outcome: PROGRESSING AS EXPECTED  Heparin implemented for DVT prophylaxis, no s/s of complication, CTM

## 2018-12-27 NOTE — FACE TO FACE
"Face to Face Note  -  Durable Medical Equipment    NEW Raymundo - NPI: [id number]  I certify that this patient is under my care and that they had a durable medical equipment(DME)face to face encounter by myself that meets the physician DME face-to-face encounter requirements with this patient on:    Date of encounter:   Patient:                    MRN:                       YOB: 2018  David Cho  [id number]  1940     The encounter with the patient was in whole, or in part, for the following medical condition, which is the primary reason for durable medical equipment:  COPD    I certify that, based on my findings, the following durable medical equipment is medically necessary:  Oxygen.    HOME O2 Saturation Measurements:(Values must be present for Home Oxygen orders)  Room air sat at rest: 86  Room air sat with amb: 85  With liters of O2: 2, O2 sat at rest with O2: 92  With Liters of O2: 3, O2 sat with amb with O2 : 90  Is the patient mobile?: Yes    My Clinical findings support the need for the above equipment due to:  Hypoxia    Supporting Symptoms: The patient requires supplemental oxygen, as the following interventions have been tried with limited or no improvement: \"Bronchodilators and/or steroid inhalers, \"Oral and/or IV steroids, \"Ambulation with oximetry and \"Incentive spirometry    "

## 2018-12-27 NOTE — CARE PLAN
Problem: Knowledge Deficit  Goal: Knowledge of disease process/condition, treatment plan, diagnostic tests, and medications will improve  Outcome: PROGRESSING AS EXPECTED      Problem: Psychosocial Needs:  Goal: Level of anxiety will decrease  Outcome: PROGRESSING AS EXPECTED

## 2018-12-28 VITALS
HEIGHT: 71 IN | SYSTOLIC BLOOD PRESSURE: 123 MMHG | HEART RATE: 80 BPM | OXYGEN SATURATION: 92 % | WEIGHT: 158.07 LBS | BODY MASS INDEX: 22.13 KG/M2 | RESPIRATION RATE: 18 BRPM | DIASTOLIC BLOOD PRESSURE: 76 MMHG | TEMPERATURE: 97.5 F

## 2018-12-28 PROBLEM — J44.1 COPD WITH ACUTE EXACERBATION (HCC): Status: RESOLVED | Noted: 2018-12-23 | Resolved: 2018-12-28

## 2018-12-28 PROBLEM — R65.10 SIRS (SYSTEMIC INFLAMMATORY RESPONSE SYNDROME) (HCC): Status: RESOLVED | Noted: 2018-12-23 | Resolved: 2018-12-28

## 2018-12-28 LAB
ANION GAP SERPL CALC-SCNC: 7 MMOL/L (ref 0–11.9)
BASOPHILS # BLD AUTO: 0.2 % (ref 0–1.8)
BASOPHILS # BLD: 0.03 K/UL (ref 0–0.12)
BUN SERPL-MCNC: 27 MG/DL (ref 8–22)
CALCIUM SERPL-MCNC: 8.9 MG/DL (ref 8.5–10.5)
CHLORIDE SERPL-SCNC: 96 MMOL/L (ref 96–112)
CO2 SERPL-SCNC: 32 MMOL/L (ref 20–33)
CREAT SERPL-MCNC: 0.87 MG/DL (ref 0.5–1.4)
EOSINOPHIL # BLD AUTO: 0.01 K/UL (ref 0–0.51)
EOSINOPHIL NFR BLD: 0.1 % (ref 0–6.9)
ERYTHROCYTE [DISTWIDTH] IN BLOOD BY AUTOMATED COUNT: 44.4 FL (ref 35.9–50)
GLUCOSE SERPL-MCNC: 127 MG/DL (ref 65–99)
HCT VFR BLD AUTO: 34.2 % (ref 42–52)
HGB BLD-MCNC: 11.6 G/DL (ref 14–18)
IMM GRANULOCYTES # BLD AUTO: 0.4 K/UL (ref 0–0.11)
IMM GRANULOCYTES NFR BLD AUTO: 3.3 % (ref 0–0.9)
LYMPHOCYTES # BLD AUTO: 1.35 K/UL (ref 1–4.8)
LYMPHOCYTES NFR BLD: 11 % (ref 22–41)
MAGNESIUM SERPL-MCNC: 2.1 MG/DL (ref 1.5–2.5)
MCH RBC QN AUTO: 32.2 PG (ref 27–33)
MCHC RBC AUTO-ENTMCNC: 33.9 G/DL (ref 33.7–35.3)
MCV RBC AUTO: 95 FL (ref 81.4–97.8)
MONOCYTES # BLD AUTO: 0.91 K/UL (ref 0–0.85)
MONOCYTES NFR BLD AUTO: 7.4 % (ref 0–13.4)
NEUTROPHILS # BLD AUTO: 9.54 K/UL (ref 1.82–7.42)
NEUTROPHILS NFR BLD: 78 % (ref 44–72)
NRBC # BLD AUTO: 0 K/UL
NRBC BLD-RTO: 0 /100 WBC
PLATELET # BLD AUTO: 421 K/UL (ref 164–446)
PMV BLD AUTO: 8.8 FL (ref 9–12.9)
POTASSIUM SERPL-SCNC: 3.8 MMOL/L (ref 3.6–5.5)
RBC # BLD AUTO: 3.6 M/UL (ref 4.7–6.1)
SODIUM SERPL-SCNC: 135 MMOL/L (ref 135–145)
WBC # BLD AUTO: 12.2 K/UL (ref 4.8–10.8)

## 2018-12-28 PROCEDURE — A9270 NON-COVERED ITEM OR SERVICE: HCPCS | Performed by: HOSPITALIST

## 2018-12-28 PROCEDURE — 700102 HCHG RX REV CODE 250 W/ 637 OVERRIDE(OP): Performed by: NURSE PRACTITIONER

## 2018-12-28 PROCEDURE — 36415 COLL VENOUS BLD VENIPUNCTURE: CPT

## 2018-12-28 PROCEDURE — 700105 HCHG RX REV CODE 258: Performed by: HOSPITALIST

## 2018-12-28 PROCEDURE — 700111 HCHG RX REV CODE 636 W/ 250 OVERRIDE (IP): Performed by: HOSPITALIST

## 2018-12-28 PROCEDURE — 99239 HOSP IP/OBS DSCHRG MGMT >30: CPT | Performed by: HOSPITALIST

## 2018-12-28 PROCEDURE — 700102 HCHG RX REV CODE 250 W/ 637 OVERRIDE(OP): Performed by: HOSPITALIST

## 2018-12-28 PROCEDURE — 83735 ASSAY OF MAGNESIUM: CPT

## 2018-12-28 PROCEDURE — 85025 COMPLETE CBC W/AUTO DIFF WBC: CPT

## 2018-12-28 PROCEDURE — 80048 BASIC METABOLIC PNL TOTAL CA: CPT

## 2018-12-28 PROCEDURE — A9270 NON-COVERED ITEM OR SERVICE: HCPCS | Performed by: NURSE PRACTITIONER

## 2018-12-28 RX ORDER — MONTELUKAST SODIUM 10 MG/1
10 TABLET ORAL DAILY
Qty: 30 TAB | Refills: 2 | Status: SHIPPED | OUTPATIENT
Start: 2018-12-28 | End: 2019-11-04

## 2018-12-28 RX ORDER — GUAIFENESIN 600 MG/1
600 TABLET, EXTENDED RELEASE ORAL 2 TIMES DAILY
Qty: 60 TAB | Refills: 2 | Status: ON HOLD | OUTPATIENT
Start: 2018-12-28 | End: 2019-02-13

## 2018-12-28 RX ORDER — METHYLPREDNISOLONE 4 MG/1
TABLET ORAL
Qty: 1 KIT | Refills: 0 | Status: ON HOLD | OUTPATIENT
Start: 2018-12-28 | End: 2019-02-13

## 2018-12-28 RX ADMIN — GUAIFENESIN 600 MG: 600 TABLET, EXTENDED RELEASE ORAL at 12:09

## 2018-12-28 RX ADMIN — FINASTERIDE 5 MG: 5 TABLET, FILM COATED ORAL at 05:15

## 2018-12-28 RX ADMIN — METHYLPREDNISOLONE 4 MG: 4 TABLET ORAL at 13:33

## 2018-12-28 RX ADMIN — ENALAPRIL MALEATE 20 MG: 10 TABLET ORAL at 05:16

## 2018-12-28 RX ADMIN — METOPROLOL TARTRATE 12.5 MG: 25 TABLET, FILM COATED ORAL at 05:15

## 2018-12-28 RX ADMIN — BUPROPION HYDROCHLORIDE 150 MG: 150 TABLET, EXTENDED RELEASE ORAL at 05:15

## 2018-12-28 RX ADMIN — FUROSEMIDE 40 MG: 10 INJECTION, SOLUTION INTRAVENOUS at 05:15

## 2018-12-28 RX ADMIN — AMPICILLIN SODIUM AND SULBACTAM SODIUM 3 G: 2; 1 INJECTION, POWDER, FOR SOLUTION INTRAMUSCULAR; INTRAVENOUS at 05:14

## 2018-12-28 RX ADMIN — AMPICILLIN SODIUM AND SULBACTAM SODIUM 3 G: 2; 1 INJECTION, POWDER, FOR SOLUTION INTRAMUSCULAR; INTRAVENOUS at 12:10

## 2018-12-28 RX ADMIN — LOVASTATIN 20 MG: 20 TABLET ORAL at 05:16

## 2018-12-28 RX ADMIN — METHYLPREDNISOLONE 4 MG: 4 TABLET ORAL at 05:20

## 2018-12-28 RX ADMIN — UMECLIDINIUM BROMIDE AND VILANTEROL TRIFENATATE 1 PUFF: 62.5; 25 POWDER RESPIRATORY (INHALATION) at 09:03

## 2018-12-28 ASSESSMENT — PAIN SCALES - GENERAL: PAINLEVEL_OUTOF10: 0

## 2018-12-28 NOTE — DISCHARGE PLANNING
Agency/Facility Name: [facility]  Outcome: Attempted to get status on o2, however, again there is no answer. Left message.

## 2018-12-28 NOTE — DISCHARGE INSTRUCTIONS
Discharge Instructions    Discharged to home by car with relative. Discharged via wheelchair, hospital escort: Yes.  Special equipment needed: Not Applicable    Be sure to schedule a follow-up appointment with your primary care doctor or any specialists as instructed.     Discharge Plan:   Influenza Vaccine Indication: Not indicated: Previously immunized this influenza season and > 8 years of age  Influenza Vaccine Given - only chart on this line when given: Influenza Vaccine Given (See MAR)    I understand that a diet low in cholesterol, fat, and sodium is recommended for good health. Unless I have been given specific instructions below for another diet, I accept this instruction as my diet prescription.   Other diet: regular      Special Instructions: None    · Is patient discharged on Warfarin / Coumadin?   No     Discharge Instructions: COPD  You have been diagnosed with chronic obstructive pulmonary disease (COPD). This is a name given to a group of diseases that limit the flow of air in and out of your lungs. This makes it harder to breathe. With COPD, you are also more likely to get lung infections. COPD includes chronic bronchitis and emphysema. COPD is most often caused by heavy, long-term cigarette smoking.  Home care  Quit smoking  · If you smoke, quit. It is the best thing you can do for your COPD and your overall health.  · Join a stop-smoking program. There are even telephone, text message, and online programs to help you quit.  · Ask your healthcare provider about medicines or other methods to help you quit.  · Ask family members to quit smoking as well.  · Don't allow people to smoke in your home, in your car, or when they are around you.  Protect yourself from infection  · Wash your hands often. Do your best to keep your hands away from your face. Most germs are spread from your hands to your mouth.  · Get a flu shot every year. Also ask your provider about pneumonia vaccines.  · Stay away from  crowds. It's especially important to do this in the winter when more people have colds and flu.  · To stay healthy, get enough sleep, exercise regularly, and eat a balanced diet. You should:  ? Get about 8 hours of sleep every night.  ? Try to exercise for at least 30 minutes on most days.  ? Have healthy foods including fruits and vegetables, 100% whole grains, lean meats and fish, and low-fat dairy products. Try to stay away from foods high in fats and sugar.  Take your medicines  Take your medicines exactly as directed. Don't skip doses.  Manage your stress  Stress can make COPD worse. Use this stress management method:  · Find a quiet place and sit or lie in a comfortable position.  · Close your eyes and do breathing exercises for several minutes. Ask your provider about the best way to breathe.  Pulmonary rehabilitation  · Pulmonary rehab can help you feel better. These programs include exercise, breathing methods, information about COPD, counseling, and help for smokers.  · Ask your provider or your local hospital about programs in your area.  When to call your healthcare provider  Call your provider right away if you have any of the following:  · Shortness of breath, wheezing, or coughing  · More mucus  · Yellow, green, bloody, or smelly mucus  · Fever or chills  · Tightness in your chest that does not go away with rest or medicine  · An irregular heartbeat or a feeling that your heart is beating very fast  · Swollen ankles      Preventing Pneumonia     Pneumonia is an infection in one or both of the lungs. Those most at risk include older adults, smokers, and people with chronic lung diseases. For example, those with conditions like chronic obstructive pulmonary disease (COPD), including emphysema or chronic bronchitis, asthma, and those with weak immune systems. There are some things you can do to lessen the chance that you will get pneumonia.  Avoid infection  · Wash your hands often:  ? Use soap and warm  water.  ? If soap and water aren't available, use a hand  with alcohol in it.  · Avoid touching your face and mouth with your hands.  · Use disposable tissues instead of a handkerchief. Throw used tissues away.  · Avoid people who have a cold or the flu.  · Try to avoid crowded places.  Get vaccinated  Talk with your healthcare provider about vaccinations and whether you should get a yearly flu shot. There are now 2 different pneumonia vaccines. Both of these are needed if you have a chronic disease or fit into the higher risk category.    · Get a flu shot every year as soon as it's available in your area. The flu shot helps prevent you from getting the flu and complications of the flu, such as pneumonia.  · Get pneumococcal pneumonia vaccines. Talk with your healthcare provider about which pneumococcal vaccines are right for you.  Do suggested breathing exercises  Deep breathing and coughing exercises can help clear your lungs. Your healthcare provider may suggest them. If so, you will be shown how to do them. Do them as often as your healthcare provider instructs.  Take care of your body  · Drink at least 6 to 8 glasses of water a day.  · Eat well-balanced, healthy meals.  · Avoid drinking alcohol.  · Don’t smoke. Avoid places where people are smoking.  · Moving around helps keep your lungs clear. Ask your healthcare provider what type of activity is best for you. Walking is often a good choice.  · Get enough rest. Sleep at least 8 hours each night. Rest or nap during the day as needed.  · Ask your healthcare provider when you should schedule follow-up care to make sure the infection is gone.      Depression / Suicide Risk    As you are discharged from this [facility] facility, it is important to learn how to keep safe from harming yourself.    Recognize the warning signs:  · Abrupt changes in personality, positive or negative- including increase in energy   · Giving away possessions  · Change in eating  patterns- significant weight changes-  positive or negative  · Change in sleeping patterns- unable to sleep or sleeping all the time   · Unwillingness or inability to communicate  · Depression  · Unusual sadness, discouragement and loneliness  · Talk of wanting to die  · Neglect of personal appearance   · Rebelliousness- reckless behavior  · Withdrawal from people/activities they love  · Confusion- inability to concentrate     If you or a loved one observes any of these behaviors or has concerns about self-harm, here's what you can do:  · Talk about it- your feelings and reasons for harming yourself  · Remove any means that you might use to hurt yourself (examples: pills, rope, extension cords, firearm)  · Get professional help from the community (Mental Health, Substance Abuse, psychological counseling)  · Do not be alone:Call your Safe Contact- someone whom you trust who will be there for you.  · Call your local CRISIS HOTLINE 686-2800 or 787-477-2770  · Call your local Children's Mobile Crisis Response Team [place] (630) 120-8435 or www.[employer]  · Call the toll free National Suicide Prevention Hotlines   · National Suicide Prevention Lifeline 570-494-GAHV (1624)  · National Hope Line Network 800-SUICIDE (835-0173)

## 2018-12-28 NOTE — CARE PLAN
Problem: Safety  Goal: Will remain free from injury  Outcome: PROGRESSING AS EXPECTED  Bed in low position, call light in reach, instructed pt to use call light for assistance.

## 2018-12-28 NOTE — DISCHARGE PLANNING
Agency/Facility Name: [facility]  Spoke To: Tiffany  Outcome: Because [facility] cannot meet the needs of the patient, [facility] is willing to take the patient, sending referral now.

## 2018-12-28 NOTE — PROGRESS NOTES
Received report from NOC RN. Pt awake, alert, up self to bathroom. Spoke with pt about the need for continuous O2 pt rolled eyes, but understands purpose of O2.

## 2018-12-28 NOTE — DISCHARGE PLANNING
Agency/Facility Name: [facility]  Spoke To: Willy  Outcome: They cannot accept patient due to him being contracted with [facility]. AMAYA Larkin notified.

## 2018-12-28 NOTE — DISCHARGE PLANNING
Agency/Facility Name: [facility]  Outcome: Attempted to get status on referral, however, I have been calling and leaving messages, but there has been no answer and no call backs.

## 2018-12-28 NOTE — DISCHARGE PLANNING
Agency/Facility Name: [facility]  Spoke To: Devang  Outcome: They are willing to  the patient from [facility]. They will be at bedside with o2 as soon as they can, they did say that they were busy but would get here when they can.

## 2018-12-28 NOTE — DISCHARGE SUMMARY
Discharge Summary    CHIEF COMPLAINT ON ADMISSION  Chief Complaint   Patient presents with   • Shortness of Breath     hx of COPD. 84% on RA on arrival, 2L NC applied with improvement to 92%. usually wears O2 only at night.    • Cough     x1 week, productive green sputum       Reason for Admission  Shortness of Breath     Admission Date  12/23/2018  Discharge Date  12/28/2018    CODE STATUS  Full Code    HPI & HOSPITAL COURSE  78 y.o. male with PMH COPD on nocturnal O2, HTN and dyslipidemia admitted 12/23/2018 with SOB and cough.  Chest x-ray consistent with CAP he completed 5-day course of IV antibiotics.  Initially, he was started on p.o. prednisone, however had minimal improvement and was transitioned to IV steroids.  He will be discharged with a Solu-Medrol Dosepak.  Follow-up chest x-ray showed inflammation versus pulmonary edema and he was treated with IV Lasix.  He qualified for continuous home O2 which was set up for him prior to discharge.  This morning he reports much improvement and feeling at or near his baseline level of functioning.  His lungs are without wheezes, rhonchi or rales. He is ambulatory independently on O2 without any issues.  He is agreeable to the plan of care and eager for discharge this afternoon.  Therefore, he is discharged in good and stable condition to home with close outpatient follow-up.    The patient met 2-midnight criteria for an inpatient stay at the time of discharge.    FOLLOW UP ITEMS POST DISCHARGE  -Follow-up with PCP  -Return to the nearest emergency department or call 911 for any worsening symptoms.    DISCHARGE DIAGNOSES  Principal Problem (Resolved):    COPD with acute exacerbation (HCC) POA: Yes  Active Problems:    CAP (community acquired pneumonia) POA: Yes    Dyslipidemia (Chronic) POA: Yes    Hypertension (Chronic) POA: Yes    Hypokalemia POA: Yes    Hypomagnesemia POA: Clinically Undetermined  Resolved Problems:    SIRS (systemic inflammatory response syndrome)  ([facility]) POA: Yes    Tachycardia POA: No      FOLLOW UP  Future Appointments  Date Time Provider Department Center   1/22/2019 8:30 AM [facility] None   4/19/2019 10:30 AM [facility] None     No follow-up provider specified.    MEDICATIONS ON DISCHARGE     Medication List      START taking these medications      Instructions   guaiFENesin  MG Tb12  Commonly known as:  MUCINEX   Take 1 Tab by mouth 2 Times a Day.  Dose:  600 mg     MethylPREDNISolone 4 MG Tbpk  Commonly known as:  MEDROL DOSEPAK   Take as prescribed on package     metoprolol 25 MG Tabs  Commonly known as:  LOPRESSOR   Take 0.5 Tabs by mouth 2 Times a Day.  Dose:  12.5 mg     montelukast 10 MG Tabs  Commonly known as:  SINGULAIR   Take 1 Tab by mouth every day.  Dose:  10 mg        CONTINUE taking these medications      Instructions   buPROPion 150 MG XL tablet  Commonly known as:  WELLBUTRIN XL   Take 1 Tab by mouth every morning.  Dose:  150 mg     enalapril 20 MG tablet  Commonly known as:  VASOTEC   Take 1 Tab by mouth every day.  Dose:  20 mg     finasteride 5 MG Tabs  Commonly known as:  PROSCAR   Take 1 Tab by mouth every day.  Dose:  5 mg     ipratropium-albuterol 0.5-2.5 (3) MG/3ML nebulizer solution  Commonly known as:  DUONEB   3 mL by Nebulization route every four hours as needed for Shortness of Breath.  Dose:  3 mL     lovastatin 20 MG Tabs  Commonly known as:  MEVACOR   Take 1 Tab by mouth every day.  Dose:  20 mg     umeclidinium-vilanterol 62.5-25 MCG/INH Aepb inhaler  Commonly known as:  ANORO ELLIPTA   Inhale 1 Puff by mouth every day.  Dose:  1 Puff     VENTOLIN  (90 Base) MCG/ACT Aers inhalation aerosol  Generic drug:  albuterol   INHALE 2 PUFFS BY MOUTH EVERY 4 HOURS AS NEEDED SHORTNESS OF BREATH            Allergies  No Known Allergies    DIET  Orders Placed This Encounter   Procedures   • Diet Order Regular     Standing Status:   Standing     Number of Occurrences:   1      Order Specific Question:   Diet:     Answer:   Regular [1]       ACTIVITY  As tolerated.  Weight bearing as tolerated    CONSULTATIONS  NA    PROCEDURES  NA    LABORATORY  Lab Results   Component Value Date    SODIUM 135 12/28/2018    POTASSIUM 3.8 12/28/2018    CHLORIDE 96 12/28/2018    CO2 32 12/28/2018    GLUCOSE 127 (H) 12/28/2018    BUN 27 (H) 12/28/2018    CREATININE 0.87 12/28/2018        Lab Results   Component Value Date    WBC 12.2 (H) 12/28/2018    HEMOGLOBIN 11.6 (L) 12/28/2018    HEMATOCRIT 34.2 (L) 12/28/2018    PLATELETCT 421 12/28/2018        Total time of the discharge process exceeds 40 minutes.  YFN Raymundo.

## 2018-12-29 ENCOUNTER — PATIENT OUTREACH (OUTPATIENT)
Dept: HEALTH INFORMATION MANAGEMENT | Facility: OTHER | Age: 78
End: 2018-12-29

## 2018-12-29 LAB
BACTERIA BLD CULT: NORMAL
BACTERIA BLD CULT: NORMAL
SIGNIFICANT IND 70042: NORMAL
SIGNIFICANT IND 70042: NORMAL
SITE SITE: NORMAL
SITE SITE: NORMAL
SOURCE SOURCE: NORMAL
SOURCE SOURCE: NORMAL

## 2019-01-10 NOTE — DOCUMENTATION QUERY
"                                                                         [address] [place] [address]                                                                         Query Response Note      PATIENT:               ENRICO ZARAGOZA  ACCT #:                  [id number]  MRN:                       [id number]  :                       1940  ADMIT DATE:       2018 6:55 PM  DISCH DATE:        2018 3:45 PM  RESPONDING  PROVIDER #:        [id number]           CDI RESPONSE TEXT:    Acute pulmonary edema, non-cardiogenic    CDI QUERY TEXT:    Pulmonary Edema [email or web address]    Pulmonary edema is documented in the Medical Record. Please further specify the chronicity and type:    NOTE:  If an appropriate response is not listed below, please respond with a new note.    The patient's Clinical Indicators include:  Clinical Findings:   \"Follow-up cxr showed inflammation vs pulmonary edema and was treated with IV Lasix\" documented in DC summary.    on 18    Treatment:   Lasix 40 mg IV given daily from  until discharge    Risk Factors:   Age, COPD w/ acute exacerbation, CAP, SIRS.  Query created by: Lillie Oneill on 2019 5:10 AM       CDI RESPONSE TEXT:    Chronic Respiratory Failure    CDI QUERY TEXT:    Acuity Specificity [email or web address]    \"On Home O2 at night\" is documented in the Medical Record. Please provide a diagnosis to support this finding.     NOTE:  If the appropriate response is not listed below, please respond with a new note.    The patient's Clinical Indicators include:  Clinical Findings:   \"Pt usually wears O2 only at night\" documented in ER.   \"Pt qualifies for continuous O2 which was set up prior to discharge\"    Treatment:   O2 2L NC throughout admission.    Risk Factors:   Age, COPD w/ exacerbation, CAP.  Query created by: Llilie Oneill on 2019 5:31 AM        Electronically signed by:  JACKY TAYLOR MD 2019 6:07 PM         "

## 2019-01-10 NOTE — ADDENDUM NOTE
Encounter addended by: Bar Bazan M.D. on: 1/9/2019  6:13 PM<BR>    Actions taken: Sign clinical note

## 2019-02-05 ENCOUNTER — OFFICE VISIT (OUTPATIENT)
Dept: MEDICAL GROUP | Facility: MEDICAL CENTER | Age: 79
End: 2019-02-05
Payer: MEDICARE

## 2019-02-05 VITALS
DIASTOLIC BLOOD PRESSURE: 82 MMHG | WEIGHT: 157 LBS | BODY MASS INDEX: 21.98 KG/M2 | HEART RATE: 82 BPM | RESPIRATION RATE: 18 BRPM | SYSTOLIC BLOOD PRESSURE: 128 MMHG | OXYGEN SATURATION: 92 % | HEIGHT: 71 IN | TEMPERATURE: 97.9 F

## 2019-02-05 DIAGNOSIS — J18.9 COMMUNITY ACQUIRED PNEUMONIA, UNSPECIFIED LATERALITY: ICD-10-CM

## 2019-02-05 DIAGNOSIS — J44.9 CHRONIC OBSTRUCTIVE PULMONARY DISEASE, UNSPECIFIED COPD TYPE (HCC): Chronic | ICD-10-CM

## 2019-02-05 PROCEDURE — 99213 OFFICE O/P EST LOW 20 MIN: CPT | Performed by: PHYSICIAN ASSISTANT

## 2019-02-05 ASSESSMENT — PATIENT HEALTH QUESTIONNAIRE - PHQ9
4. FEELING TIRED OR HAVING LITTLE ENERGY: SEVERAL DAYS
8. MOVING OR SPEAKING SO SLOWLY THAT OTHER PEOPLE COULD HAVE NOTICED. OR THE OPPOSITE, BEING SO FIGETY OR RESTLESS THAT YOU HAVE BEEN MOVING AROUND A LOT MORE THAN USUAL: SEVERAL DAYS
7. TROUBLE CONCENTRATING ON THINGS, SUCH AS READING THE NEWSPAPER OR WATCHING TELEVISION: SEVERAL DAYS
9. THOUGHTS THAT YOU WOULD BE BETTER OFF DEAD, OR OF HURTING YOURSELF: NOT AT ALL
2. FEELING DOWN, DEPRESSED, IRRITABLE, OR HOPELESS: SEVERAL DAYS
1. LITTLE INTEREST OR PLEASURE IN DOING THINGS: SEVERAL DAYS
6. FEELING BAD ABOUT YOURSELF - OR THAT YOU ARE A FAILURE OR HAVE LET YOURSELF OR YOUR FAMILY DOWN: NOT AL ALL
3. TROUBLE FALLING OR STAYING ASLEEP OR SLEEPING TOO MUCH: NOT AT ALL
5. POOR APPETITE OR OVEREATING: NOT AT ALL
SUM OF ALL RESPONSES TO PHQ9 QUESTIONS 1 AND 2: 2
SUM OF ALL RESPONSES TO PHQ QUESTIONS 1-9: 5

## 2019-02-05 NOTE — PROGRESS NOTES
"Chief Complaint   Patient presents with   • Hospital Follow-up     patient is here for a follow up from [facility] 12/2018       HPI  David Cho is a 78 y.o. male here today for hospital follow-up for CAP.  Patient was treated with 5 days of IV antibiotic and discharged with some Medrol Dosepak.  Patient has history of COPD and currently takes nocturnal oxygen 2 L.  Patient states in hospital he was put on metoprolol 25 mg he has been continuing on that.  Patient feels back to his normal, no cough, no S OB more than usual.  Fever has resolved.  No nausea or vomiting.    Past medical, surgical, family, and social history is reviewed in Epic chart by me today.   Medications and allergies reviewed and updated in Epic chart by me today.     ROS:   As documented in history of present illness above    Exam:  Blood pressure 128/82, pulse 82, temperature 36.6 °C (97.9 °F), temperature source Temporal, resp. rate 18, height 1.803 m (5' 11\"), weight 71.2 kg (157 lb), SpO2 92 %.  Constitutional: Alert, no distress, plus 3 vital signs  Skin:  Warm, dry, no rashes invisible areas  Eye: Equal, round and reactive, conjunctiva clear  Respiratory: Unlabored respiratory effort, diminished breath sounds, no wheeze or rhonchi.    Cardiovascular: RRR, no murmur  Psych: Alert, pleasant, well-groomed, normal affect    A/P:  1. Community acquired pneumonia, unspecified laterality  Resolved.    2. COPD  Patient is under care of pulmonology.    F/u prn     "

## 2019-02-13 ENCOUNTER — HOSPITAL ENCOUNTER (OUTPATIENT)
Facility: MEDICAL CENTER | Age: 79
End: 2019-02-14
Attending: EMERGENCY MEDICINE | Admitting: HOSPITALIST
Payer: MEDICARE

## 2019-02-13 ENCOUNTER — APPOINTMENT (OUTPATIENT)
Dept: RADIOLOGY | Facility: MEDICAL CENTER | Age: 79
End: 2019-02-13
Attending: EMERGENCY MEDICINE
Payer: MEDICARE

## 2019-02-13 DIAGNOSIS — J44.1 ACUTE EXACERBATION OF CHRONIC OBSTRUCTIVE PULMONARY DISEASE (COPD) (HCC): ICD-10-CM

## 2019-02-13 LAB
ALBUMIN SERPL BCP-MCNC: 4.6 G/DL (ref 3.2–4.9)
ALBUMIN/GLOB SERPL: 2 G/DL
ALP SERPL-CCNC: 68 U/L (ref 30–99)
ALT SERPL-CCNC: 6 U/L (ref 2–50)
ANION GAP SERPL CALC-SCNC: 9 MMOL/L (ref 0–11.9)
AST SERPL-CCNC: 17 U/L (ref 12–45)
BASOPHILS # BLD AUTO: 1 % (ref 0–1.8)
BASOPHILS # BLD: 0.09 K/UL (ref 0–0.12)
BILIRUB SERPL-MCNC: 0.9 MG/DL (ref 0.1–1.5)
BUN SERPL-MCNC: 12 MG/DL (ref 8–22)
CALCIUM SERPL-MCNC: 9.1 MG/DL (ref 8.5–10.5)
CHLORIDE SERPL-SCNC: 97 MMOL/L (ref 96–112)
CO2 SERPL-SCNC: 26 MMOL/L (ref 20–33)
CREAT SERPL-MCNC: 0.88 MG/DL (ref 0.5–1.4)
EOSINOPHIL # BLD AUTO: 1.55 K/UL (ref 0–0.51)
EOSINOPHIL NFR BLD: 17.4 % (ref 0–6.9)
ERYTHROCYTE [DISTWIDTH] IN BLOOD BY AUTOMATED COUNT: 49.8 FL (ref 35.9–50)
GLOBULIN SER CALC-MCNC: 2.3 G/DL (ref 1.9–3.5)
GLUCOSE SERPL-MCNC: 115 MG/DL (ref 65–99)
HCT VFR BLD AUTO: 39.9 % (ref 42–52)
HGB BLD-MCNC: 13.3 G/DL (ref 14–18)
IMM GRANULOCYTES # BLD AUTO: 0.03 K/UL (ref 0–0.11)
IMM GRANULOCYTES NFR BLD AUTO: 0.3 % (ref 0–0.9)
LYMPHOCYTES # BLD AUTO: 1.42 K/UL (ref 1–4.8)
LYMPHOCYTES NFR BLD: 15.9 % (ref 22–41)
MCH RBC QN AUTO: 32.4 PG (ref 27–33)
MCHC RBC AUTO-ENTMCNC: 33.3 G/DL (ref 33.7–35.3)
MCV RBC AUTO: 97.3 FL (ref 81.4–97.8)
MONOCYTES # BLD AUTO: 0.69 K/UL (ref 0–0.85)
MONOCYTES NFR BLD AUTO: 7.7 % (ref 0–13.4)
NEUTROPHILS # BLD AUTO: 5.14 K/UL (ref 1.82–7.42)
NEUTROPHILS NFR BLD: 57.7 % (ref 44–72)
NRBC # BLD AUTO: 0 K/UL
NRBC BLD-RTO: 0 /100 WBC
PLATELET # BLD AUTO: 241 K/UL (ref 164–446)
PMV BLD AUTO: 9.4 FL (ref 9–12.9)
POTASSIUM SERPL-SCNC: 4.2 MMOL/L (ref 3.6–5.5)
PROCALCITONIN SERPL-MCNC: <0.05 NG/ML
PROT SERPL-MCNC: 6.9 G/DL (ref 6–8.2)
RBC # BLD AUTO: 4.1 M/UL (ref 4.7–6.1)
SODIUM SERPL-SCNC: 132 MMOL/L (ref 135–145)
TROPONIN I SERPL-MCNC: 0.02 NG/ML (ref 0–0.04)
WBC # BLD AUTO: 8.9 K/UL (ref 4.8–10.8)

## 2019-02-13 PROCEDURE — 700101 HCHG RX REV CODE 250: Performed by: EMERGENCY MEDICINE

## 2019-02-13 PROCEDURE — 304561 HCHG STAT O2

## 2019-02-13 PROCEDURE — A9270 NON-COVERED ITEM OR SERVICE: HCPCS | Performed by: HOSPITALIST

## 2019-02-13 PROCEDURE — 99220 PR INITIAL OBSERVATION CARE,LEVL III: CPT | Performed by: HOSPITALIST

## 2019-02-13 PROCEDURE — 71045 X-RAY EXAM CHEST 1 VIEW: CPT

## 2019-02-13 PROCEDURE — G0378 HOSPITAL OBSERVATION PER HR: HCPCS

## 2019-02-13 PROCEDURE — 700111 HCHG RX REV CODE 636 W/ 250 OVERRIDE (IP): Performed by: EMERGENCY MEDICINE

## 2019-02-13 PROCEDURE — 84484 ASSAY OF TROPONIN QUANT: CPT

## 2019-02-13 PROCEDURE — 99285 EMERGENCY DEPT VISIT HI MDM: CPT

## 2019-02-13 PROCEDURE — 700102 HCHG RX REV CODE 250 W/ 637 OVERRIDE(OP): Performed by: HOSPITALIST

## 2019-02-13 PROCEDURE — 85025 COMPLETE CBC W/AUTO DIFF WBC: CPT

## 2019-02-13 PROCEDURE — 96374 THER/PROPH/DIAG INJ IV PUSH: CPT

## 2019-02-13 PROCEDURE — 94640 AIRWAY INHALATION TREATMENT: CPT

## 2019-02-13 PROCEDURE — 84145 PROCALCITONIN (PCT): CPT

## 2019-02-13 PROCEDURE — 80053 COMPREHEN METABOLIC PANEL: CPT

## 2019-02-13 RX ORDER — GUAIFENESIN/DEXTROMETHORPHAN 100-10MG/5
10 SYRUP ORAL EVERY 6 HOURS PRN
Status: DISCONTINUED | OUTPATIENT
Start: 2019-02-13 | End: 2019-02-14 | Stop reason: HOSPADM

## 2019-02-13 RX ORDER — FINASTERIDE 5 MG/1
5 TABLET, FILM COATED ORAL
Status: DISCONTINUED | OUTPATIENT
Start: 2019-02-13 | End: 2019-02-14

## 2019-02-13 RX ORDER — AZITHROMYCIN 250 MG/1
500 TABLET, FILM COATED ORAL ONCE
Status: COMPLETED | OUTPATIENT
Start: 2019-02-13 | End: 2019-02-13

## 2019-02-13 RX ORDER — POLYETHYLENE GLYCOL 3350 17 G/17G
1 POWDER, FOR SOLUTION ORAL
Status: DISCONTINUED | OUTPATIENT
Start: 2019-02-13 | End: 2019-02-14

## 2019-02-13 RX ORDER — BUPROPION HYDROCHLORIDE 150 MG/1
150 TABLET ORAL EVERY MORNING
Status: DISCONTINUED | OUTPATIENT
Start: 2019-02-14 | End: 2019-02-14

## 2019-02-13 RX ORDER — LOVASTATIN 20 MG/1
20 TABLET ORAL
Status: DISCONTINUED | OUTPATIENT
Start: 2019-02-13 | End: 2019-02-14

## 2019-02-13 RX ORDER — METHYLPREDNISOLONE SODIUM SUCCINATE 125 MG/2ML
125 INJECTION, POWDER, LYOPHILIZED, FOR SOLUTION INTRAMUSCULAR; INTRAVENOUS ONCE
Status: COMPLETED | OUTPATIENT
Start: 2019-02-13 | End: 2019-02-13

## 2019-02-13 RX ORDER — MONTELUKAST SODIUM 10 MG/1
10 TABLET ORAL DAILY
Status: DISCONTINUED | OUTPATIENT
Start: 2019-02-14 | End: 2019-02-14 | Stop reason: HOSPADM

## 2019-02-13 RX ORDER — AMOXICILLIN 250 MG
2 CAPSULE ORAL 2 TIMES DAILY
Status: DISCONTINUED | OUTPATIENT
Start: 2019-02-13 | End: 2019-02-14

## 2019-02-13 RX ORDER — ENALAPRIL MALEATE 20 MG/1
20 TABLET ORAL
Status: DISCONTINUED | OUTPATIENT
Start: 2019-02-13 | End: 2019-02-14

## 2019-02-13 RX ORDER — IPRATROPIUM BROMIDE AND ALBUTEROL SULFATE 2.5; .5 MG/3ML; MG/3ML
3 SOLUTION RESPIRATORY (INHALATION)
Status: DISCONTINUED | OUTPATIENT
Start: 2019-02-13 | End: 2019-02-14 | Stop reason: HOSPADM

## 2019-02-13 RX ORDER — AZITHROMYCIN 250 MG/1
250 TABLET, FILM COATED ORAL DAILY
Status: DISCONTINUED | OUTPATIENT
Start: 2019-02-14 | End: 2019-02-14 | Stop reason: HOSPADM

## 2019-02-13 RX ORDER — BISACODYL 10 MG
10 SUPPOSITORY, RECTAL RECTAL
Status: DISCONTINUED | OUTPATIENT
Start: 2019-02-13 | End: 2019-02-14

## 2019-02-13 RX ORDER — ACETAMINOPHEN 325 MG/1
650 TABLET ORAL EVERY 6 HOURS PRN
Status: DISCONTINUED | OUTPATIENT
Start: 2019-02-13 | End: 2019-02-14 | Stop reason: HOSPADM

## 2019-02-13 RX ORDER — METHYLPREDNISOLONE SODIUM SUCCINATE 40 MG/ML
40 INJECTION, POWDER, LYOPHILIZED, FOR SOLUTION INTRAMUSCULAR; INTRAVENOUS EVERY 6 HOURS
Status: DISCONTINUED | OUTPATIENT
Start: 2019-02-14 | End: 2019-02-14 | Stop reason: HOSPADM

## 2019-02-13 RX ADMIN — AZITHROMYCIN 500 MG: 250 TABLET, FILM COATED ORAL at 23:23

## 2019-02-13 RX ADMIN — ALBUTEROL SULFATE 2.5 MG: 2.5 SOLUTION RESPIRATORY (INHALATION) at 21:17

## 2019-02-13 RX ADMIN — METHYLPREDNISOLONE SODIUM SUCCINATE 125 MG: 125 INJECTION, POWDER, FOR SOLUTION INTRAMUSCULAR; INTRAVENOUS at 21:01

## 2019-02-13 ASSESSMENT — PATIENT HEALTH QUESTIONNAIRE - PHQ9
1. LITTLE INTEREST OR PLEASURE IN DOING THINGS: NOT AT ALL
2. FEELING DOWN, DEPRESSED, IRRITABLE, OR HOPELESS: NOT AT ALL
SUM OF ALL RESPONSES TO PHQ9 QUESTIONS 1 AND 2: 0

## 2019-02-13 ASSESSMENT — COPD QUESTIONNAIRES
HAVE YOU SMOKED AT LEAST 100 CIGARETTES IN YOUR ENTIRE LIFE: YES
DURING THE PAST 4 WEEKS HOW MUCH DID YOU FEEL SHORT OF BREATH: SOME OF THE TIME
COPD SCREENING SCORE: 7
DO YOU EVER COUGH UP ANY MUCUS OR PHLEGM?: YES, A FEW DAYS A WEEK OR MONTH

## 2019-02-13 ASSESSMENT — LIFESTYLE VARIABLES
EVER_SMOKED: YES
EVER_SMOKED: YES

## 2019-02-14 ENCOUNTER — PATIENT OUTREACH (OUTPATIENT)
Dept: HEALTH INFORMATION MANAGEMENT | Facility: OTHER | Age: 79
End: 2019-02-14

## 2019-02-14 VITALS
DIASTOLIC BLOOD PRESSURE: 77 MMHG | TEMPERATURE: 97.9 F | SYSTOLIC BLOOD PRESSURE: 139 MMHG | HEART RATE: 75 BPM | RESPIRATION RATE: 18 BRPM | OXYGEN SATURATION: 95 % | WEIGHT: 153.88 LBS | HEIGHT: 71 IN | BODY MASS INDEX: 21.54 KG/M2

## 2019-02-14 PROBLEM — J44.1 COPD EXACERBATION (HCC): Status: ACTIVE | Noted: 2019-02-14

## 2019-02-14 LAB
ERYTHROCYTE [DISTWIDTH] IN BLOOD BY AUTOMATED COUNT: 49.4 FL (ref 35.9–50)
FLUAV RNA SPEC QL NAA+PROBE: NEGATIVE
FLUBV RNA SPEC QL NAA+PROBE: NEGATIVE
HCT VFR BLD AUTO: 40.4 % (ref 42–52)
HGB BLD-MCNC: 13.6 G/DL (ref 14–18)
MCH RBC QN AUTO: 32.8 PG (ref 27–33)
MCHC RBC AUTO-ENTMCNC: 33.7 G/DL (ref 33.7–35.3)
MCV RBC AUTO: 97.3 FL (ref 81.4–97.8)
PLATELET # BLD AUTO: 252 K/UL (ref 164–446)
PMV BLD AUTO: 9.4 FL (ref 9–12.9)
RBC # BLD AUTO: 4.15 M/UL (ref 4.7–6.1)
WBC # BLD AUTO: 5 K/UL (ref 4.8–10.8)

## 2019-02-14 PROCEDURE — 700102 HCHG RX REV CODE 250 W/ 637 OVERRIDE(OP): Performed by: HOSPITALIST

## 2019-02-14 PROCEDURE — 700111 HCHG RX REV CODE 636 W/ 250 OVERRIDE (IP): Performed by: HOSPITALIST

## 2019-02-14 PROCEDURE — 94640 AIRWAY INHALATION TREATMENT: CPT

## 2019-02-14 PROCEDURE — 94760 N-INVAS EAR/PLS OXIMETRY 1: CPT

## 2019-02-14 PROCEDURE — 85027 COMPLETE CBC AUTOMATED: CPT

## 2019-02-14 PROCEDURE — 700101 HCHG RX REV CODE 250: Performed by: HOSPITALIST

## 2019-02-14 PROCEDURE — 96376 TX/PRO/DX INJ SAME DRUG ADON: CPT

## 2019-02-14 PROCEDURE — 87502 INFLUENZA DNA AMP PROBE: CPT

## 2019-02-14 PROCEDURE — A9270 NON-COVERED ITEM OR SERVICE: HCPCS | Performed by: HOSPITALIST

## 2019-02-14 PROCEDURE — G0378 HOSPITAL OBSERVATION PER HR: HCPCS

## 2019-02-14 PROCEDURE — 99217 PR OBSERVATION CARE DISCHARGE: CPT | Performed by: INTERNAL MEDICINE

## 2019-02-14 PROCEDURE — 36415 COLL VENOUS BLD VENIPUNCTURE: CPT

## 2019-02-14 RX ORDER — BUPROPION HYDROCHLORIDE 150 MG/1
150 TABLET, EXTENDED RELEASE ORAL DAILY
Status: DISCONTINUED | OUTPATIENT
Start: 2019-02-14 | End: 2019-02-14 | Stop reason: HOSPADM

## 2019-02-14 RX ORDER — LOVASTATIN 20 MG/1
20 TABLET ORAL DAILY
Status: DISCONTINUED | OUTPATIENT
Start: 2019-02-15 | End: 2019-02-14 | Stop reason: HOSPADM

## 2019-02-14 RX ORDER — PREDNISONE 10 MG/1
TABLET ORAL
Qty: 42 TAB | Refills: 0 | Status: SHIPPED | OUTPATIENT
Start: 2019-02-14 | End: 2019-11-04

## 2019-02-14 RX ORDER — AZITHROMYCIN 250 MG/1
TABLET, FILM COATED ORAL
Qty: 4 TAB | Refills: 0 | Status: SHIPPED | OUTPATIENT
Start: 2019-02-15 | End: 2019-11-04

## 2019-02-14 RX ORDER — FINASTERIDE 5 MG/1
5 TABLET, FILM COATED ORAL DAILY
Status: DISCONTINUED | OUTPATIENT
Start: 2019-02-14 | End: 2019-02-14 | Stop reason: HOSPADM

## 2019-02-14 RX ORDER — ENALAPRIL MALEATE 10 MG/1
20 TABLET ORAL DAILY
Status: DISCONTINUED | OUTPATIENT
Start: 2019-02-14 | End: 2019-02-14 | Stop reason: HOSPADM

## 2019-02-14 RX ADMIN — AZITHROMYCIN 250 MG: 250 TABLET, FILM COATED ORAL at 06:22

## 2019-02-14 RX ADMIN — ENALAPRIL MALEATE 20 MG: 10 TABLET ORAL at 06:23

## 2019-02-14 RX ADMIN — MONTELUKAST SODIUM 10 MG: 10 TABLET, COATED ORAL at 06:23

## 2019-02-14 RX ADMIN — UMECLIDINIUM BROMIDE AND VILANTEROL TRIFENATATE 1 PUFF: 62.5; 25 POWDER RESPIRATORY (INHALATION) at 10:17

## 2019-02-14 RX ADMIN — FINASTERIDE 5 MG: 5 TABLET, FILM COATED ORAL at 06:23

## 2019-02-14 RX ADMIN — METHYLPREDNISOLONE SODIUM SUCCINATE 40 MG: 40 INJECTION, POWDER, FOR SOLUTION INTRAMUSCULAR; INTRAVENOUS at 03:34

## 2019-02-14 RX ADMIN — METHYLPREDNISOLONE SODIUM SUCCINATE 40 MG: 40 INJECTION, POWDER, FOR SOLUTION INTRAMUSCULAR; INTRAVENOUS at 08:53

## 2019-02-14 RX ADMIN — IPRATROPIUM BROMIDE AND ALBUTEROL SULFATE 3 ML: .5; 3 SOLUTION RESPIRATORY (INHALATION) at 10:14

## 2019-02-14 RX ADMIN — IPRATROPIUM BROMIDE AND ALBUTEROL SULFATE 3 ML: .5; 3 SOLUTION RESPIRATORY (INHALATION) at 01:48

## 2019-02-14 RX ADMIN — METOPROLOL TARTRATE 12.5 MG: 25 TABLET, FILM COATED ORAL at 06:26

## 2019-02-14 RX ADMIN — BUPROPION HYDROCHLORIDE 150 MG: 150 TABLET, EXTENDED RELEASE ORAL at 06:23

## 2019-02-14 RX ADMIN — METOPROLOL TARTRATE 12.5 MG: 25 TABLET, FILM COATED ORAL at 00:50

## 2019-02-14 RX ADMIN — LOVASTATIN 20 MG: 20 TABLET ORAL at 06:25

## 2019-02-14 ASSESSMENT — ENCOUNTER SYMPTOMS
MYALGIAS: 0
PHOTOPHOBIA: 0
DEPRESSION: 0
HEADACHES: 0
VOMITING: 0
COUGH: 1
FEVER: 0
SHORTNESS OF BREATH: 1
SORE THROAT: 0
CHILLS: 0
PALPITATIONS: 0
NAUSEA: 0
WHEEZING: 1
DIZZINESS: 0
TINGLING: 0
ABDOMINAL PAIN: 0
FOCAL WEAKNESS: 0
DIARRHEA: 0

## 2019-02-14 ASSESSMENT — LIFESTYLE VARIABLES: ALCOHOL_USE: NO

## 2019-02-14 NOTE — PROGRESS NOTES
Received from yellow  pod, aox4,  steady on his feet. Denies pain, with sob. Call light within reach. Needs attended. Plan of care discussed and understood.

## 2019-02-14 NOTE — PROGRESS NOTES
IV dc'd.  Discharge instructions given to patient; patient verbalizes understanding, all questions answered.  Copy of DC summary provided, signed copy in chart.  2 prescriptions electronically sent to pt's pharmacy.   Pt awaiting ride

## 2019-02-14 NOTE — ED NOTES
Pt resting on cart in low position with call light in reach. Pt denies needs at this. Will continue to monitor.

## 2019-02-14 NOTE — FACE TO FACE
Face to Face Supporting Documentation - Home Health    The encounter with this patient was in whole or in part the primary reason for home health admission.    Date of encounter:   Patient:                    MRN:                       YOB: 2019  David Cho  [id number]  1940     Home health to see patient for:  Skilled Nursing care for assessment, interventions & education, Physical Therapy evaluation and treatment and Occupational therapy evaluation and treatment    Skilled need for:  Exacerbation of Chronic Disease State COPD    Skilled nursing interventions to include:  Comment: Med admin/education/IS use    Homebound status evidenced by:  Needs the assistance of another person in order to leave the home. Leaving home requires a considerable and taxing effort. There is a normal inability to leave the home.    Community Physician to provide follow up care: Cassie Au P.A.-C.     Optional Interventions? No      I certify the face to face encounter for this home health care referral meets the CMS requirements and the encounter/clinical assessment with the patient was, in whole, or in part, for the medical condition(s) listed above, which is the primary reason for home health care. Based on my clinical findings: the service(s) are medically necessary, support the need for home health care, and the homebound criteria are met.  I certify that this patient has had a face to face encounter by myself.  EMERSON Banks. - NPI: [id number]

## 2019-02-14 NOTE — PROGRESS NOTES
I have personally seen and examined / evaluated the patient, discussed the patient's evaluation & management plan with ETHEL Nevarez and I have reviewed the note below.     I agree with the findings, history, examination and assessment / plan as listed below with changes/addendum as listed below by me in my addendum / attestation separetely.       Presenting with COPD exacerbation, admitted to the CDU overnight.  Wheezing at home, low oxygen saturations.  Oxygen tank was empty.  Admitted with COPD exacerbation, wheezing.  Upon evaluation, his wheezing has resolved.  Supposed to follow-up with pulmonology tomorrow, recommend ambulatory oxygen desaturation study today, evaluation for viral influenza.  Can discharge home on oral doxycycline x 7 days, prednisone taper over the course of 10 days.  Continue his LABA/inhaled corticosteroid, add Spiriva on discharge.  Social work consult, arrangements for home oxygen and recommendation for home health care.    Esperanza Mendoza M.D.  02/14/19  9:40 AM

## 2019-02-14 NOTE — DISCHARGE INSTRUCTIONS
Discharge Instructions    Discharged to home by car with relative. Discharged via wheelchair, hospital escort: Yes.  Special equipment needed: Not Applicable    Be sure to schedule a follow-up appointment with your primary care doctor or any specialists as instructed.     Discharge Plan:   Diet Plan: Discussed  Activity Level: Discussed  Confirmed Follow up Appointment: Patient to Call and Schedule Appointment  Confirmed Symptoms Management: Discussed  Medication Reconciliation Updated: Yes  Influenza Vaccine Indication: Not indicated: Previously immunized this influenza season and > 8 years of age    I understand that a diet low in cholesterol, fat, and sodium is recommended for good health. Unless I have been given specific instructions below for another diet, I accept this instruction as my diet prescription.   Other diet: Heart healthy     Special Instructions: None    · Is patient discharged on Warfarin / Coumadin?   No     Depression / Suicide Risk    As you are discharged from this [facility] facility, it is important to learn how to keep safe from harming yourself.    Recognize the warning signs:  · Abrupt changes in personality, positive or negative- including increase in energy   · Giving away possessions  · Change in eating patterns- significant weight changes-  positive or negative  · Change in sleeping patterns- unable to sleep or sleeping all the time   · Unwillingness or inability to communicate  · Depression  · Unusual sadness, discouragement and loneliness  · Talk of wanting to die  · Neglect of personal appearance   · Rebelliousness- reckless behavior  · Withdrawal from people/activities they love  · Confusion- inability to concentrate     If you or a loved one observes any of these behaviors or has concerns about self-harm, here's what you can do:  · Talk about it- your feelings and reasons for harming yourself  · Remove any means that you might use to hurt yourself (examples: pills, rope,  extension cords, firearm)  · Get professional help from the community (Mental Health, Substance Abuse, psychological counseling)  · Do not be alone:Call your Safe Contact- someone whom you trust who will be there for you.  · Call your local CRISIS HOTLINE 772-9551 or 155-642-2785  · Call your local Children's Mobile Crisis Response Team [place] (280) 606-9384 or www.[facility]  · Call the toll free National Suicide Prevention Hotlines   · National Suicide Prevention Lifeline 533-312-BVRQ (2879)  · National Hope Line Network 800-SUICIDE (146-9762)

## 2019-02-14 NOTE — PROGRESS NOTES
Assessment completed.  Pt A&Ox4.  Mild WOB noted with expiratory wheezes heard, oxygen saturation 95% on 2L O2.  RT involved in pt's care.  Pt denies any pain at this time.   Call light within reach.  Pt updated on POC, updated communication board.  Needs met, will continue to monitor.  Hot meal provided

## 2019-02-14 NOTE — DISCHARGE PLANNING
Care Transition Team Assessment    Information Source  Orientation : Oriented x 4  Information Given By: Patient  Informant's Name:  (David Cho)  Who is responsible for making decisions for patient? : Patient    Readmission Evaluation  Is this a readmission?: No    Elopement Risk  Legal Hold: No  Ambulatory or Self Mobile in Wheelchair: No-Not an Elopement Risk  Disoriented: No  Elopement Risk: Not at Risk for Elopement    Interdisciplinary Discharge Planning  Does Admitting Nurse Feel This Could be a Complex Discharge?: No  Primary Care Physician:  (Cassie HULL)  Lives with - Patient's Self Care Capacity: Spouse  Patient or legal guardian wants to designate a caregiver (see row info): No  Support Systems: Spouse / Significant Other  Housing / Facility: 2 Story Apartment / Condo  Do You Take your Prescribed Medications Regularly:  ([facility]: [facility])  Able to Return to Previous ADL's: Yes  Mobility Issues: No  Prior Services: None  Patient Expects to be Discharged to:: home  Assistance Needed: No  Durable Medical Equipment: Home Oxygen  DME Provider / Phone:  (Unknown)    Discharge Preparedness  What is your plan after discharge?: Home with help  What are your discharge supports?: Spouse  Prior Functional Level: Ambulatory  Difficulity with ADLs: None  Difficulity with IADLs: None    Functional Assesment  Prior Functional Level: Ambulatory    Finances  Financial Barriers to Discharge: No  Prescription Coverage: No    Vision / Hearing Impairment  Vision Impairment : Yes  Right Eye Vision: Wears Glasses  Left Eye Vision: Wears Glasses  Hearing Impairment : Yes  Hearing Impairment: Both Ears, Hearing Device Not Available  Does Pt Need Special Equipment for the Hearing Impaired?: No    Values / Beliefs / Concerns  Values / Beliefs Concerns : No    Advance Directive  Advance Directive?: None  Advance Directive offered?: AD Booklet refused    Domestic Abuse  Have you ever been the victim of  abuse or violence?: No  Physical Abuse or Sexual Abuse: No  Verbal Abuse or Emotional Abuse: No  Possible Abuse Reported to:: Not Applicable    Psychological Assessment  History of Substance Abuse: None  History of Psychiatric Problems: No  Newly Diagnosed Illness: No    Discharge Risks or Barriers  Discharge risks or barriers?: No    Anticipated Discharge Information  Anticipated discharge disposition: Home  Discharge Address:  ([address] [place] [address])  Discharge Contact Phone Number:  (Caterina Cho (spouse)  [phone])

## 2019-02-14 NOTE — ASSESSMENT & PLAN NOTE
This is a mild exacerbation, the patient is not hypoxic when on his home 2 L of oxygen.  However, despite breathing treatments he still has increased work of breathing and expiratory wheezes.  Therefore he will be admitted overnight for aggressive respiratory therapy and placed on respiratory therapy protocol.  I started him on IV steroids and if he had clinical improvement in the morning, we will plan to transition to oral medications for discharge home.  Given his cough, I will also start him on azithromycin for possible underlying bronchitis as well as its anti-inflammatory effects.  Continue home inhalers and offer symptomatic management for cough.

## 2019-02-14 NOTE — DISCHARGE SUMMARY
Discharge Summary    CHIEF COMPLAINT ON ADMISSION  Chief Complaint   Patient presents with   • Shortness of Breath       Reason for Admission  Shortness of Breath      Admission Date  2/13/2019    CODE STATUS  Full Code    HPI & HOSPITAL COURSE  This is a 78 y.o. male here with acute exacerbation of chronic obstructive pulmonary disease.  Please see the dictated history of present illness 2/13/2019 for complete details.  In short, the patient developed progressive shortness of breath over the last several days.  In short, patient called EMS and was found to have a nasal cannula in place but hooked up to an empty oxygen tank.  He was brought to the emergency room where chest x-ray was nonacute.  Pro-calcitonin was negative.  White blood cell count was normal.  He was admitted to the observation unit for IV steroids, nebulizer therapy, and O2 support.  He normally takes 2 L of oxygen around-the-clock and was at his baseline requirement but did have market expiratory wheezes on exam.    Following morning the patient has market reduction in his expiratory wheezes.  He has been hemodynamically stable on 2 L of supplemental oxygen overnight.  His influenza swab is negative.  He has been set up for home health but has declined this stating lack of need.  He lives with his wife who assists with his care. He reports outpatient follow-up with his pulmonologist on 2/15/2019.  He will be given a prescription for prednisone with a prolonged taper.  He does endorse using additional inhaled therapies, but given his close outpatient follow-up with pulmonology I will defer further prescriptions to that provider.     Therefore, he is discharged in good and stable condition to home with close outpatient follow-up    Discharge Date  2/14/2019    FOLLOW UP ITEMS POST DISCHARGE  None    DISCHARGE DIAGNOSES  Principal Problem:    COPD exacerbation (HCC) POA: Unknown  Active Problems:    Dyslipidemia (Chronic) POA: Yes    Hypertension  (Chronic) POA: Yes  Resolved Problems:    * No resolved hospital problems. *      FOLLOW UP  Future Appointments  Date Time Provider Department Center   2/15/2019 8:30 AM [facility] None   2/19/2019 10:00 AM [facility]   4/19/2019 10:30 AM [facility] None     No follow-up provider specified.    MEDICATIONS ON DISCHARGE     Medication List      START taking these medications      Instructions   azithromycin 250 MG Tabs  Start taking on:  2/15/2019  Commonly known as:  ZITHROMAX   1 tab PO x 4 days     predniSONE 10 MG Tabs  Commonly known as:  DELTASONE   1 tab PO QID x 4d; then 1 TID x 4d, then 1 BID x 4d, then 1 QD x 4d, then 0.5 QD x 4d, then stop        CONTINUE taking these medications      Instructions   buPROPion 150 MG XL tablet  Commonly known as:  WELLBUTRIN XL   Take 1 Tab by mouth every morning.  Dose:  150 mg     enalapril 20 MG tablet  Commonly known as:  VASOTEC   TAKE 1 TABLET BY MOUTH EVERY DAY     finasteride 5 MG Tabs  Commonly known as:  PROSCAR   TAKE 1 TABLET BY MOUTH EVERY DAY     ipratropium-albuterol 0.5-2.5 (3) MG/3ML nebulizer solution  Commonly known as:  DUONEB   3 mL by Nebulization route every four hours as needed for Shortness of Breath.  Dose:  3 mL     lovastatin 20 MG Tabs  Commonly known as:  MEVACOR   TAKE 1 TABLET BY MOUTH EVERY DAY     metoprolol 25 MG Tabs  Commonly known as:  LOPRESSOR   Take 0.5 Tabs by mouth 2 Times a Day.  Dose:  12.5 mg     montelukast 10 MG Tabs  Commonly known as:  SINGULAIR   Take 1 Tab by mouth every day.  Dose:  10 mg     umeclidinium-vilanterol 62.5-25 MCG/INH Aepb inhaler  Commonly known as:  ANORO ELLIPTA   Inhale 1 Puff by mouth every day.  Dose:  1 Puff     VENTOLIN  (90 Base) MCG/ACT Aers inhalation aerosol  Generic drug:  albuterol   INHALE 2 PUFFS BY MOUTH EVERY 4 HOURS AS NEEDED SHORTNESS OF BREATH            Allergies  No Known Allergies    DIET  Orders Placed  This Encounter   Procedures   • Diet Order Regular     Standing Status:   Standing     Number of Occurrences:   1     Order Specific Question:   Diet:     Answer:   Regular [1]       ACTIVITY  As tolerated.  Weight bearing as tolerated    CONSULTATIONS  None    PROCEDURES  None    LABORATORY  Lab Results   Component Value Date    SODIUM 132 (L) 02/13/2019    POTASSIUM 4.2 02/13/2019    CHLORIDE 97 02/13/2019    CO2 26 02/13/2019    GLUCOSE 115 (H) 02/13/2019    BUN 12 02/13/2019    CREATININE 0.88 02/13/2019        Lab Results   Component Value Date    WBC 5.0 02/14/2019    HEMOGLOBIN 13.6 (L) 02/14/2019    HEMATOCRIT 40.4 (L) 02/14/2019    PLATELETCT 252 02/14/2019        Total time of the discharge process exceeds 36 minutes.

## 2019-02-14 NOTE — CARE PLAN
Problem: Safety  Goal: Will remain free from injury  Outcome: PROGRESSING AS EXPECTED  Anticipate patient's needs. Keep belongings within reach.    Problem: Respiratory:  Goal: Respiratory status will improve  Outcome: PROGRESSING AS EXPECTED  RT protocol. Administer steriods.

## 2019-02-14 NOTE — H&P
Hospital Medicine History & Physical Note    Date of Service  2/13/2019    Primary Care Physician  Cassie Au P.A.-C.    Consultants  None    Code Status  Full    Chief Complaint  Chief Complaint   Patient presents with   • Shortness of Breath       History of Presenting Illness  78 y.o. male who presented on 2/13/2019 with shortness of breath.  This is a pleasant gentleman with a history of tobacco abuse who quit smoking approximately 3 years ago and carries known oxygen dependent COPD diagnosis who comes in with complaints of shortness of breath and wheezing.  The patient states that his symptoms started rather quickly in the last 2 days.  His recent medical history is positive for hospitalization in December secondary to community-acquired pneumonia.  The patient reports that he has had a cough productive of scant sputum but denies any fevers, chills, nausea, vomiting, chest pain, abdominal pain, diarrhea or dysuria.  He is unsure of sick contacts.  He reports that he uses his home oxygen and inhalers at home as prescribed however he continues to have shortness of breath and wheezing.  When EMS was notified, they arrived to find that his oxygen tank at home was empty and that he was saturating at 88% on room air.  He typically requires 2 L of oxygen at all times.    Review of Systems  Review of Systems   Constitutional: Negative for chills and fever.   HENT: Negative for congestion and sore throat.    Eyes: Negative for photophobia.   Respiratory: Positive for cough, shortness of breath and wheezing.    Cardiovascular: Negative for chest pain and palpitations.   Gastrointestinal: Negative for abdominal pain, diarrhea, nausea and vomiting.   Genitourinary: Negative for dysuria.   Musculoskeletal: Negative for myalgias.   Skin: Negative.    Neurological: Negative for dizziness, tingling, focal weakness and headaches.   Psychiatric/Behavioral: Negative for depression and suicidal ideas.       Past Medical  History  Past Medical History:   Diagnosis Date   • Cholesterol serum elevated    • COPD (chronic obstructive pulmonary disease) (HCC)    • Depression    • Enlarged prostate    • Hypertension        Surgical History  Past Surgical History:   Procedure Laterality Date   • APPENDECTOMY     • OTHER  appendectomy       Family History  Family History   Problem Relation Age of Onset   • Heart Disease Mother        Social History  Social History   Substance Use Topics   • Smoking status: Former Smoker     Packs/day: 1.00     Years: 50.00     Types: Cigarettes     Quit date: 2016   • Smokeless tobacco: Never Used   • Alcohol use Yes      Comment: daily beer       Allergies  No Known Allergies    Medications  No current facility-administered medications on file prior to encounter.      Current Outpatient Prescriptions on File Prior to Encounter   Medication Sig Dispense Refill   • lovastatin (MEVACOR) 20 MG Tab TAKE 1 TABLET BY MOUTH EVERY DAY 90 Tab 0   • enalapril (VASOTEC) 20 MG tablet TAKE 1 TABLET BY MOUTH EVERY DAY 90 Tab 0   • finasteride (PROSCAR) 5 MG Tab TAKE 1 TABLET BY MOUTH EVERY DAY 90 Tab 0   • montelukast (SINGULAIR) 10 MG Tab Take 1 Tab by mouth every day. 30 Tab 2   • metoprolol (LOPRESSOR) 25 MG Tab Take 0.5 Tabs by mouth 2 Times a Day. 60 Tab 2   • buPROPion (WELLBUTRIN XL) 150 MG XL tablet Take 1 Tab by mouth every morning. 90 Tab 1   • umeclidinium-vilanterol (ANORO ELLIPTA) 62.5-25 MCG/INH AEROSOL POWDER, BREATH ACTIVATED inhaler Inhale 1 Puff by mouth every day. 1 Inhaler 11   • VENTOLIN  (90 Base) MCG/ACT Aero Soln inhalation aerosol INHALE 2 PUFFS BY MOUTH EVERY 4 HOURS AS NEEDED SHORTNESS OF BREATH 1 Inhaler 5   • ipratropium-albuterol (DUONEB) 0.5-2.5 (3) MG/3ML nebulizer solution 3 mL by Nebulization route every four hours as needed for Shortness of Breath. 360 mL 3       Physical Exam  Hemodynamics  Temp (24hrs), Av.1 °C (98.7 °F), Min:37 °C (98.6 °F), Max:37.1 °C (98.8 °F)    Temperature: 37 °C (98.6 °F)  Pulse  Av.9  Min: 95  Max: 115 Heart Rate (Monitored): (!) 105  Blood Pressure : (!) 174/105 (RN aware)      Respiratory      Respiration: 18, Pulse Oximetry: 95 %, O2 Daily Delivery Respiratory : Silicone Nasal Cannula     Given By:: Mouthpiece  RUL Breath Sounds: Diminished, RML Breath Sounds: Diminished, RLL Breath Sounds: Diminished, KRISTY Breath Sounds: Diminished, LLL Breath Sounds: Diminished    Physical Exam   Constitutional: He is oriented to person, place, and time. No distress.   HENT:   Head: Normocephalic and atraumatic.   Right Ear: External ear normal.   Left Ear: External ear normal.   Eyes: EOM are normal. Right eye exhibits no discharge. Left eye exhibits no discharge.   Neck: Neck supple. No JVD present.   Cardiovascular: Normal rate, regular rhythm and normal heart sounds.    Pulmonary/Chest: Effort normal. No respiratory distress. He has wheezes. He exhibits no tenderness.   Abdominal: Soft. Bowel sounds are normal. He exhibits no distension. There is no tenderness.   Musculoskeletal: He exhibits no edema.   Neurological: He is alert and oriented to person, place, and time. No cranial nerve deficit.   Skin: Skin is dry. He is not diaphoretic. No erythema.   Psychiatric: He has a normal mood and affect. His behavior is normal.   Nursing note and vitals reviewed.    Capillary refill less than 3 seconds, distal pulses intact    Laboratory:  Recent Labs      19   WBC  8.9   RBC  4.10*   HEMOGLOBIN  13.3*   HEMATOCRIT  39.9*   MCV  97.3   MCH  32.4   MCHC  33.3*   RDW  49.8   PLATELETCT  241   MPV  9.4     Recent Labs      19   SODIUM  132*   POTASSIUM  4.2   CHLORIDE  97   CO2  26   GLUCOSE  115*   BUN  12   CREATININE  0.88   CALCIUM  9.1     Recent Labs      19   ALTSGPT  6   ASTSGOT  17   ALKPHOSPHAT  68   TBILIRUBIN  0.9   GLUCOSE  115*                 Lab Results   Component Value Date    TROPONINI 0.02 2019        Imaging  Dx-chest-portable (1 View)    Result Date: 2/13/2019 2/13/2019 7:58 PM HISTORY/REASON FOR EXAM:  Chest Pain. TECHNIQUE/EXAM DESCRIPTION AND NUMBER OF VIEWS: Single portable view of the chest. COMPARISON: 12/25/2018 FINDINGS: LUNGS: Hyperinflation. No focal consolidation. No effusions. PNEUMOTHORAX: None. LINES AND TUBES: None. MEDIASTINUM: No cardiomegaly. Atherosclerosis. MUSCULOSKELETAL STRUCTURES: No acute fracture.     COPD. No acute cardiopulmonary abnormality.        Assessment/Plan:  Anticipate that patient will need less than 2 midnights for management of the discussed medical issues.    * COPD exacerbation (HCC)   Assessment & Plan    This is a mild exacerbation, the patient is not hypoxic when on his home 2 L of oxygen.  However, despite breathing treatments he still has increased work of breathing and expiratory wheezes.  Therefore he will be admitted overnight for aggressive respiratory therapy and placed on respiratory therapy protocol.  I started him on IV steroids and if he had clinical improvement in the morning, we will plan to transition to oral medications for discharge home.  Given his cough, I will also start him on azithromycin for possible underlying bronchitis as well as its anti-inflammatory effects.  Continue home inhalers and offer symptomatic management for cough.     Hypertension- (present on admission)   Assessment & Plan    This is chronic, blood pressure currently well controlled on home metoprolol and Vasotec.  Continue and monitor.     Dyslipidemia- (present on admission)   Assessment & Plan    This is chronic and stable, continue home Mevacor.         Prophylaxis: Sequential compression devices for DVT prophylaxis, no PPI indicated, bowel protocol as needed

## 2019-02-14 NOTE — ASSESSMENT & PLAN NOTE
This is chronic, blood pressure currently well controlled on home metoprolol and Vasotec.  Continue and monitor.

## 2019-02-14 NOTE — ED PROVIDER NOTES
ED Provider Note    CHIEF COMPLAINT  Chief Complaint   Patient presents with   • Shortness of Breath       HPI  David Cho is a 78 y.o. male here for evaluation of shortness of breath and productive cough.  The patient states that he had a similar episode at the end of December in 2018, which resulted in his having to be admitted to the hospital for pneumonia.  The patient states that he has had a productive cough, of green sputum.  He has no cp, and no back pain.  The pt states that walking around exacerbates his symptoms, and remaining still alleviates them.  He is on home oxygen of 2L all the time, and has not had to increase this.     PAST MEDICAL HISTORY   has a past medical history of Cholesterol serum elevated; COPD (chronic obstructive pulmonary disease) (HCC); Depression; Enlarged prostate; and Hypertension.    SOCIAL HISTORY  Social History     Social History Main Topics   • Smoking status: Former Smoker     Packs/day: 1.00     Years: 50.00     Types: Cigarettes     Quit date: 8/12/2016   • Smokeless tobacco: Never Used   • Alcohol use Yes      Comment: daily beer   • Drug use: No   • Sexual activity: Not on file       Family History  No bleeding disorders.     SURGICAL HISTORY   has a past surgical history that includes other (appendectomy) and appendectomy.    CURRENT MEDICATIONS  Home Medications     Reviewed by Cathy Tran R.N. (Registered Nurse) on 02/13/19 at 1936  Med List Status: Partial   Medication Last Dose Status   buPROPion (WELLBUTRIN XL) 150 MG XL tablet  Active   enalapril (VASOTEC) 20 MG tablet  Active   finasteride (PROSCAR) 5 MG Tab  Active   guaiFENesin LA (MUCINEX) 600 MG TABLET SR 12 HR  Active   ipratropium-albuterol (DUONEB) 0.5-2.5 (3) MG/3ML nebulizer solution  Active   lovastatin (MEVACOR) 20 MG Tab  Active   MethylPREDNISolone (MEDROL DOSEPAK) 4 MG Tablet Therapy Pack  Active   metoprolol (LOPRESSOR) 25 MG Tab  Active   montelukast (SINGULAIR) 10 MG Tab  Active    umeclidinium-vilanterol (ANORO ELLIPTA) 62.5-25 MCG/INH AEROSOL POWDER, BREATH ACTIVATED inhaler  Active   VENTOLIN  (90 Base) MCG/ACT Aero Soln inhalation aerosol  Active                ALLERGIES  No Known Allergies    REVIEW OF SYSTEMS  See HPI for further details. Review of systems as above, otherwise all other systems are negative.     PHYSICAL EXAM  Constitutional: Well developed, well nourished. mild acute distress.  HEENT: Normocephalic, atraumatic. Posterior pharynx clear and moist.  Eyes:  EOMI. Normal sclera.  Neck: Supple, Full range of motion, nontender.  Chest/Pulmonary:   Expiratory wheeze, equal expansion  Cardio: Regular rate and rhythm with no murmur.   Abdomen: Soft, nontender. No peritoneal signs. No guarding. No palpable masses.  Musculoskeletal: No deformity, no edema, neurovascular intact.   Neuro: Clear speech, appropriate, cooperative, cranial nerves II-XII grossly intact.  Psych: Normal mood and affect    Results for orders placed or performed during the hospital encounter of 02/13/19   CBC w/ Differential   Result Value Ref Range    WBC 8.9 4.8 - 10.8 K/uL    RBC 4.10 (L) 4.70 - 6.10 M/uL    Hemoglobin 13.3 (L) 14.0 - 18.0 g/dL    Hematocrit 39.9 (L) 42.0 - 52.0 %    MCV 97.3 81.4 - 97.8 fL    MCH 32.4 27.0 - 33.0 pg    MCHC 33.3 (L) 33.7 - 35.3 g/dL    RDW 49.8 35.9 - 50.0 fL    Platelet Count 241 164 - 446 K/uL    MPV 9.4 9.0 - 12.9 fL    Neutrophils-Polys 57.70 44.00 - 72.00 %    Lymphocytes 15.90 (L) 22.00 - 41.00 %    Monocytes 7.70 0.00 - 13.40 %    Eosinophils 17.40 (H) 0.00 - 6.90 %    Basophils 1.00 0.00 - 1.80 %    Immature Granulocytes 0.30 0.00 - 0.90 %    Nucleated RBC 0.00 /100 WBC    Neutrophils (Absolute) 5.14 1.82 - 7.42 K/uL    Lymphs (Absolute) 1.42 1.00 - 4.80 K/uL    Monos (Absolute) 0.69 0.00 - 0.85 K/uL    Eos (Absolute) 1.55 (H) 0.00 - 0.51 K/uL    Baso (Absolute) 0.09 0.00 - 0.12 K/uL    Immature Granulocytes (abs) 0.03 0.00 - 0.11 K/uL    NRBC (Absolute)  0.00 K/uL   Complete Metabolic Panel (CMP)   Result Value Ref Range    Sodium 132 (L) 135 - 145 mmol/L    Potassium 4.2 3.6 - 5.5 mmol/L    Chloride 97 96 - 112 mmol/L    Co2 26 20 - 33 mmol/L    Anion Gap 9.0 0.0 - 11.9    Glucose 115 (H) 65 - 99 mg/dL    Bun 12 8 - 22 mg/dL    Creatinine 0.88 0.50 - 1.40 mg/dL    Calcium 9.1 8.5 - 10.5 mg/dL    AST(SGOT) 17 12 - 45 U/L    ALT(SGPT) 6 2 - 50 U/L    Alkaline Phosphatase 68 30 - 99 U/L    Total Bilirubin 0.9 0.1 - 1.5 mg/dL    Albumin 4.6 3.2 - 4.9 g/dL    Total Protein 6.9 6.0 - 8.2 g/dL    Globulin 2.3 1.9 - 3.5 g/dL    A-G Ratio 2.0 g/dL   Troponin STAT   Result Value Ref Range    Troponin I 0.02 0.00 - 0.04 ng/mL   ESTIMATED GFR   Result Value Ref Range    GFR If African American >60 >60 mL/min/1.73 m 2    GFR If Non African American >60 >60 mL/min/1.73 m 2     DX-CHEST-PORTABLE (1 VIEW)   Final Result      COPD. No acute cardiopulmonary abnormality.          PROCEDURES     MEDICAL RECORD  I have reviewed patient's medical record and pertinent results are listed above.    COURSE & MEDICAL DECISION MAKING  I have reviewed any medical record information, laboratory studies and radiographic results as noted above.    9:05 PM  The patient will be admitted for COPD exacerbation.  A breathing treatment has been ordered, in addition to some steroids.  He has some mild wheezing on reexam, and will be treated.  Dr. Martinez will kindly admit.  He will be admitted in guarded condition.       FINAL IMPRESSION  Copd exacerbation       Electronically signed by: Jorge Ybarra, 2/13/2019 7:49 PM

## 2019-02-14 NOTE — ED TRIAGE NOTES
Pt bib ems with c/o sob. Pt has hx of copd and uses home O2 2L NC at baseline. Pts O2 tank was empty on ems arrival and patient O2 sat was 88%. Pt noted to have wheezes and given a duoneb and was 99% after tx and feeling better but still had some wheezing. Pt noted to have productive cough per ems. Recent admission for pna in dec 2018. Pt speaking in full sentences. Placed on ccm/pulse ox. VSS pt with hx of htn and is taking his medications as ordered.

## 2019-02-15 ENCOUNTER — OFFICE VISIT (OUTPATIENT)
Dept: PULMONOLOGY | Facility: HOSPICE | Age: 79
End: 2019-02-15
Payer: MEDICARE

## 2019-02-15 VITALS
RESPIRATION RATE: 16 BRPM | SYSTOLIC BLOOD PRESSURE: 120 MMHG | TEMPERATURE: 97.7 F | BODY MASS INDEX: 22.12 KG/M2 | HEIGHT: 71 IN | HEART RATE: 68 BPM | DIASTOLIC BLOOD PRESSURE: 70 MMHG | OXYGEN SATURATION: 95 % | WEIGHT: 158 LBS

## 2019-02-15 DIAGNOSIS — R06.02 SHORTNESS OF BREATH: ICD-10-CM

## 2019-02-15 DIAGNOSIS — J44.1 COPD EXACERBATION (HCC): ICD-10-CM

## 2019-02-15 DIAGNOSIS — R09.02 HYPOXIA: ICD-10-CM

## 2019-02-15 PROCEDURE — 99213 OFFICE O/P EST LOW 20 MIN: CPT | Performed by: PHYSICIAN ASSISTANT

## 2019-02-15 PROCEDURE — 94761 N-INVAS EAR/PLS OXIMETRY MLT: CPT | Performed by: PHYSICIAN ASSISTANT

## 2019-02-15 NOTE — PATIENT INSTRUCTIONS
1-multi oximetry performed in office   Last checked prior to hospitalization, EMS ride, etc  2-[facility]ing [facility]  3-continue oxygen use at night, add 2L with exertion  4-follow up 3 months   5-plan follow-up CT and PFT in August

## 2019-02-15 NOTE — PROGRESS NOTES
CC: Breathing easier on antibiotics and steroids    HPI:  David Cho is a 78 y.o. year old male here today for follow-up on COPD exacerbation.  Patient was hospitalized in December for pneumonia and was transported 3 days ago via EMS to the emergency department for COPD exacerbation.  At this time he is feeling much improved on prednisone and azithromycin.  Last seen in clinic on 10/24/2018.  Patient did trial Trelegy, felt significant benefit and is requesting [facility] at this time.    Reviewed in clinic vitals including blood pressure of 120/70, heart rate of 68, respiratory rate of 16, SPO2 of 95% on room air, BMI of 22.04.  Patient is a former smoker with a 50+ pack year history of smoking who quit in 2016.      Reviewed imaging, had chest x-ray on 2/13/2019, reviewed, hyperinflation seen, no focal consolidation or effusion seen no acute cardiopulmonary abnormality, COPD present.  Patient had x-ray on the 25th of December which demonstrated increased ill defined opacifications in each lung which could be indicative of worsening pulmonary edema or inflammation.     Had echo completed 12/26/2018, results reviewed including per cardiology interpretation: Normal left ventricular systolic function with LVEF estimated at 60%, mildly dilated left atrium, grade 2 diastolic dysfunction, moderate to severe mitral annular calcification, mild aortic insufficiency, aortic sclerosis without stenosis, moderate to severe mitral annular calcification, right atrial pressure estimated at 3 mmHg while RVSP is estimated at 55 mmHg, plaque seen in ascending aorta.     Had abnormal CT in 4/28/2017 demonstrating lucencies within upper lobe most consistent with centrilobular emphysema, 10 mm ill-defined spiculated density in the medial left lung apex with pleural extension suggesting postinflammatory scarring which is unchanged from previous CT, stable additional right upper lobe, right middle  lobe and left lower lobe nodules also probably postinflammatory.  Last CT scan performed in August 2018 again demonstrated multiple bilateral pulmonary nodules with the largest measuring up to 7 mm but unchanged size since previous makes these likely benign, emphysema again demonstrated.  Follow-up recommended 1 year from this exam given patient extensive history of smoking.    Last pulmonary function test completed in October 2018.  FVC was 3.39 L or 85% predicted, FEV1 of 1.81 L or 64% predicted, FEV1/FVC ratio was 53%, flow volume loop consistent with obstruction without significant response to bronchodilators, residual volume was 164% predicted while total lung capacity was near normal at 109% addicted, his DLCO was low normal at 81% predicted.  Moderate obstructive lung disease probably COPD given patient history.    ROS:   Constitutional: Denies fevers, chills, night sweats or weight loss, some increase in fatigue  Skin: Rashes, hair or nail changes, lumps or sores   Eyes: Wears reading glasses, status post cataract surgery  Ears, Nose, Throat: Does not wear dentures, denies history of allergies or sinus infections, persistent hoarseness or sore throat as well as earaches, reports he has difficulty hearing, reports occasional nasal congestion and/or runny nose   GI: Denies heartburn/reflux, denies difficulty swallowing  Respiratory: cough with production of green secretions which subsequently cleared and stopped, intermittent wheezing, no shortness of breath at rest, moderate shortness of breath with activity, history of pneumonia and bronchitis, denies history of TB, reports occupational exposure as a  to asbestos, lead and dust  CV: Denies chest pain, tightness, palpitations, heart murmur, orthopnea or edema      Past Medical History:   Diagnosis Date   • Cholesterol serum elevated    • COPD (chronic obstructive pulmonary disease) (HCC)    • Depression    • Enlarged prostate    • Hypertension   "      Past Surgical History:   Procedure Laterality Date   • APPENDECTOMY     • OTHER  appendectomy       Family History   Problem Relation Age of Onset   • Heart Disease Mother        Social History     Social History   • Marital status:      Spouse name: N/A   • Number of children: N/A   • Years of education: N/A     Occupational History   • Not on file.     Social History Main Topics   • Smoking status: Former Smoker     Packs/day: 1.00     Years: 50.00     Types: Cigarettes     Quit date: 8/12/2016   • Smokeless tobacco: Never Used      Comment: continued abstinance   • Alcohol use Yes      Comment: daily beer   • Drug use: No   • Sexual activity: Not on file     Other Topics Concern   • Not on file     Social History Narrative   • No narrative on file       Allergies as of 02/15/2019   • (No Known Allergies)        @Vital signs for this encounter:  Vitals:    02/15/19 0840 02/15/19 0841   Height: 1.803 m (5' 11\")    Weight: 71.7 kg (158 lb)    Weight % change since last entry.: 0 %    BP: 120/70    Pulse: 68    BMI (Calculated): 22.04    Resp: 16    Temp: 36.5 °C (97.7 °F)    TempSrc: Temporal    O2 sat % room air:  95 %       Current medications as of today   Current Outpatient Prescriptions   Medication Sig Dispense Refill   • Fluticasone-Umeclidin-Vilant (TRELEGY ELLIPTA) 100-62.5-25 MCG/INH AEROSOL POWDER, BREATH ACTIVATED Inhale 1 Puff by mouth every day. Rinse mouth after use 1 Each 0   • azithromycin (ZITHROMAX) 250 MG Tab 1 tab PO x 4 days 4 Tab 0   • predniSONE (DELTASONE) 10 MG Tab 1 tab PO QID x 4d; then 1 TID x 4d, then 1 BID x 4d, then 1 QD x 4d, then 0.5 QD x 4d, then stop 42 Tab 0   • lovastatin (MEVACOR) 20 MG Tab TAKE 1 TABLET BY MOUTH EVERY DAY 90 Tab 0   • enalapril (VASOTEC) 20 MG tablet TAKE 1 TABLET BY MOUTH EVERY DAY 90 Tab 0   • finasteride (PROSCAR) 5 MG Tab TAKE 1 TABLET BY MOUTH EVERY DAY 90 Tab 0   • montelukast (SINGULAIR) 10 MG Tab Take 1 Tab by mouth every day. 30 Tab 2   • " metoprolol (LOPRESSOR) 25 MG Tab Take 0.5 Tabs by mouth 2 Times a Day. 60 Tab 2   • buPROPion (WELLBUTRIN XL) 150 MG XL tablet Take 1 Tab by mouth every morning. 90 Tab 1   • umeclidinium-vilanterol (ANORO ELLIPTA) 62.5-25 MCG/INH AEROSOL POWDER, BREATH ACTIVATED inhaler Inhale 1 Puff by mouth every day. 1 Inhaler 11   • VENTOLIN  (90 Base) MCG/ACT Aero Soln inhalation aerosol INHALE 2 PUFFS BY MOUTH EVERY 4 HOURS AS NEEDED SHORTNESS OF BREATH 1 Inhaler 5   • ipratropium-albuterol (DUONEB) 0.5-2.5 (3) MG/3ML nebulizer solution 3 mL by Nebulization route every four hours as needed for Shortness of Breath. 360 mL 3     No current facility-administered medications for this visit.          Physical Exam:   Gen:           Alert and oriented, No apparent distress. Mood and affect appropriate, normal interaction with provider.  Eyes:          sclere white, conjunctive moist.  Hearing:     Grossly intact.  Dentition:    Fair dentition.  Oropharynx:   Tongue normal, posterior pharynx without erythema or exudate.  Mallampati Classification: I  Neck:        Supple, trachea midline, no masses.  Respiratory Effort: No intercostal retractions or use of accessory muscles.   Lung Auscultation:      Diminished throughout; no rales, rhonchi or wheezing.  CV:            Regular rate and rhythm. No murmurs, rubs or gallops appreciated.  No edema  Digits, Nails, Ext: No clubbing, cyanosis, petechiae, or nodes.   Skin:        No rashes, lesions or ulcers noted on back or exposed skin surfaces.                     Assessment:  1. COPD exacerbation (HCC)   on antibiotics and steroids, [facility]   2. Shortness of breath  Multiple Oximetry   3. Hypoxia   currently using O2 at night will need oxygen with exertion as well       Immunizations:    Flu: 10/11/2018  Pneumovax 23: 8/8/2018  Prevnar 13: 10/6/2016    Plan:  1-multi oximetry performed in office   Last checked prior to hospitalization, EMS ride,  etc  2-[facility]ing [facility]  3-continue oxygen use at night, add 2L with exertion  4-follow up 3 months   5-plan follow-up CT and PFT in August or September    This dictation was created using voice recognition software. The accuracy of the dictation is limited to the abilities of the software. I expect there may be some errors of grammar and possibly content.

## 2019-02-15 NOTE — PROCEDURES
Multi-Ox Readings  Multi Ox #1 Room air   O2 sat % at rest 94   O2 sat % on exertion 87   O2 sat average on exertion     Multi Ox #2 2 LPM   O2 sat % at rest 95   O2 sat % on exertion 94   O2 sat average on exertion       Oxygen Use 2   Oxygen Frequency Nocturnal   Duration of need     Is the patient mobile within the home?     CPAP Use?     BIPAP Use?     Servo Titration

## 2019-03-18 DIAGNOSIS — J44.1 COPD EXACERBATION (HCC): ICD-10-CM

## 2019-03-18 NOTE — TELEPHONE ENCOUNTER
Have we ever prescribed this med? Yes.  If yes, what date? 02/15/19 Dennis PAC    Last OV: 02/15/19 Dennis OSMAN    Next OV: 05/15/19 Dennis OSMAN    DX:    COPD exacerbation (HCC)          Medications:Fluticasone-Umeclidin-Vilant (TRELEGY ELLIPTA) 100-62.5-25 MCG/INH AEROSOL POWDER, BREATH ACTIVATED    Patient is requesting sample

## 2019-04-05 RX ORDER — LOVASTATIN 20 MG/1
TABLET ORAL
Qty: 90 TAB | Refills: 3 | Status: SHIPPED | OUTPATIENT
Start: 2019-04-05 | End: 2020-06-17 | Stop reason: SDUPTHER

## 2019-04-05 RX ORDER — FINASTERIDE 5 MG/1
TABLET, FILM COATED ORAL
Qty: 90 TAB | Refills: 3 | Status: SHIPPED | OUTPATIENT
Start: 2019-04-05 | End: 2020-06-17 | Stop reason: SDUPTHER

## 2019-04-05 RX ORDER — ENALAPRIL MALEATE 20 MG/1
TABLET ORAL
Qty: 90 TAB | Refills: 3 | Status: SHIPPED | OUTPATIENT
Start: 2019-04-05 | End: 2020-06-17 | Stop reason: SDUPTHER

## 2019-04-18 DIAGNOSIS — J44.1 COPD EXACERBATION (HCC): ICD-10-CM

## 2019-04-18 NOTE — TELEPHONE ENCOUNTER
Have we ever prescribed this med? Yes.  If yes, what date? 03/18/2019    Last OV: 02/15/2019-Dennis    Next OV: 05/15/2019-Dennis     DX: COPD exacerbation (HCC) (J44.1)    Medications:   Requested Prescriptions     Pending Prescriptions Disp Refills   • Fluticasone-Umeclidin-Vilant (TRELEGY ELLIPTA) 100-62.5-25 MCG/INH AEROSOL POWDER, BREATH ACTIVATED 1 Each 0     Sig: Inhale 1 Puff by mouth every day. Rinse mouth after use

## 2019-04-23 RX ORDER — BUPROPION HYDROCHLORIDE 150 MG/1
150 TABLET ORAL EVERY MORNING
Qty: 90 TAB | Refills: 1 | Status: SHIPPED | OUTPATIENT
Start: 2019-04-23 | End: 2019-10-21 | Stop reason: SDUPTHER

## 2019-05-15 ENCOUNTER — OFFICE VISIT (OUTPATIENT)
Dept: PULMONOLOGY | Facility: HOSPICE | Age: 79
End: 2019-05-15
Payer: MEDICARE

## 2019-05-15 VITALS
SYSTOLIC BLOOD PRESSURE: 155 MMHG | OXYGEN SATURATION: 93 % | WEIGHT: 161 LBS | HEART RATE: 99 BPM | BODY MASS INDEX: 22.54 KG/M2 | RESPIRATION RATE: 16 BRPM | DIASTOLIC BLOOD PRESSURE: 88 MMHG | HEIGHT: 71 IN

## 2019-05-15 DIAGNOSIS — R25.2 LEG CRAMPS: ICD-10-CM

## 2019-05-15 DIAGNOSIS — J44.9 CHRONIC OBSTRUCTIVE PULMONARY DISEASE, UNSPECIFIED COPD TYPE (HCC): ICD-10-CM

## 2019-05-15 DIAGNOSIS — R91.1 SOLITARY PULMONARY NODULE: ICD-10-CM

## 2019-05-15 PROCEDURE — 99213 OFFICE O/P EST LOW 20 MIN: CPT | Performed by: PHYSICIAN ASSISTANT

## 2019-05-15 ASSESSMENT — ENCOUNTER SYMPTOMS
CHILLS: 0
PALPITATIONS: 0
FEVER: 0
HEARTBURN: 1
WEIGHT LOSS: 0
SORE THROAT: 0
WHEEZING: 0
DIAPHORESIS: 0
COUGH: 1
SHORTNESS OF BREATH: 1
SPUTUM PRODUCTION: 1

## 2019-05-15 NOTE — PATIENT INSTRUCTIONS
1-experiencing intermittent leg cramps, encourage less drinking or some salt/bananas vs cutting back on alcohol  2-continue same meds  3-follow up in 4 months with PFT and CT  4-follow up 4 months results

## 2019-05-16 NOTE — PROGRESS NOTES
CC: Intermittent leg cramps    HPI:  David Cho is a 78 y.o. year old male here today for follow-up on COPD.  Patient last seen in clinic on 2/15/2019.  He is a former smoker with a 50+-pack-year history who quit in 2016.  Continued cessation advised.    Reviewed in clinic vitals including blood pressure of 155/88, heart rate of 3% and BMI of 22.45 kg/m².  Discussed elevated blood pressure, reports he forgot to take his blood pressure medicine this morning, continue to monitor and follow-up with PCP.    Reviewed home medication regimen including albuterol rescue inhaler use at 1-2 times a week, once daily DuoNeb.  Patient does wear 2 L O2 with exertion and at night.  Patient is not taking Singulair states was not sure why that was started when he was in the hospital nor did he perceive benefit from use.  Has been taking Trelegy through [facility] pharmacy with perceived benefit and will check with his pharmacy regarding cost.      Reviewed most recent imaging including chest x-ray obtain 2/13/2019 which demonstrated hyperinflation and COPD but no acute pulmonary process, no cardiomegaly.  Patient did have CT scan completed 8/20/2018 which demonstrated multiple bilateral pulmonary nodules with the largest measuring up to 7 mm, lack of interval growth since July 2016 likely benign, emphysema, atherosclerosis.  Repeat CT scan for surveillance.    Patient did have an echocardiogram completed 12/25/2018 which demonstrated normal left ventricular systolic function, LVEF estimated at 60%, grade 2 diastolic dysfunction, mildly dilated left atrium, moderate to severe mitral annular calcification, aortic sclerosis without stenosis, mild aortic insufficiency, estimated RVSP of 55 mmHg.    Patient did have pulmonary function testing obtained 10/24/2018 demonstrating an FVC of 3.39 L or 85% predicted, FEV1 of 1.81 L or 64% predicted, FEV1/FVC ratio of 53% predicted, flow volume loop consistent with obstruction and no  significant response to bronchodilator noted, lung volumes showed residual volume of 4.21 L or 164% predicted, total lung capacity normal at 109% predicted, diffusion capacity low normal at 81% predicted.  Per pulmonologist interpretation patient has moderate obstructive lung disease probably secondary to COPD given patient history of extensive smoking.    Patient did report intermittent late evening shin splint like leg cramps anteriorly/anterior lateral but not posteriorly or calf.  Cramps are relieved with 2-3 saunders pickle ingestion.  Reviewed recent lab work including BMP obtained 12/28/2018 which showed sodium of 134 and potassium of 3.8.  Patient does drink 4-5 beers per night.  We discussed decreased alcohol intake patient did not exhibit interest.  No change talk.  May benefit from dietary changes.      Review of Systems   Constitutional: Positive for malaise/fatigue. Negative for chills, diaphoresis, fever and weight loss.   HENT: Positive for congestion and hearing loss. Negative for sore throat and tinnitus.         Denies allergies, no runny nose, reports frequent sneezing   Eyes:        Wears reading glasses, denies new or sudden vision changes   Respiratory: Positive for cough, sputum production and shortness of breath. Negative for wheezing.         Shortness of breath with activity, white to light green production   Cardiovascular: Negative for chest pain, palpitations and leg swelling.   Gastrointestinal: Positive for heartburn.   Skin: Negative.        Past Medical History:   Diagnosis Date   • Cholesterol serum elevated    • COPD (chronic obstructive pulmonary disease) (HCC)    • Depression    • Enlarged prostate    • Hypertension        Past Surgical History:   Procedure Laterality Date   • APPENDECTOMY     • OTHER  appendectomy       Family History   Problem Relation Age of Onset   • Heart Disease Mother        Social History     Social History   • Marital status:      Spouse name: N/A  "  • Number of children: N/A   • Years of education: N/A     Occupational History   • Not on file.     Social History Main Topics   • Smoking status: Former Smoker     Packs/day: 1.00     Years: 50.00     Types: Cigarettes     Quit date: 8/12/2016   • Smokeless tobacco: Never Used      Comment: continued abstinance   • Alcohol use Yes      Comment: daily beer   • Drug use: No   • Sexual activity: Not on file     Other Topics Concern   • Not on file     Social History Narrative   • No narrative on file       Allergies as of 05/15/2019   • (No Known Allergies)        @Vital signs for this encounter:  Vitals:    05/15/19 1100   Height: 1.803 m (5' 11\")   Weight: 73 kg (161 lb)   Weight % change since last entry.: 0 %   BP: 155/88   Pulse: 99   BMI (Calculated): 22.45   Resp: 16   O2 sat % room air: 93 %       Current medications as of today   Current Outpatient Prescriptions   Medication Sig Dispense Refill   • Fluticasone-Umeclidin-Vilant (TRELEGY ELLIPTA) 100-62.5-25 MCG/INH AEROSOL POWDER, BREATH ACTIVATED Inhale 1 Puff by mouth every day. Rinse mouth after use 1 Each 0   • buPROPion (WELLBUTRIN XL) 150 MG XL tablet Take 1 Tab by mouth every morning. 90 Tab 1   • lovastatin (MEVACOR) 20 MG Tab TAKE 1 TABLET BY MOUTH EVERY DAY 90 Tab 3   • enalapril (VASOTEC) 20 MG tablet TAKE 1 TABLET BY MOUTH EVERY DAY 90 Tab 3   • finasteride (PROSCAR) 5 MG Tab TAKE 1 TABLET BY MOUTH EVERY DAY 90 Tab 3   • metoprolol (LOPRESSOR) 25 MG Tab Take 0.5 Tabs by mouth 2 Times a Day. 60 Tab 2   • azithromycin (ZITHROMAX) 250 MG Tab 1 tab PO x 4 days (Patient not taking: Reported on 5/15/2019) 4 Tab 0   • predniSONE (DELTASONE) 10 MG Tab 1 tab PO QID x 4d; then 1 TID x 4d, then 1 BID x 4d, then 1 QD x 4d, then 0.5 QD x 4d, then stop (Patient not taking: Reported on 5/15/2019) 42 Tab 0   • montelukast (SINGULAIR) 10 MG Tab Take 1 Tab by mouth every day. (Patient not taking: Reported on 5/15/2019) 30 Tab 2   • umeclidinium-vilanterol (ANORO " ELLIPTA) 62.5-25 MCG/INH AEROSOL POWDER, BREATH ACTIVATED inhaler Inhale 1 Puff by mouth every day. (Patient not taking: Reported on 5/15/2019) 1 Inhaler 11   • VENTOLIN  (90 Base) MCG/ACT Aero Soln inhalation aerosol INHALE 2 PUFFS BY MOUTH EVERY 4 HOURS AS NEEDED SHORTNESS OF BREATH 1 Inhaler 5   • ipratropium-albuterol (DUONEB) 0.5-2.5 (3) MG/3ML nebulizer solution 3 mL by Nebulization route every four hours as needed for Shortness of Breath. 360 mL 3     No current facility-administered medications for this visit.          Physical Exam:   Gen:           Alert and oriented, No apparent distress. Mood and affect     appropriate, normal interaction with provider.  Eyes:          sclere white, conjunctive moist.  Hearing:     Grossly intact.  Dentition:    Poor dentition.  Oropharynx:   Tongue normal, posterior pharynx without erythema or exudate.  Neck:        Supple, trachea midline, no masses.  Respiratory Effort: No intercostal retractions or use of accessory muscles.   Lung Auscultation:      Few crackles left base, decreased slightly right base, otherwise clear CV:            Regular rate and rhythm.  Trace pretibial edema. No murmurs, rubs or gallops.  Digits, Nails, Ext: No clubbing, cyanosis, petechiae, or nodes.   Skin:        No rashes, lesions or ulcers noted on back or exposed skin    surfaces.                     Assessment:  1. COPD, unspecified COPD type (HCC) Fluticasone-Umeclidin-Vilant (TRELEGY ELLIPTA) 100-62.5-25 MCG/INH AEROSOL POWDER, BREATH ACTIVATED    PULMONARY FUNCTION TESTS -Test requested: Complete Pulmonary Function Test   2.  pulmonary nodules CT-CHEST (THORAX) W/O   3. Leg cramps   recommend decreased alcohol intake, dietary changes, follow-up with PCP if continues       Immunizations:    Flu: 10/11/2018  Pneumovax 23: 8/8/2018  Prevnar 13: 10/6/2016    Plan:  1-experiencing intermittent leg cramps, encourage less drinking or some increase salt/bananas vs cutting back on  alcohol  2-continue same meds  3-follow up in 4 months with PFT and CT  4-follow up 4 months results     This dictation was created using voice recognition software. The accuracy of the dictation is limited to the abilities of the software. I expect there may be some errors of grammar and possibly content.

## 2019-06-19 DIAGNOSIS — J44.9 CHRONIC OBSTRUCTIVE PULMONARY DISEASE, UNSPECIFIED COPD TYPE (HCC): ICD-10-CM

## 2019-06-19 NOTE — TELEPHONE ENCOUNTER
Patient was last seen 5/15/19 with Kenyatta HULL. Patient requesting samples of Trelegy from the [facility]. Next visit scheduled for 9/16/19. Please authorize, thank you.

## 2019-08-15 DIAGNOSIS — J44.9 CHRONIC OBSTRUCTIVE PULMONARY DISEASE, UNSPECIFIED COPD TYPE (HCC): ICD-10-CM

## 2019-08-15 NOTE — TELEPHONE ENCOUNTER
Pt called in requesting samples of TRELEGY ELLIPTA. I advised pt he should try and fill out an assistance form but he advised he has and he was told he doesn't qualify. Please advise

## 2019-08-16 NOTE — TELEPHONE ENCOUNTER
Pt notified and advised of different options for affordability in the future. Pt relayed understanding. Coupons mailed to verified address.

## 2019-09-09 ENCOUNTER — HOSPITAL ENCOUNTER (OUTPATIENT)
Dept: RADIOLOGY | Facility: MEDICAL CENTER | Age: 79
End: 2019-09-09
Attending: PHYSICIAN ASSISTANT
Payer: MEDICARE

## 2019-09-09 DIAGNOSIS — R91.1 SOLITARY PULMONARY NODULE: ICD-10-CM

## 2019-09-09 PROCEDURE — 71250 CT THORAX DX C-: CPT

## 2019-09-10 ENCOUNTER — TELEPHONE (OUTPATIENT)
Dept: PULMONOLOGY | Facility: HOSPICE | Age: 79
End: 2019-09-10

## 2019-09-10 DIAGNOSIS — J44.9 CHRONIC OBSTRUCTIVE PULMONARY DISEASE, UNSPECIFIED COPD TYPE (HCC): ICD-10-CM

## 2019-09-10 DIAGNOSIS — R06.02 SOB (SHORTNESS OF BREATH): ICD-10-CM

## 2019-09-10 DIAGNOSIS — R06.02 SHORTNESS OF BREATH: ICD-10-CM

## 2019-09-16 ENCOUNTER — NON-PROVIDER VISIT (OUTPATIENT)
Dept: PULMONOLOGY | Facility: HOSPICE | Age: 79
End: 2019-09-16
Payer: MEDICARE

## 2019-09-16 ENCOUNTER — OFFICE VISIT (OUTPATIENT)
Dept: PULMONOLOGY | Facility: HOSPICE | Age: 79
End: 2019-09-16
Payer: MEDICARE

## 2019-09-16 VITALS
BODY MASS INDEX: 21.98 KG/M2 | RESPIRATION RATE: 16 BRPM | HEART RATE: 71 BPM | HEIGHT: 71 IN | DIASTOLIC BLOOD PRESSURE: 72 MMHG | SYSTOLIC BLOOD PRESSURE: 114 MMHG | OXYGEN SATURATION: 93 % | WEIGHT: 157 LBS

## 2019-09-16 VITALS — WEIGHT: 157 LBS | BODY MASS INDEX: 21.9 KG/M2

## 2019-09-16 DIAGNOSIS — J44.9 CHRONIC OBSTRUCTIVE PULMONARY DISEASE, UNSPECIFIED COPD TYPE (HCC): ICD-10-CM

## 2019-09-16 DIAGNOSIS — R06.02 SOB (SHORTNESS OF BREATH): ICD-10-CM

## 2019-09-16 DIAGNOSIS — R91.8 PULMONARY NODULES: ICD-10-CM

## 2019-09-16 DIAGNOSIS — Z23 NEED FOR VACCINATION: ICD-10-CM

## 2019-09-16 PROCEDURE — 94729 DIFFUSING CAPACITY: CPT | Performed by: INTERNAL MEDICINE

## 2019-09-16 PROCEDURE — G0008 ADMIN INFLUENZA VIRUS VAC: HCPCS | Performed by: PHYSICIAN ASSISTANT

## 2019-09-16 PROCEDURE — 94726 PLETHYSMOGRAPHY LUNG VOLUMES: CPT | Performed by: INTERNAL MEDICINE

## 2019-09-16 PROCEDURE — 94060 EVALUATION OF WHEEZING: CPT | Performed by: INTERNAL MEDICINE

## 2019-09-16 PROCEDURE — 90662 IIV NO PRSV INCREASED AG IM: CPT | Performed by: PHYSICIAN ASSISTANT

## 2019-09-16 PROCEDURE — 99214 OFFICE O/P EST MOD 30 MIN: CPT | Mod: 25 | Performed by: PHYSICIAN ASSISTANT

## 2019-09-16 ASSESSMENT — PULMONARY FUNCTION TESTS
FEV1/FVC_PERCENT_PREDICTED: 88
FEV1: 2
FEV1/FVC: 63.69
FEV1/FVC_PERCENT_PREDICTED: 71
FVC: 3.02
FEV1/FVC: 61
FEV1_PERCENT_CHANGE: 8
FEV1/FVC_PERCENT_CHANGE: 267
FEV1: 1.85
FEV1/FVC: 61
FEV1_PERCENT_PREDICTED: 66
FEV1_LLN: 2.32
FEV1/FVC_PERCENT_CHANGE: 3
FVC_PERCENT_PREDICTED: 80
FEV1/FVC: 64
FVC_LLN: 3.26
FEV1/FVC_PERCENT_PREDICTED: 86
FEV1/FVC_PERCENT_PREDICTED: 89
FEV1/FVC_PERCENT_PREDICTED: 84
FEV1_PERCENT_PREDICTED: 71
FEV1/FVC_PREDICTED: 72
FEV1_PREDICTED: 2.78
FVC: 3.14
FVC_PERCENT_PREDICTED: 77
FEV1_PERCENT_CHANGE: 3
FEV1/FVC_PERCENT_LLN: 60
FVC_PREDICTED: 3.91

## 2019-09-16 ASSESSMENT — ENCOUNTER SYMPTOMS
SPUTUM PRODUCTION: 1
ORTHOPNEA: 0
DIZZINESS: 0
INSOMNIA: 0
COUGH: 0
ROS GI COMMENTS: UPPER DENTURES
TREMORS: 1
WEIGHT LOSS: 0
SHORTNESS OF BREATH: 1
CLAUDICATION: 0
EYES NEGATIVE: 1
SINUS PAIN: 0
HEADACHES: 0
SORE THROAT: 0
PALPITATIONS: 0
CHILLS: 0
FEVER: 0
WHEEZING: 0

## 2019-09-16 NOTE — PATIENT INSTRUCTIONS
1-reviewed pulmonary function test  2-[facility] pharmacy info given  3-reviewed CT results  4-follow up scan indicated  5-obtain in February with follow up after  6-flu shot today  7-follow up sooner if needed     COMEODONE KIT < $10.00

## 2019-09-16 NOTE — PROCEDURES
Technician: REDDY Jama    Technician Comment:  Good patient effort & cooperation.  The results of this test meet the ATS/ERS standards for acceptability & reproducibility.  Test was performed on the [employer] Body Plethysmograph-Elite DX system.  Predicted values were [facility]-3 for spirometry, [facility] for DLCO, ITS for Lung Volumes.  The DLCO was uncorrected for Hgb.  A bronchodilator of Ventolin HFA -2puffs via spacer administered.  DLCO performed during dilation period.    1. Baseline spirometry demonstrates a mild to moderate reduction in FEV1 at 1.85 L which is 66% of predicted. FEV1/FVC ratio is reduced at 61%.    2. After administration of an inhaled bronchodilator there is 8% improvement in FEV1.    3. Lung volumes demonstrate mild hyperinflation.    4. Gas exchange as estimated by DLCO is mildly reduced at 73% of predicted.    5. Airway resistance is normal.      Impression:    This study demonstrates the presence of mild to moderate obstructive lung disease.  8% reversibility is noted on the study.

## 2019-09-16 NOTE — PROGRESS NOTES
CC:  Feeling better since starting Trelegy    HPI:  David Cho is a 78 y.o. year old male here today for follow-up on COPD and PFT results.  Patient last seen in clinic 5/15/2019.  Patient is a former smoker with a reported quit date in 2016 and 50-pack-year history.  Continued abstinence advised.    Reviewed in clinic vitals including blood pressure 114/72, heart rate is 71, O2 sat of 93% and BMI of 21.9 kg/m².    Reviewed home medication regimen including trilogy, Ventolin, DuoNeb, montelukast which patient is currently not taking.  Patient reports significant benefit with Trelegy.  Cost is an issue and patient requests paper copy of prescription in order to shop pharmacies for best price.  Interested in [facility] pharmacy information.  He has not been requiring rescue inhaler use.    Reviewed most recent imaging including CT of the chest obtained 9/9/2019 demonstrated diffuse emphysematous changes, unchanged ill-defined 7 mm opacity left lung apex, unchanged 5 mm nodule inferior right upper lobe, unchanged 7 x 5 mm nodule right middle lobe, unchanged 4 mm nodule right upper lobe, unchanged 5 mm nodule right lower lobe unchanged 4 mm nodule left lower lobe, did have a new 8 mm ill-defined groundglass opacity right posterior upper lobe.  No enlarged mediastinal or hilar lymph nodes, severe coronary artery calcification, moderate degenerative changes thoracic spine.    Reviewed PFT results from today as compared to prior study obtained 10/24/2018, FEV1 1.85 L or 66% predicted was 60% prior, FVC 3.02 L or 77% predicted was 87% prior, FEV1/FVC ratio of 61 was 50 prior, no significant postbronchodilator response, residual volume 142% predicted was 164 prior, total lung capacity 100% predicted was 109% prior, DLCO 73% predicted was 81% prior.    Patient did have an echocardiogram obtained 12/25/2018 demonstrating normal left ventricular systolic function, LVEF estimated at 60%, grade 2 diastolic dysfunction,  mildly dilated left atrium, moderate to severe mitral annular calcification, mild aortic insufficiency, estimated RVSP 55 mmHg, plaque seen in the ascending aorta.  Estimated RVSP significantly increased from 2016 estimation of 25 mmHg.    Patient denies day time fatigue, morning headache and/or snoring.      Review of Systems   Constitutional: Negative for chills, fever, malaise/fatigue (improved) and weight loss.   HENT: Positive for congestion (slight), hearing loss and nosebleeds (few nose bleed, better with saline rinse). Negative for sinus pain, sore throat and tinnitus.    Eyes: Negative.    Respiratory: Positive for sputum production (slight, clear) and shortness of breath (slight with exertion ). Negative for cough and wheezing.    Cardiovascular: Negative for chest pain, palpitations, orthopnea, claudication and leg swelling.   Gastrointestinal:        Upper dentures   Musculoskeletal:        Denies muscle cramps he was experiencing at last visit   Skin: Negative.    Neurological: Positive for tremors (right hand only ). Negative for dizziness and headaches.   Psychiatric/Behavioral: The patient does not have insomnia.        Past Medical History:   Diagnosis Date   • Cholesterol serum elevated    • COPD (chronic obstructive pulmonary disease) (HCC)    • Depression    • Enlarged prostate    • Hypertension        Past Surgical History:   Procedure Laterality Date   • APPENDECTOMY     • OTHER  appendectomy       Family History   Problem Relation Age of Onset   • Heart Disease Mother        Social History     Socioeconomic History   • Marital status:      Spouse name: Not on file   • Number of children: Not on file   • Years of education: Not on file   • Highest education level: Not on file   Occupational History   • Not on file   Social Needs   • Financial resource strain: Not on file   • Food insecurity:     Worry: Not on file     Inability: Not on file   • Transportation needs:     Medical: Not on  "file     Non-medical: Not on file   Tobacco Use   • Smoking status: Former Smoker     Packs/day: 1.00     Years: 50.00     Pack years: 50.00     Types: Cigarettes     Last attempt to quit: 8/12/2016     Years since quitting: 3.0   • Smokeless tobacco: Never Used   • Tobacco comment: continued abstinance   Substance and Sexual Activity   • Alcohol use: Yes     Comment: daily beer   • Drug use: No   • Sexual activity: Not on file   Lifestyle   • Physical activity:     Days per week: Not on file     Minutes per session: Not on file   • Stress: Not on file   Relationships   • Social connections:     Talks on phone: Not on file     Gets together: Not on file     Attends Orthodoxy service: Not on file     Active member of club or organization: Not on file     Attends meetings of clubs or organizations: Not on file     Relationship status: Not on file   • Intimate partner violence:     Fear of current or ex partner: Not on file     Emotionally abused: Not on file     Physically abused: Not on file     Forced sexual activity: Not on file   Other Topics Concern   • Not on file   Social History Narrative   • Not on file       Allergies as of 09/16/2019   • (No Known Allergies)        @Vital signs for this encounter:  Vitals:    09/16/19 1056 09/16/19 1059   Height: 1.803 m (5' 11\")    Weight: 71.2 kg (157 lb)    Weight % change since last entry.: 0 %    BP: 114/72    Pulse: 71    BMI (Calculated): 21.9    Resp: 16    O2 sat % room air:  93 %       Current medications as of today   Current Outpatient Medications   Medication Sig Dispense Refill   • Fluticasone-Umeclidin-Vilant (TRELEGY ELLIPTA) 100-62.5-25 MCG/INH AEROSOL POWDER, BREATH ACTIVATED Inhale 1 Puff by mouth every day. Rinse mouth after use 2 Each 0   • buPROPion (WELLBUTRIN XL) 150 MG XL tablet Take 1 Tab by mouth every morning. 90 Tab 1   • lovastatin (MEVACOR) 20 MG Tab TAKE 1 TABLET BY MOUTH EVERY DAY 90 Tab 3   • enalapril (VASOTEC) 20 MG tablet TAKE 1 TABLET " BY MOUTH EVERY DAY 90 Tab 3   • finasteride (PROSCAR) 5 MG Tab TAKE 1 TABLET BY MOUTH EVERY DAY 90 Tab 3   • VENTOLIN  (90 Base) MCG/ACT Aero Soln inhalation aerosol INHALE 2 PUFFS BY MOUTH EVERY 4 HOURS AS NEEDED SHORTNESS OF BREATH 1 Inhaler 5   • ipratropium-albuterol (DUONEB) 0.5-2.5 (3) MG/3ML nebulizer solution 3 mL by Nebulization route every four hours as needed for Shortness of Breath. 360 mL 3   • azithromycin (ZITHROMAX) 250 MG Tab 1 tab PO x 4 days (Patient not taking: Reported on 9/16/2019) 4 Tab 0   • predniSONE (DELTASONE) 10 MG Tab 1 tab PO QID x 4d; then 1 TID x 4d, then 1 BID x 4d, then 1 QD x 4d, then 0.5 QD x 4d, then stop (Patient not taking: Reported on 5/15/2019) 42 Tab 0   • montelukast (SINGULAIR) 10 MG Tab Take 1 Tab by mouth every day. (Patient not taking: Reported on 5/15/2019) 30 Tab 2   • metoprolol (LOPRESSOR) 25 MG Tab Take 0.5 Tabs by mouth 2 Times a Day. (Patient not taking: Reported on 9/16/2019) 60 Tab 2   • umeclidinium-vilanterol (ANORO ELLIPTA) 62.5-25 MCG/INH AEROSOL POWDER, BREATH ACTIVATED inhaler Inhale 1 Puff by mouth every day. (Patient not taking: Reported on 5/15/2019) 1 Inhaler 11     No current facility-administered medications for this visit.          Physical Exam:   Gen:           Alert and oriented, No apparent distress. Mood and affect     appropriate, normal interaction with provider.  Eyes:          sclere white, conjunctive moist.  Hearing:     Grossly intact.  Dentition:    Upper dentures, poor lower dentition  Oropharynx:   Tongue normal, posterior pharynx without erythema or exudate.  Neck:        Supple, trachea midline, no masses.  Respiratory Effort: No intercostal retractions or use of accessory muscles.   Lung Auscultation:      Decreased slightly bases otherwise clear to auscultation bilaterally; no rales, rhonchi or wheezing.  CV:            Regular rate and rhythm.  Trace pretibial edema. No murmurs, rubs or gallops.  Digits, Nails, Ext: No  clubbing, cyanosis, petechiae, or nodes.   Skin:        No rashes, lesions or ulcers noted on back or exposed skin surfaces.                     Assessment:  1. Chronic obstructive pulmonary disease, unspecified COPD type (HCC)  Fluticasone-Umeclidin-Vilant (TRELEGY ELLIPTA) 100-62.5-25 MCG/INH AEROSOL POWDER, BREATH ACTIVATED   2. Pulmonary nodules  CT-CHEST (THORAX) W/O   3. Need for vaccination  INFLUENZA VACCINE, HIGH DOSE (65+ ONLY)       Immunizations:    Flu:  September 16, 2019  Pneumovax 23: 8/8/2018  Prevnar 13:10/6/16    Plan:  1-reviewed pulmonary function test  2-[place] pharmacy info given  3-reviewed CT results  4-follow up scan indicated  5-obtain in February with follow up after  6-flu shot today  7-follow up sooner if needed     This dictation was created using voice recognition software. The accuracy of the dictation is limited to the abilities of the software. I expect there may be some errors of grammar and possibly content.

## 2019-10-22 RX ORDER — BUPROPION HYDROCHLORIDE 150 MG/1
150 TABLET ORAL EVERY MORNING
Qty: 90 TAB | Refills: 1 | Status: SHIPPED | OUTPATIENT
Start: 2019-10-22 | End: 2020-06-17 | Stop reason: SDUPTHER

## 2019-11-04 ENCOUNTER — OFFICE VISIT (OUTPATIENT)
Dept: URGENT CARE | Facility: MEDICAL CENTER | Age: 79
End: 2019-11-04
Payer: MEDICARE

## 2019-11-04 VITALS
TEMPERATURE: 98.4 F | RESPIRATION RATE: 14 BRPM | OXYGEN SATURATION: 92 % | SYSTOLIC BLOOD PRESSURE: 124 MMHG | WEIGHT: 158 LBS | HEART RATE: 81 BPM | HEIGHT: 71 IN | DIASTOLIC BLOOD PRESSURE: 78 MMHG | BODY MASS INDEX: 22.12 KG/M2

## 2019-11-04 DIAGNOSIS — J20.9 ACUTE BRONCHITIS WITH CHRONIC OBSTRUCTIVE PULMONARY DISEASE (COPD) (HCC): ICD-10-CM

## 2019-11-04 DIAGNOSIS — J44.0 ACUTE BRONCHITIS WITH CHRONIC OBSTRUCTIVE PULMONARY DISEASE (COPD) (HCC): ICD-10-CM

## 2019-11-04 PROCEDURE — 99214 OFFICE O/P EST MOD 30 MIN: CPT | Performed by: NURSE PRACTITIONER

## 2019-11-04 RX ORDER — DOXYCYCLINE HYCLATE 100 MG
100 TABLET ORAL 2 TIMES DAILY
Qty: 20 TAB | Refills: 0 | Status: SHIPPED | OUTPATIENT
Start: 2019-11-04 | End: 2019-11-14

## 2019-11-04 RX ORDER — PREDNISONE 10 MG/1
20 TABLET ORAL 2 TIMES DAILY
Qty: 20 TAB | Refills: 0 | Status: SHIPPED | OUTPATIENT
Start: 2019-11-04 | End: 2019-11-09

## 2019-11-04 RX ORDER — BENZONATATE 100 MG/1
100 CAPSULE ORAL 3 TIMES DAILY PRN
Qty: 60 CAP | Refills: 0 | Status: SHIPPED | OUTPATIENT
Start: 2019-11-04 | End: 2020-06-17

## 2019-11-04 NOTE — PROGRESS NOTES
Chief Complaint   Patient presents with   • URI     cough, phlem,x1week concerned because he has COPD       HISTORY OF PRESENT ILLNESS: Patient is a 79 y.o. male who presents today due to symptoms that started seven days ago. Pt reports a productive cough. Reports associated nasal congestion, fatigue, malaise. Denies sore throat, congestion, chest pain, shortness of breath, or wheezing. Admits to both a history of COPD and pneumonia. Last had pneumonia last year requiring hospitalization. He has not needed his rescue inhaler or nebulizer more since onset. Has tried OTC cold medications without significant relief of symptoms. No recent ABX use. No other aggravating or alleviating factors.     Patient Active Problem List    Diagnosis Date Noted   • COPD exacerbation (HCC) 02/14/2019     Priority: High   • CAP (community acquired pneumonia) 12/23/2018     Priority: High   • Lung nodules 10/05/2016     Priority: Low   • Hypokalemia 12/25/2018   • Hypomagnesemia 12/25/2018   • Former smoker 08/08/2018   • Nocturia 02/28/2018   • Major depressive disorder 02/28/2018   • Abnormal CT scan, lung 04/11/2017   • Nocturnal hypoxia 04/11/2017   • Chronic obstructive pulmonary disease (HCC) 10/10/2016   • Hypertension 10/10/2016   • Snoring 10/10/2016   • Dyslipidemia 10/05/2016       Allergies:Patient has no known allergies.    Current Outpatient Medications Ordered in Epic   Medication Sig Dispense Refill   • benzonatate (TESSALON) 100 MG Cap Take 1 Cap by mouth 3 times a day as needed for Cough. 60 Cap 0   • predniSONE (DELTASONE) 10 MG Tab Take 2 Tabs by mouth 2 times a day for 5 days. Take with food. 20 Tab 0   • doxycycline (VIBRAMYCIN) 100 MG Tab Take 1 Tab by mouth 2 times a day for 10 days. 20 Tab 0   • buPROPion (WELLBUTRIN XL) 150 MG XL tablet Take 1 Tab by mouth every morning. 90 Tab 1   • Fluticasone-Umeclidin-Vilant (TRELEGY ELLIPTA) 100-62.5-25 MCG/INH AEROSOL POWDER, BREATH ACTIVATED Inhale 1 Puff by mouth every  day. Rinse mouth after use 1 Each 11   • lovastatin (MEVACOR) 20 MG Tab TAKE 1 TABLET BY MOUTH EVERY DAY 90 Tab 3   • enalapril (VASOTEC) 20 MG tablet TAKE 1 TABLET BY MOUTH EVERY DAY 90 Tab 3   • finasteride (PROSCAR) 5 MG Tab TAKE 1 TABLET BY MOUTH EVERY DAY 90 Tab 3   • VENTOLIN  (90 Base) MCG/ACT Aero Soln inhalation aerosol INHALE 2 PUFFS BY MOUTH EVERY 4 HOURS AS NEEDED SHORTNESS OF BREATH 1 Inhaler 5   • ipratropium-albuterol (DUONEB) 0.5-2.5 (3) MG/3ML nebulizer solution 3 mL by Nebulization route every four hours as needed for Shortness of Breath. 360 mL 3     No current [facility]-ordered facility-administered medications on file.        Past Medical History:   Diagnosis Date   • Cholesterol serum elevated    • COPD (chronic obstructive pulmonary disease) (HCC)    • Depression    • Enlarged prostate    • Hypertension        Social History     Tobacco Use   • Smoking status: Former Smoker     Packs/day: 1.00     Years: 50.00     Pack years: 50.00     Types: Cigarettes     Last attempt to quit: 2016     Years since quitting: 3.2   • Smokeless tobacco: Never Used   • Tobacco comment: continued abstinance   Substance Use Topics   • Alcohol use: Yes     Comment: daily beer   • Drug use: No       Family Status   Relation Name Status   • Mo     • Fa       Family History   Problem Relation Age of Onset   • Heart Disease Mother        ROS:  Review of Systems   Constitutional: Positive for fatigue, malaise. Negative for weight loss.  HENT:  Negative for sore throat, congestion, ear pain, nosebleeds, and neck pain.    Eyes: Negative for vision changes.   Cardiovascular: Negative for chest pain, palpitations, orthopnea and leg swelling.   Respiratory: Positive for cough and sputum production. Negative for shortness of breath and wheezing.   Gastrointestinal: Negative for abdominal pain, nausea, vomiting or diarrhea.   Skin: Negative for rash, diaphoresis.     Exam:  /78   Pulse 81   Temp  "36.9 °C (98.4 °F) (Temporal)   Resp 14   Ht 1.803 m (5' 11\")   Wt 71.7 kg (158 lb)   SpO2 92%   General: well-nourished, well-developed male in NAD  Head: normocephalic, atraumatic  Eyes: PERRLA, EOM within normal limits, no conjunctival injection, no scleral icterus, visual fields and acuity grossly intact.  Ears: normal shape and symmetry, no tenderness, no discharge. External canals are without any significant edema or erythema. Tympanic membranes are without any inflammation, no effusion. Gross auditory acuity is intact.  Nose: symmetrical without tenderness, mild discharge, erythema present bilateral nares.  Mouth/Throat: reasonable hygiene, no exudates or tonsillar enlargement. Mild erythema present.   Neck: no masses, range of motion within normal limits, no tracheal deviation.  Lymph: mild cervical adenopathy. No supraclavicular adenopathy.   Neuro: alert and oriented. Cranial nerves 1-12 grossly intact.   Cardiovascular: regular rate and rhythm without murmurs, rubs, or gallops. No edema.   Pulmonary: no distress. Chest is symmetrical with respiration. No wheezes, crackles. Scattered rhonchi at bases.   Musculoskeletal: appropriate muscle tone, gait is stable.  Skin: warm, dry, intact, no clubbing, no cyanosis.   Psych: appropriate mood, affect, judgement.         Assessment/Plan:  1. Acute bronchitis with chronic obstructive pulmonary disease (COPD) ([facility])  benzonatate (TESSALON) 100 MG Cap    predniSONE (DELTASONE) 10 MG Tab    doxycycline (VIBRAMYCIN) 100 MG Tab         Doxy and prednisone as directed. Tessalon for cough. COPD medications at home as directed.   Rest, increase fluids, hand and respiratory hygiene.   Supportive care, differential diagnoses, and indications for immediate follow-up discussed with patient.   Pathogenesis of diagnosis discussed including typical length and natural progression.  Instructed to return to clinic or nearest emergency department for any change in condition, " further concerns, or worsening of symptoms.  Patient states understanding of the plan of care and discharge instructions.  Instructed to make an appointment with his primary care provider in the next 3-5 days if not significantly improving and for further care.         Please note that this dictation was created using voice recognition software. I have made every reasonable attempt to correct obvious errors, but I expect that there are errors of grammar and possibly content that I did not discover before finalizing the note.  A mask was worn for the entire visit with the patient.     YFN Casas.

## 2020-01-25 DIAGNOSIS — J44.9 CHRONIC OBSTRUCTIVE PULMONARY DISEASE, UNSPECIFIED COPD TYPE (HCC): ICD-10-CM

## 2020-01-27 DIAGNOSIS — J44.9 CHRONIC OBSTRUCTIVE PULMONARY DISEASE, UNSPECIFIED COPD TYPE (HCC): ICD-10-CM

## 2020-01-27 RX ORDER — ALBUTEROL SULFATE 90 UG/1
AEROSOL, METERED RESPIRATORY (INHALATION)
Qty: 1 INHALER | Refills: 5 | Status: SHIPPED | OUTPATIENT
Start: 2020-01-27 | End: 2020-01-28

## 2020-01-27 NOTE — TELEPHONE ENCOUNTER
Have we ever prescribed this med? Yes.  If yes, what date? 09/21/18([facility])    Last OV: 09/16/19 with Kenyatta Beasley PA-C    Next OV: 02/18/2020 with Kenyatta Beasley PA-C    DX: Chronic obstructive pulmonary disease, unspecified COPD type (HCC) (J44.9)    Medications:  Requested Prescriptions     Pending Prescriptions Disp Refills   • albuterol 108 (90 Base) MCG/ACT Aero Soln inhalation aerosol [Pharmacy Med Name: ALBUTEROL HFA INH (200 PUFFS) 18GM] 18 g      Sig: INHALE 2 PUFFS BY MOUTH EVERY 4 HOURS AS NEEDED FOR SHORTNESS OF BREATH

## 2020-01-28 RX ORDER — ALBUTEROL SULFATE 90 UG/1
AEROSOL, METERED RESPIRATORY (INHALATION)
Qty: 3 INHALER | Refills: 3 | Status: SHIPPED | OUTPATIENT
Start: 2020-01-28 | End: 2021-05-27

## 2020-02-12 DIAGNOSIS — J96.11 CHRONIC RESPIRATORY FAILURE WITH HYPOXIA (HCC): ICD-10-CM

## 2020-02-13 RX ORDER — IPRATROPIUM BROMIDE AND ALBUTEROL SULFATE 2.5; .5 MG/3ML; MG/3ML
SOLUTION RESPIRATORY (INHALATION)
Qty: 360 ML | Refills: 3 | Status: SHIPPED | OUTPATIENT
Start: 2020-02-13 | End: 2022-01-10

## 2020-02-13 NOTE — TELEPHONE ENCOUNTER
Have we ever prescribed this med? Yes.  If yes, what date? 01/28/20    Last OV: 09/16/19    Next OV: 02/18/20    DX: COPD    Medications: albuterol 108

## 2020-02-18 ENCOUNTER — APPOINTMENT (OUTPATIENT)
Dept: PULMONOLOGY | Facility: HOSPICE | Age: 80
End: 2020-02-18
Payer: MEDICARE

## 2020-02-21 ENCOUNTER — OFFICE VISIT (OUTPATIENT)
Dept: PULMONOLOGY | Facility: HOSPICE | Age: 80
End: 2020-02-21
Payer: MEDICARE

## 2020-02-21 VITALS
SYSTOLIC BLOOD PRESSURE: 140 MMHG | BODY MASS INDEX: 20.76 KG/M2 | HEART RATE: 101 BPM | WEIGHT: 145 LBS | DIASTOLIC BLOOD PRESSURE: 80 MMHG | OXYGEN SATURATION: 94 % | HEIGHT: 70 IN | RESPIRATION RATE: 16 BRPM

## 2020-02-21 DIAGNOSIS — J96.11 CHRONIC RESPIRATORY FAILURE WITH HYPOXIA (HCC): ICD-10-CM

## 2020-02-21 DIAGNOSIS — R91.8 LUNG NODULES: ICD-10-CM

## 2020-02-21 DIAGNOSIS — J44.9 CHRONIC OBSTRUCTIVE PULMONARY DISEASE, UNSPECIFIED COPD TYPE (HCC): ICD-10-CM

## 2020-02-21 DIAGNOSIS — R09.02 HYPOXIA: ICD-10-CM

## 2020-02-21 PROCEDURE — 99214 OFFICE O/P EST MOD 30 MIN: CPT | Performed by: PHYSICIAN ASSISTANT

## 2020-02-21 PROCEDURE — 94761 N-INVAS EAR/PLS OXIMETRY MLT: CPT | Performed by: PHYSICIAN ASSISTANT

## 2020-02-21 ASSESSMENT — ENCOUNTER SYMPTOMS
FEVER: 0
SHORTNESS OF BREATH: 1
INSOMNIA: 0
SORE THROAT: 0
DIZZINESS: 0
EYES NEGATIVE: 1
ORTHOPNEA: 0
SINUS PAIN: 0
HEARTBURN: 0
TREMORS: 0
WEIGHT LOSS: 1
COUGH: 1
WHEEZING: 1
SPUTUM PRODUCTION: 1
HEADACHES: 0
CHILLS: 0
PALPITATIONS: 0

## 2020-02-21 NOTE — PROGRESS NOTES
CC: Cannot afford Trelegy    HPI:  David Coh is a 79 y.o. year old male here today for follow-up on COPD. Last seen in clinic 9/16/2019. Patient is a former smoker with reported 50-pack-year history.  Quit date in 2016, continued abstinence advised.    Reviewed in clinic vitals including blood pressure 126/84, heart rate of 66, O2 sat of 96% and BMI of 29.13 kg/m².    Reviewed home medication regimen including DuoNeb, albuterol, Mucinex as needed.  Patient states significant benefit with Trelegy but is unable to afford the co-pay.  Sample provided in clinic today and will check with insurance regarding whether coverage is better for an alternative product.    Reviewed most recent imaging including chest CT obtained 9/9/2019 demonstrating diffuse emphysematous changes, unchanged ill-defined 7 mm opacity left lung apex, unchanged 5 mm nodule inferior right upper lobe, unchanged 7 x 5 mm nodule right middle lobe, unchanged 4 mm nodule right upper lobe, unchanged 5 mm nodule right lower lobe, unchanged 4 mm nodule left lower lobe, new 8 mm ill-defined groundglass opacity right posterior upper lobe.  Follow-up CT scan was advised due to new 8 mm nodule but had not obtained at this time.    Echocardiogram obtained 12/25/2018 demonstrated normal left ventricular systolic function, LVEF estimated 60%, grade 2 diastolic dysfunction, mildly dilated left atrium, moderate to severe mitral annular calcification, aortic sclerosis without stenosis, mild aortic insufficiency, normal right ventricular size and systolic function, normal right atrial size.  Estimated RVSP of 55 mmHg.  Plaque seen in a sending aorta.    Reviewed PFT results from 9/16/2019 including FEV1 1.85 L or 66% predicted, FVC 3.02 L or 77% predicted, FEV1/FVC ratio of 61, no significant postbronchodilator response, residual volume 142% predicted, total lung capacity 100% predicted, DLCO 73% predicted.  Per pulmonologist interpretation study  demonstrates presence of mild to moderate obstructive lung disease.      Review of Systems   Constitutional: Positive for malaise/fatigue and weight loss (since may of 2019 16 lbs.  ). Negative for chills and fever.   HENT: Positive for congestion (intermittent) and hearing loss. Negative for nosebleeds, sinus pain, sore throat and tinnitus.    Eyes: Negative.         Prescription readers, s/p cataract surgery   Respiratory: Positive for cough, sputum production (dark green ), shortness of breath (with exertion ) and wheezing.    Cardiovascular: Negative for chest pain, palpitations, orthopnea and leg swelling.   Gastrointestinal: Negative for heartburn.        New upper dentures Sept 19, no difficulty swallowing but having difficulty chewing    Skin: Negative.    Neurological: Negative for dizziness, tremors and headaches.   Psychiatric/Behavioral: The patient does not have insomnia.        Past Medical History:   Diagnosis Date   • Cholesterol serum elevated    • COPD (chronic obstructive pulmonary disease) (HCC)    • Depression    • Enlarged prostate    • Hypertension        Past Surgical History:   Procedure Laterality Date   • APPENDECTOMY     • OTHER  appendectomy       Family History   Problem Relation Age of Onset   • Heart Disease Mother        Social History     Socioeconomic History   • Marital status:      Spouse name: Not on file   • Number of children: Not on file   • Years of education: Not on file   • Highest education level: Not on file   Occupational History   • Not on file   Social Needs   • Financial resource strain: Not on file   • Food insecurity     Worry: Not on file     Inability: Not on file   • Transportation needs     Medical: Not on file     Non-medical: Not on file   Tobacco Use   • Smoking status: Former Smoker     Packs/day: 1.00     Years: 50.00     Pack years: 50.00     Types: Cigarettes     Last attempt to quit: 8/12/2016     Years since quitting: 3.5   • Smokeless tobacco:  "Never Used   • Tobacco comment: continued abstinance   Substance and Sexual Activity   • Alcohol use: Yes     Comment:  6 daily beer and Vodka   • Drug use: No   • Sexual activity: Not on file   Lifestyle   • Physical activity     Days per week: Not on file     Minutes per session: Not on file   • Stress: Not on file   Relationships   • Social connections     Talks on phone: Not on file     Gets together: Not on file     Attends [employer] service: Not on file     Active member of club or organization: Not on file     Attends meetings of clubs or organizations: Not on file     Relationship status: Not on file   • Intimate partner violence     Fear of current or ex partner: Not on file     Emotionally abused: Not on file     Physically abused: Not on file     Forced sexual activity: Not on file   Other Topics Concern   • Not on file   Social History Narrative   • Not on file       Allergies as of 02/21/2020   • (No Known Allergies)        @Vital signs for this encounter:  Vitals:    02/21/20 1511 02/21/20 1514   Height:  1.778 m (5' 10\")   Weight:  65.8 kg (145 lb)   Weight % change since last entry.:  0 %   BP:  140/80   Pulse:  (!) 101   BMI (Calculated):  20.81   Resp:  16   O2 sat % room air: 94 %        Current medications as of today   Current Outpatient Medications   Medication Sig Dispense Refill   • ipratropium-albuterol (DUONEB) 0.5-2.5 (3) MG/3ML nebulizer solution INHALE 3 MLS VIA NEBULIZER EVERY 4 HOURS AS NEEDED FOR SHORTNESS OF BREATH 360 mL 3   • albuterol 108 (90 Base) MCG/ACT Aero Soln inhalation aerosol INHALE 2 PUFFS BY MOUTH EVERY 4 HOURS AS NEEDED FOR SHORTNESS OF BREATH 3 Inhaler 3   • buPROPion (WELLBUTRIN XL) 150 MG XL tablet Take 1 Tab by mouth every morning. 90 Tab 1   • lovastatin (MEVACOR) 20 MG Tab TAKE 1 TABLET BY MOUTH EVERY DAY 90 Tab 3   • benzonatate (TESSALON) 100 MG Cap Take 1 Cap by mouth 3 times a day as needed for Cough. (Patient not taking: Reported on 2/21/2020) 60 Cap 0   • " Fluticasone-Umeclidin-Vilant (TRELEGY ELLIPTA) 100-62.5-25 MCG/INH AEROSOL POWDER, BREATH ACTIVATED Inhale 1 Puff by mouth every day. Rinse mouth after use (Patient not taking: Reported on 2/21/2020) 1 Each 11   • enalapril (VASOTEC) 20 MG tablet TAKE 1 TABLET BY MOUTH EVERY DAY (Patient not taking: Reported on 2/21/2020) 90 Tab 3   • finasteride (PROSCAR) 5 MG Tab TAKE 1 TABLET BY MOUTH EVERY DAY (Patient not taking: Reported on 2/21/2020) 90 Tab 3     No current facility-administered medications for this visit.          Physical Exam:   Gen:           Alert and oriented, No apparent distress. Mood and affect     appropriate, normal interaction with provider.  Eyes:          sclere white, conjunctive moist.  Hearing:     Grossly intact.  Dentition:    Upper dentures, poor lower dentition  Oropharynx:   Tongue normal, posterior pharynx without erythema or exudate.  Neck:        Supple, trachea midline, no masses.  Respiratory Effort: No intercostal retractions or use of accessory muscles.   Lung Auscultation:      Clear to auscultation bilaterally; no rales, rhonchi or wheezing.  CV:            Regular rate and rhythm. No edema. No murmurs, rubs or gallops.  Digits, Nails, Ext: No clubbing, cyanosis, petechiae, or nodes.   Skin:        No rashes, lesions or ulcers noted on back or exposed skin    surfaces.                     Assessment:  1. Hypoxia  Multiple Oximetry   2. Chronic respiratory failure with hypoxia (HCC)  DME O2 New Set Up   3. Chronic obstructive pulmonary disease, unspecified COPD type (HCC)   sample Trelegy   4. Lung nodules   follow-up CT scan       Immunizations:    Flu: 9/16/2019  Pneumovax 23: 8/8/2018  Prevnar 13: 10/6/2016    Plan:  1-would like to resume Trelegy   Increased shortness of breath and wheezing since stopping  2-cost a factor, will provide sample today  3-reviewed oral hygiene   -dentures out to take   -swish and spit   -swish and swallow   -brush teeth and tongue  4-follow up in  3 months or so after CT scan      This dictation was created using voice recognition software. The accuracy of the dictation is limited to the abilities of the software. I expect there may be some errors of grammar and possibly content.

## 2020-02-21 NOTE — PROCEDURES
Multi-Ox Readings  Multi Ox #1 Room air   O2 sat % at rest 92   O2 sat % on exertion 86   O2 sat average on exertion     Multi Ox #2 2 LPM   O2 sat % at rest 92   O2 sat % on exertion 90   O2 sat average on exertion       Oxygen Use 2   Oxygen Frequency Nocturnal   Duration of need     Is the patient mobile within the home?     CPAP Use?     BIPAP Use?     Servo Titration

## 2020-02-21 NOTE — PATIENT INSTRUCTIONS
1-would like to resume Trelegy   Increased shortness of breath and wheezing since stopping  2-cost a factor, will provide sample today  3-reviewed oral hygiene   -dentures out to take   -swish and spit   -swish and swallow   -brush teeth and tongue  4-follow up in 3 months or so after CT scan

## 2020-02-24 ENCOUNTER — PATIENT MESSAGE (OUTPATIENT)
Dept: PULMONOLOGY | Facility: HOSPICE | Age: 80
End: 2020-02-24

## 2020-02-24 NOTE — TELEPHONE ENCOUNTER
From: David Cho  To: Kenyatta Beasley P.A.-C.  Sent: 2/24/2020 1:39 PM PST  Subject: Non-Urgent Medical Question    Am I going to be contacted by a DME provider about portable oxygen need?

## 2020-06-01 ENCOUNTER — HOSPITAL ENCOUNTER (OUTPATIENT)
Dept: RADIOLOGY | Facility: MEDICAL CENTER | Age: 80
End: 2020-06-01
Attending: PHYSICIAN ASSISTANT
Payer: MEDICARE

## 2020-06-01 DIAGNOSIS — R91.8 PULMONARY NODULES: ICD-10-CM

## 2020-06-01 PROCEDURE — 71250 CT THORAX DX C-: CPT

## 2020-06-17 ENCOUNTER — TELEMEDICINE (OUTPATIENT)
Dept: MEDICAL GROUP | Facility: MEDICAL CENTER | Age: 80
End: 2020-06-17
Payer: MEDICARE

## 2020-06-17 VITALS
HEIGHT: 70 IN | BODY MASS INDEX: 21.76 KG/M2 | HEART RATE: 89 BPM | WEIGHT: 152 LBS | SYSTOLIC BLOOD PRESSURE: 104 MMHG | DIASTOLIC BLOOD PRESSURE: 68 MMHG

## 2020-06-17 DIAGNOSIS — F32.9 MAJOR DEPRESSIVE DISORDER WITH CURRENT ACTIVE EPISODE, UNSPECIFIED DEPRESSION EPISODE SEVERITY, UNSPECIFIED WHETHER RECURRENT: ICD-10-CM

## 2020-06-17 DIAGNOSIS — E78.5 DYSLIPIDEMIA: Chronic | ICD-10-CM

## 2020-06-17 DIAGNOSIS — R35.1 NOCTURIA: ICD-10-CM

## 2020-06-17 DIAGNOSIS — I10 ESSENTIAL HYPERTENSION: Chronic | ICD-10-CM

## 2020-06-17 DIAGNOSIS — E87.1 HYPONATREMIA: ICD-10-CM

## 2020-06-17 PROCEDURE — 99214 OFFICE O/P EST MOD 30 MIN: CPT | Mod: 95,CR | Performed by: FAMILY MEDICINE

## 2020-06-17 RX ORDER — ENALAPRIL MALEATE 20 MG/1
20 TABLET ORAL
Qty: 90 TAB | Refills: 0 | Status: SHIPPED | OUTPATIENT
Start: 2020-06-17 | End: 2020-10-20

## 2020-06-17 RX ORDER — BUPROPION HYDROCHLORIDE 150 MG/1
150 TABLET ORAL EVERY MORNING
Qty: 90 TAB | Refills: 1 | Status: SHIPPED | OUTPATIENT
Start: 2020-06-17 | End: 2020-12-16 | Stop reason: SDUPTHER

## 2020-06-17 RX ORDER — LOVASTATIN 20 MG/1
20 TABLET ORAL
Qty: 90 TAB | Refills: 0 | Status: SHIPPED | OUTPATIENT
Start: 2020-06-17 | End: 2020-09-11

## 2020-06-17 RX ORDER — FINASTERIDE 5 MG/1
5 TABLET, FILM COATED ORAL
Qty: 90 TAB | Refills: 3 | Status: SHIPPED | OUTPATIENT
Start: 2020-06-17 | End: 2021-08-09

## 2020-06-17 SDOH — HEALTH STABILITY: MENTAL HEALTH: HOW OFTEN DO YOU HAVE A DRINK CONTAINING ALCOHOL?: 4 OR MORE TIMES A WEEK

## 2020-06-17 SDOH — HEALTH STABILITY: MENTAL HEALTH: HOW MANY STANDARD DRINKS CONTAINING ALCOHOL DO YOU HAVE ON A TYPICAL DAY?: 5 OR 6

## 2020-06-17 ASSESSMENT — FIBROSIS 4 INDEX: FIB4 SCORE: 2.18

## 2020-06-17 NOTE — ASSESSMENT & PLAN NOTE
Chronic, well controlled at home with enalapril 20mg POD.  Home BP readings: 104-138/-68-90 HR 83-89.  No chest pain, palpitations, or SOB. No headaches or dizziness.

## 2020-06-17 NOTE — PROGRESS NOTES
Telemedicine Visit: New Patient     This encounter was conducted via Zoom .   Verbal consent was obtained. Patient's identity was verified.    Subjective:     CC: refills of enalapril, finasteride, bupropion, lovastatin  David Cho is a 79 y.o. male presenting to [facility] care and to discuss the evaluation and management of    Major depressive disorder  Chronic, stable.  He has been on wellbutrin xl 150mg POD.  No negative side effects.  States he never was on other mood medication in the past.  Without it he states he is grouchy.    Hypertension  Chronic, well controlled at home with enalapril 20mg POD.  Home BP readings: 104-138/-68-90 HR 83-89.  No chest pain, palpitations, or SOB. No headaches or dizziness.    Nocturia  Chronic.  Previously was was placed on finasteride by his urologist.  Urinates 2-3 times per night.  No pain with urination.  No urgency.  No blood in the urine.    Dyslipidemia  Chronic, stable.  States he is not eating a low fat diet.      ROS  See HPI  Constitutional: Negative for fever, chills and malaise/fatigue.   HENT: Negative for congestion.    Eyes: Negative for pain.   Respiratory: Negative for cough and shortness of breath.    Cardiovascular: Negative for leg swelling.   Gastrointestinal: Negative for nausea, vomiting, abdominal pain and diarrhea.   Genitourinary: Negative for dysuria and hematuria.   Skin: Negative for rash.   Neurological: Negative for dizziness, focal weakness and headaches.   Endo/Heme/Allergies: Does not bruise/bleed easily.   Psychiatric/Behavioral: Negative for depression.  The patient is not nervous/anxious.      No Known Allergies    Current medicines (including changes today)  Current Outpatient Medications   Medication Sig Dispense Refill   • buPROPion (WELLBUTRIN XL) 150 MG XL tablet Take 1 Tab by mouth every morning. 90 Tab 1   • enalapril (VASOTEC) 20 MG tablet Take 1 Tab by mouth every day. 90 Tab 0   • finasteride (PROSCAR) 5 MG Tab Take 1  "Tab by mouth every day. 90 Tab 3   • lovastatin (MEVACOR) 20 MG Tab Take 1 Tab by mouth every day. 90 Tab 0   • ipratropium-albuterol (DUONEB) 0.5-2.5 (3) MG/3ML nebulizer solution INHALE 3 MLS VIA NEBULIZER EVERY 4 HOURS AS NEEDED FOR SHORTNESS OF BREATH 360 mL 3   • albuterol 108 (90 Base) MCG/ACT Aero Soln inhalation aerosol INHALE 2 PUFFS BY MOUTH EVERY 4 HOURS AS NEEDED FOR SHORTNESS OF BREATH 3 Inhaler 3   • Fluticasone-Umeclidin-Vilant (TRELEGY ELLIPTA) 100-62.5-25 MCG/INH AEROSOL POWDER, BREATH ACTIVATED Inhale 1 Puff by mouth every day. Rinse mouth after use 1 Each 11     No current facility-administered medications for this visit.        He  has a past medical history of Cholesterol serum elevated, COPD (chronic obstructive pulmonary disease) (HCC), Depression, Enlarged prostate, and Hypertension.  He  has a past surgical history that includes other (appendectomy) and appendectomy.      Family History   Problem Relation Age of Onset   • Heart Disease Mother    • Other Mother         turberculosis   • Cancer Father         colon cancer   • Heart Disease Step-Sister      Family Status   Relation Name Status   • Mo     • Fa         Patient Active Problem List    Diagnosis Date Noted   • COPD exacerbation (HCC) 2019     Priority: High   • CAP (community acquired pneumonia) 2018     Priority: High   • Lung nodules 10/05/2016     Priority: Low   • Hypokalemia 2018   • Hypomagnesemia 2018   • Former smoker 2018   • Nocturia 2018   • Major depressive disorder 2018   • Abnormal CT scan, lung 2017   • Nocturnal hypoxia 2017   • Chronic obstructive pulmonary disease (HCC) 10/10/2016   • Hypertension 10/10/2016   • Snoring 10/10/2016   • Dyslipidemia 10/05/2016          Objective:   /68 (BP Location: Left arm, Patient Position: Sitting)   Pulse 89   Ht 1.778 m (5' 10\")   Wt 68.9 kg (152 lb)   BMI 21.81 kg/m²     Physical " Exam:  Constitutional: Alert, no distress, well-groomed.  Skin: No rashes in visible areas.  Eye: Round. Conjunctiva clear, lids normal. No icterus.   ENMT: Lips pink without lesions, good dentition, moist mucous membranes. Phonation normal.  Neck: No masses, no thyromegaly. Moves freely without pain.  CV: Pulse as reported by patient  Respiratory: Unlabored respiratory effort, no cough or audible wheeze  Psych: Alert and oriented x3, normal affect and mood.       Assessment and Plan:   The following treatment plan was discussed:     1. Major depressive disorder with current active episode, unspecified depression episode severity, unspecified whether recurrent  Chronic, stable, well controlled.  No SI/HI.  Pt denies negative SEs and would like to continue wellbutrin.  Refill given  - buPROPion (WELLBUTRIN XL) 150 MG XL tablet; Take 1 Tab by mouth every morning.  Dispense: 90 Tab; Refill: 1    2. Essential hypertension  Chronic, stable, well controlled.  Labs ordered and temporary refill given pending lab results.  - CBC WITH DIFFERENTIAL; Future  - Comp Metabolic Panel; Future  - enalapril (VASOTEC) 20 MG tablet; Take 1 Tab by mouth every day.  Dispense: 90 Tab; Refill: 0    3. Nocturia  Chronic, stable.  Pt continues to urinate 2-3 times per night, however he drinks 6 beers per night.  Recommended slowly cutting back on alcohol to avoid withdrawal.  Will check PSA (risks and benefits of testing discussed).  Continue finasteride  - PROSTATE SPECIFIC AG SCREENING; Future  - finasteride (PROSCAR) 5 MG Tab; Take 1 Tab by mouth every day.  Dispense: 90 Tab; Refill: 3    4. Dyslipidemia  Chronic, unclear control.  Will check labs.  Continue lovastatin pending results. Patient expressed understanding and agreement with plan.  - Lipid Profile; Future  - lovastatin (MEVACOR) 20 MG Tab; Take 1 Tab by mouth every day.  Dispense: 90 Tab; Refill: 0      Follow-up: Return in about 6 months (around 12/17/2020) for Medication  review.    Addendum: 6/19/2020  Called pt and reviewed his CMP, CBC, PSA, lipids with him.  He reports being asymptomatic except for a new onset rash across his chest, back and arms that is red, raised, crusty, but not painful or itchy.  Recommended UCC for rash.  Re: his hyponatremia, he has had chronic mild hyponatremia, which has worsened.  His AST is slightly elevated and he drinks about 6 beers per day and drinks coffee constantly throughout the day from the time he wakes up until right before bed, so this could be the result of his fluid intake.  However, he also has restricted sodium intake.  Recommended repeat labs within the next 2-3 days for recheck (CMP, urine osmolality, urine sodium).  Recommended he slowly cut back on alcohol and caffeine.  UCC/ER precautions discussed.  Will plan to follow-up with him after repeat labs obtained. Patient expressed understanding and agreement with plan.  -Dr. Katelyn Olea

## 2020-06-17 NOTE — ASSESSMENT & PLAN NOTE
Chronic, stable.  He has been on wellbutrin xl 150mg POD.  No negative side effects.  States he never was on other mood medication in the past.  Without it he states he is grouchy.

## 2020-06-19 ENCOUNTER — HOSPITAL ENCOUNTER (OUTPATIENT)
Dept: LAB | Facility: MEDICAL CENTER | Age: 80
End: 2020-06-19
Attending: FAMILY MEDICINE
Payer: MEDICARE

## 2020-06-19 DIAGNOSIS — E78.5 DYSLIPIDEMIA: Chronic | ICD-10-CM

## 2020-06-19 DIAGNOSIS — I10 ESSENTIAL HYPERTENSION: Chronic | ICD-10-CM

## 2020-06-19 DIAGNOSIS — R35.1 NOCTURIA: ICD-10-CM

## 2020-06-19 LAB
ALBUMIN SERPL BCP-MCNC: 4.8 G/DL (ref 3.2–4.9)
ALBUMIN/GLOB SERPL: 2.1 G/DL
ALP SERPL-CCNC: 75 U/L (ref 30–99)
ALT SERPL-CCNC: 25 U/L (ref 2–50)
ANION GAP SERPL CALC-SCNC: 12 MMOL/L (ref 7–16)
AST SERPL-CCNC: 58 U/L (ref 12–45)
BASOPHILS # BLD AUTO: 0.7 % (ref 0–1.8)
BASOPHILS # BLD: 0.04 K/UL (ref 0–0.12)
BILIRUB SERPL-MCNC: 0.7 MG/DL (ref 0.1–1.5)
BUN SERPL-MCNC: 6 MG/DL (ref 8–22)
CALCIUM SERPL-MCNC: 9.6 MG/DL (ref 8.5–10.5)
CHLORIDE SERPL-SCNC: 91 MMOL/L (ref 96–112)
CHOLEST SERPL-MCNC: 194 MG/DL (ref 100–199)
CO2 SERPL-SCNC: 25 MMOL/L (ref 20–33)
CREAT SERPL-MCNC: 0.82 MG/DL (ref 0.5–1.4)
EOSINOPHIL # BLD AUTO: 0.53 K/UL (ref 0–0.51)
EOSINOPHIL NFR BLD: 8.6 % (ref 0–6.9)
ERYTHROCYTE [DISTWIDTH] IN BLOOD BY AUTOMATED COUNT: 44.9 FL (ref 35.9–50)
FASTING STATUS PATIENT QL REPORTED: NORMAL
GLOBULIN SER CALC-MCNC: 2.3 G/DL (ref 1.9–3.5)
GLUCOSE SERPL-MCNC: 99 MG/DL (ref 65–99)
HCT VFR BLD AUTO: 40.4 % (ref 42–52)
HDLC SERPL-MCNC: 106 MG/DL
HGB BLD-MCNC: 14.4 G/DL (ref 14–18)
IMM GRANULOCYTES # BLD AUTO: 0.02 K/UL (ref 0–0.11)
IMM GRANULOCYTES NFR BLD AUTO: 0.3 % (ref 0–0.9)
LDLC SERPL CALC-MCNC: 78 MG/DL
LYMPHOCYTES # BLD AUTO: 2.13 K/UL (ref 1–4.8)
LYMPHOCYTES NFR BLD: 34.6 % (ref 22–41)
MCH RBC QN AUTO: 34.3 PG (ref 27–33)
MCHC RBC AUTO-ENTMCNC: 35.6 G/DL (ref 33.7–35.3)
MCV RBC AUTO: 96.2 FL (ref 81.4–97.8)
MONOCYTES # BLD AUTO: 0.64 K/UL (ref 0–0.85)
MONOCYTES NFR BLD AUTO: 10.4 % (ref 0–13.4)
NEUTROPHILS # BLD AUTO: 2.79 K/UL (ref 1.82–7.42)
NEUTROPHILS NFR BLD: 45.4 % (ref 44–72)
NRBC # BLD AUTO: 0 K/UL
NRBC BLD-RTO: 0 /100 WBC
PLATELET # BLD AUTO: 304 K/UL (ref 164–446)
PMV BLD AUTO: 9.5 FL (ref 9–12.9)
POTASSIUM SERPL-SCNC: 4.7 MMOL/L (ref 3.6–5.5)
PROT SERPL-MCNC: 7.1 G/DL (ref 6–8.2)
PSA SERPL-MCNC: 1.35 NG/ML (ref 0–4)
RBC # BLD AUTO: 4.2 M/UL (ref 4.7–6.1)
SODIUM SERPL-SCNC: 128 MMOL/L (ref 135–145)
TRIGL SERPL-MCNC: 49 MG/DL (ref 0–149)
WBC # BLD AUTO: 6.2 K/UL (ref 4.8–10.8)

## 2020-06-19 PROCEDURE — 80053 COMPREHEN METABOLIC PANEL: CPT

## 2020-06-19 PROCEDURE — 85025 COMPLETE CBC W/AUTO DIFF WBC: CPT

## 2020-06-19 PROCEDURE — 80061 LIPID PANEL: CPT

## 2020-06-19 PROCEDURE — 84153 ASSAY OF PSA TOTAL: CPT

## 2020-06-19 PROCEDURE — 36415 COLL VENOUS BLD VENIPUNCTURE: CPT

## 2020-06-22 ENCOUNTER — OFFICE VISIT (OUTPATIENT)
Dept: URGENT CARE | Facility: CLINIC | Age: 80
End: 2020-06-22
Payer: MEDICARE

## 2020-06-22 ENCOUNTER — HOSPITAL ENCOUNTER (OUTPATIENT)
Dept: LAB | Facility: MEDICAL CENTER | Age: 80
End: 2020-06-22
Attending: FAMILY MEDICINE
Payer: MEDICARE

## 2020-06-22 VITALS
TEMPERATURE: 98.6 F | BODY MASS INDEX: 21.67 KG/M2 | DIASTOLIC BLOOD PRESSURE: 70 MMHG | RESPIRATION RATE: 18 BRPM | SYSTOLIC BLOOD PRESSURE: 104 MMHG | HEART RATE: 80 BPM | OXYGEN SATURATION: 95 % | WEIGHT: 151 LBS

## 2020-06-22 DIAGNOSIS — R21 RASH AND NONSPECIFIC SKIN ERUPTION: ICD-10-CM

## 2020-06-22 DIAGNOSIS — E87.1 HYPONATREMIA: ICD-10-CM

## 2020-06-22 LAB
OSMOLALITY UR: 344 MOSM/KG H2O (ref 300–900)
SODIUM UR-SCNC: 53 MMOL/L

## 2020-06-22 PROCEDURE — 80053 COMPREHEN METABOLIC PANEL: CPT

## 2020-06-22 PROCEDURE — 84300 ASSAY OF URINE SODIUM: CPT

## 2020-06-22 PROCEDURE — 36415 COLL VENOUS BLD VENIPUNCTURE: CPT

## 2020-06-22 PROCEDURE — 83935 ASSAY OF URINE OSMOLALITY: CPT

## 2020-06-22 PROCEDURE — 99214 OFFICE O/P EST MOD 30 MIN: CPT | Performed by: PHYSICIAN ASSISTANT

## 2020-06-22 RX ORDER — PREDNISONE 20 MG/1
20 TABLET ORAL DAILY
Qty: 5 TAB | Refills: 0 | Status: SHIPPED | OUTPATIENT
Start: 2020-06-22 | End: 2020-06-27

## 2020-06-22 ASSESSMENT — ENCOUNTER SYMPTOMS
SHORTNESS OF BREATH: 0
WEAKNESS: 0
FEVER: 0
PALPITATIONS: 0
BLURRED VISION: 0
TINGLING: 0
MYALGIAS: 0
WEIGHT LOSS: 0
COUGH: 0
SORE THROAT: 0
NAUSEA: 0
FLANK PAIN: 0
DIZZINESS: 0
TREMORS: 0
DIARRHEA: 0
RHINORRHEA: 0
CHILLS: 0
VOMITING: 0
DOUBLE VISION: 0
DIAPHORESIS: 0
HEADACHES: 0
ABDOMINAL PAIN: 0
ANOREXIA: 0
WHEEZING: 0
FATIGUE: 0

## 2020-06-22 ASSESSMENT — FIBROSIS 4 INDEX: FIB4 SCORE: 3.01

## 2020-06-22 NOTE — PROGRESS NOTES
Subjective:   David Cho is a 79 y.o. male who presents for Rash (chest,stomach,legs-spreading-x10 days)      Rash   Chronicity: Started 10 days ago.  Started on stomach spread to chest and legs. The problem has been gradually worsening since onset. The affected locations include the abdomen, back, torso, chest, right upper leg and left upper leg. The rash is characterized by redness (Patient states the rash is not painful and is not itchy.  He has not noticed any draining). He was exposed to nothing. Pertinent negatives include no anorexia, congestion, cough, diarrhea, facial edema, fatigue, fever, joint pain, rhinorrhea, shortness of breath, sore throat or vomiting. Past treatments include nothing.       Review of Systems   Constitutional: Negative for chills, diaphoresis, fatigue, fever, malaise/fatigue and weight loss.   HENT: Negative for congestion, rhinorrhea and sore throat.    Eyes: Negative for blurred vision and double vision.   Respiratory: Negative for cough, shortness of breath and wheezing.    Cardiovascular: Negative for chest pain, palpitations and leg swelling.   Gastrointestinal: Negative for abdominal pain, anorexia, diarrhea, nausea and vomiting.   Genitourinary: Negative for dysuria and flank pain.   Musculoskeletal: Negative for joint pain and myalgias.   Skin: Positive for rash (Diffuse rash that started on the patient's abdomen spread to chest back and lower legs.  States the rash does not itch and is not painful.). Negative for itching.   Neurological: Negative for dizziness, tingling, tremors, weakness and headaches.       Medications:    • albuterol Aers  • buPROPion  • enalapril  • finasteride Tabs  • Fluticasone-Umeclidin-Vilant Aepb  • ipratropium-albuterol  • lovastatin Tabs    Allergies: Patient has no known allergies.    Problem List: David Cho has Lung nodules; Dyslipidemia; Chronic obstructive pulmonary disease (HCC); Hypertension; Snoring; Abnormal CT scan,  lung; Nocturnal hypoxia; Nocturia; Major depressive disorder; Former smoker; CAP (community acquired pneumonia); Hypokalemia; Hypomagnesemia; and COPD exacerbation (HCC) on their problem list.    Surgical History:  Past Surgical History:   Procedure Laterality Date   • APPENDECTOMY     • OTHER  appendectomy       Past Social Hx: David Cho  reports that he quit smoking about 3 years ago. His smoking use included cigarettes. He has a 50.00 pack-year smoking history. He has never used smokeless tobacco. He reports current alcohol use. He reports that he does not use drugs.     Past Family Hx:  David Cho family history includes Cancer in his father; Heart Disease in his mother and step-sister; Other in his mother.     Problem list, medications, and allergies reviewed by myself today in Epic.     Objective:     /70 (BP Location: Left arm)   Pulse 80   Temp 37 °C (98.6 °F) (Temporal)   Resp 18   Wt 68.5 kg (151 lb)   SpO2 95%   BMI 21.67 kg/m²     Physical Exam  Constitutional:       General: He is not in acute distress.     Appearance: Normal appearance. He is not ill-appearing, toxic-appearing or diaphoretic.   HENT:      Head: Normocephalic and atraumatic.      Right Ear: Tympanic membrane, ear canal and external ear normal.      Left Ear: Tympanic membrane, ear canal and external ear normal.      Nose: Nose normal. No congestion or rhinorrhea.      Mouth/Throat:      Mouth: Mucous membranes are moist.      Pharynx: No oropharyngeal exudate or posterior oropharyngeal erythema.   Eyes:      Conjunctiva/sclera: Conjunctivae normal.   Neck:      Musculoskeletal: Normal range of motion. No muscular tenderness.   Cardiovascular:      Rate and Rhythm: Normal rate and regular rhythm.      Pulses: Normal pulses.      Heart sounds: Normal heart sounds.   Pulmonary:      Effort: Pulmonary effort is normal.      Breath sounds: Normal breath sounds. No wheezing.   Abdominal:      Palpations:  Abdomen is soft.      Tenderness: There is no abdominal tenderness.   Lymphadenopathy:      Cervical: No cervical adenopathy.   Skin:     General: Skin is warm and dry.      Capillary Refill: Capillary refill takes less than 2 seconds.      Findings: Rash present. Rash is papular and urticarial.      Comments: Diffuse rash throughout the patient's abdomen chest back upper arms and lower legs.  No face or palmar involvement.  Rash is papular and slightly urticarial.  Does not look fungal or bacterial in nature.  No drainage from the lesions no crusting noted.   Neurological:      Mental Status: He is alert.   Psychiatric:         Mood and Affect: Mood normal.         Thought Content: Thought content normal.           Assessment/Plan:     Diagnosis and associated orders:     1. Rash and nonspecific skin eruption        Comments/MDM:       Differential diagnoses and treatment options were discussed with the patient at length today.  The rash is diffuse throughout his entire body.  There are no crusting or drainage from the lesions.  Multiple small papules/urticarial lesions were noted.  He denies any pain or itchiness to the lesions.  He denies any new medications, detergents, soaps, contacts.  At this time the rash ideology is unclear we elected to start a low dose oral prednisone and closely monitor symptoms.  Advised the patient to watch symptoms over the next 2 to 3 days if no improvement is noted or any worsening of symptoms return to the clinic or nearest emergency department.  Advised the patient to call with any questions or concerns.             Differential diagnosis, natural history, supportive care, and indications for immediate follow-up discussed.    Advised the patient to follow-up with the primary care physician for recheck, reevaluation, and consideration of further management.    Please note that this dictation was created using voice recognition software. I have made reasonable attempt to correct  obvious errors, but I expect that there are errors of grammar and possibly content that I did not discover before finalizing the note.    This note was electronically signed by PUMA Lentz PA-C

## 2020-06-23 LAB
ALBUMIN SERPL BCP-MCNC: 4.6 G/DL (ref 3.2–4.9)
ALBUMIN/GLOB SERPL: 2.2 G/DL
ALP SERPL-CCNC: 70 U/L (ref 30–99)
ALT SERPL-CCNC: 20 U/L (ref 2–50)
ANION GAP SERPL CALC-SCNC: 13 MMOL/L (ref 7–16)
AST SERPL-CCNC: 37 U/L (ref 12–45)
BILIRUB SERPL-MCNC: 0.6 MG/DL (ref 0.1–1.5)
BUN SERPL-MCNC: 7 MG/DL (ref 8–22)
CALCIUM SERPL-MCNC: 9.2 MG/DL (ref 8.5–10.5)
CHLORIDE SERPL-SCNC: 94 MMOL/L (ref 96–112)
CO2 SERPL-SCNC: 25 MMOL/L (ref 20–33)
CREAT SERPL-MCNC: 0.69 MG/DL (ref 0.5–1.4)
FASTING STATUS PATIENT QL REPORTED: NORMAL
GLOBULIN SER CALC-MCNC: 2.1 G/DL (ref 1.9–3.5)
GLUCOSE SERPL-MCNC: 83 MG/DL (ref 65–99)
POTASSIUM SERPL-SCNC: 4.8 MMOL/L (ref 3.6–5.5)
PROT SERPL-MCNC: 6.7 G/DL (ref 6–8.2)
SODIUM SERPL-SCNC: 132 MMOL/L (ref 135–145)

## 2020-07-17 ENCOUNTER — OFFICE VISIT (OUTPATIENT)
Dept: PULMONOLOGY | Facility: HOSPICE | Age: 80
End: 2020-07-17
Payer: MEDICARE

## 2020-07-17 VITALS
HEART RATE: 72 BPM | OXYGEN SATURATION: 93 % | WEIGHT: 150 LBS | BODY MASS INDEX: 21.47 KG/M2 | DIASTOLIC BLOOD PRESSURE: 80 MMHG | SYSTOLIC BLOOD PRESSURE: 120 MMHG | HEIGHT: 70 IN | RESPIRATION RATE: 16 BRPM

## 2020-07-17 DIAGNOSIS — J44.9 CHRONIC OBSTRUCTIVE PULMONARY DISEASE, UNSPECIFIED COPD TYPE (HCC): ICD-10-CM

## 2020-07-17 PROCEDURE — 99213 OFFICE O/P EST LOW 20 MIN: CPT | Performed by: PHYSICIAN ASSISTANT

## 2020-07-17 ASSESSMENT — ENCOUNTER SYMPTOMS
WHEEZING: 0
ORTHOPNEA: 0
WEIGHT LOSS: 0
SPUTUM PRODUCTION: 0
TREMORS: 0
HEARTBURN: 0
FEVER: 0
SHORTNESS OF BREATH: 1
DIZZINESS: 1
SINUS PAIN: 0
ROS GI COMMENTS: UPPER DENTURES, NO DIFFICULTY SWALLOWING
COUGH: 0
CHILLS: 0
SORE THROAT: 0
HEADACHES: 0
INSOMNIA: 0
PALPITATIONS: 0

## 2020-07-17 ASSESSMENT — FIBROSIS 4 INDEX: FIB4 SCORE: 2.15

## 2020-07-17 NOTE — PROGRESS NOTES
CC: Continues to practice social distancing    HPI:  David Cho is a 79 y.o. year old male here today for follow-up on COPD and CT results.  Patient is a former smoker with reported quit date in 2016 and 50-pack-year history.  Continued abstinence advised.  Patient last seen in clinic on 9/16/2019.    Pertinent past medical history includes nocturnal hypoxia, community-acquired pneumonia, previous abnormal CT scan, depression.    Reviewed in clinic vitals including blood pressure of 120/80, heart rate of 72, O2 sat of 93% on room air and BMI of 21.52 kg/m².    Reviewed most recent imaging including chest CT obtained 6/1/2020 which demonstrated resolution of right upper lobe groundglass opacity and stable nodules including right middle lobe measuring 7 x 4 mm, 4 mm right upper and left lower nodules unchanged, additional smaller nodules.  Architectural distortion present right upper lobe.  No lymphadenopathy.  Severe atherosclerosis, pulmonary arterial hypertension and right renal cyst as before.  Recommendation to follow-up screen in 1 year.    Reviewed home medication regimen including Trelegy, Ventolin.  He has not required rescue inhaler in approximately 6 months.    Reviewed PFT results from 2/21/2020, with at the FEV1 of 1.85 L or 66% predicted, FVC of 3.02 L or 77% predicted, FEV1/FVC ratio of 61, no significant postbronchodilator response, residual volume 142% predicted, total lung capacity 100% predicted, DLCO 73% predicted.  Per pulmonologist interpretation baseline spirometry demonstrated mild to moderate reduction in FEV1 with reduced FEV1/FVC ratio, 8% improvement in FEV1 postbronchodilator, mild hyperinflation, mildly reduced gas exchange, normal airway resistance.  Study demonstrates presence of mild to moderate obstructive lung disease.    Patient did have an echocardiogram obtained 12/25/2018 demonstrating normal left ventricular systolic function, LVEF estimated at 60%, grade 2 diastolic  dysfunction, mildly dilated left atrium, moderate to severe mitral annular calcification, mild aortic insufficiency, estimated RVSP 55 mmHg, plaque seen in the ascending aorta.  Estimated RVSP significantly increased from 2016 estimation of 25 mmHg.      Review of Systems   Constitutional: Negative for chills, fever, malaise/fatigue and weight loss.   HENT: Positive for hearing loss. Negative for congestion, nosebleeds, sinus pain, sore throat and tinnitus.    Eyes:        Prescription eyeglasses    Respiratory: Positive for shortness of breath (rare, no rescue inhaler use). Negative for cough, sputum production and wheezing.    Cardiovascular: Negative for chest pain, palpitations, orthopnea and leg swelling.   Gastrointestinal: Negative for heartburn.        Upper dentures, no difficulty swallowing    Skin: Negative.    Neurological: Positive for dizziness (with dehydration only ). Negative for tremors and headaches.   Psychiatric/Behavioral: The patient does not have insomnia.        Past Medical History:   Diagnosis Date   • Cholesterol serum elevated    • COPD (chronic obstructive pulmonary disease) (HCC)    • Depression    • Enlarged prostate    • Hypertension        Past Surgical History:   Procedure Laterality Date   • APPENDECTOMY     • OTHER  appendectomy       Family History   Problem Relation Age of Onset   • Heart Disease Mother    • Other Mother         turberculosis   • Cancer Father         colon cancer   • Heart Disease Step-Sister        Social History     Socioeconomic History   • Marital status:      Spouse name: Not on file   • Number of children: Not on file   • Years of education: Not on file   • Highest education level: Not on file   Occupational History   • Not on file   Social Needs   • Financial resource strain: Not on file   • Food insecurity     Worry: Not on file     Inability: Not on file   • Transportation needs     Medical: Not on file     Non-medical: Not on file   Tobacco  "Use   • Smoking status: Former Smoker     Packs/day: 1.00     Years: 50.00     Pack years: 50.00     Types: Cigarettes     Last attempt to quit: 8/12/2016     Years since quitting: 3.9   • Smokeless tobacco: Never Used   • Tobacco comment: continued abstinance   Substance and Sexual Activity   • Alcohol use: Yes     Frequency: 4 or more times a week     Drinks per session: 5 or 6     Comment:  6 daily beer    • Drug use: No   • Sexual activity: Not on file   Lifestyle   • Physical activity     Days per week: Not on file     Minutes per session: Not on file   • Stress: Not on file   Relationships   • Social connections     Talks on phone: Not on file     Gets together: Not on file     Attends Orthodox service: Not on file     Active member of club or organization: Not on file     Attends meetings of clubs or organizations: Not on file     Relationship status: Not on file   • Intimate partner violence     Fear of current or ex partner: Not on file     Emotionally abused: Not on file     Physically abused: Not on file     Forced sexual activity: Not on file   Other Topics Concern   • Not on file   Social History Narrative   • Not on file       Allergies as of 07/17/2020   • (No Known Allergies)        @Vital signs for this encounter:  Vitals:    07/17/20 1343   Height: 1.778 m (5' 10\")   Weight: 68 kg (150 lb)   Weight % change since last entry.: 0 %   BP: 120/80   Pulse: 72   BMI (Calculated): 21.52   Resp: 16       Current medications as of today   Current Outpatient Medications   Medication Sig Dispense Refill   • buPROPion (WELLBUTRIN XL) 150 MG XL tablet Take 1 Tab by mouth every morning. 90 Tab 1   • enalapril (VASOTEC) 20 MG tablet Take 1 Tab by mouth every day. 90 Tab 0   • finasteride (PROSCAR) 5 MG Tab Take 1 Tab by mouth every day. 90 Tab 3   • lovastatin (MEVACOR) 20 MG Tab Take 1 Tab by mouth every day. 90 Tab 0   • ipratropium-albuterol (DUONEB) 0.5-2.5 (3) MG/3ML nebulizer solution INHALE 3 MLS VIA " NEBULIZER EVERY 4 HOURS AS NEEDED FOR SHORTNESS OF BREATH 360 mL 3   • albuterol 108 (90 Base) MCG/ACT Aero Soln inhalation aerosol INHALE 2 PUFFS BY MOUTH EVERY 4 HOURS AS NEEDED FOR SHORTNESS OF BREATH 3 Inhaler 3   • Fluticasone-Umeclidin-Vilant (TRELEGY ELLIPTA) 100-62.5-25 MCG/INH AEROSOL POWDER, BREATH ACTIVATED Inhale 1 Puff by mouth every day. Rinse mouth after use 1 Each 11     No current facility-administered medications for this visit.          Physical Exam:   Gen:           Alert and oriented, No apparent distress. Mood and affect     appropriate, normal interaction with provider.  Eyes:          sclere white, conjunctive moist.  Hearing:     Grossly intact.  Dentition:    Upper dentures  Neck:        Supple, trachea midline, no masses.  Respiratory Effort: No intercostal retractions or use of accessory muscles.   Lung Auscultation:      Decreased bases otherwise clear to auscultation bilaterally; no rales, rhonchi or wheezing.  CV:            Regular rate and rhythm. No edema. No murmurs, rubs or gallops.  Digits, Nails, Ext: Mild clubbing, cyanosis, petechiae, or nodes.   Skin:        No rashes, lesions or ulcers noted on back or exposed skin surfaces.                     Assessment:  1. Chronic obstructive pulmonary disease, unspecified COPD type (HCC)  Fluticasone-Umeclidin-Vilant (TRELEGY ELLIPTA) 100-62.5-25 MCG/INH AEROSOL POWDER, BREATH ACTIVATED    PULMONARY FUNCTION TESTS -Test requested: Complete Pulmonary Function Test   2. Abnormal chest CT    reviewed, improved, follow-up 1 year    Immunizations:    Flu: 9/16/2019  Pneumovax 23: 8/8/2018  Prevnar 13: 10/6/2016    Plan:  David Cho is a 79 y.o. year old male here today for follow-up on COPD and CT results.  Patient is a former smoker with reported quit date in 2016 and 50-pack-year history.  Continued abstinence advised.  Patient last seen in clinic on 9/16/2019.    Pertinent past medical history includes nocturnal hypoxia for  which she continues to wear 2 L O2 at night, community-acquired pneumonia, previous abnormal CT scan, depression.  Patient reports weight has stabilized with 1 protein shake per day, encouraged to maintain current weight.    Reviewed CT scan results showing resolution of groundglass opacity in the right upper lobe but continue to monitor during is indicated for multiple nodules less than 1 mm.    Patient continues to do well on Trelegy and has not required an inhaler or nebulizer in greater than 6 months.  Encouraged to obtain flu shot this fall.  Follow-up in 6 months with pulmonary function testing sooner if needed.        This dictation was created using voice recognition software. The accuracy of the dictation is limited to the abilities of the software. I expect there may be some errors of grammar and possibly content.

## 2020-07-17 NOTE — PATIENT INSTRUCTIONS
1-doing well on Trelegy not requiring inhaler or nebulizer at this time  2-continue Trelegy, reordered  3-flu shot this fall  4-encouraged to maintain weight  5-follow up in 6 months with PFT

## 2020-09-11 DIAGNOSIS — E78.5 DYSLIPIDEMIA: Chronic | ICD-10-CM

## 2020-09-11 RX ORDER — LOVASTATIN 20 MG/1
TABLET ORAL
Qty: 90 TAB | Refills: 0 | Status: SHIPPED | OUTPATIENT
Start: 2020-09-11 | End: 2020-12-16 | Stop reason: SDUPTHER

## 2020-10-19 DIAGNOSIS — I10 ESSENTIAL HYPERTENSION: Chronic | ICD-10-CM

## 2020-10-20 RX ORDER — ENALAPRIL MALEATE 20 MG/1
TABLET ORAL
Qty: 90 TAB | Refills: 0 | Status: SHIPPED | OUTPATIENT
Start: 2020-10-20 | End: 2020-12-16 | Stop reason: SDUPTHER

## 2020-12-15 NOTE — PROGRESS NOTES
Subjective:     CC: follow-up HTN, lab review re: hyponatremia    HPI:   David presents today with     Hypertension  Chronic, well controlled at home with -140/80-90.  He is taking his enapril as prescribed.  No chest pain, palpitations or SOB.  No headaches or dizziness.    Major depressive disorder  Chronic, well controlled with wellbutrin without negative SEs.  States his dose keeps him from being grouchy and no negative SE.  No SI/HI.    Screening for colorectal cancer  Pt's father had colon cancer and he has hx of colon polyps.  He would like to continue to have colonoscopies for evaluation for possible colon cancer and states he would want treatment if cancer is present.  No blood in stools or abnormal stooling.    Hyponatremia  Pt feels well without weakness, fatigue, numbness, lightheadedness.  He drinks alcohol (6 beers daily) and declines cutting back on alcohol intake.  He has not gotten hormone testing as previously ordered 2020.      Past Medical History:   Diagnosis Date   • Cholesterol serum elevated    • COPD (chronic obstructive pulmonary disease) (HCC)    • Depression    • Enlarged prostate    • Hypertension        Social History     Tobacco Use   • Smoking status: Former Smoker     Packs/day: 1.00     Years: 50.00     Pack years: 50.00     Types: Cigarettes     Quit date: 2016     Years since quittin.3   • Smokeless tobacco: Never Used   • Tobacco comment: continued abstinance   Substance Use Topics   • Alcohol use: Yes     Frequency: 4 or more times a week     Drinks per session: 5 or 6     Comment:  6 daily beer    • Drug use: No       Current Outpatient Medications Ordered in Epic   Medication Sig Dispense Refill   • enalapril (VASOTEC) 20 MG tablet Take 1 Tab by mouth every day. 90 Tab 1   • buPROPion (WELLBUTRIN XL) 150 MG XL tablet Take 1 Tab by mouth every morning. 90 Tab 1   • lovastatin (MEVACOR) 20 MG Tab Take 1 Tab by mouth every day. 90 Tab 1   •  "Fluticasone-Umeclidin-Vilant (TRELEGY ELLIPTA) 100-62.5-25 MCG/INH AEROSOL POWDER, BREATH ACTIVATED Inhale 1 Puff by mouth every day. Rinse mouth after use 1 Each 11   • finasteride (PROSCAR) 5 MG Tab Take 1 Tab by mouth every day. 90 Tab 3   • ipratropium-albuterol (DUONEB) 0.5-2.5 (3) MG/3ML nebulizer solution INHALE 3 MLS VIA NEBULIZER EVERY 4 HOURS AS NEEDED FOR SHORTNESS OF BREATH (Patient not taking: Reported on 12/16/2020) 360 mL 3   • albuterol 108 (90 Base) MCG/ACT Aero Soln inhalation aerosol INHALE 2 PUFFS BY MOUTH EVERY 4 HOURS AS NEEDED FOR SHORTNESS OF BREATH (Patient not taking: Reported on 12/16/2020) 3 Inhaler 3     No current Epic-ordered facility-administered medications on file.        Allergies:  Patient has no known allergies.    Health Maintenance: recommend Tdap, shingles vaccines recommended    ROS:  Gen: no fevers/chills, no changes in weight  Eyes: no changes in vision  ENT: no sore throat, no hearing loss, no bloody nose  Pulm: no sob, no cough  CV: no chest pain, no palpitations  GI: no nausea/vomiting, no diarrhea  : no dysuria  MSk: no myalgias  Skin: no rash  Neuro: no headaches, no numbness/tingling  Heme/Lymph: no easy bruising      Objective:       Exam:  /80 (BP Location: Left arm, Patient Position: Sitting, BP Cuff Size: Adult)   Pulse 75   Temp 36.8 °C (98.3 °F) (Temporal)   Resp 12   Ht 1.803 m (5' 11\")   Wt 71.2 kg (157 lb)   SpO2 94%   BMI 21.90 kg/m²  Body mass index is 21.9 kg/m².    Gen: Alert and oriented, No apparent distress.  Neck: Neck is supple without lymphadenopathy.  Lungs: Normal effort, CTA bilaterally, no wheezes, rhonchi, or rales  CV: Regular rate and rhythm. No murmurs, rubs, or gallops.  Ext: No clubbing, cyanosis, edema.  Psych Mood good.  Affect normal.  No Si/HI.  Good eye contact.  Well groomed.  Linear thought process. Good insight.    Assessment & Plan:     80 y.o. male with the following -     1. Essential hypertension  Chronic, " stable, well controlled.  Continue current medications.  Dietary and lifestyle modifications discussed.  - enalapril (VASOTEC) 20 MG tablet; Take 1 Tab by mouth every day.  Dispense: 90 Tab; Refill: 1    2. Major depressive disorder with current active episode, unspecified depression episode severity, unspecified whether recurrent  Chronic, well controlled.  Continue current medications.  Will monitor.  - buPROPion (WELLBUTRIN XL) 150 MG XL tablet; Take 1 Tab by mouth every morning.  Dispense: 90 Tab; Refill: 1    3. Dyslipidemia  Chronic, stable, well controlled.  Continue current medications with plan for repeat labs after next visit.  - lovastatin (MEVACOR) 20 MG Tab; Take 1 Tab by mouth every day.  Dispense: 90 Tab; Refill: 1    4. Hyponatremia  Chronic, stable, asymptomatic.  Will continue to monitor and recommended he slowly cut back on alcohol.  Encouraged him to get labs as previously ordered on 6/2020 as well.  Follow-up precautions given. Patient expressed understanding and agreement with plan.  - Basic Metabolic Panel; Future      Return in about 6 months (around 6/16/2021) for Medication review.    Please note that this dictation was created using voice recognition software. I have made every reasonable attempt to correct obvious errors, but I expect that there are errors of grammar and possibly content that I did not discover before finalizing the note.

## 2020-12-16 ENCOUNTER — OFFICE VISIT (OUTPATIENT)
Dept: MEDICAL GROUP | Facility: MEDICAL CENTER | Age: 80
End: 2020-12-16
Payer: MEDICARE

## 2020-12-16 VITALS
HEIGHT: 71 IN | OXYGEN SATURATION: 94 % | DIASTOLIC BLOOD PRESSURE: 80 MMHG | TEMPERATURE: 98.3 F | WEIGHT: 157 LBS | SYSTOLIC BLOOD PRESSURE: 134 MMHG | BODY MASS INDEX: 21.98 KG/M2 | RESPIRATION RATE: 12 BRPM | HEART RATE: 75 BPM

## 2020-12-16 DIAGNOSIS — I10 ESSENTIAL HYPERTENSION: Chronic | ICD-10-CM

## 2020-12-16 DIAGNOSIS — F32.9 MAJOR DEPRESSIVE DISORDER WITH CURRENT ACTIVE EPISODE, UNSPECIFIED DEPRESSION EPISODE SEVERITY, UNSPECIFIED WHETHER RECURRENT: ICD-10-CM

## 2020-12-16 DIAGNOSIS — Z12.11 SCREENING FOR COLORECTAL CANCER: ICD-10-CM

## 2020-12-16 DIAGNOSIS — E87.1 HYPONATREMIA: ICD-10-CM

## 2020-12-16 DIAGNOSIS — E78.5 DYSLIPIDEMIA: Chronic | ICD-10-CM

## 2020-12-16 DIAGNOSIS — Z12.12 SCREENING FOR COLORECTAL CANCER: ICD-10-CM

## 2020-12-16 PROCEDURE — 99214 OFFICE O/P EST MOD 30 MIN: CPT | Performed by: FAMILY MEDICINE

## 2020-12-16 RX ORDER — BUPROPION HYDROCHLORIDE 150 MG/1
150 TABLET ORAL EVERY MORNING
Qty: 90 TAB | Refills: 1 | Status: SHIPPED | OUTPATIENT
Start: 2020-12-16 | End: 2021-02-12

## 2020-12-16 RX ORDER — LOVASTATIN 20 MG/1
20 TABLET ORAL
Qty: 90 TAB | Refills: 1 | Status: SHIPPED | OUTPATIENT
Start: 2020-12-16 | End: 2021-07-12

## 2020-12-16 RX ORDER — ENALAPRIL MALEATE 20 MG/1
20 TABLET ORAL
Qty: 90 TAB | Refills: 1 | Status: SHIPPED | OUTPATIENT
Start: 2020-12-16 | End: 2021-08-09

## 2020-12-16 ASSESSMENT — FIBROSIS 4 INDEX: FIB4 SCORE: 2.18

## 2020-12-16 NOTE — ASSESSMENT & PLAN NOTE
Pt feels well without weakness, fatigue, numbness, lightheadedness.  He drinks alcohol (6 beers daily) and declines cutting back on alcohol intake.  He has not gotten hormone testing as previously ordered 6/2020.

## 2020-12-16 NOTE — ASSESSMENT & PLAN NOTE
Chronic, well controlled at home with -140/80-90.  He is taking his enapril as prescribed.  No chest pain, palpitations or SOB.  No headaches or dizziness.

## 2020-12-16 NOTE — ASSESSMENT & PLAN NOTE
Chronic, well controlled with wellbutrin without negative SEs.  States his dose keeps him from being grouchy and no negative SE.  No SI/HI.

## 2020-12-16 NOTE — ASSESSMENT & PLAN NOTE
Pt's father had colon cancer and he has hx of colon polyps.  He would like to continue to have colonoscopies for evaluation for possible colon cancer and states he would want treatment if cancer is present.  No blood in stools or abnormal stooling.

## 2021-01-04 ENCOUNTER — HOSPITAL ENCOUNTER (OUTPATIENT)
Dept: LAB | Facility: MEDICAL CENTER | Age: 81
End: 2021-01-04
Attending: FAMILY MEDICINE
Payer: MEDICARE

## 2021-01-04 DIAGNOSIS — E87.1 HYPONATREMIA: ICD-10-CM

## 2021-01-04 LAB
ANION GAP SERPL CALC-SCNC: 11 MMOL/L (ref 7–16)
BUN SERPL-MCNC: 7 MG/DL (ref 8–22)
CALCIUM SERPL-MCNC: 9.7 MG/DL (ref 8.5–10.5)
CHLORIDE SERPL-SCNC: 94 MMOL/L (ref 96–112)
CO2 SERPL-SCNC: 25 MMOL/L (ref 20–33)
CORTIS SERPL-MCNC: 8.3 UG/DL (ref 0–23)
CREAT SERPL-MCNC: 0.81 MG/DL (ref 0.5–1.4)
GLUCOSE SERPL-MCNC: 86 MG/DL (ref 65–99)
POTASSIUM SERPL-SCNC: 5.3 MMOL/L (ref 3.6–5.5)
SODIUM SERPL-SCNC: 130 MMOL/L (ref 135–145)
TSH SERPL DL<=0.005 MIU/L-ACNC: 0.97 UIU/ML (ref 0.38–5.33)

## 2021-01-04 PROCEDURE — 82024 ASSAY OF ACTH: CPT

## 2021-01-04 PROCEDURE — 80048 BASIC METABOLIC PNL TOTAL CA: CPT

## 2021-01-04 PROCEDURE — 82533 TOTAL CORTISOL: CPT

## 2021-01-04 PROCEDURE — 36415 COLL VENOUS BLD VENIPUNCTURE: CPT

## 2021-01-04 PROCEDURE — 84443 ASSAY THYROID STIM HORMONE: CPT

## 2021-01-06 LAB — ACTH PLAS-MCNC: 32.1 PG/ML (ref 7.2–63.3)

## 2021-01-11 DIAGNOSIS — Z23 NEED FOR VACCINATION: ICD-10-CM

## 2021-02-12 DIAGNOSIS — F32.9 MAJOR DEPRESSIVE DISORDER WITH CURRENT ACTIVE EPISODE, UNSPECIFIED DEPRESSION EPISODE SEVERITY, UNSPECIFIED WHETHER RECURRENT: ICD-10-CM

## 2021-02-12 RX ORDER — BUPROPION HYDROCHLORIDE 150 MG/1
TABLET ORAL
Qty: 100 TABLET | Refills: 1 | Status: SHIPPED | OUTPATIENT
Start: 2021-02-12 | End: 2021-07-07 | Stop reason: SDUPTHER

## 2021-02-23 ENCOUNTER — IMMUNIZATION (OUTPATIENT)
Dept: FAMILY PLANNING/WOMEN'S HEALTH CLINIC | Facility: IMMUNIZATION CENTER | Age: 81
End: 2021-02-23
Attending: INTERNAL MEDICINE
Payer: MEDICARE

## 2021-02-23 DIAGNOSIS — Z23 ENCOUNTER FOR VACCINATION: Primary | ICD-10-CM

## 2021-02-23 DIAGNOSIS — Z23 NEED FOR VACCINATION: ICD-10-CM

## 2021-02-23 PROCEDURE — 0001A PFIZER SARS-COV-2 VACCINE: CPT | Performed by: INTERNAL MEDICINE

## 2021-02-23 PROCEDURE — 91300 PFIZER SARS-COV-2 VACCINE: CPT | Performed by: INTERNAL MEDICINE

## 2021-03-17 ENCOUNTER — IMMUNIZATION (OUTPATIENT)
Dept: FAMILY PLANNING/WOMEN'S HEALTH CLINIC | Facility: IMMUNIZATION CENTER | Age: 81
End: 2021-03-17
Attending: INTERNAL MEDICINE
Payer: MEDICARE

## 2021-03-17 DIAGNOSIS — Z23 ENCOUNTER FOR VACCINATION: Primary | ICD-10-CM

## 2021-03-17 PROCEDURE — 91300 PFIZER SARS-COV-2 VACCINE: CPT

## 2021-03-17 PROCEDURE — 0002A PFIZER SARS-COV-2 VACCINE: CPT

## 2021-05-27 DIAGNOSIS — J44.9 CHRONIC OBSTRUCTIVE PULMONARY DISEASE, UNSPECIFIED COPD TYPE (HCC): ICD-10-CM

## 2021-05-27 RX ORDER — ALBUTEROL SULFATE 90 UG/1
AEROSOL, METERED RESPIRATORY (INHALATION)
Qty: 1 EACH | Refills: 5 | Status: ON HOLD | OUTPATIENT
Start: 2021-05-27 | End: 2022-01-18

## 2021-05-27 NOTE — TELEPHONE ENCOUNTER
Have we ever prescribed this med? Yes.  If yes, what date? 01/28/2020    Last OV: 07/17/2020 - Kenyatta Beasley    Next OV: 06/11/2021 - Kenyatta Beasley    DX: COPD    Medications: Albuterol

## 2021-06-11 ENCOUNTER — OFFICE VISIT (OUTPATIENT)
Dept: SLEEP MEDICINE | Facility: MEDICAL CENTER | Age: 81
End: 2021-06-11
Payer: MEDICARE

## 2021-06-11 ENCOUNTER — NON-PROVIDER VISIT (OUTPATIENT)
Dept: SLEEP MEDICINE | Facility: MEDICAL CENTER | Age: 81
End: 2021-06-11
Attending: PHYSICIAN ASSISTANT
Payer: MEDICARE

## 2021-06-11 VITALS
DIASTOLIC BLOOD PRESSURE: 90 MMHG | HEIGHT: 69 IN | WEIGHT: 147 LBS | HEART RATE: 74 BPM | RESPIRATION RATE: 16 BRPM | SYSTOLIC BLOOD PRESSURE: 140 MMHG | OXYGEN SATURATION: 95 % | BODY MASS INDEX: 21.77 KG/M2

## 2021-06-11 VITALS — WEIGHT: 147 LBS | BODY MASS INDEX: 20.5 KG/M2

## 2021-06-11 DIAGNOSIS — I10 HYPERTENSION, UNSPECIFIED TYPE: ICD-10-CM

## 2021-06-11 DIAGNOSIS — J44.9 CHRONIC OBSTRUCTIVE PULMONARY DISEASE, UNSPECIFIED COPD TYPE (HCC): ICD-10-CM

## 2021-06-11 DIAGNOSIS — R91.8 LUNG NODULES: ICD-10-CM

## 2021-06-11 DIAGNOSIS — G47.34 NOCTURNAL HYPOXIA: ICD-10-CM

## 2021-06-11 PROCEDURE — 99214 OFFICE O/P EST MOD 30 MIN: CPT | Performed by: PHYSICIAN ASSISTANT

## 2021-06-11 PROCEDURE — 94729 DIFFUSING CAPACITY: CPT | Performed by: INTERNAL MEDICINE

## 2021-06-11 PROCEDURE — 94010 BREATHING CAPACITY TEST: CPT | Performed by: INTERNAL MEDICINE

## 2021-06-11 PROCEDURE — 94726 PLETHYSMOGRAPHY LUNG VOLUMES: CPT | Performed by: INTERNAL MEDICINE

## 2021-06-11 ASSESSMENT — ENCOUNTER SYMPTOMS
PALPITATIONS: 0
FEVER: 0
SHORTNESS OF BREATH: 1
SINUS PAIN: 0
WEIGHT LOSS: 0
HEARTBURN: 0
ORTHOPNEA: 0
COUGH: 0
INSOMNIA: 0
TREMORS: 0
HEADACHES: 0
SORE THROAT: 0
DIZZINESS: 0
SPUTUM PRODUCTION: 0
WHEEZING: 0
CHILLS: 0

## 2021-06-11 ASSESSMENT — PULMONARY FUNCTION TESTS
FEV1: 1.64
FEV1/FVC_PERCENT_LLN: 62
FEV1_PREDICTED: 2.79
FEV1/FVC: 58
FVC_PERCENT_PREDICTED: 75
FVC_LLN: 3.14
FEV1_LLN: 2.33
FEV1_PERCENT_PREDICTED: 58
FEV1/FVC: 58
FEV1/FVC_PREDICTED: 75
FEV1/FVC_PERCENT_PREDICTED: 74
FVC: 2.84
FEV1/FVC_PERCENT_PREDICTED: 77
FVC_PREDICTED: 3.76
FEV1/FVC_PERCENT_PREDICTED: 77

## 2021-06-11 ASSESSMENT — FIBROSIS 4 INDEX
FIB4 SCORE: 2.18
FIB4 SCORE: 2.18

## 2021-06-11 NOTE — PROGRESS NOTES
CC: Follow-up COPD and PFTs    HPI:  David Cho is a 80 y.o. year old male here today for follow-up on COPD and pulmonary function testing results.  Patient is a former smoker with reported 50-pack-year history and quit date in 2016.  Patient last seen in clinic 7/17/2020.    Pertinent past medical history includes nocturnal hypoxia, community-acquired pneumonia, previous abnormal CT scan, hypertension.    Reviewed in clinic vitals including blood pressure 140/90, HR 74, O2 sat 95% on room air and BMI 21.71 kg per square.  Patient is on O2 at 2 L at night.    Reviewed home medication regimen including Trelegy, DuoNeb, albuterol inhaler.    Reviewed most recent imaging including chest CT obtained 6/1/2020 demonstrating severe emphysema, right middle lobe nodule measuring 7 x 4 mm unchanged, multiple additional nodules including 4 mm right upper and left lower lobe nodule unchanged.  Groundglass opacity right upper lobe in the area of severe architectural distortion has resolved but distortion remains.  Severe atherosclerosis, pulmonary arterial hypertension.    Echocardiogram obtained 12/25/2018 demonstrating normal left ventricular systolic function, LVEF estimated at 60%, grade 2 diastolic dysfunction, mildly dilated left atrium, moderate to severe mitral annular calcification, mild aortic insufficiency, estimated RVSP 55 mmHg, plaque seen in the ascending aorta.  Estimated RVSP significantly increased from 2016 estimation of 25 mmHg.     Pulmonary function testing obtained 6/11/2021 as compared to 9/18/2019 demonstrated an FEV1 of 1.64 L or 58% predicted was 66% prior, FVC of 2.84 L or 75% predicted was 77% predicted prior, FEV1/FVC ratio 58, residual volume 119% predicted value 142 prior total lung capacity 90% predicted was 100% prior, DLCO 66% predicted with 73% prior.  Per pulmonologist interpretation baseline spirometry shows airflow obstruction, borderline air trapping, reduced diffusion capacity  consistent with diagnosis of COPD.     Review of Systems   Constitutional: Negative for chills, fever, malaise/fatigue and weight loss.   HENT: Positive for congestion and hearing loss. Negative for nosebleeds, sinus pain, sore throat and tinnitus.         Runny nose occasionally maybe allergies   Respiratory: Positive for shortness of breath (occasionally with strenuous exertion ). Negative for cough, sputum production and wheezing.    Cardiovascular: Negative for chest pain, palpitations, orthopnea and leg swelling.   Gastrointestinal: Negative for heartburn.        Upper dentures, missing a few teeth bottom, no difficulty swallowing   Neurological: Negative for dizziness, tremors and headaches.   Psychiatric/Behavioral: The patient does not have insomnia.        Past Medical History:   Diagnosis Date   • Cholesterol serum elevated    • COPD (chronic obstructive pulmonary disease) (HCC)    • Depression    • Enlarged prostate    • Hypertension        Past Surgical History:   Procedure Laterality Date   • APPENDECTOMY     • OTHER  appendectomy       Family History   Problem Relation Age of Onset   • Heart Disease Mother    • Other Mother         turberculosis   • Cancer Father         colon cancer   • Heart Disease Step-Sister        Social History     Socioeconomic History   • Marital status:      Spouse name: Not on file   • Number of children: Not on file   • Years of education: Not on file   • Highest education level: Not on file   Occupational History   • Not on file   Tobacco Use   • Smoking status: Former Smoker     Packs/day: 1.00     Years: 50.00     Pack years: 50.00     Types: Cigarettes     Quit date: 2016     Years since quittin.8   • Smokeless tobacco: Never Used   • Tobacco comment: continued abstinance   Vaping Use   • Vaping Use: Never used   Substance and Sexual Activity   • Alcohol use: Yes     Comment:  6 daily beer    • Drug use: No   • Sexual activity: Not on file   Other Topics  "Concern   • Not on file   Social History Narrative   • Not on file     Social Determinants of Health     Financial Resource Strain:    • Difficulty of Paying Living Expenses:    Food Insecurity:    • Worried About Running Out of Food in the Last Year:    • Ran Out of Food in the Last Year:    Transportation Needs:    • Lack of Transportation (Medical):    • Lack of Transportation (Non-Medical):    Physical Activity:    • Days of Exercise per Week:    • Minutes of Exercise per Session:    Stress:    • Feeling of Stress :    Social Connections:    • Frequency of Communication with Friends and Family:    • Frequency of Social Gatherings with Friends and Family:    • Attends [employer] Services:    • Active Member of Clubs or Organizations:    • Attends Club or Organization Meetings:    • Marital Status:    Intimate Partner Violence:    • Fear of Current or Ex-Partner:    • Emotionally Abused:    • Physically Abused:    • Sexually Abused:        Allergies as of 06/11/2021   • (No Known Allergies)        @Vital signs for this encounter:  Vitals:    06/11/21 0917 06/11/21 0918   Height: 1.753 m (5' 9\")    Weight: 66.7 kg (147 lb)    Weight % change since last entry.: 0 %    BP: 140/90    Pulse: 74    BMI (Calculated): 21.71    Resp: 16    O2 sat % room air:  95 %       Current medications as of today   Current Outpatient Medications   Medication Sig Dispense Refill   • albuterol 108 (90 Base) MCG/ACT Aero Soln inhalation aerosol INHALE 2 PUFFS BY MOUTH EVERY 4 HOURS AS NEEDED FOR SHORTNESS OF BREATH 1 Each 5   • buPROPion (WELLBUTRIN XL) 150 MG XL tablet TAKE 1 TABLET BY MOUTH EVERY MORNING 100 tablet 1   • enalapril (VASOTEC) 20 MG tablet Take 1 Tab by mouth every day. 90 Tab 1   • lovastatin (MEVACOR) 20 MG Tab Take 1 Tab by mouth every day. 90 Tab 1   • Fluticasone-Umeclidin-Vilant (TRELEGY ELLIPTA) 100-62.5-25 MCG/INH AEROSOL POWDER, BREATH ACTIVATED Inhale 1 Puff by mouth every day. Rinse mouth after use 1 Each 11   • " finasteride (PROSCAR) 5 MG Tab Take 1 Tab by mouth every day. 90 Tab 3   • ipratropium-albuterol (DUONEB) 0.5-2.5 (3) MG/3ML nebulizer solution INHALE 3 MLS VIA NEBULIZER EVERY 4 HOURS AS NEEDED FOR SHORTNESS OF BREATH (Patient not taking: Reported on 12/16/2020) 360 mL 3     No current facility-administered medications for this visit.         Physical Exam:   Gen:           Alert and oriented, No apparent distress. Mood and affect appropriate, normal interaction with provider.  Eyes:          sclere white, conjunctive moist.  Hearing:     Grossly intact.  Dentition:    Upper dentures  Oropharynx:   Tongue normal, posterior pharynx without erythema or exudate.  Neck:        Supple, trachea midline, no masses.  Respiratory Effort: No intercostal retractions or use of accessory muscles.   Lung Auscultation:      Decreased greater bases; no rales, rhonchi or wheezing.  CV:            Regular rate and rhythm. No edema. No murmurs, rubs or gallops.  Digits, Nails, Ext: Mild clubbing, cyanosis, petechiae, or nodes.   Skin:        No rashes, lesions or ulcers noted on exposed skin surfaces.                     Assessment:  1. Chronic obstructive pulmonary disease, unspecified COPD type (HCC)  Fluticasone-Umeclidin-Vilant (TRELEGY ELLIPTA) 100-62.5-25 MCG/INH AEROSOL POWDER, BREATH ACTIVATED   2. Lung nodules  CT-CHEST (THORAX) W/O   3. Hypertension, unspecified type     4. Nocturnal hypoxia         Immunizations:    Flu: 10/1/2020  Pneumovax 23: 8/8/2018  Prevnar 13: 10/1/2020  Pfizer SARS CoV-2 vaccine: 3/17/2021, 2/23/2021     Plan:    80 y.o. year old male here today for follow-up on COPD and pulmonary function testing results.  Patient is a former smoker with reported 50-pack-year history and quit date in 2016.  Patient last seen in clinic 7/17/2020.    Pertinent past medical history includes nocturnal hypoxia, community-acquired pneumonia, previous abnormal CT scan, hypertension.    Reviewed in clinic vitals including  blood pressure 140/90, HR 74, O2 sat 95% on room air and BMI 21.71 kg per square.  Patient is on O2 at 2 L at night.    Nocturnal hypoxia: Patient continues to use and benefit from nighttime oxygen at 2 L.    Reviewed home medication regimen including Trelegy, DuoNeb, albuterol inhaler.    COPD: Reviewed pulmonary function test results with patient.  Refill Trelegy.  Continue current regimen.    Hypertension: Increased blood pressure in clinic today.  Follow-up with primary care provider.    Reviewed most recent imaging including chest CT obtained 6/1/2020 demonstrating severe emphysema, right middle lobe nodule measuring 7 x 4 mm unchanged, multiple additional nodules including 4 mm right upper and left lower lobe nodule unchanged.  Groundglass opacity right upper lobe in the area of severe architectural distortion has resolved but distortion remains.  Severe atherosclerosis, pulmonary arterial hypertension.    Pulmonary nodules: Follow-up chest CT in 6 months.    Had Covid vaccine.  Patient to follow-up in 6 months with CT results, sooner if needed.    This dictation was created using voice recognition software. The accuracy of the dictation is limited to the abilities of the software. I expect there may be some errors of grammar and possibly content.

## 2021-06-11 NOTE — PATIENT INSTRUCTIONS
1-continue Trelegy with stated benefit  2-follow up chest CT in 6 months  3-reviewed pulmonary function test  4-follow up in 6 months

## 2021-06-11 NOTE — PROCEDURES
Technician: REDDY Jama    Technician Comment:  Good patient effort & cooperation.  The results of this test meet the ATS/ERS standards for acceptability & reproducibility.  Test was performed on the Chroma Body Plethysmograph-Elite DX system.  Predicted values were GLI-2012 for spirometry, GLI-2017 for DLCO, ITS for Lung Volumes.  The DLCO was uncorrected for Hgb.      Interpretation:  Baseline spirometry shows airflow obstruction with FEV1/FVC ratio 58 and FEV1 of 1.64 L or 58% predicted.  Bronchodilator testing was not performed.  Total lung capacity is within normal limits.  There is borderline air trapping with residual volume at 119% predicted.  Diffusion capacity is reduced at 66% predicted.  Pulmonary function testing shows airflow obstruction with borderline air trapping and reduced DLCO consistent with a diagnosis of COPD.

## 2021-07-07 ENCOUNTER — OFFICE VISIT (OUTPATIENT)
Dept: MEDICAL GROUP | Facility: MEDICAL CENTER | Age: 81
End: 2021-07-07
Payer: MEDICARE

## 2021-07-07 VITALS
BODY MASS INDEX: 21.05 KG/M2 | HEIGHT: 70 IN | WEIGHT: 147 LBS | SYSTOLIC BLOOD PRESSURE: 134 MMHG | HEART RATE: 64 BPM | RESPIRATION RATE: 12 BRPM | TEMPERATURE: 98.4 F | DIASTOLIC BLOOD PRESSURE: 74 MMHG | OXYGEN SATURATION: 95 %

## 2021-07-07 DIAGNOSIS — E78.5 HYPERLIPIDEMIA, UNSPECIFIED HYPERLIPIDEMIA TYPE: ICD-10-CM

## 2021-07-07 DIAGNOSIS — Z00.00 MEDICARE ANNUAL WELLNESS VISIT, SUBSEQUENT: ICD-10-CM

## 2021-07-07 DIAGNOSIS — Z12.12 SCREENING FOR COLORECTAL CANCER: ICD-10-CM

## 2021-07-07 DIAGNOSIS — Z12.5 ENCOUNTER FOR SCREENING FOR MALIGNANT NEOPLASM OF PROSTATE: ICD-10-CM

## 2021-07-07 DIAGNOSIS — E87.1 CHRONIC HYPONATREMIA: ICD-10-CM

## 2021-07-07 DIAGNOSIS — J44.9 CHRONIC OBSTRUCTIVE PULMONARY DISEASE, UNSPECIFIED COPD TYPE (HCC): Chronic | ICD-10-CM

## 2021-07-07 DIAGNOSIS — Z78.9 ALCOHOL USE: ICD-10-CM

## 2021-07-07 DIAGNOSIS — I10 HYPERTENSION, UNSPECIFIED TYPE: Chronic | ICD-10-CM

## 2021-07-07 DIAGNOSIS — R79.89 ABNORMAL CBC: ICD-10-CM

## 2021-07-07 DIAGNOSIS — F32.9 MAJOR DEPRESSIVE DISORDER WITH CURRENT ACTIVE EPISODE, UNSPECIFIED DEPRESSION EPISODE SEVERITY, UNSPECIFIED WHETHER RECURRENT: ICD-10-CM

## 2021-07-07 DIAGNOSIS — J96.11 CHRONIC RESPIRATORY FAILURE WITH HYPOXIA (HCC): ICD-10-CM

## 2021-07-07 DIAGNOSIS — E78.5 DYSLIPIDEMIA: Chronic | ICD-10-CM

## 2021-07-07 DIAGNOSIS — H91.93 HEARING DIFFICULTY OF BOTH EARS: ICD-10-CM

## 2021-07-07 DIAGNOSIS — Z91.81 RISK FOR FALLS: ICD-10-CM

## 2021-07-07 DIAGNOSIS — Z12.11 SCREENING FOR COLORECTAL CANCER: ICD-10-CM

## 2021-07-07 PROBLEM — J44.1 COPD EXACERBATION (HCC): Status: RESOLVED | Noted: 2019-02-14 | Resolved: 2021-07-07

## 2021-07-07 PROCEDURE — G0439 PPPS, SUBSEQ VISIT: HCPCS | Performed by: FAMILY MEDICINE

## 2021-07-07 RX ORDER — BUPROPION HYDROCHLORIDE 150 MG/1
TABLET ORAL
Qty: 100 TABLET | Refills: 1 | Status: SHIPPED | OUTPATIENT
Start: 2021-07-07 | End: 2021-08-11

## 2021-07-07 ASSESSMENT — PATIENT HEALTH QUESTIONNAIRE - PHQ9
7. TROUBLE CONCENTRATING ON THINGS, SUCH AS READING THE NEWSPAPER OR WATCHING TELEVISION: NOT AT ALL
1. LITTLE INTEREST OR PLEASURE IN DOING THINGS: NOT AT ALL
SUM OF ALL RESPONSES TO PHQ9 QUESTIONS 1 AND 2: 0
CLINICAL INTERPRETATION OF PHQ2 SCORE: 0
5. POOR APPETITE OR OVEREATING: NOT AT ALL
4. FEELING TIRED OR HAVING LITTLE ENERGY: NOT AT ALL
2. FEELING DOWN, DEPRESSED, IRRITABLE, OR HOPELESS: NOT AT ALL
9. THOUGHTS THAT YOU WOULD BE BETTER OFF DEAD, OR OF HURTING YOURSELF: NOT AT ALL
3. TROUBLE FALLING OR STAYING ASLEEP OR SLEEPING TOO MUCH: NOT AT ALL
8. MOVING OR SPEAKING SO SLOWLY THAT OTHER PEOPLE COULD HAVE NOTICED. OR THE OPPOSITE, BEING SO FIGETY OR RESTLESS THAT YOU HAVE BEEN MOVING AROUND A LOT MORE THAN USUAL: NOT AT ALL
SUM OF ALL RESPONSES TO PHQ QUESTIONS 1-9: 0
6. FEELING BAD ABOUT YOURSELF - OR THAT YOU ARE A FAILURE OR HAVE LET YOURSELF OR YOUR FAMILY DOWN: NOT AL ALL

## 2021-07-07 ASSESSMENT — ENCOUNTER SYMPTOMS: GENERAL WELL-BEING: FAIR

## 2021-07-07 ASSESSMENT — FIBROSIS 4 INDEX: FIB4 SCORE: 2.18

## 2021-07-07 ASSESSMENT — ACTIVITIES OF DAILY LIVING (ADL): BATHING_REQUIRES_ASSISTANCE: 0

## 2021-07-07 NOTE — PROGRESS NOTES
Chief Complaint   Patient presents with   • Annual Exam       HPI:  David Cho is a 80 y.o. here for Medicare Annual Wellness Visit    HTN- home blood pressure are erratic at home (106-172/) and he thinks his blood pressure cuff is broken.  No chest pain, palpitations or SOB.  No headaches or dizziness.  States he was worried at his recent pulmonary appointment and states his BP at that time was elevated due to worry.    Chronic obstructive pulmonary disease (HCC)  Chronic issue. Uses his albuterol less than once weekly.  He is taking trelegy as prescribed without negative SEs.  No SOB or wheezing unless he does excessive exercise.        Patient Active Problem List    Diagnosis Date Noted   • Risk for falls 07/07/2021   • Hearing difficulty of both ears 07/07/2021   • Alcohol use 07/07/2021   • Hyponatremia 12/16/2020   • Screening for colorectal cancer 12/16/2020   • Hypokalemia 12/25/2018   • Hypomagnesemia 12/25/2018   • CAP (community acquired pneumonia) 12/23/2018   • Former smoker 08/08/2018   • Nocturia 02/28/2018   • Major depressive disorder 02/28/2018   • Abnormal CT scan, lung 04/11/2017   • Nocturnal hypoxia 04/11/2017   • Chronic obstructive pulmonary disease (HCC) 10/10/2016   • Hypertension 10/10/2016   • Snoring 10/10/2016   • Lung nodules 10/05/2016   • Dyslipidemia 10/05/2016       Current Outpatient Medications   Medication Sig Dispense Refill   • buPROPion (WELLBUTRIN XL) 150 MG XL tablet TAKE 1 TABLET BY MOUTH EVERY MORNING 100 tablet 1   • Fluticasone-Umeclidin-Vilant (TRELEGY ELLIPTA) 100-62.5-25 MCG/INH AEROSOL POWDER, BREATH ACTIVATED Inhale 1 Puff every day. Rinse mouth after use 1 Each 11   • albuterol 108 (90 Base) MCG/ACT Aero Soln inhalation aerosol INHALE 2 PUFFS BY MOUTH EVERY 4 HOURS AS NEEDED FOR SHORTNESS OF BREATH 1 Each 5   • enalapril (VASOTEC) 20 MG tablet Take 1 Tab by mouth every day. 90 Tab 1   • lovastatin (MEVACOR) 20 MG Tab Take 1 Tab by mouth every day.  90 Tab 1   • finasteride (PROSCAR) 5 MG Tab Take 1 Tab by mouth every day. 90 Tab 3   • ipratropium-albuterol (DUONEB) 0.5-2.5 (3) MG/3ML nebulizer solution INHALE 3 MLS VIA NEBULIZER EVERY 4 HOURS AS NEEDED FOR SHORTNESS OF BREATH (Patient not taking: Reported on 12/16/2020) 360 mL 3     No current facility-administered medications for this visit.          Current supplements as per medication list.     Allergies: Patient has no known allergies.    Current social contact/activities:     He  reports that he quit smoking about 4 years ago. His smoking use included cigarettes. He has a 50.00 pack-year smoking history. He has never used smokeless tobacco. He reports current alcohol use. He reports that he does not use drugs.  Counseling given: Not Answered  Comment: continued abstinance      DPA/Advanced Directive:  Patient does not have an Advanced Directive.  A packet and workshop information was given on Advanced Directives.    ROS:    Gait: Uses no assistive device  Ostomy: No  Other tubes: No  Amputations: No  Chronic oxygen use: Yes- uses 2L at night when sleeping.  Oxygen upon awakening is 96-98%  Last eye exam: 2018  Wears hearing aids: No   : Denies any urinary leakage during the last 6 months    Screening:    Depression Screening    Little interest or pleasure in doing things?  0 - not at all  Feeling down, depressed , or hopeless? 0 - not at all  Patient Health Questionnaire Score: 0     If depressive symptoms identified deferred to follow up visit unless specifically addressed in assessment and plan.    Interpretation of PHQ-9 Total Score   Score Severity   1-4 No Depression   5-9 Mild Depression   10-14 Moderate Depression   15-19 Moderately Severe Depression   20-27 Severe Depression    Screening for Cognitive Impairment    Three Minute Recall (captain, garden, picture) 2/3  Duarte clock face with all 12 numbers and set the hands to show 5 past 8.  Yes 5/5  Cognitive concerns identified deferred for  follow up unless specifically addressed in assessment and plan.    Fall Risk Assessment    Has the patient had two or more falls in the last year or any fall with injury in the last year?  Yes    Safety Assessment    Throw rugs on floor.  Yes  Handrails on all stairs.  Yes  Good lighting in all hallways.  Yes  Difficulty hearing.  Yes  Patient counseled about all safety risks that were identified.    Functional Assessment ADLs    Are there any barriers preventing you from cooking for yourself or meeting nutritional needs?  No.    Are there any barriers preventing you from driving safely or obtaining transportation?  No.    Are there any barriers preventing you from using a telephone or calling for help?  No.    Are there any barriers preventing you from shopping?  No.    Are there any barriers preventing you from taking care of your own finances?  No.    Are there any barriers preventing you from managing your medications?  No.    Are there any barriers preventing you from showering, bathing or dressing yourself?  No.    Are you currently engaging in any exercise or physical activity?  No.     What is your perception of your health?  Fair.      Health Maintenance Summary                IMM DTaP/Tdap/Td Vaccine Overdue 10/8/1959     COLONOSCOPY Overdue 10/8/1990     IMM ZOSTER VACCINES Overdue 10/8/1990     IMM INFLUENZA Next Due 9/1/2021      Done 10/1/2020 Imm Admin: Influenza Vaccine Adult HD     Patient has more history with this topic...    Annual Pulmonary Function Test / Spirometry Next Due 6/11/2022      Done 6/11/2021 PULMONARY FUNCTION TESTS     Patient has more history with this topic...          Patient Care Team:  Katelyn Olea D.O. as PCP - General (Family Medicine)  Dahiana Manzano M.D. (Pulmonary Medicine)  [facility]        Social History     Tobacco Use   • Smoking status: Former Smoker     Packs/day: 1.00     Years: 50.00     Pack years: 50.00     Types: Cigarettes     Quit date: 8/12/2016      "Years since quittin.9   • Smokeless tobacco: Never Used   • Tobacco comment: continued abstinance   Vaping Use   • Vaping Use: Never used   Substance Use Topics   • Alcohol use: Yes     Comment:  3 daily beer    • Drug use: No     Family History   Problem Relation Age of Onset   • Heart Disease Mother    • Other Mother         turberculosis   • Cancer Father         colon cancer   • Heart Disease Step-Sister      He  has a past medical history of Cholesterol serum elevated, COPD (chronic obstructive pulmonary disease) (HCC), Depression, Enlarged prostate, and Hypertension.   Past Surgical History:   Procedure Laterality Date   • APPENDECTOMY     • OTHER  appendectomy       Exam:   /74 (BP Location: Right arm, Patient Position: Sitting, BP Cuff Size: Adult)   Pulse 64   Temp 36.9 °C (98.4 °F) (Temporal)   Resp 12   Ht 1.778 m (5' 10\")   Wt 66.7 kg (147 lb)   SpO2 95%  Body mass index is 21.09 kg/m².    Hearing poor.    Dentition nasal and OP examinations deferred given COVID19 exposure risk  Alert, oriented in no acute distress.  Eye contact is good, speech goal directed, affect calm    Assessment and Plan. The following treatment and monitoring plan is recommended:      1. Medicare annual wellness visit, subsequent  Pt is presenting for his medical AWV.  He reports no changes in memory but was only able to remember 2/3 words with the 3 minute recall test.  Plan to retest next visit for stability.  HM per below.    2. Chronic obstructive pulmonary disease, unspecified COPD type (HCC)  Chronic, well controlled.  Continue current medications and pt to continue to follow-up with pulmonary for monitoring and medication adjustments.    3. Major depressive disorder with current active episode, unspecified depression episode severity, unspecified whether recurrent  Chronic issue, slightly worsening recently per patient report due to his wife being ill and the patient having more home responsibilities.  He is " also hard of hearing and gets frustrated easily with his partner due to not being able to hear her.  He would like a trial of lifestyle modifications, exercise, dietary changes and hearing evaluation prior to adjusting medications.  No SI/HI.  Will continue current medications and monitor  - buPROPion (WELLBUTRIN XL) 150 MG XL tablet; TAKE 1 TABLET BY MOUTH EVERY MORNING  Dispense: 100 tablet; Refill: 1    4. Risk for falls  Pt had a recent fall due to his glasses falling off his shirt and into a trash can.  When the patient went to retrieve his glasses he fell into the trash can.  Recommended he use a grabber tool when reaching for objects that could cause him to become off balanced.  He has a normal gait and normal strength, so will defer PT at this time and montior.  - Patient identified as fall risk.  Appropriate orders and counseling given.    5. Screening for colorectal cancer  Pt desires colonoscopy for cancer screening given hx of polyps in the past.  Referral given.  - REFERRAL TO GI FOR COLONOSCOPY    6. Chronic respiratory failure with hypoxia (HCC)  Chronic issue, stable. Pt requires 2L of supplemental O2 by NC when sleeping. Continue supplemental nocturnal oxygen and pt to follow-up with pulmonary for monitoring and adjustments.    7. Hearing difficulty of both ears  New issue.  Ear canals and TMs are normal.  Audiology referral given.  - REFERRAL TO AUDIOLOGY    8. Alcohol use  Chronic issue, improving.  Pt has reduced beer use from 6 beers to 3 beers daily since last visit.  Recommended continued cutting back, no drinking while intoxicated and labs ordered.  - FOLATE; Future  - VITAMIN B12; Future  - VITAMIN B1; Future    9. Chronic hyponatremia  Chronic issue, asymptomatic.  Encouraged cutting back on alcohol and repeat labs ordered.  - Comp Metabolic Panel; Future    10. Hyperlipidemia, unspecified hyperlipidemia type  Chronic issue, unclear control.  Will recheck labs and adjust statin prn.  -  Lipid Profile; Future    11. Encounter for screening for malignant neoplasm of prostate  Pt is asymptomatic.  Will obtain screening PSA and treat based on results.  - PROSTATE SPECIFIC AG SCREENING; Future    12. Abnormal CBC  Chronic issue with mild anemia on recent labs.  Will recheck labs and further evaluate and treat based on results.  - CBC WITH DIFFERENTIAL; Future    13. Hypertension, unspecified type  Chronic, labile control noted at home but asymptomatic. Goal BP less than 150/90.  Continue current medications.  Encouraged cutting back on alcohol, low salt diet. Will monitor.      Services suggested: No services needed at this time  Health Care Screening: Age-appropriate preventive services recommended by USPTF and ACIP covered by Medicare were discussed today. Services ordered if indicated and agreed upon by the patient.  Referrals offered: Community-based lifestyle interventions to reduce health risks and promote self-management and wellness, fall prevention, nutrition, physical activity, tobacco-use cessation, weight loss, and mental health services as per orders if indicated.    Discussion today about general wellness and lifestyle habits:    · Prevent falls and reduce trip hazards; Cautioned about securing or removing rugs.  · Have a working fire alarm and carbon monoxide detector;   · Engage in regular physical activity and social activities     Follow-up: Return in about 4 weeks (around 8/4/2021) for HTN follow-up.

## 2021-07-08 NOTE — ASSESSMENT & PLAN NOTE
Chronic issue. Uses his albuterol less than once weekly.  He is taking trelegy as prescribed without negative SEs.  No SOB or wheezing unless he does excessive exercise.

## 2021-07-12 DIAGNOSIS — E78.5 DYSLIPIDEMIA: Chronic | ICD-10-CM

## 2021-07-12 RX ORDER — LOVASTATIN 20 MG/1
TABLET ORAL
Qty: 90 TABLET | Refills: 1 | Status: ON HOLD | OUTPATIENT
Start: 2021-07-12 | End: 2022-01-12

## 2021-08-09 DIAGNOSIS — R35.1 NOCTURIA: ICD-10-CM

## 2021-08-09 DIAGNOSIS — I10 ESSENTIAL HYPERTENSION: Chronic | ICD-10-CM

## 2021-08-09 RX ORDER — ENALAPRIL MALEATE 20 MG/1
TABLET ORAL
Qty: 90 TABLET | Refills: 1 | Status: ON HOLD | OUTPATIENT
Start: 2021-08-09 | End: 2022-01-18

## 2021-08-09 RX ORDER — FINASTERIDE 5 MG/1
TABLET, FILM COATED ORAL
Qty: 90 TABLET | Refills: 3 | Status: ON HOLD | OUTPATIENT
Start: 2021-08-09 | End: 2022-01-18

## 2021-08-10 ENCOUNTER — HOSPITAL ENCOUNTER (OUTPATIENT)
Dept: LAB | Facility: MEDICAL CENTER | Age: 81
End: 2021-08-10
Attending: FAMILY MEDICINE
Payer: MEDICARE

## 2021-08-10 DIAGNOSIS — Z12.5 ENCOUNTER FOR SCREENING FOR MALIGNANT NEOPLASM OF PROSTATE: ICD-10-CM

## 2021-08-10 DIAGNOSIS — Z78.9 ALCOHOL USE: ICD-10-CM

## 2021-08-10 DIAGNOSIS — E87.1 CHRONIC HYPONATREMIA: ICD-10-CM

## 2021-08-10 DIAGNOSIS — R79.89 ABNORMAL CBC: ICD-10-CM

## 2021-08-10 DIAGNOSIS — E78.5 HYPERLIPIDEMIA, UNSPECIFIED HYPERLIPIDEMIA TYPE: ICD-10-CM

## 2021-08-10 LAB
ALBUMIN SERPL BCP-MCNC: 4.3 G/DL (ref 3.2–4.9)
ALBUMIN/GLOB SERPL: 1.8 G/DL
ALP SERPL-CCNC: 89 U/L (ref 30–99)
ALT SERPL-CCNC: 9 U/L (ref 2–50)
ANION GAP SERPL CALC-SCNC: 10 MMOL/L (ref 7–16)
AST SERPL-CCNC: 20 U/L (ref 12–45)
BASOPHILS # BLD AUTO: 0.5 % (ref 0–1.8)
BASOPHILS # BLD: 0.03 K/UL (ref 0–0.12)
BILIRUB SERPL-MCNC: 1.1 MG/DL (ref 0.1–1.5)
BUN SERPL-MCNC: 8 MG/DL (ref 8–22)
CALCIUM SERPL-MCNC: 9.5 MG/DL (ref 8.5–10.5)
CHLORIDE SERPL-SCNC: 93 MMOL/L (ref 96–112)
CHOLEST SERPL-MCNC: 143 MG/DL (ref 100–199)
CO2 SERPL-SCNC: 26 MMOL/L (ref 20–33)
CREAT SERPL-MCNC: 0.82 MG/DL (ref 0.5–1.4)
EOSINOPHIL # BLD AUTO: 0.17 K/UL (ref 0–0.51)
EOSINOPHIL NFR BLD: 2.9 % (ref 0–6.9)
ERYTHROCYTE [DISTWIDTH] IN BLOOD BY AUTOMATED COUNT: 49.1 FL (ref 35.9–50)
FASTING STATUS PATIENT QL REPORTED: NORMAL
FOLATE SERPL-MCNC: 13 NG/ML
GLOBULIN SER CALC-MCNC: 2.4 G/DL (ref 1.9–3.5)
GLUCOSE SERPL-MCNC: 97 MG/DL (ref 65–99)
HCT VFR BLD AUTO: 40.9 % (ref 42–52)
HDLC SERPL-MCNC: 74 MG/DL
HGB BLD-MCNC: 13.9 G/DL (ref 14–18)
IMM GRANULOCYTES # BLD AUTO: 0.03 K/UL (ref 0–0.11)
IMM GRANULOCYTES NFR BLD AUTO: 0.5 % (ref 0–0.9)
LDLC SERPL CALC-MCNC: 54 MG/DL
LYMPHOCYTES # BLD AUTO: 2.05 K/UL (ref 1–4.8)
LYMPHOCYTES NFR BLD: 35 % (ref 22–41)
MCH RBC QN AUTO: 33.1 PG (ref 27–33)
MCHC RBC AUTO-ENTMCNC: 34 G/DL (ref 33.7–35.3)
MCV RBC AUTO: 97.4 FL (ref 81.4–97.8)
MONOCYTES # BLD AUTO: 0.57 K/UL (ref 0–0.85)
MONOCYTES NFR BLD AUTO: 9.7 % (ref 0–13.4)
NEUTROPHILS # BLD AUTO: 3 K/UL (ref 1.82–7.42)
NEUTROPHILS NFR BLD: 51.4 % (ref 44–72)
NRBC # BLD AUTO: 0 K/UL
NRBC BLD-RTO: 0 /100 WBC
PLATELET # BLD AUTO: 263 K/UL (ref 164–446)
PMV BLD AUTO: 9.5 FL (ref 9–12.9)
POTASSIUM SERPL-SCNC: 4.9 MMOL/L (ref 3.6–5.5)
PROT SERPL-MCNC: 6.7 G/DL (ref 6–8.2)
PSA SERPL-MCNC: 1.27 NG/ML (ref 0–4)
RBC # BLD AUTO: 4.2 M/UL (ref 4.7–6.1)
SODIUM SERPL-SCNC: 129 MMOL/L (ref 135–145)
TRIGL SERPL-MCNC: 74 MG/DL (ref 0–149)
VIT B12 SERPL-MCNC: 519 PG/ML (ref 211–911)
WBC # BLD AUTO: 5.9 K/UL (ref 4.8–10.8)

## 2021-08-10 PROCEDURE — 84153 ASSAY OF PSA TOTAL: CPT | Mod: GA

## 2021-08-10 PROCEDURE — 36415 COLL VENOUS BLD VENIPUNCTURE: CPT

## 2021-08-10 PROCEDURE — 82746 ASSAY OF FOLIC ACID SERUM: CPT

## 2021-08-10 PROCEDURE — 85025 COMPLETE CBC W/AUTO DIFF WBC: CPT

## 2021-08-10 PROCEDURE — 80053 COMPREHEN METABOLIC PANEL: CPT

## 2021-08-10 PROCEDURE — 80061 LIPID PANEL: CPT

## 2021-08-10 PROCEDURE — 84425 ASSAY OF VITAMIN B-1: CPT

## 2021-08-10 PROCEDURE — 82607 VITAMIN B-12: CPT

## 2021-08-10 NOTE — PROGRESS NOTES
Subjective:     CC: HTN follow-up    HPI:   David presents today with     Pt had multiple dental extractions this week with plan for fillings in 3 weeks.  He has a partial being made with plan to rebuild his upper dentures.  He has not been eating well for the last 2 years due to dental pain.    HTN- home blood pressures are ranging 100-160s/.  He did not bring his BP cuff with him today.  No chest pain, palpitations, SOB, headaches, or dizziness.    He is going to pulmonary rehab for his COPD and states his breathing is doing well.    Major depressive disorder  Chronic issue, well controlled with wellbutrin without negative SEs.  He feels this medication helps keep him from feeling grouchy.  No negative Se reported.  No SI/HI.      Past Medical History:   Diagnosis Date   • Cholesterol serum elevated    • COPD (chronic obstructive pulmonary disease) (HCC)    • Depression    • Enlarged prostate    • Hypertension        Social History     Tobacco Use   • Smoking status: Former Smoker     Packs/day: 1.00     Years: 50.00     Pack years: 50.00     Types: Cigarettes     Quit date: 2016     Years since quittin.0   • Smokeless tobacco: Never Used   • Tobacco comment: continued abstinance   Vaping Use   • Vaping Use: Never used   Substance Use Topics   • Alcohol use: Yes     Comment:  3 daily beer    • Drug use: No       Current Outpatient Medications Ordered in Epic   Medication Sig Dispense Refill   • buPROPion (WELLBUTRIN) 75 MG Tab Take 1 Tablet by mouth every day. 30 Tablet 1   • enalapril (VASOTEC) 20 MG tablet TAKE 1 TABLET BY MOUTH EVERY DAY 90 tablet 1   • finasteride (PROSCAR) 5 MG Tab TAKE 1 TABLET BY MOUTH EVERY DAY( GENERIC EQUIVALENT FOR PROSCAR) 90 tablet 3   • lovastatin (MEVACOR) 20 MG Tab TAKE 1 TABLET BY MOUTH EVERY DAY 90 tablet 1   • Fluticasone-Umeclidin-Vilant (TRELEGY ELLIPTA) 100-62.5-25 MCG/INH AEROSOL POWDER, BREATH ACTIVATED Inhale 1 Puff every day. Rinse mouth after use 1 Each 11  "  • albuterol 108 (90 Base) MCG/ACT Aero Soln inhalation aerosol INHALE 2 PUFFS BY MOUTH EVERY 4 HOURS AS NEEDED FOR SHORTNESS OF BREATH 1 Each 5   • ipratropium-albuterol (DUONEB) 0.5-2.5 (3) MG/3ML nebulizer solution INHALE 3 MLS VIA NEBULIZER EVERY 4 HOURS AS NEEDED FOR SHORTNESS OF BREATH (Patient not taking: Reported on 12/16/2020) 360 mL 3     No current [facility]-ordered facility-administered medications on file.       Allergies:  Patient has no known allergies.    Health Maintenance:FIT previously ordered.  Plan to discuss vaccines next visit    ROS:  Gen: no fevers/chills, no changes in weight  Eyes: no changes in vision  ENT: no sore throat, no hearing loss, no bloody nose  Pulm: no sob,no cough  CV: no chest pain, no palpitations  GI: no nausea/vomiting, no diarrhea  MSk: no myalgias  Neuro: no headaches, no numbness/tingling  Heme/Lymph: no easy bruising      Objective:     Exam:  /62 (BP Location: Right arm, Patient Position: Sitting, BP Cuff Size: Adult)   Pulse 62   Temp 36.7 °C (98.1 °F) (Temporal)   Resp 14   Ht 1.778 m (5' 10\")   Wt 64.9 kg (143 lb)   SpO2 92%   BMI 20.52 kg/m²  Body mass index is 20.52 kg/m².    Gen: Alert and oriented, No apparent distress.  Normal mentation  Neck: Neck is supple without lymphadenopathy.  Lungs: Normal effort, CTA bilaterally, no wheezes, rhonchi, or rales  Abd: Soft, nontender, nondistended  CV: Regular rate and rhythm. No murmurs, rubs, or gallops.  Ext: No clubbing, cyanosis, edema.  Psych Normal affect and mood.  Very pleasant. Well groomed. Linear thought process, good insight.  No SI/HI.    Labs: 8/10/21 labs reviewed    Assessment & Plan:     80 y.o. male with the following -     1. Major depressive disorder with current active episode, unspecified depression episode severity, unspecified whether recurrent  Chronic issue, well controlled, however his wellbutrin is likely causing worsening of his hyponatremia.  Encouraged him to cut back on alcohol, " increase electrolyte hydration by 8oz per day, cut back on wellbutrin by 50% and get repeat labs in 2 days.  Pt to follow-up for mood recheck in 1 week for further evaluation and treatment.  UCC/ER precautions given.  - buPROPion (WELLBUTRIN) 75 MG Tab; Take 1 Tablet by mouth every day.  Dispense: 30 Tablet; Refill: 1    2. Alcohol use  Chronic issue, improved with pt reporting 3 alcoholic beverages per day.  This is likely triggering his hyponatremia given his sodium improved in the past with cutting back on alcohol and hydrating with non-alcoholic beverages.  Encouraged slow cut back on alcohol and RUQ US ordered. Will monitor.  - US-RUQ; Future    3. Chronic hyponatremia  Chronic, worsening issue with sodium on 129 on labs yesterday.  Asymptomatic. Previous labs were consistent with hypotonic hyponatremia likely secondary to beer intake vs SIADH likely due to his wellbutrin.  Recommended cutting back on wellbutrin, cutting back on alcohol, increasing sodium intake with 8oz of electrolyte fluid intake to replace the losses from alcohol use (of note he is only drinking 8oz of water daily currently).  Will recheck labs in 2 days with close follow-up after labs.  UCC/ER precautions given for worsening symptoms.  - Basic Metabolic Panel; Future  - US-RUQ; Future    4. Anemia, unspecified type  Chronic issue, overall stable decreased RBC with normal MCV.  Recent vitamin B12 and folate levels are normal.  He reports no abnormal bleeding.  He was previously referred for colonoscopy and is pending GI appointment.  Recommended he get a colonoscopy for further evaluation and will check RUQ as per above.  Will monitor.          Return in about 1 week (around 8/18/2021).    Please note that this dictation was created using voice recognition software. I have made every reasonable attempt to correct obvious errors, but I expect that there are errors of grammar and possibly content that I did not discover before finalizing the  note.

## 2021-08-11 ENCOUNTER — OFFICE VISIT (OUTPATIENT)
Dept: MEDICAL GROUP | Facility: MEDICAL CENTER | Age: 81
End: 2021-08-11
Payer: MEDICARE

## 2021-08-11 VITALS
HEIGHT: 70 IN | OXYGEN SATURATION: 92 % | HEART RATE: 62 BPM | RESPIRATION RATE: 14 BRPM | SYSTOLIC BLOOD PRESSURE: 130 MMHG | WEIGHT: 143 LBS | BODY MASS INDEX: 20.47 KG/M2 | TEMPERATURE: 98.1 F | DIASTOLIC BLOOD PRESSURE: 62 MMHG

## 2021-08-11 DIAGNOSIS — E87.1 CHRONIC HYPONATREMIA: ICD-10-CM

## 2021-08-11 DIAGNOSIS — Z78.9 ALCOHOL USE: ICD-10-CM

## 2021-08-11 DIAGNOSIS — F32.9 MAJOR DEPRESSIVE DISORDER WITH CURRENT ACTIVE EPISODE, UNSPECIFIED DEPRESSION EPISODE SEVERITY, UNSPECIFIED WHETHER RECURRENT: ICD-10-CM

## 2021-08-11 DIAGNOSIS — D64.9 ANEMIA, UNSPECIFIED TYPE: ICD-10-CM

## 2021-08-11 PROCEDURE — 99214 OFFICE O/P EST MOD 30 MIN: CPT | Performed by: FAMILY MEDICINE

## 2021-08-11 RX ORDER — BUPROPION HYDROCHLORIDE 75 MG/1
75 TABLET ORAL DAILY
Qty: 30 TABLET | Refills: 1 | Status: SHIPPED | OUTPATIENT
Start: 2021-08-11 | End: 2021-10-05

## 2021-08-11 ASSESSMENT — FIBROSIS 4 INDEX: FIB4 SCORE: 2.03

## 2021-08-12 ENCOUNTER — PATIENT MESSAGE (OUTPATIENT)
Dept: MEDICAL GROUP | Facility: MEDICAL CENTER | Age: 81
End: 2021-08-12

## 2021-08-12 PROBLEM — D64.9 ANEMIA: Status: ACTIVE | Noted: 2021-08-12

## 2021-08-12 NOTE — ASSESSMENT & PLAN NOTE
Chronic issue, well controlled with wellbutrin without negative SEs.  He feels this medication helps keep him from feeling grouchy.  No negative Se reported.  No SI/HI.

## 2021-08-14 DIAGNOSIS — E51.9 VITAMIN B1 DEFICIENCY: ICD-10-CM

## 2021-08-14 LAB — VIT B1 BLD-MCNC: 55 NMOL/L (ref 70–180)

## 2021-08-14 RX ORDER — UREA 10 %
1 LOTION (ML) TOPICAL DAILY
Qty: 90 TABLET | Refills: 2 | Status: SHIPPED | OUTPATIENT
Start: 2021-08-14 | End: 2021-08-19 | Stop reason: SDUPTHER

## 2021-08-16 ENCOUNTER — HOSPITAL ENCOUNTER (OUTPATIENT)
Dept: LAB | Facility: MEDICAL CENTER | Age: 81
End: 2021-08-16
Attending: FAMILY MEDICINE
Payer: MEDICARE

## 2021-08-16 DIAGNOSIS — E87.1 CHRONIC HYPONATREMIA: ICD-10-CM

## 2021-08-16 LAB
ANION GAP SERPL CALC-SCNC: 11 MMOL/L (ref 7–16)
BUN SERPL-MCNC: 8 MG/DL (ref 8–22)
CALCIUM SERPL-MCNC: 9.2 MG/DL (ref 8.5–10.5)
CHLORIDE SERPL-SCNC: 96 MMOL/L (ref 96–112)
CO2 SERPL-SCNC: 25 MMOL/L (ref 20–33)
CREAT SERPL-MCNC: 0.81 MG/DL (ref 0.5–1.4)
GLUCOSE SERPL-MCNC: 102 MG/DL (ref 65–99)
POTASSIUM SERPL-SCNC: 5.1 MMOL/L (ref 3.6–5.5)
SODIUM SERPL-SCNC: 132 MMOL/L (ref 135–145)

## 2021-08-16 PROCEDURE — 36415 COLL VENOUS BLD VENIPUNCTURE: CPT

## 2021-08-16 PROCEDURE — 80048 BASIC METABOLIC PNL TOTAL CA: CPT

## 2021-08-17 NOTE — PROGRESS NOTES
Subjective:     CC: lab results follow-up, B1 deficiency    HPI:   David presents today with     Vitamin B1 deficiency  States he only has difficulty walking in one pair of his shoes, but when barefoot or wearing other shoes no falls or difficulty walking.  +fatigue at baseline but no recent changes.  States he feels like his memory is worsening intermittently and he has trouble remember what he was planning to do when he enters a room.  No chest pain, palpitations, SOB, headaches or dizziness.      Past Medical History:   Diagnosis Date   • Cholesterol serum elevated    • COPD (chronic obstructive pulmonary disease) (HCC)    • Depression    • Enlarged prostate    • Hypertension        Social History     Tobacco Use   • Smoking status: Former Smoker     Packs/day: 1.00     Years: 50.00     Pack years: 50.00     Types: Cigarettes     Quit date: 2016     Years since quittin.0   • Smokeless tobacco: Never Used   • Tobacco comment: continued abstinance   Vaping Use   • Vaping Use: Never used   Substance Use Topics   • Alcohol use: Yes     Comment:  3 daily beer    • Drug use: No       Current Outpatient Medications Ordered in Epic   Medication Sig Dispense Refill   • Thiamine HCl (VITAMIN B1) 50 MG Tab Take 2 Tablets by mouth every day. 180 Tablet 2   • buPROPion (WELLBUTRIN) 75 MG Tab Take 1 Tablet by mouth every day. 30 Tablet 1   • enalapril (VASOTEC) 20 MG tablet TAKE 1 TABLET BY MOUTH EVERY DAY 90 tablet 1   • finasteride (PROSCAR) 5 MG Tab TAKE 1 TABLET BY MOUTH EVERY DAY( GENERIC EQUIVALENT FOR PROSCAR) 90 tablet 3   • lovastatin (MEVACOR) 20 MG Tab TAKE 1 TABLET BY MOUTH EVERY DAY 90 tablet 1   • Fluticasone-Umeclidin-Vilant (TRELEGY ELLIPTA) 100-62.5-25 MCG/INH AEROSOL POWDER, BREATH ACTIVATED Inhale 1 Puff every day. Rinse mouth after use 1 Each 11   • albuterol 108 (90 Base) MCG/ACT Aero Soln inhalation aerosol INHALE 2 PUFFS BY MOUTH EVERY 4 HOURS AS NEEDED FOR SHORTNESS OF BREATH 1 Each 5   •  "ipratropium-albuterol (DUONEB) 0.5-2.5 (3) MG/3ML nebulizer solution INHALE 3 MLS VIA NEBULIZER EVERY 4 HOURS AS NEEDED FOR SHORTNESS OF BREATH (Patient not taking: Reported on 12/16/2020) 360 mL 3     No current Epic-ordered facility-administered medications on file.       Allergies:  Patient has no known allergies.    Health Maintenance: Tdap and shingles recommended to be done at his pharmacy.    ROS:  Gen: no fevers/chills  Pulm: no sob, no cough  CV: no chest pain, no palpitations  GI: no nausea/vomiting, no diarrhea  MSk: no myalgias  Neuro: no headaches, no numbness/tingling  Heme/Lymph: no easy bruising      Objective:       Exam:  /82 (BP Location: Left arm, Patient Position: Sitting, BP Cuff Size: Adult)   Pulse (!) 58   Temp 36.2 °C (97.2 °F) (Temporal)   Resp 14   Ht 1.778 m (5' 10\")   Wt 66.2 kg (146 lb)   SpO2 97%   BMI 20.95 kg/m²  Body mass index is 20.95 kg/m².    Gen: Alert and oriented, No apparent distress.  Neck: Neck is supple without lymphadenopathy.  Lungs: Normal effort, CTA bilaterally, no wheezes, rhonchi, or rales  CV: Regular rate and rhythm. No murmurs, rubs, or gallops.  Ext: No clubbing, cyanosis, edema.  Neuro: AOx 3.  Normal clock drawing.  Normal 3 minute recall.  Normal gait.  Strength 5/5 UE and LE.      Labs: 8/10/2021and 8/16/21 labs reviewed with patient    Assessment & Plan:     80 y.o. male with the following -     1. Vitamin B1 deficiency  Newly noted issue.  Given his fatigue and intermittent memory issues, recommended ER for IV thiamine infusion but he declines.  Will instead start oral thiamine with close follow-up and repeat labs.  Encouraged cutting back on alcohol intake.  ER precautions given for worsening symptoms.  - Thiamine HCl (VITAMIN B1) 50 MG Tab; Take 2 Tablets by mouth every day.  Dispense: 180 Tablet; Refill: 2  - VITAMIN B1; Future    2. Hyponatremia  Improved on recheck with patient drinking one gatoraide per day.  Likely this is due to " cirrhosis vs poor electrolyte intake.  Recommended continuing daily gatoraide, cut back on alcohol use, RUQ US to evaluate for cirrhosis.  Will monitor.  - Comp Metabolic Panel; Future    3. Anemia, unspecified type  Chronic issue, stable.   Overall stable decrased RBC with normal MCV.  Possibly due to B1 deficiency which will be treated per above.   Possibly due to cirrhosis from chronic alcohol use, pt to get RUQ as previously ordered.  Pt due to colon tumor given hx of polyp for which he has not had follow-up.  Will monitor and pt to follow-up with GI for colonoscopy for further evaluation. Will monitor.    - CBC WITH DIFFERENTIAL; Future    4. Hypertension, unspecified type  Chronic, stable, asymptomatic, well controlled.  His home BP cuff measurements are labile and not consistent with comparison BP measurements done at our office (he brought his cuff in for comparison Bps today).  Continue current medications.  Will monitor    Return in about 3 months (around 11/19/2021) for  medication review or sooner if symptoms worsen.    Please note that this dictation was created using voice recognition software. I have made every reasonable attempt to correct obvious errors, but I expect that there are errors of grammar and possibly content that I did not discover before finalizing the note.

## 2021-08-19 ENCOUNTER — OFFICE VISIT (OUTPATIENT)
Dept: MEDICAL GROUP | Facility: MEDICAL CENTER | Age: 81
End: 2021-08-19
Payer: MEDICARE

## 2021-08-19 VITALS
OXYGEN SATURATION: 97 % | WEIGHT: 146 LBS | RESPIRATION RATE: 14 BRPM | SYSTOLIC BLOOD PRESSURE: 138 MMHG | HEIGHT: 70 IN | BODY MASS INDEX: 20.9 KG/M2 | TEMPERATURE: 97.2 F | DIASTOLIC BLOOD PRESSURE: 82 MMHG | HEART RATE: 58 BPM

## 2021-08-19 DIAGNOSIS — D64.9 ANEMIA, UNSPECIFIED TYPE: ICD-10-CM

## 2021-08-19 DIAGNOSIS — E87.1 HYPONATREMIA: ICD-10-CM

## 2021-08-19 DIAGNOSIS — I10 HYPERTENSION, UNSPECIFIED TYPE: Chronic | ICD-10-CM

## 2021-08-19 DIAGNOSIS — E51.9 VITAMIN B1 DEFICIENCY: ICD-10-CM

## 2021-08-19 PROCEDURE — 99214 OFFICE O/P EST MOD 30 MIN: CPT | Performed by: FAMILY MEDICINE

## 2021-08-19 RX ORDER — UREA 10 %
2 LOTION (ML) TOPICAL DAILY
Qty: 180 TABLET | Refills: 2 | Status: ON HOLD | OUTPATIENT
Start: 2021-08-19 | End: 2022-01-18

## 2021-08-19 RX ORDER — UREA 10 %
1 LOTION (ML) TOPICAL DAILY
Qty: 90 TABLET | Refills: 2 | Status: SHIPPED | OUTPATIENT
Start: 2021-08-19 | End: 2021-08-19 | Stop reason: SDUPTHER

## 2021-08-19 ASSESSMENT — FIBROSIS 4 INDEX: FIB4 SCORE: 2.03

## 2021-08-19 NOTE — ASSESSMENT & PLAN NOTE
States he only has difficulty walking in one pair of his shoes, but when barefoot or wearing other shoes no falls or difficulty walking.  +fatigue at baseline but no recent changes.  States he feels like his memory is worsening intermittently and he has trouble remember what he was planning to do when he enters a room.  No chest pain, palpitations, SOB, headaches or dizziness.

## 2021-08-19 NOTE — PATIENT INSTRUCTIONS
Start taking thiamine 100mg every day.  Continue to cut back on alcohol.  Seek immediate medical attention if symptoms worsen.    I recommend you get your tetanus and shingles vaccines at the pharmacy.

## 2021-09-02 ENCOUNTER — HOSPITAL ENCOUNTER (OUTPATIENT)
Dept: RADIOLOGY | Facility: MEDICAL CENTER | Age: 81
End: 2021-09-02
Attending: FAMILY MEDICINE
Payer: MEDICARE

## 2021-09-02 DIAGNOSIS — E87.1 CHRONIC HYPONATREMIA: ICD-10-CM

## 2021-09-02 DIAGNOSIS — Z78.9 ALCOHOL USE: ICD-10-CM

## 2021-09-24 ENCOUNTER — HOSPITAL ENCOUNTER (OUTPATIENT)
Dept: RADIOLOGY | Facility: MEDICAL CENTER | Age: 81
End: 2021-09-24
Attending: FAMILY MEDICINE
Payer: MEDICARE

## 2021-09-24 PROCEDURE — 76705 ECHO EXAM OF ABDOMEN: CPT

## 2021-09-30 ENCOUNTER — TELEPHONE (OUTPATIENT)
Dept: MEDICAL GROUP | Facility: MEDICAL CENTER | Age: 81
End: 2021-09-30

## 2021-09-30 NOTE — TELEPHONE ENCOUNTER
Phone Number Called: [phone] (home)     Call outcome: Spoke to patient regarding message below.    Message: Informed pt about US results. Pt understood and he stated he already saw Dr. Olea's comment. Pt will just follow up with Dr. Olea at his upcoming appointment on 11/18/21 at 10 am.

## 2021-10-05 DIAGNOSIS — F32.9 MAJOR DEPRESSIVE DISORDER WITH CURRENT ACTIVE EPISODE, UNSPECIFIED DEPRESSION EPISODE SEVERITY, UNSPECIFIED WHETHER RECURRENT: ICD-10-CM

## 2021-10-05 RX ORDER — BUPROPION HYDROCHLORIDE 75 MG/1
75 TABLET ORAL DAILY
Qty: 30 TABLET | Refills: 1 | Status: SHIPPED | OUTPATIENT
Start: 2021-10-05 | End: 2022-01-10

## 2021-11-16 NOTE — PROGRESS NOTES
Subjective:     CC: medication follow-up for B1 deficiency, HTN    HPI:   David presents today with     HTN- no chest pain, palpitations, SOB, lightheadedness or dizziness.  He is taking his medication as prescribed.    B1 deficiency- taking OTC supplement since insurance won't cover this medications.  No new weakness, numbness.  He is urinating and stooling normally.  Normal cognition.  He continues to drink alcohol regularly without recent changes and declines intervention at this time.    Hyponatremia- is drinking one gatoraide per day.  Still drinking daily alcohol.    Cerumen impaction- he recently went to do his hearing test and had loaner hearing aids which he lost.  He went back and got a new one.  States he was told he needs ears flushed to retest his hearing last week.    Has pulmonary follow-up 2021 with CT scan prior.    Small infrarenal aortic aneurysm and right iliac artery aneurysms- no nausea, vomiting or abdominal pain.        Past Medical History:   Diagnosis Date   • Cholesterol serum elevated    • COPD (chronic obstructive pulmonary disease) (HCC)    • Depression    • Enlarged prostate    • Hypertension        Social History     Tobacco Use   • Smoking status: Former Smoker     Packs/day: 1.00     Years: 50.00     Pack years: 50.00     Types: Cigarettes     Quit date: 2016     Years since quittin.2   • Smokeless tobacco: Never Used   • Tobacco comment: continued abstinance   Vaping Use   • Vaping Use: Never used   Substance Use Topics   • Alcohol use: Yes     Comment:  3 daily beer    • Drug use: No       Current Outpatient Medications Ordered in Epic   Medication Sig Dispense Refill   • buPROPion (WELLBUTRIN) 75 MG Tab TAKE 1 TABLET BY MOUTH EVERY DAY 30 Tablet 1   • Thiamine HCl (VITAMIN B1) 50 MG Tab Take 2 Tablets by mouth every day. 180 Tablet 2   • enalapril (VASOTEC) 20 MG tablet TAKE 1 TABLET BY MOUTH EVERY DAY 90 tablet 1   • finasteride (PROSCAR) 5 MG Tab TAKE 1 TABLET BY  "MOUTH EVERY DAY( GENERIC EQUIVALENT FOR PROSCAR) 90 tablet 3   • lovastatin (MEVACOR) 20 MG Tab TAKE 1 TABLET BY MOUTH EVERY DAY 90 tablet 1   • Fluticasone-Umeclidin-Vilant (TRELEGY ELLIPTA) 100-62.5-25 MCG/INH AEROSOL POWDER, BREATH ACTIVATED Inhale 1 Puff every day. Rinse mouth after use 1 Each 11   • albuterol 108 (90 Base) MCG/ACT Aero Soln inhalation aerosol INHALE 2 PUFFS BY MOUTH EVERY 4 HOURS AS NEEDED FOR SHORTNESS OF BREATH 1 Each 5   • ipratropium-albuterol (DUONEB) 0.5-2.5 (3) MG/3ML nebulizer solution INHALE 3 MLS VIA NEBULIZER EVERY 4 HOURS AS NEEDED FOR SHORTNESS OF BREATH (Patient not taking: Reported on 12/16/2020) 360 mL 3     No current [facility]-ordered facility-administered medications on file.       Allergies:  Patient has no known allergies.    Health Maintenance: Tdap and shingrix recommended to be done at his pharmacy.  Pt is declining colonoscopy and has spoken to GI regarding this matter.    ROS:  Gen: no fevers/chills, no changes in weight  Eyes: no changes in vision  ENT: no sore throat, + hearing loss, no bloody nose  Pulm: no sob, no cough  CV: no chest pain, no palpitations  GI: no nausea/vomiting, no diarrhea  : no dysuria  MSk: no myalgias  Neuro: no headaches, no numbness/tingling      Objective:       Exam:  /60 (BP Location: Left arm, Patient Position: Sitting, BP Cuff Size: Adult)   Pulse 81   Temp 36.6 °C (97.9 °F) (Temporal)   Resp 14   Ht 1.778 m (5' 10\")   Wt 66.7 kg (147 lb)   SpO2 94%   BMI 21.09 kg/m²  Body mass index is 21.09 kg/m².    Gen: Alert and oriented, No apparent distress.  HEENT: Bilateral cerumen impaction, right more than left  Neck: Neck is supple without lymphadenopathy.  Lungs: Normal effort, CTA bilaterally, no wheezes, rhonchi, or rales  Abd: Soft, nontender, nondistended  CV: Regular rate and rhythm. No murmurs, rubs, or gallops.  Ext: No clubbing, cyanosis, edema.    Labs: 11/17/21 labs reviewed with patient    Procedure: [place] " Removal  Risks and benefits of procedure discussed with patient.  Cerumen removed with  lavage   Patient tolerated the procedure well  Post lavage curette performed by provider, Post procedure exam with clear canal and normal TM.        Assessment & Plan:     81 y.o. male with the following -     1. Bilateral impacted cerumen  New issue. Cerumen removal bilaterally completed.  Pt tolerated procedure well and TMs were normal post-procedure.  No acute complications. Pt educated about proper care of ear canal. Q-tip cleaning discouraged, use of debrox and warm water lavage discussed.  - US-RUQ; Future    2. Hyponatremia  Chronic, mild, asymptomatic.  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 Encouraged cutting back on alcohol, well balanced diet. Will monitor  - Basic Metabolic Panel; Future    3. Hypertension, unspecified type  Chronic, well controlled, asymptomatic. Continue current medications.  Will monitor  - Basic Metabolic Panel; Future  - CBC WITH DIFFERENTIAL; Future    4. Dyslipidemia  Chronic, well controlled, asymptomatic.  Continue statin.  Encouraged low cholesterol diet.    5. Vitamin B1 deficiency  Chronic issue secondary to alcohol intake.  Encouraged cutting back and abstaining from alcohol.  Pt declines assistance in cutting back.  Continue MV and daily thiamine supplement.  Will monitor.  - VITAMIN B1; Future    6. Lung nodules  Chronic issue, asymptomatic.  Pt has monitoring CT scan ordered and will follow-up with pulmonary s/p imaging.    7. Aneurysm of infrarenal abdominal aorta (HCC)  Newly noted issue on recent US, asymptomatic.  Continue to optimize cholesterol and HTN.  Plan for repeat US in 6 months or sooner if symptomatic.  Follow-up and Er precautions given. Patient expressed understanding and agreement with plan.    8. 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HCC)  Newly noted issue on recent US, asymptomatic.  Continue to optimize cholesterol and HTN.  Plan for repeat US in 6 months or sooner if  symptomatic.  Follow-up and Er precautions given. Patient expressed understanding and agreement with plan.      Return in about 3 months (around 2/18/2022) for Medication review.    Please note that this dictation was created using voice recognition software. I have made every reasonable attempt to correct obvious errors, but I expect that there are errors of grammar and possibly content that I did not discover before finalizing the note.

## 2021-11-17 ENCOUNTER — HOSPITAL ENCOUNTER (OUTPATIENT)
Dept: LAB | Facility: MEDICAL CENTER | Age: 81
End: 2021-11-17
Attending: FAMILY MEDICINE
Payer: MEDICARE

## 2021-11-17 DIAGNOSIS — E51.9 VITAMIN B1 DEFICIENCY: ICD-10-CM

## 2021-11-17 LAB
ALBUMIN SERPL BCP-MCNC: 4.7 G/DL (ref 3.2–4.9)
ALBUMIN/GLOB SERPL: 2.2 G/DL
ALP SERPL-CCNC: 101 U/L (ref 30–99)
ALT SERPL-CCNC: 12 U/L (ref 2–50)
ANION GAP SERPL CALC-SCNC: 9 MMOL/L (ref 7–16)
AST SERPL-CCNC: 24 U/L (ref 12–45)
BASOPHILS # BLD AUTO: 0.9 % (ref 0–1.8)
BASOPHILS # BLD: 0.05 K/UL (ref 0–0.12)
BILIRUB SERPL-MCNC: 0.8 MG/DL (ref 0.1–1.5)
BUN SERPL-MCNC: 8 MG/DL (ref 8–22)
CALCIUM SERPL-MCNC: 9.2 MG/DL (ref 8.5–10.5)
CHLORIDE SERPL-SCNC: 97 MMOL/L (ref 96–112)
CO2 SERPL-SCNC: 26 MMOL/L (ref 20–33)
CREAT SERPL-MCNC: 0.76 MG/DL (ref 0.5–1.4)
EOSINOPHIL # BLD AUTO: 0.3 K/UL (ref 0–0.51)
EOSINOPHIL NFR BLD: 5.2 % (ref 0–6.9)
ERYTHROCYTE [DISTWIDTH] IN BLOOD BY AUTOMATED COUNT: 47.2 FL (ref 35.9–50)
FASTING STATUS PATIENT QL REPORTED: NORMAL
GLOBULIN SER CALC-MCNC: 2.1 G/DL (ref 1.9–3.5)
GLUCOSE SERPL-MCNC: 79 MG/DL (ref 65–99)
HCT VFR BLD AUTO: 43.2 % (ref 42–52)
HGB BLD-MCNC: 14.4 G/DL (ref 14–18)
IMM GRANULOCYTES # BLD AUTO: 0.01 K/UL (ref 0–0.11)
IMM GRANULOCYTES NFR BLD AUTO: 0.2 % (ref 0–0.9)
LYMPHOCYTES # BLD AUTO: 2.32 K/UL (ref 1–4.8)
LYMPHOCYTES NFR BLD: 40.5 % (ref 22–41)
MCH RBC QN AUTO: 32.2 PG (ref 27–33)
MCHC RBC AUTO-ENTMCNC: 33.3 G/DL (ref 33.7–35.3)
MCV RBC AUTO: 96.6 FL (ref 81.4–97.8)
MONOCYTES # BLD AUTO: 0.49 K/UL (ref 0–0.85)
MONOCYTES NFR BLD AUTO: 8.6 % (ref 0–13.4)
NEUTROPHILS # BLD AUTO: 2.56 K/UL (ref 1.82–7.42)
NEUTROPHILS NFR BLD: 44.6 % (ref 44–72)
NRBC # BLD AUTO: 0 K/UL
NRBC BLD-RTO: 0 /100 WBC
PLATELET # BLD AUTO: 220 K/UL (ref 164–446)
PMV BLD AUTO: 10 FL (ref 9–12.9)
POTASSIUM SERPL-SCNC: 4.7 MMOL/L (ref 3.6–5.5)
PROT SERPL-MCNC: 6.8 G/DL (ref 6–8.2)
RBC # BLD AUTO: 4.47 M/UL (ref 4.7–6.1)
SODIUM SERPL-SCNC: 132 MMOL/L (ref 135–145)
WBC # BLD AUTO: 5.7 K/UL (ref 4.8–10.8)

## 2021-11-17 PROCEDURE — 85025 COMPLETE CBC W/AUTO DIFF WBC: CPT

## 2021-11-17 PROCEDURE — 84425 ASSAY OF VITAMIN B-1: CPT

## 2021-11-17 PROCEDURE — 80053 COMPREHEN METABOLIC PANEL: CPT

## 2021-11-17 PROCEDURE — 36415 COLL VENOUS BLD VENIPUNCTURE: CPT

## 2021-11-18 ENCOUNTER — OFFICE VISIT (OUTPATIENT)
Dept: MEDICAL GROUP | Facility: MEDICAL CENTER | Age: 81
End: 2021-11-18
Payer: MEDICARE

## 2021-11-18 VITALS
BODY MASS INDEX: 21.05 KG/M2 | SYSTOLIC BLOOD PRESSURE: 120 MMHG | HEIGHT: 70 IN | OXYGEN SATURATION: 94 % | HEART RATE: 81 BPM | WEIGHT: 147 LBS | TEMPERATURE: 97.9 F | RESPIRATION RATE: 14 BRPM | DIASTOLIC BLOOD PRESSURE: 60 MMHG

## 2021-11-18 DIAGNOSIS — E51.9 VITAMIN B1 DEFICIENCY: ICD-10-CM

## 2021-11-18 DIAGNOSIS — I71.43 ANEURYSM OF INFRARENAL ABDOMINAL AORTA (HCC): ICD-10-CM

## 2021-11-18 DIAGNOSIS — E87.1 HYPONATREMIA: ICD-10-CM

## 2021-11-18 DIAGNOSIS — I72.3 ILIAC ARTERY ANEURYSM (HCC): ICD-10-CM

## 2021-11-18 DIAGNOSIS — I10 HYPERTENSION, UNSPECIFIED TYPE: Chronic | ICD-10-CM

## 2021-11-18 DIAGNOSIS — R91.8 LUNG NODULES: ICD-10-CM

## 2021-11-18 DIAGNOSIS — H61.23 BILATERAL IMPACTED CERUMEN: ICD-10-CM

## 2021-11-18 DIAGNOSIS — E78.5 DYSLIPIDEMIA: Chronic | ICD-10-CM

## 2021-11-18 PROCEDURE — 99214 OFFICE O/P EST MOD 30 MIN: CPT | Performed by: FAMILY MEDICINE

## 2021-11-18 ASSESSMENT — FIBROSIS 4 INDEX: FIB4 SCORE: 2.55

## 2021-11-23 LAB — VIT B1 BLD-MCNC: 170 NMOL/L (ref 70–180)

## 2021-12-07 ENCOUNTER — HOSPITAL ENCOUNTER (OUTPATIENT)
Dept: RADIOLOGY | Facility: MEDICAL CENTER | Age: 81
End: 2021-12-07
Attending: PHYSICIAN ASSISTANT
Payer: MEDICARE

## 2021-12-07 DIAGNOSIS — R91.8 LUNG NODULES: ICD-10-CM

## 2021-12-07 PROCEDURE — 71250 CT THORAX DX C-: CPT | Mod: ME

## 2021-12-13 ENCOUNTER — OFFICE VISIT (OUTPATIENT)
Dept: SLEEP MEDICINE | Facility: MEDICAL CENTER | Age: 81
End: 2021-12-13
Payer: MEDICARE

## 2021-12-13 VITALS
SYSTOLIC BLOOD PRESSURE: 120 MMHG | RESPIRATION RATE: 16 BRPM | HEART RATE: 81 BPM | DIASTOLIC BLOOD PRESSURE: 80 MMHG | BODY MASS INDEX: 20.9 KG/M2 | WEIGHT: 146 LBS | HEIGHT: 70 IN | OXYGEN SATURATION: 93 %

## 2021-12-13 DIAGNOSIS — J44.9 CHRONIC OBSTRUCTIVE PULMONARY DISEASE, UNSPECIFIED COPD TYPE (HCC): ICD-10-CM

## 2021-12-13 DIAGNOSIS — G47.34 NOCTURNAL HYPOXIA: ICD-10-CM

## 2021-12-13 DIAGNOSIS — R91.8 ABNORMAL CT SCAN, LUNG: ICD-10-CM

## 2021-12-13 DIAGNOSIS — Z87.891 FORMER SMOKER: ICD-10-CM

## 2021-12-13 PROCEDURE — 99214 OFFICE O/P EST MOD 30 MIN: CPT | Performed by: PHYSICIAN ASSISTANT

## 2021-12-13 ASSESSMENT — ENCOUNTER SYMPTOMS
INSOMNIA: 0
PALPITATIONS: 0
SINUS PAIN: 0
FEVER: 0
TREMORS: 0
WEIGHT LOSS: 0
DIZZINESS: 0
HEADACHES: 0
CHILLS: 0
SHORTNESS OF BREATH: 1
WHEEZING: 0
COUGH: 1
SORE THROAT: 0
ORTHOPNEA: 0
HEARTBURN: 0
SPUTUM PRODUCTION: 0

## 2021-12-13 ASSESSMENT — FIBROSIS 4 INDEX: FIB4 SCORE: 2.55

## 2021-12-13 NOTE — PROGRESS NOTES
CC: Follow-up abnormal CT    HPI:  David Cho is a 81 y.o. year old male here today for follow-up on COPD.  Patient last seen in clinic 6/11/2021.  He is a former smoker with reported 50-pack-year history and quit date in 2016.    Pertinent past medical history includes nocturnal hypoxia, community-acquired pneumonia, abnormal CT scan, hypertension, infrarenal abdominal aortic aneurysm.    Reviewed in clinic vitals including /80, HR 80, O2 sat of 93% on room air and BMI of 20.95 kg/m².    Reviewed home medication regimen including Trelegy, albuterol.  Patient does wear O2 at 2 L at night.    Reviewed most recent imaging including CT scan obtained 12/7/2021 demonstrating previously seen presumed bullous change with in superior segment right lower lobe has increased in size now ill-defined bilobe cavitary mass superior measuring 2.8 x 2.9 cm.  Some interval increase in surrounding consolidation and thickening of the cavitary wall.  Calcified granuloma within the right lung apex, stable 3 mm nodule right upper lobe, stable 4 mm nodule within right upper lobe, stable 6 mm nodule within right middle lobe, stable 4 mm nodule right upper lobe anteriorly and inferiorly, other smaller nodules within the right middle lobe, emphysematous changes right middle lobe, stable 3 mm nodule left lower lobe, multiple areas of bullous changes.  Multiple mediastinal nodes present largest measures 9 mm short axis.  Mildly enlarged heart, mitral annular calcification.  Prominent central pulmonary arteries.    Echocardiogram obtained 12/25/2018 demonstrating normal left ventricular systolic function, LVEF estimated at 60%, grade 2 diastolic dysfunction, mildly dilated left atrium, moderate to severe mitral annular calcification, mild aortic insufficiency, estimated RVSP 55 mmHg, plaque seen in the ascending aorta.  Estimated RVSP significantly increased from 2016 estimation of 25 mmHg.     Pulmonary function testing obtained  6/11/2021 as compared to 9/18/2019 demonstrated an FEV1 of 1.64 L or 58% predicted was 66% prior, FVC of 2.84 L or 75% predicted was 77% predicted prior, FEV1/FVC ratio 58, residual volume 119% predicted value 142 prior total lung capacity 90% predicted was 100% prior, DLCO 66% predicted with 73% prior.  Per pulmonologist interpretation baseline spirometry shows airflow obstruction, borderline air trapping, reduced diffusion capacity consistent with diagnosis of COPD.     Review of Systems   Constitutional: Positive for malaise/fatigue. Negative for chills, fever and weight loss.   HENT: Positive for hearing loss. Negative for congestion, nosebleeds, sinus pain, sore throat and tinnitus.    Respiratory: Positive for cough (occasional ) and shortness of breath (with exertion). Negative for sputum production and wheezing.    Cardiovascular: Negative for chest pain, palpitations, orthopnea and leg swelling.   Gastrointestinal: Negative for heartburn.        Upper dentures, stuff gets caught in throat   Neurological: Negative for dizziness, tremors and headaches.   Psychiatric/Behavioral: The patient does not have insomnia.        Past Medical History:   Diagnosis Date   • Cholesterol serum elevated    • COPD (chronic obstructive pulmonary disease) (HCC)    • Depression    • Enlarged prostate    • Hypertension        Past Surgical History:   Procedure Laterality Date   • APPENDECTOMY     • OTHER  appendectomy       Family History   Problem Relation Age of Onset   • Heart Disease Mother    • Other Mother         turberculosis   • Cancer Father         colon cancer   • Heart Disease Step-Sister        Social History     Socioeconomic History   • Marital status:      Spouse name: Not on file   • Number of children: Not on file   • Years of education: Not on file   • Highest education level: Not on file   Occupational History   • Not on file   Tobacco Use   • Smoking status: Former Smoker     Packs/day: 1.00      "Years: 50.00     Pack years: 50.00     Types: Cigarettes     Quit date: 2016     Years since quittin.3   • Smokeless tobacco: Never Used   • Tobacco comment: continued abstinance   Vaping Use   • Vaping Use: Never used   Substance and Sexual Activity   • Alcohol use: Yes     Comment:  3 daily beer    • Drug use: No   • Sexual activity: Not on file   Other Topics Concern   • Not on file   Social History Narrative   • Not on file     Social Determinants of Health     Financial Resource Strain:    • Difficulty of Paying Living Expenses: Not on file   Food Insecurity:    • Worried About Running Out of Food in the Last Year: Not on file   • Ran Out of Food in the Last Year: Not on file   Transportation Needs:    • Lack of Transportation (Medical): Not on file   • Lack of Transportation (Non-Medical): Not on file   Physical Activity:    • Days of Exercise per Week: Not on file   • Minutes of Exercise per Session: Not on file   Stress:    • Feeling of Stress : Not on file   Social Connections:    • Frequency of Communication with Friends and Family: Not on file   • Frequency of Social Gatherings with Friends and Family: Not on file   • Attends Denominational Services: Not on file   • Active Member of Clubs or Organizations: Not on file   • Attends Club or Organization Meetings: Not on file   • Marital Status: Not on file   Intimate Partner Violence:    • Fear of Current or Ex-Partner: Not on file   • Emotionally Abused: Not on file   • Physically Abused: Not on file   • Sexually Abused: Not on file   Housing Stability:    • Unable to Pay for Housing in the Last Year: Not on file   • Number of Places Lived in the Last Year: Not on file   • Unstable Housing in the Last Year: Not on file       Allergies as of 2021   • (No Known Allergies)        @Vital signs for this encounter:  /80   Pulse 81   Resp 16   Ht 1.778 m (5' 10\")   Wt 66.2 kg (146 lb)   SpO2 93%     Current medications as of today   Current " Outpatient Medications   Medication Sig Dispense Refill   • buPROPion (WELLBUTRIN) 75 MG Tab TAKE 1 TABLET BY MOUTH EVERY DAY 30 Tablet 1   • Thiamine HCl (VITAMIN B1) 50 MG Tab Take 2 Tablets by mouth every day. 180 Tablet 2   • enalapril (VASOTEC) 20 MG tablet TAKE 1 TABLET BY MOUTH EVERY DAY 90 tablet 1   • finasteride (PROSCAR) 5 MG Tab TAKE 1 TABLET BY MOUTH EVERY DAY( GENERIC EQUIVALENT FOR PROSCAR) 90 tablet 3   • lovastatin (MEVACOR) 20 MG Tab TAKE 1 TABLET BY MOUTH EVERY DAY 90 tablet 1   • Fluticasone-Umeclidin-Vilant (TRELEGY ELLIPTA) 100-62.5-25 MCG/INH AEROSOL POWDER, BREATH ACTIVATED Inhale 1 Puff every day. Rinse mouth after use 1 Each 11   • albuterol 108 (90 Base) MCG/ACT Aero Soln inhalation aerosol INHALE 2 PUFFS BY MOUTH EVERY 4 HOURS AS NEEDED FOR SHORTNESS OF BREATH 1 Each 5   • ipratropium-albuterol (DUONEB) 0.5-2.5 (3) MG/3ML nebulizer solution INHALE 3 MLS VIA NEBULIZER EVERY 4 HOURS AS NEEDED FOR SHORTNESS OF BREATH (Patient not taking: Reported on 12/16/2020) 360 mL 3     No current facility-administered medications for this visit.         Physical Exam:   Gen:           Alert and oriented, No apparent distress. Mood and affect appropriate, normal interaction with provider.  Eyes:          sclere white, conjunctive moist.  Hearing:     Grossly intact.  Dentition:    Upper dentures, fair lower dentition  Oropharynx:   Tongue normal, posterior pharynx without erythema or exudate.  Neck:        Supple, trachea midline, no masses.  Respiratory Effort: No intercostal retractions or use of accessory muscles.   Lung Auscultation:      Decreased bases; no rales, rhonchi or wheezing.  CV:            Regular rate and rhythm. No edema. No murmurs, rubs or gallops.  Digits, Nails, Ext: No clubbing, cyanosis, petechiae, or nodes.   Skin:        No rashes, lesions or ulcers noted on exposed skin surfaces.                     Assessment:  1. Abnormal CT scan, lung  Bronchoscopy   2. Chronic obstructive  pulmonary disease, unspecified COPD type (HCC)  Fluticasone-Umeclidin-Vilant (TRELEGY ELLIPTA) 100-62.5-25 MCG/INH AEROSOL POWDER, BREATH ACTIVATED inhalation   3. Nocturnal hypoxia     4. Former smoker         Immunizations:    Flu: 10/8/2021  Pneumovax 23: 8/8/2018  Prevnar 13: 10/1/2020  Pfizer SARS-CoV-2 vaccine: 10/8/2021, 3/17/2021, 2/23/2021    Plan:  81 y.o. year old male here today for follow-up on CT results and COPD.  Patient last seen in clinic 6/11/2021.  He is a former smoker with reported 50-pack-year history and quit date in 2016.    Former smoker: Remains abstinent of tobacco since 2016.  Abnormal CT scan.    Pertinent past medical history includes nocturnal hypoxia, community-acquired pneumonia, abnormal CT scan, hypertension, infrarenal abdominal aortic aneurysm.    Reviewed in clinic vitals including /80, HR 80, O2 sat of 93% on room air and BMI of 20.95 kg/m².    Reviewed home medication regimen including Trelegy, albuterol.  Patient does wear O2 at 2 L at night.    Nocturnal hypoxia: Patient continues to use and benefit from oxygen at night.    COPD: Continues to use Trelegy with stated benefit. Sample provided.  Let office know if 3-month prescription is needed.    Abnormal CT scan: Now with bilobed cavitary mass concerning for malignancy versus atypical infection.  Reviewed with pulmonologist Dr. Abraham.  Bronchoscopy is recommended. Order placed.    Patient to follow-up in 3 months, sooner if needed.      This dictation was created using voice recognition software. The accuracy of the dictation is limited to the abilities of the software. I expect there may be some errors of grammar and possibly content.

## 2021-12-13 NOTE — PATIENT INSTRUCTIONS
1-reviewed CT imaging with pulmonologist  2-recommendation for bronchoscopy  3-our office will reach out to you for scheduling  4-sample of Trelegy  5-let us know if 3 month presc needed   6-follow up in 3 months

## 2021-12-27 ENCOUNTER — APPOINTMENT (OUTPATIENT)
Dept: RADIOLOGY | Facility: MEDICAL CENTER | Age: 81
End: 2021-12-27
Attending: EMERGENCY MEDICINE
Payer: MEDICARE

## 2021-12-27 ENCOUNTER — HOSPITAL ENCOUNTER (EMERGENCY)
Facility: MEDICAL CENTER | Age: 81
End: 2021-12-28
Attending: EMERGENCY MEDICINE
Payer: MEDICARE

## 2021-12-27 DIAGNOSIS — S09.90XA CLOSED HEAD INJURY, INITIAL ENCOUNTER: ICD-10-CM

## 2021-12-27 DIAGNOSIS — S01.01XA LACERATION OF SCALP, INITIAL ENCOUNTER: ICD-10-CM

## 2021-12-27 DIAGNOSIS — F10.929 ACUTE ALCOHOLIC INTOXICATION WITH COMPLICATION (HCC): ICD-10-CM

## 2021-12-27 PROCEDURE — 99284 EMERGENCY DEPT VISIT MOD MDM: CPT

## 2021-12-27 PROCEDURE — 90471 IMMUNIZATION ADMIN: CPT

## 2021-12-27 PROCEDURE — 700102 HCHG RX REV CODE 250 W/ 637 OVERRIDE(OP): Performed by: EMERGENCY MEDICINE

## 2021-12-27 PROCEDURE — 305308 HCHG STAPLER,SKIN,DISP.

## 2021-12-27 PROCEDURE — 700111 HCHG RX REV CODE 636 W/ 250 OVERRIDE (IP): Performed by: EMERGENCY MEDICINE

## 2021-12-27 PROCEDURE — 90715 TDAP VACCINE 7 YRS/> IM: CPT | Performed by: EMERGENCY MEDICINE

## 2021-12-27 PROCEDURE — 304999 HCHG REPAIR-SIMPLE/INTERMED LEVEL 1

## 2021-12-27 PROCEDURE — 700101 HCHG RX REV CODE 250

## 2021-12-27 PROCEDURE — 304217 HCHG IRRIGATION SYSTEM

## 2021-12-27 PROCEDURE — A9270 NON-COVERED ITEM OR SERVICE: HCPCS | Performed by: EMERGENCY MEDICINE

## 2021-12-27 PROCEDURE — 70450 CT HEAD/BRAIN W/O DYE: CPT | Mod: ME

## 2021-12-27 RX ORDER — LIDOCAINE HYDROCHLORIDE AND EPINEPHRINE 10; 10 MG/ML; UG/ML
INJECTION, SOLUTION INFILTRATION; PERINEURAL
Status: COMPLETED
Start: 2021-12-27 | End: 2021-12-27

## 2021-12-27 RX ORDER — LIDOCAINE HCL/EPINEPHRINE/PF 2%-1:200K
10 VIAL (ML) INJECTION ONCE
Status: DISCONTINUED | OUTPATIENT
Start: 2021-12-27 | End: 2021-12-28 | Stop reason: HOSPADM

## 2021-12-27 RX ORDER — ACETAMINOPHEN 500 MG
1000 TABLET ORAL ONCE
Status: COMPLETED | OUTPATIENT
Start: 2021-12-27 | End: 2021-12-27

## 2021-12-27 RX ADMIN — ACETAMINOPHEN 1000 MG: 500 TABLET, FILM COATED ORAL at 22:50

## 2021-12-27 RX ADMIN — LIDOCAINE HYDROCHLORIDE AND EPINEPHRINE: 10; 10 INJECTION, SOLUTION INFILTRATION; PERINEURAL at 23:00

## 2021-12-27 RX ADMIN — CLOSTRIDIUM TETANI TOXOID ANTIGEN (FORMALDEHYDE INACTIVATED), CORYNEBACTERIUM DIPHTHERIAE TOXOID ANTIGEN (FORMALDEHYDE INACTIVATED), BORDETELLA PERTUSSIS TOXOID ANTIGEN (GLUTARALDEHYDE INACTIVATED), BORDETELLA PERTUSSIS FILAMENTOUS HEMAGGLUTININ ANTIGEN (FORMALDEHYDE INACTIVATED), BORDETELLA PERTUSSIS PERTACTIN ANTIGEN, AND BORDETELLA PERTUSSIS FIMBRIAE 2/3 ANTIGEN 0.5 ML: 5; 2; 2.5; 5; 3; 5 INJECTION, SUSPENSION INTRAMUSCULAR at 22:40

## 2021-12-27 ASSESSMENT — FIBROSIS 4 INDEX: FIB4 SCORE: 2.55

## 2021-12-28 VITALS
WEIGHT: 150 LBS | BODY MASS INDEX: 21.47 KG/M2 | HEART RATE: 69 BPM | HEIGHT: 70 IN | RESPIRATION RATE: 18 BRPM | SYSTOLIC BLOOD PRESSURE: 148 MMHG | TEMPERATURE: 97.8 F | DIASTOLIC BLOOD PRESSURE: 84 MMHG | OXYGEN SATURATION: 94 %

## 2021-12-28 PROCEDURE — A9270 NON-COVERED ITEM OR SERVICE: HCPCS | Performed by: EMERGENCY MEDICINE

## 2021-12-28 PROCEDURE — 700102 HCHG RX REV CODE 250 W/ 637 OVERRIDE(OP): Performed by: EMERGENCY MEDICINE

## 2021-12-28 RX ORDER — IBUPROFEN 600 MG/1
600 TABLET ORAL ONCE
Status: COMPLETED | OUTPATIENT
Start: 2021-12-28 | End: 2021-12-28

## 2021-12-28 RX ADMIN — IBUPROFEN 600 MG: 600 TABLET, FILM COATED ORAL at 03:35

## 2021-12-28 NOTE — DISCHARGE INSTRUCTIONS
Please discuss the following findings with your regular doctor:  CT-HEAD W/O   Final Result      No noncontrast CT evidence of acute intracranial hemorrhage.      Moderate to severe white matter hypodensity is present.  This is a nonspecific finding which usually is found to represent chronic microvascular disease in patient's of this demographic.  Demyelination, age indeterminant ischemia and gliosis are also    common possibilities.      Age-appropriate atrophy

## 2021-12-28 NOTE — ED TRIAGE NOTES
"Chief Complaint   Patient presents with   • Fall     Patient brought in by EMS. Patient admits to being intoxicated and went take a step out his house. He missed the last step and had a fall. Denies blood thinners or LOC.        BP (!) 163/79   Pulse 65   Temp 36.6 °C (97.8 °F) (Temporal)   Resp 18   Ht 1.778 m (5' 10\")   Wt 68 kg (150 lb)   SpO2 95%     "

## 2021-12-28 NOTE — ED PROVIDER NOTES
ED Provider Note    Scribed for Israel Wilkerson M.D. by Rodríguez Brown. 2021  10:00 PM    Primary care provider: Katelyn Olea D.O.  Means of arrival: EMS  History obtained from: Patient  History limited by: None    CHIEF COMPLAINT  Chief Complaint   Patient presents with   • Fall     Patient brought in by EMS. Patient admits to being intoxicated and went take a step out his house. He missed the last step and had a fall. Denies blood thinners or LOC.        HPI  David Cho is a 81 y.o. male who presents to the Emergency Department via EMS for evaluation following a fall that occurred this evening. He had been drinking a beer and tequila this evening and while walking down the stairs he missed the last step. He fell and hit his forehead on the tile floor. No loss of consciousness, neck pain, back pain, hip pain, or chest pain. No blood thinners. He is unsure of his last tetanus.     REVIEW OF SYSTEMS  Pertinent positives include: fall, intoxication, and head injury. Pertinent negatives include: no loss of consciousness, neck pain, back pain, hip pain, or chest pain. See history of present illness. All other systems are negative.     PAST MEDICAL HISTORY   has a past medical history of Cholesterol serum elevated, COPD (chronic obstructive pulmonary disease) (HCC), Depression, Enlarged prostate, and Hypertension.    SURGICAL HISTORY   has a past surgical history that includes other (appendectomy) and appendectomy.    SOCIAL HISTORY  Social History     Tobacco Use   • Smoking status: Former Smoker     Packs/day: 1.00     Years: 50.00     Pack years: 50.00     Types: Cigarettes     Quit date: 2016     Years since quittin.3   • Smokeless tobacco: Never Used   • Tobacco comment: continued abstinance   Vaping Use   • Vaping Use: Never used   Substance Use Topics   • Alcohol use: Yes     Comment:  3 daily beer, currently intoxicated   • Drug use: No      Social History     Substance and Sexual  "Activity   Drug Use No       FAMILY HISTORY  Family History   Problem Relation Age of Onset   • Heart Disease Mother    • Other Mother         turberculosis   • Cancer Father         colon cancer   • Heart Disease Step-Sister        CURRENT MEDICATIONS  Home Medications     Reviewed by Angus Duvall R.N. (Registered Nurse) on 12/27/21 at 2142  Med List Status: Not Addressed   Medication Last Dose Status   albuterol 108 (90 Base) MCG/ACT Aero Soln inhalation aerosol  Active   buPROPion (WELLBUTRIN) 75 MG Tab  Active   enalapril (VASOTEC) 20 MG tablet  Active   finasteride (PROSCAR) 5 MG Tab  Active   Fluticasone-Umeclidin-Vilant (TRELEGY ELLIPTA) 100-62.5-25 MCG/INH AEROSOL POWDER, BREATH ACTIVATED  Active   Fluticasone-Umeclidin-Vilant (TRELEGY ELLIPTA) 100-62.5-25 MCG/INH AEROSOL POWDER, BREATH ACTIVATED inhalation  Active   ipratropium-albuterol (DUONEB) 0.5-2.5 (3) MG/3ML nebulizer solution  Active   lovastatin (MEVACOR) 20 MG Tab  Active   Thiamine HCl (VITAMIN B1) 50 MG Tab  Active                ALLERGIES  No Known Allergies    PHYSICAL EXAM  VITAL SIGNS: BP (!) 163/79   Pulse 65   Temp 36.6 °C (97.8 °F) (Temporal)   Resp 18   Ht 1.778 m (5' 10\")   Wt 68 kg (150 lb)   SpO2 95%   BMI 21.52 kg/m²     Constitutional: Alert in no apparent distress.  HENT: 4.5 cm linear laceration to forehead, Bilateral external ears normal, Nose normal. Uvula midline.   Eyes: Pupils are equal and reactive, Conjunctiva normal, Non-icteric.   Neck: Normal range of motion, No tenderness, Supple, No stridor.   Lymphatic: No lymphadenopathy noted.   Cardiovascular: Regular rate and rhythm, no murmurs.   Thorax & Lungs: Normal breath sounds, No respiratory distress, No wheezing, No chest tenderness.   Abdomen:  Soft, No tenderness, No peritoneal signs, No masses, No pulsatile masses.   Skin: Warm, Dry, No erythema, No rash.   Back: No bony tenderness, No CVA tenderness.   Extremities: Intact distal pulses, No edema, No tenderness, " No cyanosis.  Musculoskeletal: Good range of motion in all major joints. No tenderness to palpation or major deformities noted. Small abrasion to dorsum left 3rd finger, Skin tear lateral left forearm, Abrasion to right knee, Skin tear over left great toe  Neurologic: Alert , Normal motor function, Normal sensory function, No focal deficits noted.   Psychiatric: Affect normal, Judgment normal, Mood normal.     DIAGNOSTIC STUDIES / PROCEDURES    RADIOLOGY  CT-HEAD W/O   Final Result      No noncontrast CT evidence of acute intracranial hemorrhage.      Moderate to severe white matter hypodensity is present.  This is a nonspecific finding which usually is found to represent chronic microvascular disease in patient's of this demographic.  Demyelination, age indeterminant ischemia and gliosis are also    common possibilities.      Age-appropriate atrophy        The radiologist's interpretation of all radiological studies have been reviewed by me.    LACERATION REPAIR PROCEDURE NOTE  The patient's identification was confirmed and consent was obtained.  This procedure was performed by Dr. Wilkerson at 11:17 PM.  Site: Head  Sterile procedures observed  Anesthetic used (type and amt): Lidocaine-epinephrine 1%  Suture type/size: Staples  Length: 4.5 cm  # of Sutures: 9  Tetanus ordered  Site anesthetized, irrigated with NS, explored without evidence of foreign body, wound well approximated, site covered with dry, sterile dressing. Patient tolerated procedure well without complications. Instructions for care discussed verbally and patient provided with additional written instructions for homecare and f/u.    COURSE & MEDICAL DECISION MAKING  Nursing notes, VS, PMSFHx reviewed in chart.    81 y.o. male p/w chief complaint of a fall that occurred prior to arrival.    10:00 PM Patient seen and examined at bedside.      I verified that the patient was wearing a mask and I was wearing appropriate PPE every time I entered the room. The  patient's mask was on the patient at all times during my encounter except for a brief view of the oropharynx.     The differential diagnoses include but are not limited to:     #fall   Given close head injury and [place] head CT rule positive, CT head ordered for patient with no evidence of ICH  Patient not anticoagulated  Patient has no neck or back pain    Pt treated with Tylenol and tetanus updated  Head laceration requiring sutures, see procedure note above    Patient has no abdominal tenderness palpation  Patient ambulates with steady gait without assistance upon discharge home.    11:17 PM Performed laceration repair procedure. See above for details. His other wounds will be dressed in bandages as they are non suturable.     The patient will return for new or worsening symptoms and is stable at the time of discharge.    The patient is referred to a primary physician for blood pressure management, diabetic screening, and for all other preventative health concerns.    DISPOSITION:  Patient will be discharged home in stable condition.    FOLLOW UP:  Katelyn Olea D.O.  [address]  [address]  [place] [address]  [phone]    In 1 week  staple removal    [facility], Emergency Dept  [address]  [facility] [address] [phone]    If symptoms worsen      OUTPATIENT MEDICATIONS:  New Prescriptions    No medications on file     FINAL IMPRESSION  1. Closed head injury, initial encounter    2. Acute alcoholic intoxication with complication (HCC)    3. Laceration of scalp, initial encounter          Rodríguez COX (Trishibjuan c), am scribing for, and in the presence of, Israel Wilkerson M.D..    Electronically signed by: Rodríguez Brown (Devon), 12/27/2021    IIsrael M.D. personally performed the services described in this documentation, as scribed by Rodríguez Brown in my presence, and it is both accurate and complete.    The note accurately reflects work and  decisions made by me.  Israel Wilkerson M.D.  12/28/2021  2:59 AM

## 2021-12-29 ENCOUNTER — TELEPHONE (OUTPATIENT)
Dept: SLEEP MEDICINE | Facility: MEDICAL CENTER | Age: 81
End: 2021-12-29

## 2021-12-29 NOTE — TELEPHONE ENCOUNTER
Bronch ordered by Kenyatta Beasley. Mychart message sent to patient to see what dates upcoming would work best for him.

## 2022-01-04 NOTE — PROGRESS NOTES
Subjective:     CC: staple removal s/p ER laceration repair 21    HPI:   David presents today with     Pt was seen in the ER on 21 after falling while intoxicated.  He hit his forehead on a tile floor but had no LOC, neck pain, back pain, hip pain or chest pain.  He is not on blood thinners. CT head showed no hemorrhage.  He had 9 staples applied to his scalp and Td given.  States his arm scrapes and facial scrapes are healing.  No headaches or dizziness.  He has been applying antibiotic ointment to his scalp lesions 2-3 times per day.  No fevers, chills.  He feels that he tripped due to his shoes being sticky on the floor, but also dose endorse possible relationship to alcohol use.    Suspected COVID-19 virus infection  Onset: about 7 days ago.  Has been having congestion, runny nose.  Initially had cough but that has resolved. No wheezing.  No fevers or chills.  No nausea or vomiting.  He has been doing deep breathing and using his supplemental O2 to help improve his symptoms.  He was using 2L of supplemental O2 but is now using 2.5L.  Hoome O2s go less than 88% if off oxygen, but this improves when on oxygen.  He is taking his trelagy as prescribed.  States his symptoms are improving but not resolved.    Past Medical History:   Diagnosis Date   • Cholesterol serum elevated    • COPD (chronic obstructive pulmonary disease) (HCC)    • Depression    • Enlarged prostate    • Hypertension        Social History     Tobacco Use   • Smoking status: Former Smoker     Packs/day: 1.00     Years: 50.00     Pack years: 50.00     Types: Cigarettes     Quit date: 2016     Years since quittin.4   • Smokeless tobacco: Never Used   • Tobacco comment: continued abstinance   Vaping Use   • Vaping Use: Never used   Substance Use Topics   • Alcohol use: Yes     Comment:  3 daily beer, currently intoxicated   • Drug use: No       Current Outpatient Medications Ordered in Epic   Medication Sig Dispense Refill   •  amoxicillin-clavulanate (AUGMENTIN) 875-125 MG Tab Take 1 Tablet by mouth 2 times a day for 5 days. 10 Tablet 0   • predniSONE (DELTASONE) 20 MG Tab Take 1 Tablet by mouth 2 times a day for 5 days. 10 Tablet 0   • Fluticasone-Umeclidin-Vilant (TRELEGY ELLIPTA) 100-62.5-25 MCG/INH AEROSOL POWDER, BREATH ACTIVATED inhalation Inhale 1 Inhalation every day. Rinse mouth after use 1 Each 0   • buPROPion (WELLBUTRIN) 75 MG Tab TAKE 1 TABLET BY MOUTH EVERY DAY 30 Tablet 1   • Thiamine HCl (VITAMIN B1) 50 MG Tab Take 2 Tablets by mouth every day. 180 Tablet 2   • enalapril (VASOTEC) 20 MG tablet TAKE 1 TABLET BY MOUTH EVERY DAY 90 tablet 1   • finasteride (PROSCAR) 5 MG Tab TAKE 1 TABLET BY MOUTH EVERY DAY( GENERIC EQUIVALENT FOR PROSCAR) 90 tablet 3   • lovastatin (MEVACOR) 20 MG Tab TAKE 1 TABLET BY MOUTH EVERY DAY 90 tablet 1   • Fluticasone-Umeclidin-Vilant (TRELEGY ELLIPTA) 100-62.5-25 MCG/INH AEROSOL POWDER, BREATH ACTIVATED Inhale 1 Puff every day. Rinse mouth after use 1 Each 11   • albuterol 108 (90 Base) MCG/ACT Aero Soln inhalation aerosol INHALE 2 PUFFS BY MOUTH EVERY 4 HOURS AS NEEDED FOR SHORTNESS OF BREATH 1 Each 5   • ipratropium-albuterol (DUONEB) 0.5-2.5 (3) MG/3ML nebulizer solution INHALE 3 MLS VIA NEBULIZER EVERY 4 HOURS AS NEEDED FOR SHORTNESS OF BREATH (Patient not taking: Reported on 12/16/2020) 360 mL 3     No current [facility]-ordered facility-administered medications on file.       Allergies:  Patient has no known allergies.    Health Maintenance: plan to discuss zoster vaccine next visit.    ROS:  Gen: no changes in weight  Eyes: no changes in vision  ENT: no sore throat, no hearing loss, no bloody nose  Pulm: no wheezing  CV: no chest pain, no palpitations  GI: no nausea/vomiting, no diarrhea  : no dysuria  MSk: no myalgias  Skin: +abrasion on his right cheek, elbows are all improving.  Scalp lesion without discharge or bleeding  Neuro: no headaches, no numbness/tingling  Heme/Lymph: no easy  "bruising      Objective:       Exam:  /62 (BP Location: Right arm, Patient Position: Sitting, BP Cuff Size: Adult)   Pulse 72   Temp 37 °C (98.6 °F) (Temporal)   Resp 12   Ht 1.778 m (5' 10\")   Wt 66.2 kg (146 lb)   SpO2 88%   BMI 20.95 kg/m²  Body mass index is 20.95 kg/m².    Gen: Alert and oriented, No apparent distress.  HEENT: +9 staples located on his forehead with scab located along the staple line.  Good wound approximation.  No drainage, erythema or increased warmth.  +abrasion of his right cheek without drainage or swelling.  OP normal.  TMs boggy and erythematous.  TMs normal bilaterally.  Neck: Neck is supple without lymphadenopathy.  Lungs: Normal effort, CTA bilaterally, no wheezes, rhonchi, or rales  CV: Regular rate and rhythm. No murmurs, rubs, or gallops.  Ext: No clubbing, cyanosis, edema.    Staple removal: Risks and benefits of procedure discussed with patient who agreed with suture removal.  Lesion was cleaned.  9 staples were removed.  Pt had mild bleeding which resolved with application of sterile gauze.  Steri-strips applied and sterile gauze bandage applied.  No immediate complications were noted.  Pt tolerated the procedure without complaint of discomfort.  Follow-up precautions given for s/s of infection, bleeding, dehiscence or worsening symptoms. Patient expressed understanding and agreement with plan.    Assessment & Plan:     81 y.o. male with the following -     1. Suspected COVID-19 virus infection  New issue.  POCT covid test negative today, will send PCR out.  Encouraged home isolation for 10 days since onset of symptoms to be stopped only if PCR test is negative.  Encouraged rest, hydration, well balanced diet and avoiding alcohol use.  Follow-up precautions given.  - POCT SARS-COV Antigen BHAKTI (Symptomatic Only)  - SARS-CoV-2 PCR (24 hour In-House): Collect NP swab in VTM; Future    2. Laceration of scalp, subsequent encounter  New issue, improved s/p staples without " s/s of infection.  Sutures removed today as per above.  Pt tolerated procedure well and steri-strips with gauze applied.  Skin care and follow-up precautions given. Encouraged proper shoes and avoiding alcohol use to prevent recurrent falls. Patient expressed understanding and agreement with plan.    3. Chronic obstructive pulmonary disease, unspecified COPD type (HCC)  Chronic, worsened with bronchitis today.  Will start augmentin (SE profile discussed) and steroids.  SE profiles discussed.  Continue current inhalers and follow-up precautions given.  - amoxicillin-clavulanate (AUGMENTIN) 875-125 MG Tab; Take 1 Tablet by mouth 2 times a day for 5 days.  Dispense: 10 Tablet; Refill: 0    4. Acute bronchitis with COPD (HCC)  New issue with increased supplemental O2 use over the last week and need for his albuterol once yesterday.  Start abx and steroids (Se profile discussed).  Will check COVID19 PCR as per above. Close follow-up and UCC/ER precautions given for continued and worsening symptoms. Patient expressed understanding and agreement with plan.  - predniSONE (DELTASONE) 20 MG Tab; Take 1 Tablet by mouth 2 times a day for 5 days.  Dispense: 10 Tablet; Refill: 0      Return if symptoms worsen or fail to improve in 2 days, for otherwise, follow-up on 2/14/21 as scheduled.    Please note that this dictation was created using voice recognition software. I have made every reasonable attempt to correct obvious errors, but I expect that there are errors of grammar and possibly content that I did not discover before finalizing the note.

## 2022-01-05 ENCOUNTER — OFFICE VISIT (OUTPATIENT)
Dept: MEDICAL GROUP | Facility: MEDICAL CENTER | Age: 82
End: 2022-01-05
Payer: MEDICARE

## 2022-01-05 ENCOUNTER — HOSPITAL ENCOUNTER (OUTPATIENT)
Facility: MEDICAL CENTER | Age: 82
End: 2022-01-05
Attending: FAMILY MEDICINE
Payer: MEDICARE

## 2022-01-05 VITALS
BODY MASS INDEX: 20.9 KG/M2 | DIASTOLIC BLOOD PRESSURE: 62 MMHG | TEMPERATURE: 98.6 F | SYSTOLIC BLOOD PRESSURE: 124 MMHG | OXYGEN SATURATION: 88 % | HEIGHT: 70 IN | HEART RATE: 72 BPM | WEIGHT: 146 LBS | RESPIRATION RATE: 12 BRPM

## 2022-01-05 DIAGNOSIS — J44.9 CHRONIC OBSTRUCTIVE PULMONARY DISEASE, UNSPECIFIED COPD TYPE (HCC): Chronic | ICD-10-CM

## 2022-01-05 DIAGNOSIS — J44.0 ACUTE BRONCHITIS WITH COPD (HCC): ICD-10-CM

## 2022-01-05 DIAGNOSIS — J20.9 ACUTE BRONCHITIS WITH COPD (HCC): ICD-10-CM

## 2022-01-05 DIAGNOSIS — S01.01XD LACERATION OF SCALP, SUBSEQUENT ENCOUNTER: ICD-10-CM

## 2022-01-05 DIAGNOSIS — Z20.822 SUSPECTED COVID-19 VIRUS INFECTION: ICD-10-CM

## 2022-01-05 PROBLEM — S01.01XA LACERATION OF SCALP: Status: ACTIVE | Noted: 2022-01-05

## 2022-01-05 LAB
EXTERNAL QUALITY CONTROL: NORMAL
SARS-COV+SARS-COV-2 AG RESP QL IA.RAPID: NEGATIVE

## 2022-01-05 PROCEDURE — U0005 INFEC AGEN DETEC AMPLI PROBE: HCPCS

## 2022-01-05 PROCEDURE — 99214 OFFICE O/P EST MOD 30 MIN: CPT | Mod: CS | Performed by: FAMILY MEDICINE

## 2022-01-05 PROCEDURE — U0003 INFECTIOUS AGENT DETECTION BY NUCLEIC ACID (DNA OR RNA); SEVERE ACUTE RESPIRATORY SYNDROME CORONAVIRUS 2 (SARS-COV-2) (CORONAVIRUS DISEASE [COVID-19]), AMPLIFIED PROBE TECHNIQUE, MAKING USE OF HIGH THROUGHPUT TECHNOLOGIES AS DESCRIBED BY CMS-2020-01-R: HCPCS

## 2022-01-05 PROCEDURE — 87426 SARSCOV CORONAVIRUS AG IA: CPT | Performed by: FAMILY MEDICINE

## 2022-01-05 RX ORDER — PREDNISONE 20 MG/1
20 TABLET ORAL 2 TIMES DAILY
Qty: 10 TABLET | Refills: 0 | Status: ON HOLD | OUTPATIENT
Start: 2022-01-05 | End: 2022-01-12

## 2022-01-05 RX ORDER — AMOXICILLIN AND CLAVULANATE POTASSIUM 875; 125 MG/1; MG/1
1 TABLET, FILM COATED ORAL 2 TIMES DAILY
Qty: 10 TABLET | Refills: 0 | Status: ON HOLD | OUTPATIENT
Start: 2022-01-05 | End: 2022-01-12

## 2022-01-05 ASSESSMENT — PATIENT HEALTH QUESTIONNAIRE - PHQ9: CLINICAL INTERPRETATION OF PHQ2 SCORE: 0

## 2022-01-05 ASSESSMENT — FIBROSIS 4 INDEX: FIB4 SCORE: 2.55

## 2022-01-05 NOTE — ASSESSMENT & PLAN NOTE
Onset: about 7 days ago.  Has been having congestion, runny nose.  Initially had cough but that has resolved. No wheezing.  No fevers or chills.  No nausea or vomiting.  He has been doing deep breathing and using his supplemental O2 to help improve his symptoms.  He was using 2L of supplemental O2 but is now using 2.5L.  [place] O2s go less than 88% if off oxygen, but this improves when on oxygen.  He is taking his trelagy as prescribed.

## 2022-01-06 DIAGNOSIS — Z20.822 SUSPECTED COVID-19 VIRUS INFECTION: ICD-10-CM

## 2022-01-06 LAB — COVID ORDER STATUS COVID19: NORMAL

## 2022-01-07 LAB
SARS-COV-2 RNA RESP QL NAA+PROBE: NOTDETECTED
SPECIMEN SOURCE: NORMAL

## 2022-01-10 ENCOUNTER — APPOINTMENT (OUTPATIENT)
Dept: RADIOLOGY | Facility: MEDICAL CENTER | Age: 82
End: 2022-01-10
Attending: EMERGENCY MEDICINE
Payer: MEDICARE

## 2022-01-10 ENCOUNTER — HOSPITAL ENCOUNTER (OUTPATIENT)
Facility: MEDICAL CENTER | Age: 82
End: 2022-01-12
Attending: EMERGENCY MEDICINE | Admitting: HOSPITALIST
Payer: MEDICARE

## 2022-01-10 DIAGNOSIS — I63.9 CEREBROVASCULAR ACCIDENT (CVA), UNSPECIFIED MECHANISM (HCC): ICD-10-CM

## 2022-01-10 DIAGNOSIS — I10 HYPERTENSION, UNSPECIFIED TYPE: Chronic | ICD-10-CM

## 2022-01-10 DIAGNOSIS — Z78.9 ALCOHOL USE: ICD-10-CM

## 2022-01-10 DIAGNOSIS — R20.0 LEFT UPPER EXTREMITY NUMBNESS: ICD-10-CM

## 2022-01-10 DIAGNOSIS — I48.91 ATRIAL FIBRILLATION, UNSPECIFIED TYPE (HCC): ICD-10-CM

## 2022-01-10 PROBLEM — R47.81 SLURRED SPEECH: Status: ACTIVE | Noted: 2022-01-10

## 2022-01-10 PROBLEM — R47.9 SPEECH ABNORMALITY: Status: ACTIVE | Noted: 2022-01-10

## 2022-01-10 LAB
ABO GROUP BLD: NORMAL
ALBUMIN SERPL BCP-MCNC: 4.3 G/DL (ref 3.2–4.9)
ALBUMIN/GLOB SERPL: 1.8 G/DL
ALP SERPL-CCNC: 123 U/L (ref 30–99)
ALT SERPL-CCNC: 12 U/L (ref 2–50)
ANION GAP SERPL CALC-SCNC: 11 MMOL/L (ref 7–16)
ANION GAP SERPL CALC-SCNC: 13 MMOL/L (ref 7–16)
APTT PPP: 26.9 SEC (ref 24.7–36)
AST SERPL-CCNC: 26 U/L (ref 12–45)
BASOPHILS # BLD AUTO: 0 % (ref 0–1.8)
BASOPHILS # BLD: 0 K/UL (ref 0–0.12)
BILIRUB SERPL-MCNC: 0.8 MG/DL (ref 0.1–1.5)
BLD GP AB SCN SERPL QL: NORMAL
BUN SERPL-MCNC: 12 MG/DL (ref 8–22)
BUN SERPL-MCNC: 13 MG/DL (ref 8–22)
CALCIUM SERPL-MCNC: 8.7 MG/DL (ref 8.4–10.2)
CALCIUM SERPL-MCNC: 9.1 MG/DL (ref 8.4–10.2)
CHLORIDE SERPL-SCNC: 89 MMOL/L (ref 96–112)
CHLORIDE SERPL-SCNC: 89 MMOL/L (ref 96–112)
CO2 SERPL-SCNC: 25 MMOL/L (ref 20–33)
CO2 SERPL-SCNC: 25 MMOL/L (ref 20–33)
CREAT SERPL-MCNC: 0.65 MG/DL (ref 0.5–1.4)
CREAT SERPL-MCNC: 0.9 MG/DL (ref 0.5–1.4)
EKG IMPRESSION: NORMAL
EOSINOPHIL # BLD AUTO: 0.1 K/UL (ref 0–0.51)
EOSINOPHIL NFR BLD: 1 % (ref 0–6.9)
ERYTHROCYTE [DISTWIDTH] IN BLOOD BY AUTOMATED COUNT: 46.3 FL (ref 35.9–50)
EST. AVERAGE GLUCOSE BLD GHB EST-MCNC: 128 MG/DL
ETHANOL BLD-MCNC: <10.1 MG/DL (ref 0–10)
GLOBULIN SER CALC-MCNC: 2.4 G/DL (ref 1.9–3.5)
GLUCOSE BLD-MCNC: 136 MG/DL (ref 65–99)
GLUCOSE SERPL-MCNC: 121 MG/DL (ref 65–99)
GLUCOSE SERPL-MCNC: 132 MG/DL (ref 65–99)
HBA1C MFR BLD: 6.1 % (ref 4–5.6)
HCT VFR BLD AUTO: 45.1 % (ref 42–52)
HGB BLD-MCNC: 15.5 G/DL (ref 14–18)
INR PPP: 1.09 (ref 0.87–1.13)
LYMPHOCYTES # BLD AUTO: 1.58 K/UL (ref 1–4.8)
LYMPHOCYTES NFR BLD: 16 % (ref 22–41)
MANUAL DIFF BLD: NORMAL
MCH RBC QN AUTO: 32.3 PG (ref 27–33)
MCHC RBC AUTO-ENTMCNC: 34.4 G/DL (ref 33.7–35.3)
MCV RBC AUTO: 94 FL (ref 81.4–97.8)
METAMYELOCYTES NFR BLD MANUAL: 1 %
MONOCYTES # BLD AUTO: 0.59 K/UL (ref 0–0.85)
MONOCYTES NFR BLD AUTO: 6 % (ref 0–13.4)
NEUTROPHILS # BLD AUTO: 7.52 K/UL (ref 1.82–7.42)
NEUTROPHILS NFR BLD: 75 % (ref 44–72)
NEUTS BAND NFR BLD MANUAL: 1 % (ref 0–10)
NRBC # BLD AUTO: 0 K/UL
NRBC BLD-RTO: 0 /100 WBC
PLATELET # BLD AUTO: 358 K/UL (ref 164–446)
PLATELET BLD QL SMEAR: NORMAL
PMV BLD AUTO: 8.8 FL (ref 9–12.9)
POTASSIUM SERPL-SCNC: 3.8 MMOL/L (ref 3.6–5.5)
POTASSIUM SERPL-SCNC: 5.1 MMOL/L (ref 3.6–5.5)
PROT SERPL-MCNC: 6.7 G/DL (ref 6–8.2)
PROTHROMBIN TIME: 13.3 SEC (ref 12–14.6)
RBC # BLD AUTO: 4.8 M/UL (ref 4.7–6.1)
RBC BLD AUTO: NORMAL
RH BLD: NORMAL
SODIUM SERPL-SCNC: 125 MMOL/L (ref 135–145)
SODIUM SERPL-SCNC: 127 MMOL/L (ref 135–145)
TROPONIN T SERPL-MCNC: 16 NG/L (ref 6–19)
TROPONIN T SERPL-MCNC: 19 NG/L (ref 6–19)
WBC # BLD AUTO: 9.9 K/UL (ref 4.8–10.8)

## 2022-01-10 PROCEDURE — 93005 ELECTROCARDIOGRAM TRACING: CPT | Mod: 76 | Performed by: EMERGENCY MEDICINE

## 2022-01-10 PROCEDURE — 0042T CT-CEREBRAL PERFUSION ANALYSIS: CPT

## 2022-01-10 PROCEDURE — 700105 HCHG RX REV CODE 258: Performed by: HOSPITALIST

## 2022-01-10 PROCEDURE — 83036 HEMOGLOBIN GLYCOSYLATED A1C: CPT

## 2022-01-10 PROCEDURE — 86901 BLOOD TYPING SEROLOGIC RH(D): CPT

## 2022-01-10 PROCEDURE — 80048 BASIC METABOLIC PNL TOTAL CA: CPT

## 2022-01-10 PROCEDURE — 82077 ASSAY SPEC XCP UR&BREATH IA: CPT

## 2022-01-10 PROCEDURE — 96374 THER/PROPH/DIAG INJ IV PUSH: CPT | Mod: XU

## 2022-01-10 PROCEDURE — G1004 CDSM NDSC: HCPCS

## 2022-01-10 PROCEDURE — 85610 PROTHROMBIN TIME: CPT

## 2022-01-10 PROCEDURE — G0378 HOSPITAL OBSERVATION PER HR: HCPCS

## 2022-01-10 PROCEDURE — 85730 THROMBOPLASTIN TIME PARTIAL: CPT

## 2022-01-10 PROCEDURE — 80053 COMPREHEN METABOLIC PANEL: CPT

## 2022-01-10 PROCEDURE — 700111 HCHG RX REV CODE 636 W/ 250 OVERRIDE (IP): Performed by: HOSPITALIST

## 2022-01-10 PROCEDURE — 99220 PR INITIAL OBSERVATION CARE,LEVL III: CPT | Performed by: HOSPITALIST

## 2022-01-10 PROCEDURE — 85007 BL SMEAR W/DIFF WBC COUNT: CPT

## 2022-01-10 PROCEDURE — 86850 RBC ANTIBODY SCREEN: CPT

## 2022-01-10 PROCEDURE — 86900 BLOOD TYPING SEROLOGIC ABO: CPT

## 2022-01-10 PROCEDURE — 71045 X-RAY EXAM CHEST 1 VIEW: CPT

## 2022-01-10 PROCEDURE — 82962 GLUCOSE BLOOD TEST: CPT

## 2022-01-10 PROCEDURE — 99285 EMERGENCY DEPT VISIT HI MDM: CPT

## 2022-01-10 PROCEDURE — 700111 HCHG RX REV CODE 636 W/ 250 OVERRIDE (IP): Performed by: EMERGENCY MEDICINE

## 2022-01-10 PROCEDURE — 36415 COLL VENOUS BLD VENIPUNCTURE: CPT

## 2022-01-10 PROCEDURE — 700117 HCHG RX CONTRAST REV CODE 255: Performed by: EMERGENCY MEDICINE

## 2022-01-10 PROCEDURE — 84484 ASSAY OF TROPONIN QUANT: CPT

## 2022-01-10 PROCEDURE — 94760 N-INVAS EAR/PLS OXIMETRY 1: CPT

## 2022-01-10 PROCEDURE — 85027 COMPLETE CBC AUTOMATED: CPT

## 2022-01-10 PROCEDURE — 70496 CT ANGIOGRAPHY HEAD: CPT | Mod: MG

## 2022-01-10 PROCEDURE — 70498 CT ANGIOGRAPHY NECK: CPT | Mod: MG

## 2022-01-10 RX ORDER — CLOPIDOGREL BISULFATE 75 MG/1
75 TABLET ORAL DAILY
Status: DISCONTINUED | OUTPATIENT
Start: 2022-01-10 | End: 2022-01-10

## 2022-01-10 RX ORDER — ALBUTEROL SULFATE 90 UG/1
2 AEROSOL, METERED RESPIRATORY (INHALATION) EVERY 4 HOURS PRN
Status: DISCONTINUED | OUTPATIENT
Start: 2022-01-10 | End: 2022-01-12 | Stop reason: HOSPADM

## 2022-01-10 RX ORDER — ONDANSETRON 4 MG/1
4 TABLET, ORALLY DISINTEGRATING ORAL EVERY 4 HOURS PRN
Status: DISCONTINUED | OUTPATIENT
Start: 2022-01-10 | End: 2022-01-12 | Stop reason: HOSPADM

## 2022-01-10 RX ORDER — BUPROPION HYDROCHLORIDE 150 MG/1
150 TABLET ORAL DAILY
Status: ON HOLD | COMMUNITY
Start: 2021-12-07 | End: 2022-01-18

## 2022-01-10 RX ORDER — ONDANSETRON 2 MG/ML
4 INJECTION INTRAMUSCULAR; INTRAVENOUS EVERY 4 HOURS PRN
Status: DISCONTINUED | OUTPATIENT
Start: 2022-01-10 | End: 2022-01-12 | Stop reason: HOSPADM

## 2022-01-10 RX ORDER — FINASTERIDE 5 MG/1
5 TABLET, FILM COATED ORAL DAILY
Status: DISCONTINUED | OUTPATIENT
Start: 2022-01-11 | End: 2022-01-12 | Stop reason: HOSPADM

## 2022-01-10 RX ORDER — BUPROPION HYDROCHLORIDE 150 MG/1
150 TABLET, EXTENDED RELEASE ORAL DAILY
Status: DISCONTINUED | OUTPATIENT
Start: 2022-01-11 | End: 2022-01-12 | Stop reason: HOSPADM

## 2022-01-10 RX ORDER — ENALAPRILAT 1.25 MG/ML
1.25 INJECTION INTRAVENOUS EVERY 6 HOURS PRN
Status: DISCONTINUED | OUTPATIENT
Start: 2022-01-10 | End: 2022-01-12

## 2022-01-10 RX ORDER — GAUZE BANDAGE 2" X 2"
100 BANDAGE TOPICAL DAILY
Status: DISCONTINUED | OUTPATIENT
Start: 2022-01-11 | End: 2022-01-12 | Stop reason: HOSPADM

## 2022-01-10 RX ORDER — ALUMINA, MAGNESIA, AND SIMETHICONE 2400; 2400; 240 MG/30ML; MG/30ML; MG/30ML
30 SUSPENSION ORAL EVERY 4 HOURS PRN
Status: ACTIVE | OUTPATIENT
Start: 2022-01-10 | End: 2022-01-11

## 2022-01-10 RX ORDER — HYDRALAZINE HYDROCHLORIDE 20 MG/ML
20 INJECTION INTRAMUSCULAR; INTRAVENOUS EVERY 6 HOURS PRN
Status: DISCONTINUED | OUTPATIENT
Start: 2022-01-10 | End: 2022-01-12

## 2022-01-10 RX ORDER — M-VIT,TX,IRON,MINS/CALC/FOLIC 27MG-0.4MG
1 TABLET ORAL DAILY
Status: DISCONTINUED | OUTPATIENT
Start: 2022-01-11 | End: 2022-01-12 | Stop reason: HOSPADM

## 2022-01-10 RX ORDER — ENALAPRILAT 1.25 MG/ML
1.25 INJECTION INTRAVENOUS EVERY 6 HOURS PRN
Status: DISCONTINUED | OUTPATIENT
Start: 2022-01-10 | End: 2022-01-10

## 2022-01-10 RX ORDER — LABETALOL HYDROCHLORIDE 5 MG/ML
10 INJECTION, SOLUTION INTRAVENOUS EVERY 4 HOURS PRN
Status: DISCONTINUED | OUTPATIENT
Start: 2022-01-10 | End: 2022-01-10

## 2022-01-10 RX ORDER — AMOXICILLIN 250 MG
2 CAPSULE ORAL 2 TIMES DAILY
Status: DISCONTINUED | OUTPATIENT
Start: 2022-01-11 | End: 2022-01-12 | Stop reason: HOSPADM

## 2022-01-10 RX ORDER — HEPARIN SODIUM 5000 [USP'U]/ML
5000 INJECTION, SOLUTION INTRAVENOUS; SUBCUTANEOUS EVERY 8 HOURS
Status: DISCONTINUED | OUTPATIENT
Start: 2022-01-10 | End: 2022-01-10

## 2022-01-10 RX ORDER — FOLIC ACID 1 MG/1
1 TABLET ORAL DAILY
Status: DISCONTINUED | OUTPATIENT
Start: 2022-01-11 | End: 2022-01-12 | Stop reason: HOSPADM

## 2022-01-10 RX ORDER — HYDRALAZINE HYDROCHLORIDE 20 MG/ML
10 INJECTION INTRAMUSCULAR; INTRAVENOUS ONCE
Status: COMPLETED | OUTPATIENT
Start: 2022-01-10 | End: 2022-01-10

## 2022-01-10 RX ORDER — SODIUM CHLORIDE 9 MG/ML
1000 INJECTION, SOLUTION INTRAVENOUS CONTINUOUS
Status: ACTIVE | OUTPATIENT
Start: 2022-01-10 | End: 2022-01-11

## 2022-01-10 RX ORDER — BISACODYL 10 MG
10 SUPPOSITORY, RECTAL RECTAL
Status: DISCONTINUED | OUTPATIENT
Start: 2022-01-10 | End: 2022-01-12 | Stop reason: HOSPADM

## 2022-01-10 RX ORDER — POLYETHYLENE GLYCOL 3350 17 G/17G
1 POWDER, FOR SOLUTION ORAL
Status: DISCONTINUED | OUTPATIENT
Start: 2022-01-10 | End: 2022-01-12 | Stop reason: HOSPADM

## 2022-01-10 RX ORDER — LOVASTATIN 20 MG/1
20 TABLET ORAL
Status: DISCONTINUED | OUTPATIENT
Start: 2022-01-10 | End: 2022-01-11

## 2022-01-10 RX ORDER — ACETAMINOPHEN 325 MG/1
650 TABLET ORAL EVERY 6 HOURS PRN
Status: DISCONTINUED | OUTPATIENT
Start: 2022-01-10 | End: 2022-01-12 | Stop reason: HOSPADM

## 2022-01-10 RX ORDER — FLUTICASONE PROPIONATE 44 UG/1
2 AEROSOL, METERED RESPIRATORY (INHALATION)
Status: DISCONTINUED | OUTPATIENT
Start: 2022-01-11 | End: 2022-01-12 | Stop reason: HOSPADM

## 2022-01-10 RX ORDER — HYDRALAZINE HYDROCHLORIDE 20 MG/ML
20 INJECTION INTRAMUSCULAR; INTRAVENOUS EVERY 6 HOURS PRN
Status: DISCONTINUED | OUTPATIENT
Start: 2022-01-10 | End: 2022-01-10

## 2022-01-10 RX ADMIN — SODIUM CHLORIDE 1000 ML: 9 INJECTION, SOLUTION INTRAVENOUS at 18:16

## 2022-01-10 RX ADMIN — IOHEXOL 40 ML: 350 INJECTION, SOLUTION INTRAVENOUS at 15:57

## 2022-01-10 RX ADMIN — HYDRALAZINE HYDROCHLORIDE 10 MG: 20 INJECTION INTRAMUSCULAR; INTRAVENOUS at 17:19

## 2022-01-10 RX ADMIN — IOHEXOL 80 ML: 350 INJECTION, SOLUTION INTRAVENOUS at 15:58

## 2022-01-10 RX ADMIN — HYDRALAZINE HYDROCHLORIDE 20 MG: 20 INJECTION INTRAMUSCULAR; INTRAVENOUS at 19:49

## 2022-01-10 ASSESSMENT — ENCOUNTER SYMPTOMS
SHORTNESS OF BREATH: 0
COUGH: 0
EYE REDNESS: 0
FEVER: 0
SPEECH CHANGE: 1
SENSORY CHANGE: 1
CHILLS: 0
ABDOMINAL PAIN: 0
STRIDOR: 0
EYE DISCHARGE: 0
NERVOUS/ANXIOUS: 0
FLANK PAIN: 0
FOCAL WEAKNESS: 1
MYALGIAS: 0
VOMITING: 0
BRUISES/BLEEDS EASILY: 0

## 2022-01-10 ASSESSMENT — LIFESTYLE VARIABLES
HOW MANY TIMES IN THE PAST YEAR HAVE YOU HAD 5 OR MORE DRINKS IN A DAY: 0
HAVE YOU EVER FELT YOU SHOULD CUT DOWN ON YOUR DRINKING: NO
TOTAL SCORE: 0
EVER FELT BAD OR GUILTY ABOUT YOUR DRINKING: NO
ALCOHOL_USE: YES
HAVE PEOPLE ANNOYED YOU BY CRITICIZING YOUR DRINKING: NO
CONSUMPTION TOTAL: NEGATIVE
EVER HAD A DRINK FIRST THING IN THE MORNING TO STEADY YOUR NERVES TO GET RID OF A HANGOVER: NO
TOTAL SCORE: 0
ON A TYPICAL DAY WHEN YOU DRINK ALCOHOL HOW MANY DRINKS DO YOU HAVE: 2
TOTAL SCORE: 0
AVERAGE NUMBER OF DAYS PER WEEK YOU HAVE A DRINK CONTAINING ALCOHOL: 7

## 2022-01-10 ASSESSMENT — COGNITIVE AND FUNCTIONAL STATUS - GENERAL
DAILY ACTIVITIY SCORE: 21
CLIMB 3 TO 5 STEPS WITH RAILING: A LITTLE
DRESSING REGULAR LOWER BODY CLOTHING: A LITTLE
SUGGESTED CMS G CODE MODIFIER MOBILITY: CJ
TOILETING: A LITTLE
MOBILITY SCORE: 22
WALKING IN HOSPITAL ROOM: A LITTLE
HELP NEEDED FOR BATHING: A LITTLE
SUGGESTED CMS G CODE MODIFIER DAILY ACTIVITY: CJ

## 2022-01-10 ASSESSMENT — FIBROSIS 4 INDEX
FIB4 SCORE: 2.55
FIB4 SCORE: 1.7

## 2022-01-10 ASSESSMENT — PAIN DESCRIPTION - PAIN TYPE: TYPE: ACUTE PAIN

## 2022-01-10 NOTE — ED TRIAGE NOTES
Chief Complaint   Patient presents with   • Facial Droop     Left eye, unsure when occurred   • Numbness     Pt states TEE when numb around 1400 today, states is unable to use hand to hold anything     Has this patient been vaccinated for COVID YES  If not, would they like to be vaccinated while in the ER if eligible?  na  Would the patient like to speak with the ERP about the possibility of receiving the COVID vaccine today before making a decision? Na

## 2022-01-11 ENCOUNTER — APPOINTMENT (OUTPATIENT)
Dept: RADIOLOGY | Facility: MEDICAL CENTER | Age: 82
End: 2022-01-11
Attending: HOSPITALIST
Payer: MEDICARE

## 2022-01-11 ENCOUNTER — APPOINTMENT (OUTPATIENT)
Dept: CARDIOLOGY | Facility: MEDICAL CENTER | Age: 82
End: 2022-01-11
Attending: HOSPITALIST
Payer: MEDICARE

## 2022-01-11 PROBLEM — I63.9 CVA (CEREBRAL VASCULAR ACCIDENT) (HCC): Status: ACTIVE | Noted: 2022-01-11

## 2022-01-11 PROBLEM — N40.0 BPH (BENIGN PROSTATIC HYPERPLASIA): Status: ACTIVE | Noted: 2022-01-11

## 2022-01-11 LAB
ALBUMIN SERPL BCP-MCNC: 3.6 G/DL (ref 3.2–4.9)
ALBUMIN/GLOB SERPL: 2 G/DL
ALP SERPL-CCNC: 100 U/L (ref 30–99)
ALT SERPL-CCNC: 8 U/L (ref 2–50)
ANION GAP SERPL CALC-SCNC: 10 MMOL/L (ref 7–16)
ANION GAP SERPL CALC-SCNC: 12 MMOL/L (ref 7–16)
ANION GAP SERPL CALC-SCNC: 9 MMOL/L (ref 7–16)
AST SERPL-CCNC: 16 U/L (ref 12–45)
BASOPHILS # BLD AUTO: 0.4 % (ref 0–1.8)
BASOPHILS # BLD: 0.04 K/UL (ref 0–0.12)
BILIRUB SERPL-MCNC: 0.8 MG/DL (ref 0.1–1.5)
BUN SERPL-MCNC: 11 MG/DL (ref 8–22)
CALCIUM SERPL-MCNC: 8.3 MG/DL (ref 8.4–10.2)
CALCIUM SERPL-MCNC: 8.4 MG/DL (ref 8.4–10.2)
CALCIUM SERPL-MCNC: 8.6 MG/DL (ref 8.4–10.2)
CHLORIDE SERPL-SCNC: 91 MMOL/L (ref 96–112)
CHLORIDE SERPL-SCNC: 92 MMOL/L (ref 96–112)
CHLORIDE SERPL-SCNC: 93 MMOL/L (ref 96–112)
CHOLEST SERPL-MCNC: 145 MG/DL (ref 100–199)
CO2 SERPL-SCNC: 25 MMOL/L (ref 20–33)
CO2 SERPL-SCNC: 25 MMOL/L (ref 20–33)
CO2 SERPL-SCNC: 26 MMOL/L (ref 20–33)
CREAT SERPL-MCNC: 0.61 MG/DL (ref 0.5–1.4)
CREAT SERPL-MCNC: 0.63 MG/DL (ref 0.5–1.4)
CREAT SERPL-MCNC: 0.66 MG/DL (ref 0.5–1.4)
EKG IMPRESSION: NORMAL
EKG IMPRESSION: NORMAL
EOSINOPHIL # BLD AUTO: 0.08 K/UL (ref 0–0.51)
EOSINOPHIL NFR BLD: 0.8 % (ref 0–6.9)
ERYTHROCYTE [DISTWIDTH] IN BLOOD BY AUTOMATED COUNT: 46.3 FL (ref 35.9–50)
GLOBULIN SER CALC-MCNC: 1.8 G/DL (ref 1.9–3.5)
GLUCOSE SERPL-MCNC: 87 MG/DL (ref 65–99)
GLUCOSE SERPL-MCNC: 96 MG/DL (ref 65–99)
GLUCOSE SERPL-MCNC: 99 MG/DL (ref 65–99)
HCT VFR BLD AUTO: 41.4 % (ref 42–52)
HDLC SERPL-MCNC: 71 MG/DL
HGB BLD-MCNC: 14 G/DL (ref 14–18)
IMM GRANULOCYTES # BLD AUTO: 0.16 K/UL (ref 0–0.11)
IMM GRANULOCYTES NFR BLD AUTO: 1.7 % (ref 0–0.9)
LDLC SERPL CALC-MCNC: 56 MG/DL
LV EJECT FRACT  99904: 60
LV EJECT FRACT MOD 2C 99903: 59.65
LV EJECT FRACT MOD 4C 99902: 60.13
LV EJECT FRACT MOD BP 99901: 58.97
LYMPHOCYTES # BLD AUTO: 2.97 K/UL (ref 1–4.8)
LYMPHOCYTES NFR BLD: 31.3 % (ref 22–41)
MAGNESIUM SERPL-MCNC: 1.9 MG/DL (ref 1.5–2.5)
MCH RBC QN AUTO: 31.7 PG (ref 27–33)
MCHC RBC AUTO-ENTMCNC: 33.8 G/DL (ref 33.7–35.3)
MCV RBC AUTO: 93.9 FL (ref 81.4–97.8)
MONOCYTES # BLD AUTO: 1.05 K/UL (ref 0–0.85)
MONOCYTES NFR BLD AUTO: 11.1 % (ref 0–13.4)
NEUTROPHILS # BLD AUTO: 5.18 K/UL (ref 1.82–7.42)
NEUTROPHILS NFR BLD: 54.7 % (ref 44–72)
NRBC # BLD AUTO: 0 K/UL
NRBC BLD-RTO: 0 /100 WBC
PLATELET # BLD AUTO: 307 K/UL (ref 164–446)
PMV BLD AUTO: 8.9 FL (ref 9–12.9)
POTASSIUM SERPL-SCNC: 3.6 MMOL/L (ref 3.6–5.5)
POTASSIUM SERPL-SCNC: 3.6 MMOL/L (ref 3.6–5.5)
POTASSIUM SERPL-SCNC: 3.7 MMOL/L (ref 3.6–5.5)
PROT SERPL-MCNC: 5.4 G/DL (ref 6–8.2)
RBC # BLD AUTO: 4.41 M/UL (ref 4.7–6.1)
SODIUM SERPL-SCNC: 127 MMOL/L (ref 135–145)
SODIUM SERPL-SCNC: 128 MMOL/L (ref 135–145)
SODIUM SERPL-SCNC: 128 MMOL/L (ref 135–145)
TRIGL SERPL-MCNC: 88 MG/DL (ref 0–149)
TROPONIN T SERPL-MCNC: 18 NG/L (ref 6–19)
TSH SERPL DL<=0.005 MIU/L-ACNC: 2.21 UIU/ML (ref 0.38–5.33)
WBC # BLD AUTO: 9.5 K/UL (ref 4.8–10.8)

## 2022-01-11 PROCEDURE — 700111 HCHG RX REV CODE 636 W/ 250 OVERRIDE (IP): Performed by: HOSPITALIST

## 2022-01-11 PROCEDURE — 92610 EVALUATE SWALLOWING FUNCTION: CPT

## 2022-01-11 PROCEDURE — 93306 TTE W/DOPPLER COMPLETE: CPT | Mod: 26 | Performed by: INTERNAL MEDICINE

## 2022-01-11 PROCEDURE — 84484 ASSAY OF TROPONIN QUANT: CPT

## 2022-01-11 PROCEDURE — 93010 ELECTROCARDIOGRAM REPORT: CPT | Performed by: INTERNAL MEDICINE

## 2022-01-11 PROCEDURE — 70551 MRI BRAIN STEM W/O DYE: CPT | Mod: ME

## 2022-01-11 PROCEDURE — 93306 TTE W/DOPPLER COMPLETE: CPT

## 2022-01-11 PROCEDURE — 84443 ASSAY THYROID STIM HORMONE: CPT

## 2022-01-11 PROCEDURE — 80048 BASIC METABOLIC PNL TOTAL CA: CPT

## 2022-01-11 PROCEDURE — 97535 SELF CARE MNGMENT TRAINING: CPT

## 2022-01-11 PROCEDURE — 93005 ELECTROCARDIOGRAM TRACING: CPT | Performed by: HOSPITALIST

## 2022-01-11 PROCEDURE — 96372 THER/PROPH/DIAG INJ SC/IM: CPT

## 2022-01-11 PROCEDURE — 700102 HCHG RX REV CODE 250 W/ 637 OVERRIDE(OP): Performed by: HOSPITALIST

## 2022-01-11 PROCEDURE — A9270 NON-COVERED ITEM OR SERVICE: HCPCS | Performed by: HOSPITALIST

## 2022-01-11 PROCEDURE — G0378 HOSPITAL OBSERVATION PER HR: HCPCS

## 2022-01-11 PROCEDURE — 97166 OT EVAL MOD COMPLEX 45 MIN: CPT

## 2022-01-11 PROCEDURE — 83735 ASSAY OF MAGNESIUM: CPT

## 2022-01-11 PROCEDURE — 85025 COMPLETE CBC W/AUTO DIFF WBC: CPT

## 2022-01-11 PROCEDURE — 99226 PR SUBSEQUENT OBSERVATION CARE,LEVEL III: CPT | Performed by: INTERNAL MEDICINE

## 2022-01-11 PROCEDURE — 700102 HCHG RX REV CODE 250 W/ 637 OVERRIDE(OP): Performed by: INTERNAL MEDICINE

## 2022-01-11 PROCEDURE — 80061 LIPID PANEL: CPT

## 2022-01-11 PROCEDURE — 97162 PT EVAL MOD COMPLEX 30 MIN: CPT

## 2022-01-11 PROCEDURE — A9270 NON-COVERED ITEM OR SERVICE: HCPCS | Performed by: INTERNAL MEDICINE

## 2022-01-11 PROCEDURE — 80053 COMPREHEN METABOLIC PANEL: CPT

## 2022-01-11 RX ORDER — ATORVASTATIN CALCIUM 40 MG/1
40 TABLET, FILM COATED ORAL EVERY EVENING
Status: DISCONTINUED | OUTPATIENT
Start: 2022-01-11 | End: 2022-01-12 | Stop reason: HOSPADM

## 2022-01-11 RX ADMIN — APIXABAN 5 MG: 5 TABLET, FILM COATED ORAL at 17:24

## 2022-01-11 RX ADMIN — FINASTERIDE 5 MG: 5 TABLET, FILM COATED ORAL at 05:03

## 2022-01-11 RX ADMIN — ACETAMINOPHEN 650 MG: 325 TABLET, FILM COATED ORAL at 12:44

## 2022-01-11 RX ADMIN — ATORVASTATIN CALCIUM 40 MG: 40 TABLET, FILM COATED ORAL at 17:22

## 2022-01-11 RX ADMIN — MULTIPLE VITAMINS W/ MINERALS TAB 1 TABLET: TAB at 05:03

## 2022-01-11 RX ADMIN — THIAMINE HCL TAB 100 MG 100 MG: 100 TAB at 05:03

## 2022-01-11 RX ADMIN — ENOXAPARIN SODIUM 40 MG: 40 INJECTION SUBCUTANEOUS at 05:03

## 2022-01-11 RX ADMIN — FOLIC ACID 1 MG: 1 TABLET ORAL at 05:03

## 2022-01-11 RX ADMIN — BUPROPION HYDROCHLORIDE 150 MG: 150 TABLET, EXTENDED RELEASE ORAL at 05:03

## 2022-01-11 ASSESSMENT — ENCOUNTER SYMPTOMS
BLURRED VISION: 0
HEARTBURN: 0
BACK PAIN: 0
FEVER: 0
WEAKNESS: 1
HEADACHES: 0
DOUBLE VISION: 0
VOMITING: 0
DIZZINESS: 0
SHORTNESS OF BREATH: 0
CHILLS: 0
ABDOMINAL PAIN: 0
NERVOUS/ANXIOUS: 0
PALPITATIONS: 0
COUGH: 0
FALLS: 0

## 2022-01-11 ASSESSMENT — CHA2DS2 SCORE
AGE 65 TO 74: NO
HYPERTENSION: YES
CHF OR LEFT VENTRICULAR DYSFUNCTION: NO
DIABETES: NO
PRIOR STROKE OR TIA OR THROMBOEMBOLISM: NO
VASCULAR DISEASE: NO
AGE 75 OR GREATER: YES
SEX: MALE
CHA2DS2 VASC SCORE: 3

## 2022-01-11 ASSESSMENT — COGNITIVE AND FUNCTIONAL STATUS - GENERAL
MOVING FROM LYING ON BACK TO SITTING ON SIDE OF FLAT BED: A LITTLE
MOBILITY SCORE: 20
CLIMB 3 TO 5 STEPS WITH RAILING: A LITTLE
DAILY ACTIVITIY SCORE: 14
WALKING IN HOSPITAL ROOM: A LITTLE
SUGGESTED CMS G CODE MODIFIER DAILY ACTIVITY: CK
PERSONAL GROOMING: A LOT
DRESSING REGULAR UPPER BODY CLOTHING: A LOT
HELP NEEDED FOR BATHING: A LOT
EATING MEALS: A LITTLE
TOILETING: A LITTLE
DRESSING REGULAR LOWER BODY CLOTHING: A LOT
STANDING UP FROM CHAIR USING ARMS: A LITTLE
SUGGESTED CMS G CODE MODIFIER MOBILITY: CJ

## 2022-01-11 ASSESSMENT — GAIT ASSESSMENTS
DISTANCE (FEET): 125
GAIT LEVEL OF ASSIST: CONTACT GUARD ASSIST
DISTANCE (FEET): 40

## 2022-01-11 ASSESSMENT — ACTIVITIES OF DAILY LIVING (ADL): TOILETING: INDEPENDENT

## 2022-01-11 ASSESSMENT — PAIN DESCRIPTION - PAIN TYPE: TYPE: ACUTE PAIN

## 2022-01-11 ASSESSMENT — LIFESTYLE VARIABLES: SUBSTANCE_ABUSE: 0

## 2022-01-11 NOTE — ASSESSMENT & PLAN NOTE
Hypovolemic hyponatremia  I expect Na to improve with IVF  I am ordering a BMPs every 4 hours, avoid over correction, aim for 8-10 meq correction in 24 hours      1/11: It seems to be chronic. Patient also appears to have alcohol abuse, which could also cause hyponatremia.

## 2022-01-11 NOTE — ASSESSMENT & PLAN NOTE
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  Continue lovastatin  PT/OT recommended Inpatient rehab, we have ordered PMR referral.    "

## 2022-01-11 NOTE — PROGRESS NOTES
Brief Cardiology Note:    I was called to discuss this patients care with Dr. Santamaria We discussed patient admitted with stroke found to have A. melody started on anticoagulation telemetry shows tendency for bradycardia arrhythmia.    He can be seen in cardiology consultation on discharge avoid AV woody agents    Would increase his statin to atorvastatin 40 mg or rosuvastatin 20 mg based on history of stroke    At this time it was deemed no formal in person cardiology consultation was necessary, however if this changes due to changes in patient condition or abnormal test results, please re-consult cardiology.     Electronically signed: Jimbo Flannery MD PhD [facility]  Cardiologist [facility]    Please note that this dictation was created using voice recognition software. There are errors of grammar and possibly content I did not discover before finalizing the note.     1/11/2022  2:15 PM

## 2022-01-11 NOTE — PROGRESS NOTES
4 Eyes Skin Assessment Completed by MAGY Kinney and MAGY Juarez.    Head Scar  Ears WDL  Nose WDL  Mouth WDL  Neck WDL  Breast/Chest WDL  Shoulder Blades WDL  Spine WDL  (R) Arm/Elbow/Hand Bruising  (L) Arm/Elbow/Hand Bruising  Abdomen WDL  Groin WDL  Scrotum/Coccyx/Buttocks WDL  (R) Leg Bruising  (L) Leg Bruising  (R) Heel/Foot/Toe Bruising  (L) Heel/Foot/Toe Bruising, missing big toe nail          Devices In Places Tele Box, Blood Pressure Cuff and Nasal Cannula      Interventions In Place Gray Ear Foams, NC W/Ear Foams, Pillows and Pressure Redistribution Mattress    Possible Skin Injury No    Pictures Uploaded Into Epic N/A  Wound Consult Placed N/A  RN Wound Prevention Protocol Ordered No

## 2022-01-11 NOTE — CARE PLAN
The patient is Stable - Low risk of patient condition declining or worsening    Shift Goals  Clinical Goals: q4h neuro checks, MRI  Patient Goals: go home    Progress made toward(s) clinical / shift goals:  Neuro checks and MRI completed.     Patient is not progressing towards the following goals: Patient has been seen by PT/OT and they recommend Rehab. Patient and patient's daughter are open to the idea of inpatient therapy.     Problem: Knowledge Deficit - Stroke Education  Goal: Patient's knowledge of stroke and risk factors will improve  Outcome: Progressing  Discussed plan of care with patient including MRI, echo, and PT/OT/SLP evaluations ordered for stroke workup. Allowed time for questions, patient agreed and verbalized understanding.     Problem: Discharge Planning - Stroke  Goal: Ensure Stroke Core Measures are met prior to discharge  Outcome: Progressing  Patient has been seen by PT/OT and they recommend Rehab. Patient and patient's daughter are open to the idea of inpatient therapy

## 2022-01-11 NOTE — ED PROVIDER NOTES
"ED Provider Note    CHIEF COMPLAINT  Chief Complaint   Patient presents with   • Facial Droop     Left eye, unsure when occurred   • Numbness     Pt states TEE when numb around 1400 today, states is unable to use hand to hold anything       HPI  David Cho is a 81 y.o. male who presents with a chief complaint of left upper extremity numbness and weakness that started at approximately 2:00 PM today.  Patient's wife was contacted by his daughter who was in the room with him and wife notes that the patient complained of his arm and feeling weak at 10:00 AM.  Patient's daughter noted that he was not making sense when he talked and ultimately brought him to the ER for evaluation.  The patient denies any headache, chest pain or shortness of breath, fevers or chills, abdominal pain, nausea, or vomiting.  He has never had a stroke.  He did have a fall from a step 1 week ago and hit his head but was evaluated by his primary care physician.  He is not anticoagulated.  In triage the patient was made a \"code stroke\" due to left eye ptosis.  His daughter notes that his speech has improved and is largely back to baseline with the exception that it is still slightly slow.    REVIEW OF SYSTEMS  See HPI for further details.  Left upper extremity weakness.  Left-sided ptosis.  All other systems are negative.     PAST MEDICAL HISTORY   has a past medical history of Cholesterol serum elevated, COPD (chronic obstructive pulmonary disease) (HCC), Depression, Enlarged prostate, and Hypertension.    SOCIAL HISTORY  Social History     Tobacco Use   • Smoking status: Former Smoker     Packs/day: 1.00     Years: 50.00     Pack years: 50.00     Types: Cigarettes     Quit date: 2016     Years since quittin.4   • Smokeless tobacco: Never Used   • Tobacco comment: continued abstinance   Vaping Use   • Vaping Use: Never used   Substance and Sexual Activity   • Alcohol use: Yes   • Drug use: No   • Sexual activity: Not on file " "      SURGICAL HISTORY   has a past surgical history that includes other (appendectomy) and appendectomy.    CURRENT MEDICATIONS  Home Medications     Reviewed by Francisco Merritt (Pharmacy Tech) on 01/10/22 at 1603  Med List Status: Complete   Medication Last Dose Status   albuterol 108 (90 Base) MCG/ACT Aero Soln inhalation aerosol UNK Active   amoxicillin-clavulanate (AUGMENTIN) 875-125 MG Tab UNK Active   buPROPion (WELLBUTRIN XL) 150 MG XL tablet UNK Active   enalapril (VASOTEC) 20 MG tablet UNK Active   finasteride (PROSCAR) 5 MG Tab UNK Active   Fluticasone-Umeclidin-Vilant (TRELEGY ELLIPTA) 100-62.5-25 MCG/INH AEROSOL POWDER, BREATH ACTIVATED inhalation UNK Active   lovastatin (MEVACOR) 20 MG Tab UNK Active   predniSONE (DELTASONE) 20 MG Tab UNK Active   Thiamine HCl (VITAMIN B1) 50 MG Tab UNK Active                ALLERGIES  No Known Allergies    PHYSICAL EXAM  VITAL SIGNS: BP (!) 218/132   Pulse 74   Temp 36.1 °C (97 °F) (Temporal)   Resp 19   Ht 1.778 m (5' 10\")   Wt 68 kg (150 lb)   SpO2 94%   BMI 21.52 kg/m²    Pulse ox interpretation: I interpret this pulse ox as normal.  Constitutional: Alert in no apparent distress.  HENT: No signs of trauma, Bilateral external ears normal, Nose normal.  Moist mucous membranes.  Eyes: Pupils are equal and reactive, Conjunctiva normal, Non-icteric.   Neck: Normal range of motion, No tenderness, Supple, No stridor.   Lymphatic: No lymphadenopathy noted.   Cardiovascular: Regular rate and rhythm, no murmurs. Pulses symmetrical.  Thorax & Lungs: Normal breath sounds, No respiratory distress, No wheezing, No chest tenderness.   Abdomen: Bowel sounds normal, Soft, No tenderness, No masses, No pulsatile masses. No peritoneal signs.  Skin: Warm, Dry, No erythema, No rash.   Back: Normal alignment.  Extremities: Intact distal pulses, No edema, No tenderness, No cyanosis.  Musculoskeletal: Good range of motion in all major joints. No tenderness to palpation or " major deformities noted.   Neurologic: Alert, left-sided ptosis, normal vision, normal speech, weakness in the left upper extremity, Normal sensory function.  Gait deferred.  Psychiatric: Affect normal, Judgment normal, Mood normal.     DIAGNOSTIC STUDIES / PROCEDURES    LABS  Results for orders placed or performed during the hospital encounter of 01/10/22   CBC WITH DIFFERENTIAL   Result Value Ref Range    WBC 9.9 4.8 - 10.8 K/uL    RBC 4.80 4.70 - 6.10 M/uL    Hemoglobin 15.5 14.0 - 18.0 g/dL    Hematocrit 45.1 42.0 - 52.0 %    MCV 94.0 81.4 - 97.8 fL    MCH 32.3 27.0 - 33.0 pg    MCHC 34.4 33.7 - 35.3 g/dL    RDW 46.3 35.9 - 50.0 fL    Platelet Count 358 164 - 446 K/uL    MPV 8.8 (L) 9.0 - 12.9 fL    Neutrophils-Polys 75.00 (H) 44.00 - 72.00 %    Lymphocytes 16.00 (L) 22.00 - 41.00 %    Monocytes 6.00 0.00 - 13.40 %    Eosinophils 1.00 0.00 - 6.90 %    Basophils 0.00 0.00 - 1.80 %    Nucleated RBC 0.00 /100 WBC    Neutrophils (Absolute) 7.52 (H) 1.82 - 7.42 K/uL    Lymphs (Absolute) 1.58 1.00 - 4.80 K/uL    Monos (Absolute) 0.59 0.00 - 0.85 K/uL    Eos (Absolute) 0.10 0.00 - 0.51 K/uL    Baso (Absolute) 0.00 0.00 - 0.12 K/uL    NRBC (Absolute) 0.00 K/uL   COMP METABOLIC PANEL   Result Value Ref Range    Sodium 127 (L) 135 - 145 mmol/L    Potassium 5.1 3.6 - 5.5 mmol/L    Chloride 89 (L) 96 - 112 mmol/L    Co2 25 20 - 33 mmol/L    Anion Gap 13.0 7.0 - 16.0    Glucose 132 (H) 65 - 99 mg/dL    Bun 13 8 - 22 mg/dL    Creatinine 0.90 0.50 - 1.40 mg/dL    Calcium 9.1 8.4 - 10.2 mg/dL    AST(SGOT) 26 12 - 45 U/L    ALT(SGPT) 12 2 - 50 U/L    Alkaline Phosphatase 123 (H) 30 - 99 U/L    Total Bilirubin 0.8 0.1 - 1.5 mg/dL    Albumin 4.3 3.2 - 4.9 g/dL    Total Protein 6.7 6.0 - 8.2 g/dL    Globulin 2.4 1.9 - 3.5 g/dL    A-G Ratio 1.8 g/dL   PROTHROMBIN TIME   Result Value Ref Range    PT 13.3 12.0 - 14.6 sec    INR 1.09 0.87 - 1.13   APTT   Result Value Ref Range    APTT 26.9 24.7 - 36.0 sec   COD (ADULT)   Result Value  Ref Range    ABO Grouping Only O     Rh Grouping Only POS     Antibody Screen-Cod NEG    TROPONIN   Result Value Ref Range    Troponin T 19 6 - 19 ng/L   ESTIMATED GFR   Result Value Ref Range    GFR If African American >60 >60 mL/min/1.73 m 2    GFR If Non African American >60 >60 mL/min/1.73 m 2   DIFFERENTIAL MANUAL   Result Value Ref Range    Bands-Stabs 1.00 0.00 - 10.00 %    Metamyelocytes 1.00 %    Manual Diff Status PERFORMED    PLATELET ESTIMATE   Result Value Ref Range    Plt Estimation Normal    MORPHOLOGY   Result Value Ref Range    RBC Morphology Normal    ETHYL ALCOHOL (BLOOD)   Result Value Ref Range    Diagnostic Alcohol <10.1 0.0 - 10.0 mg/dL   EKG   Result Value Ref Range    Report       [facility] Emergency Dept.    Test Date:  2022-01-10  Pt Name:    ENRICO ZARAGOZA               Department: [facility]  MRN:        [id number]                      [facility]  Gender:     Male                         Technician: HO  :        [id number]                   Requested By:KAYLIN FRANK  Order #:    [id number]                    Reading MD:    Measurements  Intervals                                Axis  Rate:       67                           P:  NV:                                      QRS:        55  QRSD:       108                          T:          -7  QT:         440  QTc:        465    Interpretive Statements  ATRIAL FIBRILLATION  BORDERLINE INTRAVENTRICULAR CONDUCTION DELAY  Compared to ECG 2018 15:21:26  Sinus tachycardia no longer present  ST (T wave) deviation no longer present     POCT glucose device results   Result Value Ref Range    Glucose - Accu-Ck 136 (H) 65 - 99 mg/dL     RADIOLOGY  DX-CHEST-PORTABLE (1 VIEW)   Final Result      1.  Cardiomegaly with interstitial prominence.      2.  Linear bibasilar atelectasis.      CT-CTA NECK WITH & W/O-POST PROCESSING   Final Result      1.  Moderate stenoses of the carotid arteries bilaterally.   2.  Severe  stenosis of the proximal right vertebral artery.   3.  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 This is of indeterminate age.   4.  Moderate proximal right subclavian stenosis.      CT-CTA HEAD WITH & W/O-POST PROCESS   Final Result      No thrombosis is seen within the [place].      CT-CEREBRAL PERFUSION ANALYSIS   Final Result      1.  Cerebral blood flow less than 30% likely representing completed infarct = 0 mL.      2.  T Max more than 6 seconds likely representing combination of completed infarct and ischemia = 0 mL.      3.  Mismatched volume likely representing ischemic brain/penumbra = None      4.  Please note that the cerebral perfusion was performed on the limited brain tissue around the basal ganglia region. Infarct/ischemia outside the CT perfusion sections can be missed in this study.      CT-HEAD W/O   Final Result      1.  No acute intracranial abnormality is identified.   2.  Atrophy   3.  There are periventricular and subcortical white matter changes present.  This finding is nonspecific and could be from previous small vessel ischemia, demyelination, or gliosis.   4.  Focal hypodensities in the basal ganglia are likely related to prior lacunar infarcts.         EC-ECHOCARDIOGRAM COMPLETE W/ CONT    (Results Pending)       COURSE & MEDICAL DECISION MAKING  Pertinent Labs & Imaging studies reviewed. (See chart for details)  This is an 81-year-old male with a history of hyperlipidemia and hypertension who is here with upper extremity weakness and left-sided ptosis.  Differential diagnosis includes, but is not limited to, hypoglycemia, TIA, CVA, electrolyte abnormality, seizure, intoxication, hypertensive urgency, hypertensive emergency, Bell's palsy is less likely secondary to left upper extremity weakness.    Arrives hypertensive with systolic blood pressure above 220.  Remainder of his vital  signs are normal.  He appears well-hydrated and nontoxic.  NIH stroke scale score 2.  Patient is outside of the window for TPA given that his wife stated that his symptoms started greater than 6 hours prior to my evaluation of the patient.  He was sent emergently to the CT scanner prior to my evaluation for head images.  Nursing staff reports that the patient's point-of-care blood glucose is 130.    CTA of the neck demonstrates moderate stenosis of carotid arteries bilaterally and severe stenosis of the proximal right vertebral artery.  There is also an occlusion of the left vertebral artery at the level of C7 and throughout the neck with reconstitution at the craniocervical junction which is of indeterminate age.  CTA of the head is unremarkable.  CT perfusion scan did not demonstrate any abnormalities.    I discussed the patient with the on-call neurologist, Dr. Cardona, following CT scan return.  Although there are abnormalities in the CTA of the neck he does not recommend thrombectomy.  He has recommended hospitalization for MRI tomorrow and management of BP with a goal of between 1 80-1 90 for systolic blood pressure.  Patient was given a dose of hydralazine with improvement.    Patient does have some abnormalities on his metabolic panel including hyponatremia to 127, chloride of 89, blood glucose of 132.  Alcohol level is negative.  Troponin is at the high end of normal.    Discussed with the hospitalist and admitted in guarded condition.    Patient is critically ill.   The patient continues to have: Strokelike symptoms  The vital organ system that is affected is the: All are at risk  If untreated there is a high chance of deterioration into: Cardiac arrest, respiratory arrest, paralysis, permanent morbidity, And eventually death.   The critical care that I am providing today is: Initial bedside evaluation resuscitation, interpretation of lab and imaging results, discussion with neurologist and hospitalist,  and preparation of the medical record.  The critical that has been undertaken is medically complex.   There has been no overlap in critical care time.   Critical Care Time not including procedures: 35 minutes    FINAL IMPRESSION  1.  Left upper extremity weakness  2.  Left-sided ptosis  3.  Hypertensive urgency  4.  Hyponatremia  5.  Hyperglycemia      Electronically signed by: Michel Anaya M.D., 1/10/2022 4:11 PM

## 2022-01-11 NOTE — PROGRESS NOTES
Patient arrived to floor via ED staff on 2L oxygen nasal canula. Patient ambulated from gurney to bed in room. Patient placed on tele monitor, Neuro check completed, NIHS completed.

## 2022-01-11 NOTE — H&P
Hospital Medicine History & Physical Note    Date of Service  1/10/2022    Primary Care Physician  Katelyn Olea D.O.    Consultants  Neurology, Dr Guerrero Cardona     Code Status  Full Code    Chief Complaint  Chief Complaint   Patient presents with   • Facial Droop     Left eye, unsure when occurred   • Numbness     Pt states TEE when numb around 1400 today, states is unable to use hand to hold anything     History of Presenting Illness  David Cho is a 81 y.o. male with a past medical history of hypertension, chronic obstructive pulmonary disease on room air at baseline, and dyslipidemia who presented 1/10/2022 with transient facial droop, numbness and weakness of the left upper extremity in addition to slurred speech.  Symptoms started around 10 AM in the morning.  Patient daughter noticed that her father was not making sense and have difficulty expressing himself.  Patient reports that he was not able to lift his left arm and had numbness in that area.  He denies having loss of consciousness.  He denies having abnormal jerking movements, tongue biting fecal or urinary incontinence.  Symptoms resolved but neither the patient nor the daughter were able to say after how long.     I discussed the plan of care with emergency department physician, the patient, and the patient daughter was present at bedside.    Review of Systems  Review of Systems   Constitutional: Negative for chills and fever.   Eyes: Negative for discharge and redness.   Respiratory: Negative for cough, shortness of breath and stridor.    Cardiovascular: Negative for chest pain and leg swelling.   Gastrointestinal: Negative for abdominal pain and vomiting.   Genitourinary: Negative for flank pain.   Musculoskeletal: Negative for myalgias.   Skin: Negative.    Neurological: Positive for sensory change (Transient numbness left upper extremity), speech change (Transit) and focal weakness (Transit).   Endo/Heme/Allergies: Does not  bruise/bleed easily.   Psychiatric/Behavioral: The patient is not nervous/anxious.      Past Medical History   has a past medical history of Cholesterol serum elevated, COPD (chronic obstructive pulmonary disease) (HCC), Depression, Enlarged prostate, and Hypertension.    Surgical History   has a past surgical history that includes other (appendectomy) and appendectomy.     Family History  family history includes Cancer in his father; Heart Disease in his mother and step-sister; Other in his mother.      Social History   reports that he quit smoking about 5 years ago. His smoking use included cigarettes. He has a 50.00 pack-year smoking history. He has never used smokeless tobacco. He reports current alcohol use. He reports that he does not use drugs.    Allergies  No Known Allergies    Medications  Prior to Admission Medications   Prescriptions Last Dose Informant Patient Reported? Taking?   Fluticasone-Umeclidin-Vilant (TRELEGY ELLIPTA) 100-62.5-25 MCG/INH AEROSOL POWDER, BREATH ACTIVATED inhalation UNK at [facility] Patient's Home Pharmacy No No   Sig: Inhale 1 Inhalation every day. Rinse mouth after use   Thiamine HCl (VITAMIN B1) 50 MG Tab UNK at [facility] Patient's Home Pharmacy No No   Sig: Take 2 Tablets by mouth every day.   albuterol 108 (90 Base) MCG/ACT Aero Soln inhalation aerosol UNK at [facility] Patient's Home Pharmacy No No   Sig: INHALE 2 PUFFS BY MOUTH EVERY 4 HOURS AS NEEDED FOR SHORTNESS OF BREATH   amoxicillin-clavulanate (AUGMENTIN) 875-125 MG Tab UNK at [facility] Patient's Home Pharmacy No No   Sig: Take 1 Tablet by mouth 2 times a day for 5 days.   buPROPion (WELLBUTRIN XL) 150 MG XL tablet UNK at [facility] Patient's Home Pharmacy Yes No   Sig: Take 150 mg by mouth every day.   enalapril (VASOTEC) 20 MG tablet UNK at [facility] Patient's Home Pharmacy No No   Sig: TAKE 1 TABLET BY MOUTH EVERY DAY   finasteride (PROSCAR) 5 MG Tab UNK at [facility] Patient's Home Pharmacy No No   Sig: TAKE 1 TABLET BY MOUTH EVERY DAY( GENERIC EQUIVALENT  FOR PROSCAR)   lovastatin (MEVACOR) 20 MG Tab UNK at [facility] Patient's Home Pharmacy No No   Sig: TAKE 1 TABLET BY MOUTH EVERY DAY   predniSONE (DELTASONE) 20 MG Tab UNK at [facility] Patient's Home Pharmacy No No   Sig: Take 1 Tablet by mouth 2 times a day for 5 days.      Facility-Administered Medications: None     Physical Exam  Temp:  [36.1 °C (97 °F)-36.7 °C (98 °F)] 36.7 °C (98 °F)  Pulse:  [49-74] 59  Resp:  [15-19] 15  BP: (171-238)/() 171/81  SpO2:  [91 %-97 %] 97 %  Blood Pressure : (!) 218/132   Temperature: 36.1 °C (97 °F)   Pulse: 74   Respiration: 19   Pulse Oximetry: 94 %     Physical Exam  Constitutional:       General: He is not in acute distress.     Appearance: He is not ill-appearing or diaphoretic.   HENT:      Head: Atraumatic.      Right Ear: External ear normal.      Left Ear: External ear normal.      Nose: No congestion or rhinorrhea.      Mouth/Throat:      Mouth: Mucous membranes are dry.   Eyes:      General: No scleral icterus.        Right eye: No discharge.         Left eye: No discharge.      Pupils: Pupils are equal, round, and reactive to light.   Cardiovascular:      Rate and Rhythm: Normal rate and regular rhythm.   Pulmonary:      Effort: Pulmonary effort is normal.   Abdominal:      General: There is no distension.   Musculoskeletal:      Cervical back: Neck supple. No rigidity. No muscular tenderness.      Right lower leg: No edema.      Left lower leg: No edema.   Skin:     General: Skin is dry.      Capillary Refill: Capillary refill takes 2 to 3 seconds.      Coloration: Skin is not jaundiced or pale.   Neurological:      Mental Status: He is alert and oriented to person, place, and time.      Coordination: Coordination normal.   Psychiatric:         Mood and Affect: Mood normal.         Behavior: Behavior normal.       Laboratory:  Recent Labs     01/10/22  1530   WBC 9.9   RBC 4.80   HEMOGLOBIN 15.5   HEMATOCRIT 45.1   MCV 94.0   MCH 32.3   MCHC 34.4   RDW 46.3   PLATELETCT  358   MPV 8.8*     Recent Labs     01/10/22  1530 01/10/22  2009   SODIUM 127* 125*   POTASSIUM 5.1 3.8   CHLORIDE 89* 89*   CO2 25 25   GLUCOSE 132* 121*   BUN 13 12   CREATININE 0.90 0.65   CALCIUM 9.1 8.7     Recent Labs     01/10/22  1530 01/10/22  2009   ALTSGPT 12  --    ASTSGOT 26  --    ALKPHOSPHAT 123*  --    TBILIRUBIN 0.8  --    GLUCOSE 132* 121*     Recent Labs     01/10/22  1530   APTT 26.9   INR 1.09     No results for input(s): NTPROBNP in the last 72 hours.      Recent Labs     01/10/22  1530 01/10/22  2009   TROPONINT 19 16       Imaging:  DX-CHEST-PORTABLE (1 VIEW)   Final Result      1.  Cardiomegaly with interstitial prominence.      2.  Linear bibasilar atelectasis.      CT-CTA NECK WITH & W/O-POST PROCESSING   Final Result      1.  Moderate stenoses of the carotid arteries bilaterally.   2.  Severe stenosis of the proximal right vertebral artery.   3.  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 This is of indeterminate age.   4.  Moderate proximal right subclavian stenosis.      CT-CTA HEAD WITH & W/O-POST PROCESS   Final Result      No thrombosis is seen within the [place].      CT-CEREBRAL PERFUSION ANALYSIS   Final Result      1.  Cerebral blood flow less than 30% likely representing completed infarct = 0 mL.      2.  T Max more than 6 seconds likely representing combination of completed infarct and ischemia = 0 mL.      3.  Mismatched volume likely representing ischemic brain/penumbra = None      4.  Please note that the cerebral perfusion was performed on the limited brain tissue around the basal ganglia region. Infarct/ischemia outside the CT perfusion sections can be missed in this study.      CT-HEAD W/O   Final Result      1.  No acute intracranial abnormality is identified.   2.  Atrophy   3.  There are periventricular and subcortical white matter changes present.  This finding is  nonspecific and could be from previous small vessel ischemia, demyelination, or gliosis.   4.  Focal hypodensities in the basal ganglia are likely related to prior lacunar infarcts.         EC-ECHOCARDIOGRAM COMPLETE W/ CONT    (Results Pending)   MR-BRAIN-W/O    (Results Pending)     Assessment/Plan:  I anticipate this patient is appropriate for observation status at this time.    * Transit slurred speech- (present on admission)  Assessment & Plan  Continuous cardiac monitoring  CT, did not show evidence for acute infarction or hemorrhage  Echo with contrast ordered  Neurology consulted, recommending MRI to be done tomorrow, ordered for tomorrow  Aspiration, Fall, and seizure precautions    Neuro checks   Neurology wants to lower his blood pressure to goal of 180-190 systolic  We will use hydralazine and labetalol as needed pushes, if this is not effective we will use nicardipine drip  Physical, occupational therapy evaluation ordered  Speech therapy evaluation ordered  Physiatry consult placed     Transit left upper extremity numbness and weakness- (present on admission)  Assessment & Plan  As above in transit slurred speech     Hypertensive urgency- (present on admission)  Assessment & Plan  Unclear if it is associated with his TIA or not.  Neurology consulted recommended a goal systolic blood pressure between 180-190  We will start as needed hydralazine and labetalol for extreme hypertension, consider nicardipine drip/intensive care if those measures are not enough in controlling blood pressure    Alcohol use- (present on admission)  Assessment & Plan  Thiamine, folic acid and multivitamin  Watch for alcohol withdrawal    Chronic obstructive pulmonary disease (HCC)- (present on admission)  Assessment & Plan  Not currently in exacerbation.  Oxygen as needed, Respiratory protocol, Bronchodilators, Incentive spirometry     Hyponatremia- (present on admission)  Assessment & Plan  Hypovolemic hyponatremia  I expect  Na to improve with IVF  I am ordering a BMPs every 4 hours, avoid over correction, aim for 8-10 meq correction in 24 hours      Dyslipidemia- (present on admission)  Assessment & Plan  Resume home lovastatin    VTE prophylaxis: SCDs/TEDs and enoxaparin ppx

## 2022-01-11 NOTE — ASSESSMENT & PLAN NOTE
Unclear if it is associated with his TIA or not.  Neurology consulted recommended a goal systolic blood pressure between 180-190  We will start as needed hydralazine and labetalol for extreme hypertension, consider nicardipine drip/intensive care if those measures are not enough in controlling blood pressure

## 2022-01-11 NOTE — THERAPY
"Occupational Therapy   Initial Evaluation     Patient Name: David Cho  Age:  81 y.o., Sex:  male  Medical Record #: [id number]  Today's Date: 1/11/2022     Precautions  Precautions: Fall Risk,Swallow Precautions ( See Comments)    Assessment  Patient is 81 y.o. male with a diagnosis of L facial droop, L hand weakness and numbness.  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  Pt demonstrates significantly impaired ADL's and IADl's due to limited to no functional use of L hand.  Pt is slightly unsteady with mobility and needs CGA for safety with mobility.  Pt is primary manager of his household due to spouse having many medical issues and not being able to tolerate ADL's and IADL's. At this time he is not safe to drive and cannot manage simple self care tasks.  IADL's like groceries and laundry would be highly challenging at this time.  Pt also lives in a 2 story condo which also affects his IADL's.  Pt is highly motivated to regain function especially driving and would be a great candidate for intense inpt post acute therapy program (Like Inpt Rehab).  Wife does not consistently drive so outpatient therapy would not be a viable option for his recovery.  OT will follow while in house     Plan    Recommend Occupational Therapy 4 times per week until therapy goals are met for the following treatments:  Adaptive Equipment, Neuro Re-Education / Balance, Self Care/Activities of Daily Living, Therapeutic Activities and Therapeutic Exercises.    DC Equipment Recommendations: Unable to determine at this time  Discharge Recommendations: Recommend post-acute placement for additional occupational therapy services prior to discharge home (Intense level post acute therapy program preferred.  Lack of reliable transportation is a barrier to outpatient therapy services)    Subjective    \"I need my hand to work\"     Objective       01/11/22 1055   Prior Living Situation   Prior Services Home-Independent "   Housing / Facility 2 Story Apartment / Condo   Steps Into Home 3   Steps In Home   (1 from landing then 1 flight to BR)   Rail Right Rail  (Steps in Home);Left Rail (Steps in Home)  (no rail on steps to enter home)   Bathroom Set up Walk In Shower;Grab Bars  (wifes BR is tub shower w/ shower chair)   Equipment Owned Front-Wheel Walker;Single Point Cane  (pt doesn't currently use)   Lives with - Patient's Self Care Capacity Spouse   Comments Spouse has several medical issues that limit her mobility per pt.  She has good days where she can do things and drive, and other days where she is physically not capable of driving or leaving the house.  Pt normally is the one that does all the IADL's in the home, grocery shopping etc   Prior Level of ADL Function   Self Feeding Independent   Grooming / Hygiene Independent   Bathing Independent   Dressing Independent   Toileting Independent   Prior Level of IADL Function   Medication Management Independent   Laundry Independent   Kitchen Mobility Independent   Finances Independent   Home Management Independent   Shopping Independent   Prior Level Of Mobility Independent Without Device in Community   Driving / Transportation Driving Independent   Occupation (Pre-Hospital Vocational) Retired Due To Age   Leisure Interests Unable To Determine At This Time   History of Falls   History of Falls Yes   Date of Last Fall   (2 weeks ago, caught foot on stair and fell forward)   Precautions   Precautions Fall Risk;Swallow Precautions ( See Comments)   Vitals   O2 Delivery Device None - Room Air   Pain 0 - 10 Group   Therapist Pain Assessment 0;During Activity;Nurse Notified   Cognition    Cognition / Consciousness WDL   Level of Consciousness Alert   Comments Oriented, cooperative, speech fluent   Passive ROM Upper Body   Passive ROM Upper Body X   Comments L first finger limited extension due to h/o tendon injury   Active ROM Upper Body   Active ROM Upper Body  X   Dominant Hand Right    Comments L hand lacking full finger flexion and extension, doesn't fully supinate wrist   Strength Upper Body   Upper Body Strength  X   Comments B shoulders impaired, 4-/5, biceps and triceps BUE 4/5 (equal) pron/supination strength grossly symmetrical, L wrist extension 3+/5, L gross grasp 3-5, lateral pinch 2+/5 (RUE hand and wrist intact)   Sensation Upper Body   Upper Extremity Sensation  X   Lt Upper Extremity Deep Pressure Sensation Impaired   Lt Upper Extremity Proprioception Impaired   Neurological Concerns   Neurological Concerns Yes   Lt Upper Extremity Gross Motor Control Impaired   Lt Upper Extremity Fine Motor Control Absent   Lt Upper Extremity Functional Use Impaired   Left In Hand Manipulation Absent   Coordination Upper Body   Coordination X   Fine Motor Coordination IMP LUE   Gross Motor Coordination IMP LUE   Comments Decreased awareness of LUE position in space.  Tries to use it purposefully, but occasionally loses track of it when doing seated WB exercises and hand is flat on bed next to him   Balance Assessment   Sitting Balance (Static) Fair +   Sitting Balance (Dynamic) Fair -   Standing Balance (Static) Fair   Standing Balance (Dynamic) Fair -   Weight Shift Sitting Fair   Weight Shift Standing Fair   Comments CGA without device   Bed Mobility    Supine to Sit Supervised   Sit to Supine Supervised   Scooting Modified Independent   ADL Assessment   Eating Minimal Assist   Grooming Moderate Assist   Upper Body Dressing Moderate Assist   Lower Body Dressing Moderate Assist  (maxA socks, ModA pants)   Toileting Contact Guard Assist  (standing to use urinal, hygiene on toilet not tested)   Comments problems with self care tasks mostly related to not being able to use both hands for opening containers, pulling up clothing.  Not used to doing activities one handed since this acute functional decline   Modified Bailey (mRS)   Modified [place] Score 4   Functional Mobility   Sit to Stand Contact  "Guard Assist   Bed, Chair, Wheelchair Transfer Contact Guard Assist   Toilet Transfers Standby Assist   Transfer Method Stand Step   Mobility CGA in room to doorway and into bathroom   Distance (Feet) 40   # of Times Distance was Traveled 1   Visual Perception   Visual Perception  WDL   Edema / Skin Assessment   Edema / Skin  WDL   Activity Tolerance   Sitting Edge of Bed 18 min   Standing 5 min   Patient / Family Goals   Patient / Family Goal #1 get my hand working again   Short Term Goals   Short Term Goal # 1 Pt will groom standing with Willa in 3 visits   Short Term Goal # 2 Pt will dress with Willa in 3 visits   Short Term Goal # 3 Pt will toilet in BR with Willa in 3 visits   Education Group   Education Provided Stroke;Role of Occupational Therapist;Activities of Daily Living;Adaptive Equipment;Upper Extremity Strengthening   Additional Comments Pt educated on WB exercises in sitting and standing  to help promote movement and strength in LUE, given light level slo-foam, finger stretching and FM activities to work on as able, as well as built up  to try with utensils to help promote BUE use for things like cutting food etc..  Educated on the lack of function in LUE being a barrier (safety risk) for driving at this time and pt verbalized understanding.  Explained different levels of post acute therapy and that the most intense program he could get would yield likely the best outcome.  Pt agreeable to \"whatever you recommend\"                 "

## 2022-01-11 NOTE — ASSESSMENT & PLAN NOTE
Continuous cardiac monitoring  CT, did not show evidence for acute infarction or hemorrhage  Echo with contrast ordered  Neurology consulted, recommending MRI to be done tomorrow, ordered for tomorrow  Aspiration, Fall, and seizure precautions    Neuro checks   Neurology wants to lower his blood pressure to goal of 180-190 systolic  We will use hydralazine and labetalol as needed pushes, if this is not effective we will use nicardipine drip  Physical, occupational therapy evaluation ordered  Speech therapy evaluation ordered  Physiatry consult placed

## 2022-01-11 NOTE — PROGRESS NOTES
0720 Report received from Gregoria BHATTI. Plan of care discussed. Patient resting comfortably in bed, patient to go down for MRI at 0800. Safety precautions in place.     0800 Patient down for MRI via wheelchair and transport.    0900 Patient back from MRI. Assessment completed, patient is alert and oriented x 4, patient has left sided arm weakness since prior to admission, patient states he is having a hard time gripping objects. NIHSS 1 and neuro checks completed, see flowsheet. Safety precautions. Safety precautions in place.

## 2022-01-11 NOTE — THERAPY
"Speech Language Pathology   Clinical Swallow Evaluation     Patient Name: David Cho  AGE:  81 y.o., SEX:  male  Medical Record #: [id number]  Today's Date: 1/11/2022     Precautions  Precautions: Swallow Precautions ( See Comments)    Assessment    82 y/o admitted 1/10 for L sided facial droop and TEE numbness. PMH includes HTN, COPD, and dyslipidemia. Not seen by SLP before at [facility].     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    Dx chest found \"Linear bibasilar atelectasis.\"    MRI of brain pending.    Pt seen on this date for a clinical swallow evaluation. Pt with intermittent coughing prior to PO trials and pt endorsed a hx of COPD. He denied any difficulty with swallowing and endorsed that speech has improved since yesterday. Left eye weakness/droop noted during the oral motor evaluation and pt c/o left arm weakness. Intermittent cough, as noted prior to PO, continued through out trials of soft solid, solid, and thin liquid trials. Pt c/o SOB while eating/drinking and benefited from cues to reduce bite/sip size. Reduction in bite/sip size appeared to reduce intermittent cough as well. Mastication slightly prolonged given missing dentition and he denied any difficulty with mastication of breakfast this morning (eggs, fried potatoes, and English muffin). He required some assistance 2/2 LUE weakness and is R sided dominant. Upon palpation, laryngeal elevation was complete. Vocal quality clear following the swallow. At this time, recommend continuation of regular/thin liquid diet. Discontinue PO with any overt s/x of aspiration or difficulty. SLP to follow.    Plan    1) recommend continuation of regular/thin " liquid diet    Recommend Speech Therapy 3 times per week until therapy goals are met for the following treatments:  Dysphagia Training and Patient / Family / Caregiver Education.    Discharge Recommendations: Anticipate that the patient will have no further speech therapy needs after discharge from the hospital       Objective       01/11/22 1005   Vitals   O2 (LPM) 0   O2 Delivery Device None - Room Air   Pain 0 - 10 Group   Therapist Pain Assessment Post Activity Pain Same as Prior to Activity;Nurse Notified;0   Prior Living Situation   Lives with - Patient's Self Care Capacity Spouse   Prior Level Of Function   Communication Within Functional Limits   Swallow Within Functional Limits   Dentition Poor Quality    Dentures None   Hearing Within Functional Limits for Evaluation   Vision Within Functional Limits for Evaluation   Patient's Primary Language English   Occupation (Pre-Hospital Vocational) Not Employed   Oral Motor Eval    Is Patient Able to Complete Oral Motor Eval Yes, Within Normal Limits  (but does appear to have L eyelid droop/weakness)   Laryngeal Function   Voice Quality Minimal   Volutional Cough Within Functional Limits   Excursion Upon Swallow Complete   Oral Food Presentation   Single Swallow Thin (0) Within Functional Limits   Serial Swallow Thin (0) Within Functional Limits   Soft & Bite-Sized (6) - (Dysphagia III) Minimal  (slight prolonged mastication)   Regular (7) Minimal  (slight prolonged mastication)   Self Feeding Needs Assistance  (2/2 TEE weakness)   Dysphagia Strategies / Recommendations   Strategies / Interventions Recommended (Yes / No) Yes   Compensatory Strategies Head of Bed 90 Degrees During Eating / Drinking;Single Sips / Bites   Diet / Liquid Recommendation Regular (7);Thin (0)   Medication Administration  Whole with Liquid Wash   Therapy Interventions Dysphagia Therapy By Speech Language Pathologist   Dysphagia Rating   Nutritional Liquid Intake Rating Scale Non thickened  beverages   Nutritional Food Intake Rating Scale Total oral diet with no restrictions   Short Term Goals   Short Term Goal # 1 Pt will consume a regular/thin liquid diet with no overt s/sx of aspiration

## 2022-01-11 NOTE — CARE PLAN
The patient is Watcher - Medium risk of patient condition declining or worsening    Shift Goals  Clinical Goals: Q4 neuro checks, monitor telemetry  Patient Goals: Sleep    Progress made toward(s) clinical / shift goals:  Q4 neuro checks and telemetry monitoring in place. Trending troponins and EKGs.    Patient is not progressing towards the following goals:      Problem: Neuro Status  Goal: Neuro status will remain stable or improve  Outcome: Progressing   Patient's neuro status has remained the same with LUE weakness and left facial droop.     Problem: Knowledge Deficit - Standard  Goal: Patient and family/care givers will demonstrate understanding of plan of care, disease process/condition, diagnostic tests and medications  Outcome: Progressing   Plan of care discussed with patient including trending troponins and repeat EKGs to monitor heart changes and abnormalities. Patient will be getting an MRI and Echo tomorrow. Patient verbalized understanding, all questions answered.

## 2022-01-11 NOTE — THERAPY
Physical Therapy   Initial Evaluation     Patient Name: David Cho  Age:  81 y.o., Sex:  male  Medical Record #: [id number]  Today's Date: 1/11/2022     Precautions  Precautions: Fall Risk;Swallow Precautions ( See Comments)    Assessment  Patient is 81 y.o. male with a diagnosis of R frontal and pariettal acute CVA presenting with L hand and wrist weakness, impaired sensation, mild L neglect. L LE is mildly weaker with a slight delay with movement. Pts sitting and standing balance also midly impaired affecting pts gait and stability in standing. Recommend continued aggressive PT at [facility] prior to DC home. Pt is in agreement to participate with further therapy and is eager to recover.      Plan    Recommend Physical Therapy 4 times per week until therapy goals are met for the following treatments:  Bed Mobility, Gait Training, Neuro Re-Education / Balance, Stair Training, Therapeutic Activities and Therapeutic Exercises    DC Equipment Recommendations: Unable to determine at this time  Discharge Recommendations: Recommend post-acute placement for additional physical therapy services prior to discharge home (acute rehab referral)        01/11/22 1132   Prior Living Situation   Housing / Facility 2 Story Apartment / Condo   Steps Into Home 3   Steps In Home 14   Rail Both Rail (Steps in Home)   Equipment Owned Front-Wheel Walker;Single Point Cane   Lives with - Patient's Self Care Capacity Spouse   Comments Per pt, spouse might not be able to provide much assist at home   Prior Level of Functional Mobility   Bed Mobility Independent   Transfer Status Independent   Ambulation Independent   Assistive Devices Used None   Stairs Independent   Cognition    Level of Consciousness Alert   Comments Oriented x4   Passive ROM Lower Body   Passive ROM Lower Body Not Tested   Active ROM Lower Body    Active ROM Lower Body  WDL   Strength Lower Body   Lower Body Strength  X   Comments mildly weaker 4+/5 L LE, 5/5 R LE    Sensation Lower Body   Lower Extremity Sensation   WDL   Lower Body Muscle Tone   Lower Body Muscle Tone  WDL   Strength Upper Body   Upper Body Strength  X   Comments L hand and wrist impaired strength   Coordination Upper Body   Comments Impaired fine and gross motor L wrist and hand   Balance Assessment   Sitting Balance (Static) Fair +   Sitting Balance (Dynamic) Fair -   Standing Balance (Static) Fair   Standing Balance (Dynamic) Fair -   Weight Shift Sitting Fair   Weight Shift Standing Fair   Gait Analysis   Gait Level Of Assist Contact Guard Assist   Assistive Device None   Distance (Feet) 125   # of Times Distance was Traveled 2   Deviation   (L LE mild delay with swing/stance phase)   # of Stairs Climbed 2   Level of Assist with Stairs Contact Guard Assist   Bed Mobility    Supine to Sit Supervised   Sit to Supine Supervised   Functional Mobility   Sit to Stand Contact Guard Assist   Bed, Chair, Wheelchair Transfer Contact Guard Assist   Activity Tolerance   Standing 6-7 min   Short Term Goals    Short Term Goal # 1 Pt will be able to perform sit <> stand and transfer Stephenie in 6 visits so can DC home safely.   Short Term Goal # 2 Pt will be able to ambulate 200 ft without AD Stephenie in 6 visits.   Short Term Goal # 3 UP/down flight of stairs Stephenie using rail in 6 visits.

## 2022-01-11 NOTE — DISCHARGE PLANNING
Anticipated Discharge Disposition:  Inpatient rehabilitation facility (IRF)    Action:  Chart review completed.    Per MD notes, therapy is recommending IRF and he put in a referral to PMR.  Awaiting PT eval and recommendation to follow up with patient regarding choices.    Barriers to Discharge:  Medical clearance.    Plan:   Hospital care management will continue to assist with discharge planning needs.

## 2022-01-11 NOTE — ASSESSMENT & PLAN NOTE
As per neurology, BP to be between 180-190  Patient with CVA  Permissive HTN for now.  After permissive HTN, resume home medication

## 2022-01-11 NOTE — ED NOTES
Code stroke called overhead by triage.  IV established and blood sent to lab.  RN to CT with patient.

## 2022-01-11 NOTE — PROGRESS NOTES
Telemetry Shift Summary     Rhythm: afib/aflutter, BBB  Rate: 40-63  Measurements: -/.12/-  Ectopy (reported by Monitor Tech): R PVC  *Down to 32     Normal Values  Rhythm: Sinus  HR:   Measurements: 0.12-0.20/0.06-0.10/0.30-0.52

## 2022-01-11 NOTE — PROGRESS NOTES
Neuro check completed, patient's daughter is at bedside and updated on plan of care. Both patient and patient's daughter are open to PT/OT recommendations for Rehab.     Dr. Santamaria updated on MRI and echo resulting. MD to contact patient's daughter via phone, phone number provided by patient's daughter to this RN and given to MD.

## 2022-01-11 NOTE — PROGRESS NOTES
Monitor tech alerting RN that patient HR keeps touching down to 38, 37, 36, 38, 35, and 42. Dr. Rogers made aware during rounds.

## 2022-01-11 NOTE — ASSESSMENT & PLAN NOTE
Not currently in exacerbation.  Oxygen as needed, Respiratory protocol, Bronchodilators, Incentive spirometry

## 2022-01-11 NOTE — PROGRESS NOTES
"Hospital Medicine Daily Progress Note    Date of Service  1/11/2022    Chief Complaint  David Cho is a 81 y.o. male admitted 1/10/2022 with facial droop, numbness    Hospital Course  As per chart review:  \"81 y.o. male with a past medical history of hypertension, chronic obstructive pulmonary disease on room air at baseline, and dyslipidemia who presented 1/10/2022 with transient facial droop, numbness and weakness of the left upper extremity in addition to slurred speech.  Symptoms started around 10 AM in the morning.  Patient daughter noticed that her father was not making sense and have difficulty expressing himself.  Patient reports that he was not able to lift his left arm and had numbness in that area.  He denies having loss of consciousness.  He denies having abnormal jerking movements, tongue biting fecal or urinary incontinence.  Symptoms resolved but neither the patient nor the daughter were able to say after how long.\"    Interval Problem Update  1/11: Patient seen at bedside this morning.  The patient mentions he still complaining of left upper extremity weakness, especially his hand .  He mentions it is somewhat getting better.  MRI reported: \"Multifocal areas of acute cortical infarcts in the right frontal and parietal lobes.\"  I spoke to neurology, who recommended starting the patient on Eliquis.  PT/OT recommended inpatient rehab, PMR referral has been placed.  I received a call from telemetry that the patient was dropping his heart rate into the 30s, I spoke to cardiology regarding this, they do not recommend further work-up inpatient, and outpatient follow-up with cardiology.  As per nursing no other overnight events reported.    I have personally seen and examined the patient at bedside. I discussed the plan of care with patient and bedside RN.    Consultants/Specialty  neurology    Code Status  Full Code    Disposition  Patient is not medically cleared for discharge.   Anticipate " discharge to to an inpatient rehabilitation hospital.  I have placed the appropriate orders for post-discharge needs.    Review of Systems  Review of Systems   Constitutional: Negative for chills and fever.   HENT: Negative for hearing loss and nosebleeds.    Eyes: Negative for blurred vision and double vision.   Respiratory: Negative for cough and shortness of breath.    Cardiovascular: Negative for chest pain and palpitations.   Gastrointestinal: Negative for abdominal pain, heartburn and vomiting.   Genitourinary: Negative for dysuria and urgency.   Musculoskeletal: Negative for back pain and falls.   Skin: Negative for itching and rash.   Neurological: Positive for weakness. Negative for dizziness and headaches.   Psychiatric/Behavioral: Negative for substance abuse. The patient is not nervous/anxious.    All other systems reviewed and are negative.       Physical Exam  Temp:  [36.1 °C (97 °F)-36.9 °C (98.5 °F)] 36.9 °C (98.5 °F)  Pulse:  [49-95] 58  Resp:  [15-19] 18  BP: (143-238)/() 168/78  SpO2:  [91 %-99 %] 96 %    Physical Exam  Vitals and nursing note reviewed.   Constitutional:       Appearance: Normal appearance.   HENT:      Head: Normocephalic and atraumatic.      Right Ear: External ear normal.      Left Ear: External ear normal.      Nose: Nose normal.      Mouth/Throat:      Mouth: Mucous membranes are moist.      Pharynx: Oropharynx is clear.   Eyes:      General:         Right eye: No discharge.         Left eye: No discharge.   Cardiovascular:      Rate and Rhythm: Normal rate. Rhythm irregular.      Heart sounds: No murmur heard.      Pulmonary:      Effort: Pulmonary effort is normal. No respiratory distress.      Breath sounds: Normal breath sounds.   Abdominal:      General: Abdomen is flat. Bowel sounds are normal. There is no distension.      Palpations: Abdomen is soft.      Tenderness: There is no abdominal tenderness.   Musculoskeletal:      Cervical back: Normal range of motion  and neck supple.      Right lower leg: No edema.      Left lower leg: No edema.   Skin:     General: Skin is warm.   Neurological:      Mental Status: He is alert and oriented to person, place, and time.      Motor: Weakness (left upper extremity) present.   Psychiatric:         Mood and Affect: Mood normal.         Behavior: Behavior normal.         Fluids    Intake/Output Summary (Last 24 hours) at 1/11/2022 1415  Last data filed at 1/11/2022 0939  Gross per 24 hour   Intake 889.17 ml   Output 650 ml   Net 239.17 ml       Laboratory  Recent Labs     01/10/22  1530 01/11/22  0009   WBC 9.9 9.5   RBC 4.80 4.41*   HEMOGLOBIN 15.5 14.0   HEMATOCRIT 45.1 41.4*   MCV 94.0 93.9   MCH 32.3 31.7   MCHC 34.4 33.8   RDW 46.3 46.3   PLATELETCT 358 307   MPV 8.8* 8.9*     Recent Labs     01/10/22  2009 01/11/22  0009 01/11/22  0415   SODIUM 125* 127*  128* 128*   POTASSIUM 3.8 3.6  3.7 3.6   CHLORIDE 89* 92*  91* 93*   CO2 25 25  25 26   GLUCOSE 121* 99  96 87   BUN 12 11  11 11   CREATININE 0.65 0.63  0.66 0.61   CALCIUM 8.7 8.4  8.6 8.3*     Recent Labs     01/10/22  1530   APTT 26.9   INR 1.09         Recent Labs     01/11/22  0009   TRIGLYCERIDE 88   HDL 71   LDL 56       Imaging  EC-ECHOCARDIOGRAM COMPLETE W/O CONT   Final Result      MR-BRAIN-W/O   Final Result      1.  Multifocal areas of acute cortical infarcts in the right frontal and parietal lobes.   2.  Multiple chronic lacunar infarcts.   3.  Severe chronic microvascular ischemic disease.   4.  Moderate cerebral volume loss.      DX-CHEST-PORTABLE (1 VIEW)   Final Result      1.  Cardiomegaly with interstitial prominence.      2.  Linear bibasilar atelectasis.      CT-CTA NECK WITH & W/O-POST PROCESSING   Final Result      1.  Moderate stenoses of the carotid arteries bilaterally.   2.  Severe stenosis of the proximal right vertebral artery.   3.  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 This is of indeterminate age.   4.  Moderate proximal right subclavian stenosis.      CT-CTA HEAD WITH & W/O-POST PROCESS   Final Result      No thrombosis is seen within the [place].      CT-CEREBRAL PERFUSION ANALYSIS   Final Result      1.  Cerebral blood flow less than 30% likely representing completed infarct = 0 mL.      2.  T Max more than 6 seconds likely representing combination of completed infarct and ischemia = 0 mL.      3.  Mismatched volume likely representing ischemic brain/penumbra = None      4.  Please note that the cerebral perfusion was performed on the limited brain tissue around the basal ganglia region. Infarct/ischemia outside the CT perfusion sections can be missed in this study.      CT-HEAD W/O   Final Result      1.  No acute intracranial abnormality is identified.   2.  Atrophy   3.  There are periventricular and subcortical white matter changes present.  This finding is nonspecific and could be from previous small vessel ischemia, demyelination, or gliosis.   4.  Focal hypodensities in the basal ganglia are likely related to prior lacunar infarcts.              Assessment/Plan  * CVA (cerebral vascular accident) (HCC)  Assessment & Plan  MRI it reported: \"Multifocal areas of acute cortical infarcts in the right frontal and parietal lobes.\"  I spoke to neurology and they recommend eliquis, which has been started.  Continue lovastatin  PT/OT recommended Inpatient rehab, we have ordered PMR referral.      BPH (benign prostatic hyperplasia)  Assessment & Plan  Resume home finasteride.    Alcohol use- (present on admission)  Assessment & Plan  Thiamine, folic acid and multivitamin  Watch for alcohol withdrawal    Hyponatremia- (present on admission)  Assessment & Plan  Hypovolemic hyponatremia  I expect Na to improve with IVF  I am ordering a BMPs every 4 hours, avoid over correction, aim for 8-10 meq correction in 24 hours      1/11: It seems to be " chronic. Patient also appears to have alcohol abuse, which could also cause hyponatremia.    Hypertension- (present on admission)  Assessment & Plan  As per neurology, BP to be between 180-190  Patient with CVA  Permissive HTN for now.  After permissive HTN, resume home medication    Chronic obstructive pulmonary disease (HCC)- (present on admission)  Assessment & Plan  Not currently in exacerbation.  Oxygen as needed, Respiratory protocol, Bronchodilators, Incentive spirometry     Dyslipidemia- (present on admission)  Assessment & Plan  Resume home lovastatin    Hypertensive urgency- (present on admission)  Assessment & Plan  Unclear if it is associated with his TIA or not.  Neurology consulted recommended a goal systolic blood pressure between 180-190  We will start as needed hydralazine and labetalol for extreme hypertension, consider nicardipine drip/intensive care if those measures are not enough in controlling blood pressure         VTE prophylaxis: therapeutic anticoagulation with eliquis    I have performed a physical exam and reviewed and updated ROS and Plan today (1/11/2022). In review of yesterday's note (1/10/2022), there are no changes except as documented above.

## 2022-01-11 NOTE — PROGRESS NOTES
1945 Monitor tech alerted RN patient is afib/aflutter with a rate of 53.    1956 Monitor tech alerted RN patient has a new Left BBB. Patient assessed and denies any shortness of breath or chest pain. Dr. Clemons notified and ordered EKG and serial troponins.     2018 EKG obtained and reviewed with charge RN. Dr. Clemons notified.

## 2022-01-12 ENCOUNTER — PATIENT OUTREACH (OUTPATIENT)
Dept: HEALTH INFORMATION MANAGEMENT | Facility: OTHER | Age: 82
End: 2022-01-12

## 2022-01-12 ENCOUNTER — HOSPITAL ENCOUNTER (INPATIENT)
Facility: REHABILITATION | Age: 82
LOS: 7 days | DRG: 057 | End: 2022-01-19
Attending: PHYSICAL MEDICINE & REHABILITATION | Admitting: PHYSICAL MEDICINE & REHABILITATION
Payer: MEDICARE

## 2022-01-12 VITALS
WEIGHT: 143.3 LBS | TEMPERATURE: 99 F | OXYGEN SATURATION: 95 % | RESPIRATION RATE: 18 BRPM | SYSTOLIC BLOOD PRESSURE: 145 MMHG | HEIGHT: 70 IN | HEART RATE: 63 BPM | BODY MASS INDEX: 20.52 KG/M2 | DIASTOLIC BLOOD PRESSURE: 64 MMHG

## 2022-01-12 DIAGNOSIS — I10 PRIMARY HYPERTENSION: Chronic | ICD-10-CM

## 2022-01-12 DIAGNOSIS — F32.9 MAJOR DEPRESSIVE DISORDER, REMISSION STATUS UNSPECIFIED, UNSPECIFIED WHETHER RECURRENT: ICD-10-CM

## 2022-01-12 DIAGNOSIS — Z78.9 ALCOHOL USE: ICD-10-CM

## 2022-01-12 DIAGNOSIS — J44.9 CHRONIC OBSTRUCTIVE PULMONARY DISEASE, UNSPECIFIED COPD TYPE (HCC): ICD-10-CM

## 2022-01-12 DIAGNOSIS — I48.91 ATRIAL FIBRILLATION, UNSPECIFIED TYPE (HCC): ICD-10-CM

## 2022-01-12 DIAGNOSIS — R33.9 URINARY RETENTION: ICD-10-CM

## 2022-01-12 DIAGNOSIS — I63.9 CEREBROVASCULAR ACCIDENT (CVA), UNSPECIFIED MECHANISM (HCC): ICD-10-CM

## 2022-01-12 DIAGNOSIS — I63.411 CEREBROVASCULAR ACCIDENT (CVA) DUE TO EMBOLISM OF RIGHT MIDDLE CEREBRAL ARTERY (HCC): ICD-10-CM

## 2022-01-12 PROBLEM — Z74.09 IMPAIRED MOBILITY AND ADLS: Status: ACTIVE | Noted: 2022-01-12

## 2022-01-12 PROBLEM — I34.0 MITRAL REGURGITATION: Status: ACTIVE | Noted: 2022-01-12

## 2022-01-12 PROBLEM — R00.1 BRADYCARDIA: Status: ACTIVE | Noted: 2022-01-12

## 2022-01-12 PROBLEM — I27.20 PULMONARY HYPERTENSION (HCC): Status: ACTIVE | Noted: 2022-01-12

## 2022-01-12 PROCEDURE — 83935 ASSAY OF URINE OSMOLALITY: CPT

## 2022-01-12 PROCEDURE — 700111 HCHG RX REV CODE 636 W/ 250 OVERRIDE (IP): Performed by: INTERNAL MEDICINE

## 2022-01-12 PROCEDURE — A9270 NON-COVERED ITEM OR SERVICE: HCPCS | Performed by: PHYSICAL MEDICINE & REHABILITATION

## 2022-01-12 PROCEDURE — U0005 INFEC AGEN DETEC AMPLI PROBE: HCPCS

## 2022-01-12 PROCEDURE — 84300 ASSAY OF URINE SODIUM: CPT

## 2022-01-12 PROCEDURE — A9270 NON-COVERED ITEM OR SERVICE: HCPCS | Performed by: INTERNAL MEDICINE

## 2022-01-12 PROCEDURE — 94760 N-INVAS EAR/PLS OXIMETRY 1: CPT

## 2022-01-12 PROCEDURE — G0378 HOSPITAL OBSERVATION PER HR: HCPCS

## 2022-01-12 PROCEDURE — 700102 HCHG RX REV CODE 250 W/ 637 OVERRIDE(OP): Performed by: PHYSICAL MEDICINE & REHABILITATION

## 2022-01-12 PROCEDURE — U0003 INFECTIOUS AGENT DETECTION BY NUCLEIC ACID (DNA OR RNA); SEVERE ACUTE RESPIRATORY SYNDROME CORONAVIRUS 2 (SARS-COV-2) (CORONAVIRUS DISEASE [COVID-19]), AMPLIFIED PROBE TECHNIQUE, MAKING USE OF HIGH THROUGHPUT TECHNOLOGIES AS DESCRIBED BY CMS-2020-01-R: HCPCS

## 2022-01-12 PROCEDURE — 96375 TX/PRO/DX INJ NEW DRUG ADDON: CPT

## 2022-01-12 PROCEDURE — 770010 HCHG ROOM/CARE - REHAB SEMI PRIVAT*

## 2022-01-12 PROCEDURE — 94640 AIRWAY INHALATION TREATMENT: CPT

## 2022-01-12 PROCEDURE — 99223 1ST HOSP IP/OBS HIGH 75: CPT | Mod: AI | Performed by: PHYSICAL MEDICINE & REHABILITATION

## 2022-01-12 PROCEDURE — 700102 HCHG RX REV CODE 250 W/ 637 OVERRIDE(OP): Performed by: HOSPITALIST

## 2022-01-12 PROCEDURE — 700102 HCHG RX REV CODE 250 W/ 637 OVERRIDE(OP): Performed by: INTERNAL MEDICINE

## 2022-01-12 PROCEDURE — 99217 PR OBSERVATION CARE DISCHARGE: CPT | Performed by: INTERNAL MEDICINE

## 2022-01-12 PROCEDURE — A9270 NON-COVERED ITEM OR SERVICE: HCPCS | Performed by: HOSPITALIST

## 2022-01-12 RX ORDER — FOLIC ACID 1 MG/1
1 TABLET ORAL DAILY
Status: DISCONTINUED | OUTPATIENT
Start: 2022-01-13 | End: 2022-01-19 | Stop reason: HOSPADM

## 2022-01-12 RX ORDER — ONDANSETRON 2 MG/ML
4 INJECTION INTRAMUSCULAR; INTRAVENOUS 4 TIMES DAILY PRN
Status: DISCONTINUED | OUTPATIENT
Start: 2022-01-12 | End: 2022-01-19 | Stop reason: HOSPADM

## 2022-01-12 RX ORDER — AMOXICILLIN 250 MG
2 CAPSULE ORAL 2 TIMES DAILY
Status: CANCELLED | OUTPATIENT
Start: 2022-01-12

## 2022-01-12 RX ORDER — BISACODYL 10 MG
10 SUPPOSITORY, RECTAL RECTAL
Status: CANCELLED | OUTPATIENT
Start: 2022-01-12

## 2022-01-12 RX ORDER — ENALAPRILAT 1.25 MG/ML
1.25 INJECTION INTRAVENOUS EVERY 6 HOURS PRN
Status: DISCONTINUED | OUTPATIENT
Start: 2022-01-12 | End: 2022-01-12 | Stop reason: HOSPADM

## 2022-01-12 RX ORDER — ATORVASTATIN CALCIUM 40 MG/1
40 TABLET, FILM COATED ORAL EVERY EVENING
Status: DISCONTINUED | OUTPATIENT
Start: 2022-01-12 | End: 2022-01-19 | Stop reason: HOSPADM

## 2022-01-12 RX ORDER — FINASTERIDE 5 MG/1
5 TABLET, FILM COATED ORAL DAILY
Status: DISCONTINUED | OUTPATIENT
Start: 2022-01-13 | End: 2022-01-19 | Stop reason: HOSPADM

## 2022-01-12 RX ORDER — HYDRALAZINE HYDROCHLORIDE 20 MG/ML
20 INJECTION INTRAMUSCULAR; INTRAVENOUS EVERY 6 HOURS PRN
Refills: 0 | Status: ON HOLD
Start: 2022-01-12 | End: 2022-01-18

## 2022-01-12 RX ORDER — ENALAPRIL MALEATE 5 MG/1
20 TABLET ORAL DAILY
Status: DISCONTINUED | OUTPATIENT
Start: 2022-01-12 | End: 2022-01-12 | Stop reason: HOSPADM

## 2022-01-12 RX ORDER — HYDRALAZINE HYDROCHLORIDE 20 MG/ML
20 INJECTION INTRAMUSCULAR; INTRAVENOUS EVERY 6 HOURS PRN
Status: DISCONTINUED | OUTPATIENT
Start: 2022-01-12 | End: 2022-01-12 | Stop reason: HOSPADM

## 2022-01-12 RX ORDER — FINASTERIDE 5 MG/1
5 TABLET, FILM COATED ORAL DAILY
Status: CANCELLED | OUTPATIENT
Start: 2022-01-12

## 2022-01-12 RX ORDER — ALUMINA, MAGNESIA, AND SIMETHICONE 2400; 2400; 240 MG/30ML; MG/30ML; MG/30ML
20 SUSPENSION ORAL
Status: DISCONTINUED | OUTPATIENT
Start: 2022-01-12 | End: 2022-01-19 | Stop reason: HOSPADM

## 2022-01-12 RX ORDER — BUPROPION HYDROCHLORIDE 150 MG/1
150 TABLET, EXTENDED RELEASE ORAL DAILY
Status: DISCONTINUED | OUTPATIENT
Start: 2022-01-13 | End: 2022-01-19 | Stop reason: HOSPADM

## 2022-01-12 RX ORDER — MIDAZOLAM HYDROCHLORIDE 5 MG/ML
5 INJECTION INTRAMUSCULAR; INTRAVENOUS PRN
Status: DISCONTINUED | OUTPATIENT
Start: 2022-01-12 | End: 2022-01-19 | Stop reason: HOSPADM

## 2022-01-12 RX ORDER — OMEPRAZOLE 20 MG/1
20 CAPSULE, DELAYED RELEASE ORAL
Status: DISCONTINUED | OUTPATIENT
Start: 2022-01-13 | End: 2022-01-19 | Stop reason: HOSPADM

## 2022-01-12 RX ORDER — AMOXICILLIN 250 MG
2 CAPSULE ORAL 2 TIMES DAILY
Status: DISCONTINUED | OUTPATIENT
Start: 2022-01-12 | End: 2022-01-19 | Stop reason: HOSPADM

## 2022-01-12 RX ORDER — M-VIT,TX,IRON,MINS/CALC/FOLIC 27MG-0.4MG
1 TABLET ORAL DAILY
Status: DISCONTINUED | OUTPATIENT
Start: 2022-01-13 | End: 2022-01-19 | Stop reason: HOSPADM

## 2022-01-12 RX ORDER — HYDRALAZINE HYDROCHLORIDE 10 MG/1
10 TABLET, FILM COATED ORAL EVERY 8 HOURS PRN
Status: DISCONTINUED | OUTPATIENT
Start: 2022-01-12 | End: 2022-01-19 | Stop reason: HOSPADM

## 2022-01-12 RX ORDER — POLYVINYL ALCOHOL 14 MG/ML
1 SOLUTION/ DROPS OPHTHALMIC PRN
Status: DISCONTINUED | OUTPATIENT
Start: 2022-01-12 | End: 2022-01-19 | Stop reason: HOSPADM

## 2022-01-12 RX ORDER — FOLIC ACID 1 MG/1
1 TABLET ORAL DAILY
Qty: 30 TABLET | Status: ON HOLD
Start: 2022-01-13 | End: 2022-01-18 | Stop reason: SDUPTHER

## 2022-01-12 RX ORDER — QUETIAPINE FUMARATE 25 MG/1
25 TABLET, FILM COATED ORAL 3 TIMES DAILY PRN
Status: DISCONTINUED | OUTPATIENT
Start: 2022-01-12 | End: 2022-01-19 | Stop reason: HOSPADM

## 2022-01-12 RX ORDER — ECHINACEA PURPUREA EXTRACT 125 MG
2 TABLET ORAL PRN
Status: DISCONTINUED | OUTPATIENT
Start: 2022-01-12 | End: 2022-01-19 | Stop reason: HOSPADM

## 2022-01-12 RX ORDER — ENEMA 19; 7 G/133ML; G/133ML
1 ENEMA RECTAL
Status: DISCONTINUED | OUTPATIENT
Start: 2022-01-12 | End: 2022-01-19 | Stop reason: HOSPADM

## 2022-01-12 RX ORDER — ENALAPRILAT 1.25 MG/ML
1.25 INJECTION INTRAVENOUS EVERY 6 HOURS PRN
Refills: 0 | Status: ON HOLD
Start: 2022-01-12 | End: 2022-01-18

## 2022-01-12 RX ORDER — BISACODYL 10 MG
10 SUPPOSITORY, RECTAL RECTAL
Status: DISCONTINUED | OUTPATIENT
Start: 2022-01-12 | End: 2022-01-19 | Stop reason: HOSPADM

## 2022-01-12 RX ORDER — GAUZE BANDAGE 2" X 2"
100 BANDAGE TOPICAL DAILY
Status: CANCELLED | OUTPATIENT
Start: 2022-01-12

## 2022-01-12 RX ORDER — SODIUM CHLORIDE 1 G/1
1 TABLET ORAL
Status: DISCONTINUED | OUTPATIENT
Start: 2022-01-12 | End: 2022-01-13

## 2022-01-12 RX ORDER — LANOLIN ALCOHOL/MO/W.PET/CERES
3 CREAM (GRAM) TOPICAL NIGHTLY PRN
Status: DISCONTINUED | OUTPATIENT
Start: 2022-01-12 | End: 2022-01-19 | Stop reason: HOSPADM

## 2022-01-12 RX ORDER — ATORVASTATIN CALCIUM 40 MG/1
40 TABLET, FILM COATED ORAL EVERY EVENING
Qty: 30 TABLET | Status: ON HOLD
Start: 2022-01-12 | End: 2022-01-18 | Stop reason: SDUPTHER

## 2022-01-12 RX ORDER — GAUZE BANDAGE 2" X 2"
100 BANDAGE TOPICAL DAILY
Status: DISCONTINUED | OUTPATIENT
Start: 2022-01-13 | End: 2022-01-19 | Stop reason: HOSPADM

## 2022-01-12 RX ORDER — BUPROPION HYDROCHLORIDE 150 MG/1
150 TABLET, EXTENDED RELEASE ORAL DAILY
Status: CANCELLED | OUTPATIENT
Start: 2022-01-12

## 2022-01-12 RX ORDER — ACETAMINOPHEN 325 MG/1
650 TABLET ORAL EVERY 4 HOURS PRN
Status: DISCONTINUED | OUTPATIENT
Start: 2022-01-12 | End: 2022-01-19 | Stop reason: HOSPADM

## 2022-01-12 RX ORDER — ENALAPRIL MALEATE 5 MG/1
20 TABLET ORAL DAILY
Status: CANCELLED | OUTPATIENT
Start: 2022-01-13

## 2022-01-12 RX ORDER — TRAZODONE HYDROCHLORIDE 50 MG/1
50 TABLET ORAL
Status: DISCONTINUED | OUTPATIENT
Start: 2022-01-12 | End: 2022-01-19 | Stop reason: HOSPADM

## 2022-01-12 RX ORDER — ATORVASTATIN CALCIUM 40 MG/1
40 TABLET, FILM COATED ORAL EVERY EVENING
Status: CANCELLED | OUTPATIENT
Start: 2022-01-12

## 2022-01-12 RX ORDER — ENALAPRIL MALEATE 5 MG/1
20 TABLET ORAL DAILY
Status: DISCONTINUED | OUTPATIENT
Start: 2022-01-13 | End: 2022-01-16

## 2022-01-12 RX ORDER — POLYETHYLENE GLYCOL 3350 17 G/17G
1 POWDER, FOR SOLUTION ORAL
Status: DISCONTINUED | OUTPATIENT
Start: 2022-01-12 | End: 2022-01-19 | Stop reason: HOSPADM

## 2022-01-12 RX ORDER — FOLIC ACID 1 MG/1
1 TABLET ORAL DAILY
Status: CANCELLED | OUTPATIENT
Start: 2022-01-12

## 2022-01-12 RX ORDER — POLYETHYLENE GLYCOL 3350 17 G/17G
1 POWDER, FOR SOLUTION ORAL
Status: CANCELLED | OUTPATIENT
Start: 2022-01-12

## 2022-01-12 RX ORDER — HYDROXYZINE HYDROCHLORIDE 25 MG/1
50 TABLET, FILM COATED ORAL EVERY 6 HOURS PRN
Status: DISCONTINUED | OUTPATIENT
Start: 2022-01-12 | End: 2022-01-19 | Stop reason: HOSPADM

## 2022-01-12 RX ORDER — ONDANSETRON 4 MG/1
4 TABLET, ORALLY DISINTEGRATING ORAL 4 TIMES DAILY PRN
Status: DISCONTINUED | OUTPATIENT
Start: 2022-01-12 | End: 2022-01-19 | Stop reason: HOSPADM

## 2022-01-12 RX ORDER — M-VIT,TX,IRON,MINS/CALC/FOLIC 27MG-0.4MG
1 TABLET ORAL DAILY
Status: CANCELLED | OUTPATIENT
Start: 2022-01-12

## 2022-01-12 RX ORDER — LACTULOSE 20 G/30ML
30 SOLUTION ORAL
Status: DISCONTINUED | OUTPATIENT
Start: 2022-01-12 | End: 2022-01-19 | Stop reason: HOSPADM

## 2022-01-12 RX ADMIN — ENALAPRIL MALEATE 20 MG: 5 TABLET ORAL at 06:45

## 2022-01-12 RX ADMIN — UMECLIDINIUM BROMIDE AND VILANTEROL TRIFENATATE 1 PUFF: 62.5; 25 POWDER RESPIRATORY (INHALATION) at 08:19

## 2022-01-12 RX ADMIN — ATORVASTATIN CALCIUM 40 MG: 40 TABLET, FILM COATED ORAL at 21:14

## 2022-01-12 RX ADMIN — FINASTERIDE 5 MG: 5 TABLET, FILM COATED ORAL at 05:03

## 2022-01-12 RX ADMIN — APIXABAN 5 MG: 5 TABLET, FILM COATED ORAL at 05:03

## 2022-01-12 RX ADMIN — SENNOSIDES AND DOCUSATE SODIUM 2 TABLET: 50; 8.6 TABLET ORAL at 05:03

## 2022-01-12 RX ADMIN — SODIUM CHLORIDE 1 G: 1 TABLET ORAL at 18:59

## 2022-01-12 RX ADMIN — ENALAPRILAT 1.25 MG: 1.25 INJECTION INTRAVENOUS at 08:06

## 2022-01-12 RX ADMIN — APIXABAN 5 MG: 5 TABLET, FILM COATED ORAL at 21:14

## 2022-01-12 RX ADMIN — MULTIPLE VITAMINS W/ MINERALS TAB 1 TABLET: TAB at 05:02

## 2022-01-12 RX ADMIN — FLUTICASONE PROPIONATE 88 MCG: 44 AEROSOL, METERED RESPIRATORY (INHALATION) at 08:19

## 2022-01-12 RX ADMIN — THIAMINE HCL TAB 100 MG 100 MG: 100 TAB at 05:03

## 2022-01-12 RX ADMIN — FOLIC ACID 1 MG: 1 TABLET ORAL at 05:03

## 2022-01-12 RX ADMIN — BUPROPION HYDROCHLORIDE 150 MG: 150 TABLET, EXTENDED RELEASE ORAL at 05:03

## 2022-01-12 ASSESSMENT — PATIENT HEALTH QUESTIONNAIRE - PHQ9
4. FEELING TIRED OR HAVING LITTLE ENERGY: SEVERAL DAYS
SUM OF ALL RESPONSES TO PHQ9 QUESTIONS 1 AND 2: 2
8. MOVING OR SPEAKING SO SLOWLY THAT OTHER PEOPLE COULD HAVE NOTICED. OR THE OPPOSITE, BEING SO FIGETY OR RESTLESS THAT YOU HAVE BEEN MOVING AROUND A LOT MORE THAN USUAL: NOT AT ALL
7. TROUBLE CONCENTRATING ON THINGS, SUCH AS READING THE NEWSPAPER OR WATCHING TELEVISION: NOT AT ALL
3. TROUBLE FALLING OR STAYING ASLEEP OR SLEEPING TOO MUCH: NOT AT ALL
SUM OF ALL RESPONSES TO PHQ QUESTIONS 1-9: 4
2. FEELING DOWN, DEPRESSED, IRRITABLE, OR HOPELESS: SEVERAL DAYS
1. LITTLE INTEREST OR PLEASURE IN DOING THINGS: SEVERAL DAYS
6. FEELING BAD ABOUT YOURSELF - OR THAT YOU ARE A FAILURE OR HAVE LET YOURSELF OR YOUR FAMILY DOWN: NOT AL ALL
5. POOR APPETITE OR OVEREATING: SEVERAL DAYS
9. THOUGHTS THAT YOU WOULD BE BETTER OFF DEAD, OR OF HURTING YOURSELF: NOT AT ALL

## 2022-01-12 ASSESSMENT — LIFESTYLE VARIABLES
TOTAL SCORE: 3
AVERAGE NUMBER OF DAYS PER WEEK YOU HAVE A DRINK CONTAINING ALCOHOL: 7
HAVE YOU EVER FELT YOU SHOULD CUT DOWN ON YOUR DRINKING: YES
EVER FELT BAD OR GUILTY ABOUT YOUR DRINKING: YES
EVER HAD A DRINK FIRST THING IN THE MORNING TO STEADY YOUR NERVES TO GET RID OF A HANGOVER: NO
HOW MANY TIMES IN THE PAST YEAR HAVE YOU HAD 5 OR MORE DRINKS IN A DAY: 100
TOTAL SCORE: 3
TOTAL SCORE: 3
ON A TYPICAL DAY WHEN YOU DRINK ALCOHOL HOW MANY DRINKS DO YOU HAVE: 2
HAVE PEOPLE ANNOYED YOU BY CRITICIZING YOUR DRINKING: YES
CONSUMPTION TOTAL: POSITIVE
ALCOHOL_USE: YES

## 2022-01-12 ASSESSMENT — PAIN DESCRIPTION - PAIN TYPE: TYPE: ACUTE PAIN

## 2022-01-12 ASSESSMENT — FIBROSIS 4 INDEX: FIB4 SCORE: 1.49

## 2022-01-12 NOTE — PROGRESS NOTES
Pt discharged to [facility]. Discharge instructions provided to pt. Pt verbalizes understanding. Pt states all questions have been answered. Signed copy in chart. Prescriptions sent to n/a. Pt states that all personal belongings are in possession. Pt off unit via wheelchair, escorted by [facility].

## 2022-01-12 NOTE — PREADMISSION SCREENING NOTE
Pre-Admission Screening Form    Patient Information:   Name: David Cho     MRN: [id number]       : 1940      Age: 81 y.o.   Gender: male      Race: White [7]       Marital Status:  [2]  Family Contact: Caterina Cho        Relationship: Spouse [17]  Home Phone: [phone]           Cell Phone:   Advanced Directives: None  Code Status:  FULL  Current Attending Provider: Aaron Noonan*  Referring Physician: Dr. Rosalio Solis  Physiatrist Consult: Dr. Mario   Referral Date: 22  Primary Payor Source:  MEDICARE  Secondary Payor Source:  [facility]    Medical Information:   Date of Admission to Acute Care [place]/10/2022  Room Number: 3304/02  Rehabilitation Diagnosis: 0001.1 - Stroke: Left Body Involvement (Right Brain)  Immunization History   Administered Date(s) Administered   • INFLUENZA TIV (IM) 2011, 10/01/2015   • Influenza Seasonal Injectable - Historical Data 10/19/2012, 10/01/2014, 2015, 10/01/2015   • Influenza Vaccine Adult HD 2013, 10/06/2016, 10/11/2018, 2019, 10/01/2020, 10/08/2021   • Influenza, Unspecified - HISTORICAL DATA 2017   • Pfizer SARS-CoV-2 Vaccine 122021, 2021, 10/08/2021   • Pneumococcal Conjugate Vaccine (Prevnar/PCV-13) 10/06/2016, 10/01/2020   • Pneumococcal polysaccharide vaccine (PPSV-23) 2018   • Tdap Vaccine 2021     No Known Allergies  Past Medical History:   Diagnosis Date   • Cholesterol serum elevated    • COPD (chronic obstructive pulmonary disease) (HCC)    • Depression    • Enlarged prostate    • Hypertension      Past Surgical History:   Procedure Laterality Date   • APPENDECTOMY     • OTHER  appendectomy       History Leading to Admission, Conditions that Caused the Need for Rehab (CMS):     Dr. Clemons H&P:  Chief Complaint   Patient presents with   • Facial Droop       Left eye, unsure when occurred   • Numbness       Pt states TEE when numb around 1400 today, states is  unable to use hand to hold anything      History of Presenting Illness  David Cho is a 81 y.o. male with a past medical history of hypertension, chronic obstructive pulmonary disease on room air at baseline, and dyslipidemia who presented 1/10/2022 with transient facial droop, numbness and weakness of the left upper extremity in addition to slurred speech.  Symptoms started around 10 AM in the morning.  Patient daughter noticed that her father was not making sense and have difficulty expressing himself.  Patient reports that he was not able to lift his left arm and had numbness in that area.  He denies having loss of consciousness.  He denies having abnormal jerking movements, tongue biting fecal or urinary incontinence.  Symptoms resolved but neither the patient nor the daughter were able to say after how long.     I discussed the plan of care with emergency department physician, the patient, and the patient daughter was present at bedside.   Assessment/Plan:  I anticipate this patient is appropriate for observation status at this time.     * Transit slurred speech- (present on admission)  Assessment & Plan  Continuous cardiac monitoring  CT, did not show evidence for acute infarction or hemorrhage  Echo with contrast ordered  Neurology consulted, recommending MRI to be done tomorrow, ordered for tomorrow  Aspiration, Fall, and seizure precautions    Neuro checks   Neurology wants to lower his blood pressure to goal of 180-190 systolic  We will use hydralazine and labetalol as needed pushes, if this is not effective we will use nicardipine drip  Physical, occupational therapy evaluation ordered  Speech therapy evaluation ordered  Physiatry consult placed      Transit left upper extremity numbness and weakness- (present on admission)  Assessment & Plan  As above in transit slurred speech      Hypertensive urgency- (present on admission)  Assessment & Plan  Unclear if it is associated with his TIA or  not.  Neurology consulted recommended a goal systolic blood pressure between 180-190  We will start as needed hydralazine and labetalol for extreme hypertension, consider nicardipine drip/intensive care if those measures are not enough in controlling blood pressure     Alcohol use- (present on admission)  Assessment & Plan  Thiamine, folic acid and multivitamin  Watch for alcohol withdrawal     Chronic obstructive pulmonary disease (HCC)- (present on admission)  Assessment & Plan  Not currently in exacerbation.  Oxygen as needed, Respiratory protocol, Bronchodilators, Incentive spirometry      Hyponatremia- (present on admission)  Assessment & Plan  Hypovolemic hyponatremia  I expect Na to improve with IVF  I am ordering a BMPs every 4 hours, avoid over correction, aim for 8-10 meq correction in 24 hours       Dyslipidemia- (present on admission)  Assessment & Plan  Resume home lovastatin     VTE prophylaxis: SCDs/TEDs and enoxaparin ppx    Dr. Mario (Physiatry) recommendations:  The patient is an excellent candidate for an acute inpatient rehabilitation program with a coordinated program of care at an intensity and frequency not available at a lower level of care.      Note: if the patient continues to progress while waiting for medical clearance, and no longer requires 2 out of 3 therapy services (PT/OT/SLP), then the patient would no longer meet the criteria for acute inpatient rehabilitation.     This recommendation is substantiated by the patient's current medical condition with intervention and assessment of medical issues requiring an acute level of care for patient's safety and maximum outcome. A coordinated program of care will be provided by an interdisciplinary team including physical therapy, occupational therapy, speech language pathology, hospitalist, physiatry, rehab nursing and rehab psychology. Rehab goals include improved cognition and swallowing, mobility, self-care management, strength and  conditioning/endurance, pain management, bowel and bladder management, mood and affect, and safety with independent home management including caregiver training. Estimated length of stay is approximately 14-21 days. Rehab potential: Very Good. Disposition: to pre-morbid independent living setting with supportive care of patient's spouse. We will continue to follow with you in anticipation of discharge to acute inpatient rehabilitation when medically stable to do so at the discretion of his attending physician.     Dr. Flannery (Cardiology) recommendations:  Brief Cardiology Note:     I was called to discuss this patients care with Dr. Santamaria We discussed patient admitted with stroke found to have A. fib started on anticoagulation telemetry shows tendency for bradycardia arrhythmia.     He can be seen in cardiology consultation on discharge avoid AV woody agents     Would increase his statin to atorvastatin 40 mg or rosuvastatin 20 mg based on history of stroke     At this time it was deemed no formal in person cardiology consultation was necessary, however if this changes due to changes in patient condition or abnormal test results, please re-consult cardiology.      Electronically signed: Jimbo Flannery MD PhD [facility]  Cardiologist [facility]     Please note that this dictation was created using voice recognition software. There are errors of grammar and possibly content I did not discover before finalizing the note.     Brain MRI 01-11-22:  1.  Multifocal areas of acute cortical infarcts in the right frontal and parietal lobes.  2.  Multiple chronic lacunar infarcts.  3.  Severe chronic microvascular ischemic disease.  4.  Moderate cerebral volume loss.    Co-morbidities: See PMH  Potential Risk - Complications: Aphasia, Cognitive Impairment, Contractures, Deep Vein Thrombosis, Dysphagia, Incontinence, Malnutrition, Pain, Perceptual Impairment, Pneumonia, Pressure Ulcer and  "Urinary Tract Infection  Level of Risk: High    Ongoing Medical Management Needed (Medical/Nursing Needs):   Patient Active Problem List    Diagnosis Date Noted   • Atrial fibrillation (HCC) 01/12/2022   • CVA (cerebral vascular accident) ([facility]) 01/11/2022   • BPH (benign prostatic hyperplasia) 01/11/2022   • Transit speech abnormality 01/10/2022   • Transit left upper extremity numbness and weakness 01/10/2022   • Transit slurred speech 01/10/2022   • Suspected COVID-19 virus infection 01/05/2022   • Laceration of scalp 01/05/2022   • Aneurysm of infrarenal abdominal aorta (HCC) 11/18/2021   • 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[facility]) 11/18/2021   • Vitamin B1 deficiency 08/19/2021   • Anemia 08/12/2021   • Risk for falls 07/07/2021   • Hearing difficulty of both ears 07/07/2021   • Alcohol use 07/07/2021   • Hyponatremia 12/16/2020   • Screening for colorectal cancer 12/16/2020   • Hypokalemia 12/25/2018   • Hypomagnesemia 12/25/2018   • CAP (community acquired pneumonia) 12/23/2018   • Former smoker 08/08/2018   • Nocturia 02/28/2018   • Major depressive disorder 02/28/2018   • Abnormal CT scan, lung 04/11/2017   • Nocturnal hypoxia 04/11/2017   • Chronic obstructive pulmonary disease (HCC) 10/10/2016   • Hypertension 10/10/2016   • Snoring 10/10/2016   • Lung nodules 10/05/2016   • Dyslipidemia 10/05/2016   • Hypertensive urgency 10/04/2016     Alert with periods of forgetfulness.    Current Vital Signs:   Temperature: 37.1 °C (98.7 °F) Pulse: 64 Respiration: 18 Blood Pressure : 127/77  Weight: 65 kg (143 lb 4.8 oz) Height: 177.8 cm (5' 10\")  Pulse Oximetry: 97 % O2 (LPM): 0      Completed Laboratory Reports:  Recent Labs     01/10/22  1530 01/10/22  1531 01/10/22  2009 01/11/22  0009 01/11/22  0415   WBC 9.9  --   --  9.5  --    HEMOGLOBIN 15.5  --   --  14.0  --    HEMATOCRIT 45.1  --   --  41.4*  --    PLATELETCT 358  --   --  307  --    SODIUM 127*  --  125* 127*  128* 128*   POTASSIUM 5.1  --  3.8 3.6  3.7 3.6   BUN 13  " --  12 11  11 11   CREATININE 0.90  --  0.65 0.63  0.66 0.61   ALBUMIN 4.3  --   --  3.6  --    GLUCOSE 132*  --  121* 99  96 87   POCGLUCOSE  --  136*  --   --   --    INR 1.09  --   --   --   --      Additional Labs: Not Applicable    Prior Living Situation:   Housing / Facility: 2 Story Apartment / Condo  Steps Into Home: 3  Steps In Home: 14  Lives with - Patient's Self Care Capacity: Spouse  Equipment Owned: Front-Wheel Walker,Single Point Cane    Prior Level of Function / Living Situation:   Physical Therapy: Prior Services: Home-Independent  Housing / Facility: 2 Story Apartment / Condo  Steps Into Home: 3  Steps In Home: 14  Rail: Both Rail (Steps in Home)  Bathroom Set up: Walk In Shower,Grab Bars (wifes BR is tub shower w/ shower chair)  Equipment Owned: Front-Wheel Walker,Single Point Cane  Lives with - Patient's Self Care Capacity: Spouse  Bed Mobility: Independent  Transfer Status: Independent  Ambulation: Independent  Assistive Devices Used: None  Stairs: Independent  Current Level of Function:   Gait Level Of Assist: Contact Guard Assist  Assistive Device: None  Distance (Feet): 125  Deviation:  (L LE mild delay with swing/stance phase)  # of Stairs Climbed: 2  Level of Assist with Stairs: Contact Guard Assist  Supine to Sit: Supervised  Sit to Supine: Supervised  Scooting: Modified Independent  Sit to Stand: Contact Guard Assist  Bed, Chair, Wheelchair Transfer: Contact Guard Assist  Toilet Transfers: Standby Assist  Transfer Method: Stand Step  Sitting Edge of Bed: 18 min  Standin-7 min  Occupational Therapy:   Self Feeding: Independent  Grooming / Hygiene: Independent  Bathing: Independent  Dressing: Independent  Toileting: Independent  Medication Management: Independent  Laundry: Independent  Kitchen Mobility: Independent  Finances: Independent  Home Management: Independent  Shopping: Independent  Prior Level Of Mobility: Independent Without Device in Community  Driving / Transportation:  Driving Independent  Prior Services: Home-Independent  Housing / Facility: 2 Story Apartment / Condo  Occupation (Pre-Hospital Vocational): Retired Due To Age  Leisure Interests: Unable To Determine At This Time  Current Level of Function:   Eating: Minimal Assist  Upper Body Dressing: Moderate Assist  Lower Body Dressing: Moderate Assist (maxA socks, ModA pants)  Toileting: Contact Guard Assist (standing to use urinal, hygiene on toilet not tested)  Speech Language Pathology:   Diet / Liquid Recommendation: Regular (7),Thin (0)  Rehabilitation Prognosis/Potential: Good  Estimated Length of Stay: 14-21 days    Nursing:      Continent    Scope/Intensity of Services Recommended:  Physical Therapy: 1 hr / day  5 days / week. Therapeutic Interventions Required: Maximize Endurance, Mobility, Strength and Safety  Occupational Therapy: 1 hr / day 5 days / week. Therapeutic Interventions Required: Maximize Self Care, ADLs, IADLs and Energy Conservation  Speech & Language Pathology: 1 hr / day 5 days / week. Therapeutic Interventions Required: Maximize Cognition, Swallowing and Safety  Rehabilitation Nursin/7. Therapeutic Interventions Required: Monitor Pain, Skin, Vital Signs, Intake and Output, Labs, Safety, Aspiration Risk and Family Training  Rehabilitation Physician: 3 - 5 days / week. Therapeutic Interventions Required: Medical Management    He requires 24-hour rehabilitation nursing to manage skin care, nutrition and fluid intake, pain control, safety, medication management and patient/family goals. In addition, rehabilitation nursing will reiterate and reinforce therapy skills and equipment use, including ADLs, as well as provide education to the patient and family. David Alejandro Marquis is willing to participate in and is able to tolerate the proposed plan of care.    Rehabilitation Goals and Plan (Expected frequency & duration of treatment in the IRF):   Return to the Community, Modified Independent Level of  Care and Outpatient Support  Anticipated Date of Rehabilitation Admission: 01-12-22  Patient/Family oriented IRF level of care/facility/plan: Yes  Patient/Family willing to participate in IRF care/facility/plan: Yes  Patient able to tolerate IRF level of care proposed: Yes  Patient has potential to benefit IRF level of care proposed: Yes  Comments: Not Applicable    Special Needs or Precautions - Medical Necessity:  Safety Concerns/Precautions:  Fall Risk / High Risk for Falls, Balance, Cognition and Bed / Chair Alarm  Pain Management  IV Site: Peripheral  Current Medications:    Current Facility-Administered Medications Ordered in Epic   Medication Dose Route Frequency Provider Last Rate Last Admin   • enalapril (VASOTEC) tablet 20 mg  20 mg Oral DAILY Aaron Solis M.D.   20 mg at 01/12/22 0645   • enalaprilat (Vasotec) injection 1.25 mg 1 mL  1.25 mg Intravenous Q6HRS PRN Aaron Solis M.D.   1.25 mg at 01/12/22 0806   • hydrALAZINE (APRESOLINE) injection 20 mg  20 mg Intravenous Q6HRS PRN Aaron Solis M.D.       • apixaban (ELIQUIS) tablet 5 mg  5 mg Oral BID Aaron Solis M.D.   5 mg at 01/12/22 0503   • atorvastatin (LIPITOR) tablet 40 mg  40 mg Oral Q EVENING Aaron Solis M.D.   40 mg at 01/11/22 1722   • acetaminophen (Tylenol) tablet 650 mg  650 mg Oral Q6HRS PRN Steve Clemons M.D.   650 mg at 01/11/22 1244   • senna-docusate (PERICOLACE or SENOKOT S) 8.6-50 MG per tablet 2 Tablet  2 Tablet Oral BID Steve Clemons M.D.   2 Tablet at 01/12/22 0503    And   • polyethylene glycol/lytes (MIRALAX) PACKET 1 Packet  1 Packet Oral QDAY PRN Steve Clemons M.D.        And   • magnesium hydroxide (MILK OF MAGNESIA) suspension 30 mL  30 mL Oral QDAY PRN Steve Clemons M.D.        And   • bisacodyl (DULCOLAX) suppository 10 mg  10 mg Rectal QDAY PRN Steve Clemons M.D.       • Respiratory Therapy Consult   Nebulization Continuous RT Steve AMADOR  MIGUEL Clmeons       • ondansetron (ZOFRAN) syringe/vial injection 4 mg  4 mg Intravenous Q4HRS PRN Steve Clemons M.D.       • ondansetron (ZOFRAN ODT) dispertab 4 mg  4 mg Oral Q4HRS PRN Steve Clemons M.D.       • thiamine (Vitamin B-1) tablet 100 mg  100 mg Oral DAILY Aseprem Clemons M.D.   100 mg at 01/12/22 0503   • folic acid (FOLVITE) tablet 1 mg  1 mg Oral DAILY Aseprem Clemons M.D.   1 mg at 01/12/22 0503   • therapeutic multivitamin-minerals (THERAGRAN-M) tablet 1 Tablet  1 Tablet Oral DAILY Steve Clemons M.D.   1 Tablet at 01/12/22 0502   • albuterol inhaler 2 Puff  2 Puff Inhalation Q4HRS PRN Steve Clemons M.D.       • buPROPion SR (WELLBUTRIN-SR) tablet 150 mg  150 mg Oral DAILY Steve Clemons M.D.   150 mg at 01/12/22 0503   • finasteride (PROSCAR) tablet 5 mg  5 mg Oral DAILY Steve Clemons M.D.   5 mg at 01/12/22 0503   • umeclidinium-vilanterol (ANORO ELLIPTA) inhaler 1 Puff  1 Puff Inhalation QDAILY (RT) Steve Clemons M.D.   1 Puff at 01/12/22 0819   • fluticasone (FLOVENT HFA) 44 MCG/ACT inhaler 88 mcg  2 Puff Inhalation QDAILY (RT) Aseprem Clemons M.D.   88 mcg at 01/12/22 0819     Current Outpatient Medications Ordered in Epic   Medication Sig Dispense Refill   • apixaban (ELIQUIS) 5mg Tab Take 1 Tablet by mouth 2 times a day. Indications: Thromboembolism secondary to Atrial Fibrillation 60 Tablet    • atorvastatin (LIPITOR) 40 MG Tab Take 1 Tablet by mouth every evening. 30 Tablet    • enalaprilat (VASOTEC) 1.25 MG/ML Injection Infuse 1 mL into a venous catheter every 6 hours as needed (sbp > 170).  0   • [START ON 1/13/2022] folic acid (FOLVITE) 1 MG Tab Take 1 Tablet by mouth every day. 30 Tablet    • hydrALAZINE (APRESOLINE) 20 MG/ML Solution Infuse 1 mL into a venous catheter every 6 hours as needed (1st line: For SBP greater than or equals  170 mmHg).  0     Diet:   DIET ORDERS (From admission to next 24h)     Start     Ordered    01/10/22 2011  Diet Order Diet:  Cardiac; Miscellaneous modifications: (optional): No Decaf, No Caffeine(for test)  ALL MEALS        Question Answer Comment   Diet: Cardiac    Miscellaneous modifications: (optional) No Decaf, No Caffeine(for test)        01/10/22 2010                Anticipated Discharge Destination / Patient/Family Goal:  Destination: Home with Supervision Support System: Spouse  Anticipated home health services: OT and PT  Previously used HH service/ provider: Not Applicable  Anticipated DME Needs: [employer]  Outpatient Services: OT and PT  Alternative resources to address additional identified needs:     Pre-Screen Completed: 1/12/2022 10:29 AM William Anton L.P.N.

## 2022-01-12 NOTE — CARE PLAN
The patient is Stable - Low risk of patient condition declining or worsening    Shift Goals  Clinical Goals: q4h neuro checks, acceptance to inpatient rehab  Patient Goals: discharge    Progress made toward(s) clinical / shift goals:  q4h neuro checks completed, patient continues to have left arm weakness although strength has improved. Patient has been accepted to [facility], awaiting on transportation to be set up.    Patient is not progressing towards the following goals:    Problem: Knowledge Deficit - Stroke Education  Goal: Patient's knowledge of stroke and risk factors will improve  Outcome: Progressing  Discussed plan of care with patient including blood pressure monitoring and medications ordered and given as well as awaiting inpatient rehab acceptance and transportation. Allowed time for questions, patient agreed and verbalized understanding.     Problem: Discharge Planning - Stroke  Goal: Ensure Stroke Core Measures are met prior to discharge  Outcome: Progressing  q4h neuro checks completed, patient continues to have left arm weakness although strength has improved. Patient has been accepted to [facility], awaiting on transportation to be set up.

## 2022-01-12 NOTE — PROGRESS NOTES
Report given to Sridhar BHATTI. Plan of care discussed. Patient resting comfortably in bed. Safety precautions in place.

## 2022-01-12 NOTE — DISCHARGE PLANNING
Received Choice form at [id number]  Agency/Facility Name: [facility]  Referral sent per Choice form @ [phone]

## 2022-01-12 NOTE — PROGRESS NOTES
Report received from Bethany BHATTI. Plan of care discussed. Patient resting comfortably in bed. BP is 190/47, per neurology SBP goal is 180-190, prns ordered for SBP > 170. Clarified orders with Dr. Santamaria who asked for hydralazine to be given. Asked MD to clarify parameters. Safety precautions in place.

## 2022-01-12 NOTE — DISCHARGE SUMMARY
"Discharge Summary    CHIEF COMPLAINT ON ADMISSION  Chief Complaint   Patient presents with   • Facial Droop     Left eye, unsure when occurred   • Numbness     Pt states TEE when numb around 1400 today, states is unable to use hand to hold anything       Reason for Admission  Left Arm Numbness, Confused     CODE STATUS  Full Code    HPI & HOSPITAL COURSE  As per chart review:  \"81 y.o. male with a past medical history of hypertension, chronic obstructive pulmonary disease on room air at baseline, and dyslipidemia who presented 1/10/2022 with transient facial droop, numbness and weakness of the left upper extremity in addition to slurred speech.  Symptoms started around 10 AM in the morning.  Patient daughter noticed that her father was not making sense and have difficulty expressing himself.  Patient reports that he was not able to lift his left arm and had numbness in that area.  He denies having loss of consciousness.  He denies having abnormal jerking movements, tongue biting fecal or urinary incontinence.  Symptoms resolved but neither the patient nor the daughter were able to say after how long.\"    The patient was admitted for further management and care.  MRI reported multifocal areas of acute cortical infarct in the right frontoparietal lobes.  The patient was found to have atrial fibrillation.  As per neurology they recommended starting the patient on Eliquis.  He has been started on Eliquis as well as atorvastatin.  Cardiology was also consulted due to the fact that the patient was having heart rate in the 30s, they recommended cardiology consultation on discharge and avoid AV woody agents which we have placed referral to cardiology as an outpatient.  The patient was asymptomatic.    The patient was having high blood pressure.  Resume the patient's home blood pressure medications.  He will most likely require titration of blood pressure medications to maintain normal pressure.    The patient was counseled " extensively on substance abuse with alcohol, he understands.  He will require close follow-up with PCP as an outpatient.    The patient was seen at bedside this morning.  Patient mentions that his left upper extremity handgrip has been improving.  He has been accepted for inpatient rehab facility.  He will be discharged to an inpatient rehab facility today.    He will require close follow-up with PCP, cardiology and neurology as an outpatient.    Therefore, he is discharged in fair and stable condition to an inpatient rehabilitation hospital.    The patient met 2-midnight criteria for an inpatient stay at the time of discharge.      FOLLOW UP ITEMS POST DISCHARGE  Patient will require close follow-up with PCP, cardiology and neurology as an outpatient.    DISCHARGE DIAGNOSES  Principal Problem:    CVA (cerebral vascular accident) (HCC) POA: Unknown  Active Problems:    Hypertensive urgency POA: Yes    Dyslipidemia (Chronic) POA: Yes    Chronic obstructive pulmonary disease (HCC) (Chronic) POA: Yes    Hypertension (Chronic) POA: Yes    Hyponatremia POA: Yes    Alcohol use POA: Yes    BPH (benign prostatic hyperplasia) POA: Unknown    Atrial fibrillation (HCC) POA: Unknown  Resolved Problems:    * No resolved hospital problems. *      FOLLOW UP  Future Appointments   Date Time Provider Department Center   2/14/2022 10:00 AM Katelyn Olea D.O. [facility]   3/2/2022  9:00 AM [id number]   3/14/2022 10:00 AM Kenyatta Beasley P.A.-C. [facility] None     Katelyn Olea D.O.  [address]  [address]  [facility] [id number] [phone]    Call  Please call your primary care provider to schedule a hospital follow up. Thank you.     Jimbo Flannery M.D.  [address]  [address]  [facility] [address]  [phone]    Schedule an appointment as soon as possible for a visit        MEDICATIONS ON DISCHARGE     Medication List      START taking these medications      Instructions   apixaban 5mg  Tabs  Commonly known as: ELIQUIS   Take 1 Tablet by mouth 2 times a day. Indications: Thromboembolism secondary to Atrial Fibrillation  Dose: 5 mg     atorvastatin 40 MG Tabs  Commonly known as: LIPITOR   Take 1 Tablet by mouth every evening.  Dose: 40 mg     enalaprilat 1.25 MG/ML Inj  Commonly known as: Vasotec   Infuse 1 mL into a venous catheter every 6 hours as needed (sbp > 170).  Dose: 1.25 mg     folic acid 1 MG Tabs  Start taking on: January 13, 2022  Commonly known as: FOLVITE   Take 1 Tablet by mouth every day.  Dose: 1 mg     hydrALAZINE 20 MG/ML Soln  Commonly known as: APRESOLINE   Infuse 1 mL into a venous catheter every 6 hours as needed (1st line: For SBP greater than or equals  170 mmHg).  Dose: 20 mg        CONTINUE taking these medications      Instructions   albuterol 108 (90 Base) MCG/ACT Aers inhalation aerosol   INHALE 2 PUFFS BY MOUTH EVERY 4 HOURS AS NEEDED FOR SHORTNESS OF BREATH     buPROPion 150 MG XL tablet  Commonly known as: WELLBUTRIN XL   Take 150 mg by mouth every day.  Dose: 150 mg     enalapril 20 MG tablet  Commonly known as: VASOTEC   TAKE 1 TABLET BY MOUTH EVERY DAY     finasteride 5 MG Tabs  Commonly known as: PROSCAR   TAKE 1 TABLET BY MOUTH EVERY DAY( GENERIC EQUIVALENT FOR PROSCAR)     Trelegy Ellipta 100-62.5-25 MCG/INH Aepb inhalation  Generic drug: Fluticasone-Umeclidin-Vilant   Inhale 1 Inhalation every day. Rinse mouth after use  Dose: 1 Inhalation     Vitamin B1 50 MG Tabs   Take 2 Tablets by mouth every day.  Dose: 2 Tablet        STOP taking these medications    amoxicillin-clavulanate 875-125 MG Tabs  Commonly known as: AUGMENTIN     lovastatin 20 MG Tabs  Commonly known as: MEVACOR     predniSONE 20 MG Tabs  Commonly known as: DELTASONE            Allergies  No Known Allergies    DIET  Orders Placed This Encounter   Procedures   • Diet Order Diet: Cardiac; Miscellaneous modifications: (optional): No Decaf, No Caffeine(for test)     Standing Status:   Standing      Number of Occurrences:   1     Order Specific Question:   Diet:     Answer:   Cardiac [6]     Order Specific Question:   Miscellaneous modifications: (optional)     Answer:   No Decaf, No Caffeine(for test) [11]       ACTIVITY  As tolerated and directed by rehab.  Weight bearing as tolerated and directed by rehab.    LINES, DRAINS, AND WOUNDS  This is an automated list. Peripheral IVs will be removed prior to discharge.  Peripheral IV 01/10/22 20 G Left Antecubital (Active)   Site Assessment Clean;Dry;Intact 01/11/22 2100   Dressing Type Occlusive;Transparent 01/11/22 2100   Line Status Scrubbed the hub prior to access;Flushed;Saline locked 01/11/22 2100   Dressing Status Clean;Dry;Intact 01/11/22 2100   Dressing Intervention N/A 01/11/22 2100   Dressing Change Due 01/15/22 01/11/22 2100   Date Primary Tubing Changed 01/10/22 01/1940   NEXT Primary Tubing Change  01/11/22 01/1940   Infiltration Grading ([facility], CV) 0 01/11/22 2100   Phlebitis Scale ([facility] Only) 0 01/11/22 2100          Peripheral IV 01/10/22 20 G Left Antecubital (Active)   Site Assessment Clean;Dry;Intact 01/11/22 2100   Dressing Type Occlusive;Transparent 01/11/22 2100   Line Status Scrubbed the hub prior to access;Flushed;Saline locked 01/11/22 2100   Dressing Status Clean;Dry;Intact 01/11/22 2100   Dressing Intervention N/A 01/11/22 2100   Dressing Change Due 01/15/22 01/11/22 2100   Date Primary Tubing Changed 01/10/22 01/1940   NEXT Primary Tubing Change  01/11/22 01/1940   Infiltration Grading ([facility], CV) 0 01/11/22 2100   Phlebitis Scale ([facility] Only) 0 01/11/22 2100               MENTAL STATUS ON TRANSFER   Alert and oriented          CONSULTATIONS  Neurology  PMR    PROCEDURES      EC-ECHOCARDIOGRAM COMPLETE W/O CONT   Final Result      MR-BRAIN-W/O   Final Result      1.  Multifocal areas of acute cortical infarcts in the right frontal and parietal lobes.   2.  Multiple chronic lacunar infarcts.   3.  Severe  chronic microvascular ischemic disease.   4.  Moderate cerebral volume loss.      DX-CHEST-PORTABLE (1 VIEW)   Final Result      1.  Cardiomegaly with interstitial prominence.      2.  Linear bibasilar atelectasis.      CT-CTA NECK WITH & W/O-POST PROCESSING   Final Result      1.  Moderate stenoses of the carotid arteries bilaterally.   2.  Severe stenosis of the proximal right vertebral artery.   3.  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 This is of indeterminate age.   4.  Moderate proximal right subclavian stenosis.      CT-CTA HEAD WITH & W/O-POST PROCESS   Final Result      No thrombosis is seen within the [place].      CT-CEREBRAL PERFUSION ANALYSIS   Final Result      1.  Cerebral blood flow less than 30% likely representing completed infarct = 0 mL.      2.  T Max more than 6 seconds likely representing combination of completed infarct and ischemia = 0 mL.      3.  Mismatched volume likely representing ischemic brain/penumbra = None      4.  Please note that the cerebral perfusion was performed on the limited brain tissue around the basal ganglia region. Infarct/ischemia outside the CT perfusion sections can be missed in this study.      CT-HEAD W/O   Final Result      1.  No acute intracranial abnormality is identified.   2.  Atrophy   3.  There are periventricular and subcortical white matter changes present.  This finding is nonspecific and could be from previous small vessel ischemia, demyelination, or gliosis.   4.  Focal hypodensities in the basal ganglia are likely related to prior lacunar infarcts.             LABORATORY  Lab Results   Component Value Date    SODIUM 128 (L) 01/11/2022    POTASSIUM 3.6 01/11/2022    CHLORIDE 93 (L) 01/11/2022    CO2 26 01/11/2022    GLUCOSE 87 01/11/2022    BUN 11 01/11/2022    CREATININE 0.61 01/11/2022        Lab Results   Component Value Date    WBC 9.5  01/11/2022    HEMOGLOBIN 14.0 01/11/2022    HEMATOCRIT 41.4 (L) 01/11/2022    PLATELETCT 307 01/11/2022        Total time of the discharge process exceeds 35 minutes.

## 2022-01-12 NOTE — CARE PLAN
The patient is Watcher - Medium risk of patient condition declining or worsening           Problem: Knowledge Deficit - Standard  Goal: Patient and family/care givers will demonstrate understanding of plan of care, disease process/condition, diagnostic tests and medications  Note: Pt educated to use call light when in need of assistance, call light and personal belongings within reach.

## 2022-01-12 NOTE — PROGRESS NOTES
2 RN Skin Check    2 RN skin check complete. Assisted by MAGY Cesar  Devices in place: n/a.  Skin assessed under devices: N\A.  Confirmed pressure ulcers found on: n/a.  New potential pressure ulcers noted on n/a. Wound consult placed N/A.  The following interventions in place Pillows.

## 2022-01-12 NOTE — FLOWSHEET NOTE
01/12/22 1524   Patient History   Pulmonary Diagnosis COPD   Procedures Relevant to Respiratory Status None   Home O2 Yes   Oxygen liter flow 2   Oxygen at night only Yes   Nocturnal CPAP No   Home Treatments/Frequency Yes   MDI 1/Frequency Alb PRN   DPI 1/Frequency Trele    Sleep Apnea Screening   Have you had a sleep study? Yes   Have you been diagnosed with sleep apnea? Yes

## 2022-01-12 NOTE — DISCHARGE PLANNING
Anticipated Discharge Disposition:  Inpatient Rehab Facility (IRF)    Action:  Chart review completed.    Per MD, physiatry consult has been submitted.  Met with patient who was aware of the therapy recommendations.  He gave verbal consent for [facility]. [facility] and 2. [facility] (unable to sign choice letter due to hand weakness).  Left a list of SNF choices if patient does not meet IRF criteria for admission and will go back when patient's wife is at bedside.      11:40- patient accepted by [facility] and they arranged transport for 13:00.  Patient signed COBRA form and it was placed on the chart.  Patient and daughter Sarah notified of transfer and time     Barriers to Discharge:  IRF acceptance v.s SNF    Plan:   Hospital care management will continue to assist with discharge planning needs.

## 2022-01-12 NOTE — CONSULTS
Medical chart review completed.     Patient is a 81 y.o. male  with a past medical history of hypertension, BPH, COPD, hyperlipidemia, depression, admitted to [facility] on 1/10, with left-sided weakness and numbness.  Admission labs with hyponatremia sodium of 127, borderline hyperkalemia potassium of 5.1, elevated alk phos.  EKG with atrial fibrillation.  Head CT without acute findings.  Negative perfusion scan.  Negative CTA of the head.  CTA of the neck with moderate stenosis of bilateral carotid arteries, severe stenosis of the proximal right vertebral artery, occlusion of the left vertebral artery at 67 of undetermined age, moderate proximal right subclavian stenosis.  Patient also with hypertension systolics in the 220s.  He was admitted to complete stroke work-up. HgbA1c 6.1, LDL 56, ECHO EF 65 % with moderate to severe mitral regurgitation with severely dilated left atrium, moderate tricuspid regurgitation, right ventricular systolic pressure 58 mmHg.  MRI confirmed multifocal areas of acute cortical infarcts in the right frontal and parietal lobes, multiple chronic lacunar infarcts, severe chronic microvascular ischemic disease with moderate cerebral volume loss.  Cardiology was consulted due to bradycardia arrhythmia.  Recommendation for outpatient follow-up and to avoid AV woody agents.  He was started on anticoagulation for atrial fibrillation.      Patient with multiple co-morbidities(including but not limited to hypertension, hyponatremia, prediabetes, atrial fibrillation, COPD); with cognitive deficits and functional deficits in mobility/self-cares/swallowing, and Moderate de-conditioning.     Pre-morbidly, this patient lived in a two level home with Three steps to enter, with spouse.  Who may not be able to provide much physical assistance at discharge.  The patient was evaluated by acute care Physical Therapy, Occupational Therapy and Speech Language Pathology; currently  requiring minimal assistance for mobility and moderate assistance for ADLs, also with ongoing swallowing deficits.     The patient is an excellent candidate for an acute inpatient rehabilitation program with a coordinated program of care at an intensity and frequency not available at a lower level of care.     Note: if the patient continues to progress while waiting for medical clearance, and no longer requires 2 out of 3 therapy services (PT/OT/SLP), then the patient would no longer meet the criteria for acute inpatient rehabilitation.    This recommendation is substantiated by the patient's current medical condition with intervention and assessment of medical issues requiring an acute level of care for patient's safety and maximum outcome. A coordinated program of care will be provided by an interdisciplinary team including physical therapy, occupational therapy, speech language pathology, hospitalist, physiatry, rehab nursing and rehab psychology. Rehab goals include improved cognition and swallowing, mobility, self-care management, strength and conditioning/endurance, pain management, bowel and bladder management, mood and affect, and safety with independent home management including caregiver training. Estimated length of stay is approximately 14-21 days. Rehab potential: Very Good. Disposition: to pre-morbid independent living setting with supportive care of patient's spouse. We will continue to follow with you in anticipation of discharge to acute inpatient rehabilitation when medically stable to do so at the discretion of his attending physician.     Thank you for allowing us to participate in his care.    Eusebia Mario M.D.  Physical Medicine and Rehabilitation

## 2022-01-12 NOTE — DISCHARGE INSTRUCTIONS
Discharge Instructions    Discharged to home by medical transport with self. Discharged via wheelchair, hospital escort: Yes.  Special equipment needed: Not Applicable    Be sure to schedule a follow-up appointment with your primary care doctor or any specialists as instructed.     Discharge Plan:   Diet Plan: Discussed  Activity Level: Discussed  Confirmed Follow up Appointment: Patient to Call and Schedule Appointment  Confirmed Symptoms Management: Discussed  Medication Reconciliation Updated: Yes  Influenza Vaccine Indication: Not indicated: Previously immunized this influenza season and > 8 years of age    I understand that a diet low in cholesterol, fat, and sodium is recommended for good health. Unless I have been given specific instructions below for another diet, I accept this instruction as my diet prescription.   Other diet: Regular    Special Instructions: None    · Is patient discharged on Warfarin / Coumadin?   No       Ischemic Stroke    An ischemic stroke is the sudden death of brain tissue. Blood carries oxygen to all areas of the body. This type of stroke happens when your blood does not flow to your brain like normal. Your brain cannot get the oxygen it needs. This is an emergency. It must be treated right away.  Symptoms of a stroke usually happen all of a sudden. You may notice them when you wake up. They can include:  · Weakness or loss of feeling in your face, arm, or leg. This often happens on one side of the body.  · Trouble walking.  · Trouble moving your arms or legs.  · Loss of balance or coordination.  · Feeling confused.  · Trouble talking or understanding what people are saying.  · Slurred speech.  · Trouble seeing.  · Seeing two of one object (double vision).  · Feeling dizzy.  · Feeling sick to your stomach (nauseous) and throwing up (vomiting).  · A very bad headache for no reason.  Get help as soon as any of these problems start. This is important. Some treatments work better if  they are given right away. These include:  · Aspirin.  · Medicines to control blood pressure.  · A shot (injection) of medicine to break up the blood clot.  · Treatments given in the blood vessel (artery) to take out the clot or break it up.  Other treatments may include:  · Oxygen.  · Fluids given through an IV tube.  · Medicines to thin out your blood.  · Procedures to help your blood flow better.  What increases the risk?  Certain things may make you more likely to have a stroke. Some of these are things that you can change, such as:  · Being very overweight (obesity).  · Smoking.  · Taking birth control pills.  · Not being active.  · Drinking too much alcohol.  · Using drugs.  Other risk factors include:  · High blood pressure.  · High cholesterol.  · Diabetes.  · Heart disease.  · Being , , , or .  · Being over age 60.  · Family history of stroke.  · Having had blood clots, stroke, or warning stroke (transient ischemic attack, TIA) in the past.  · Sickle cell disease.  · Being a woman with a history of high blood pressure in pregnancy (preeclampsia).  · Migraine headache.  · Sleep apnea.  · Having an irregular heartbeat (atrial fibrillation).  · Long-term (chronic) diseases that cause soreness and swelling (inflammation).  · Disorders that affect how your blood clots.  Follow these instructions at home:  Medicines  · Take over-the-counter and prescription medicines only as told by your doctor.  · If you were told to take aspirin or another medicine to thin your blood, take it exactly as told by your doctor.  ? Taking too much of the medicine can cause bleeding.  ? If you do not take enough, it may not work as well.  · Know the side effects of your medicines. If you are taking a blood thinner, make sure you:  ? Hold pressure over any cuts for longer than usual.  ? Tell your dentist and other doctors that you take this medicine.  ? Avoid activities that may  "cause damage or injury to your body.  Eating and drinking  · Follow instructions from your doctor about what you cannot eat or drink.  · Eat healthy foods.  · If you have trouble with swallowing, do these things to avoid choking:  ? Take small bites when eating.  ? Eat foods that are soft or pureed.  Safety  · Follow instructions from your health care team about physical activity.  · Use a walker or cane as told by your doctor.  · Keep your home safe so you do not fall. This may include:  ? Having experts look at your home to make sure it is safe.  ? Putting grab bars in the bedroom and bathroom.  ? Using raised toilets.  ? Putting a seat in the shower.  General instructions  · Do not use any tobacco products.  ? Examples of these are cigarettes, chewing tobacco, and e-cigarettes.  ? If you need help quitting, ask your doctor.  · Limit how much alcohol you drink. This means no more than 1 drink a day for nonpregnant women and 2 drinks a day for men. One drink equals 12 oz of beer, 5 oz of wine, or 1½ oz of hard liquor.  · If you need help to stop using drugs or alcohol, ask your doctor to refer you to a program or specialist.  · Stay active. Exercise as told by your doctor.  · Keep all follow-up visits as told by your doctor. This is important.  Get help right away if:    · You have any signs of a stroke. \"BE FAST\" is an easy way to remember the main warning signs:  ? B - Balance. Signs are dizziness, sudden trouble walking, or loss of balance.  ? E - Eyes. Signs are trouble seeing or a change in how you see.  ? F - Face. Signs are sudden weakness or loss of feeling of the face, or the face or eyelid drooping on one side.  ? A - Arms. Signs are weakness or loss of feeling in an arm. This happens suddenly and usually on one side of the body.  ? S - Speech. Signs are sudden trouble speaking, slurred speech, or trouble understanding what people say.  ? T - Time. Time to call emergency services. Write down what time " symptoms started.  · You have other signs of a stroke, such as:  ? A sudden, very bad headache with no known cause.  ? Feeling sick to your stomach (nausea).  ? Throwing up (vomiting).  ? Jerky movements you cannot control (seizure).  These symptoms may be an emergency. Do not wait to see if the symptoms will go away. Get medical help right away. Call your local emergency services (911 in the [place] Do not drive yourself to the hospital.  Summary  · An ischemic stroke is the sudden death of brain tissue.  · Symptoms of a stroke usually happen all of a sudden. You may notice them when you wake up.  · Get help if you have any warning signs of a stroke. This is important. Some treatments work better if they are given right away.  This information is not intended to replace advice given to you by your health care provider. Make sure you discuss any questions you have with your health care provider.  Document Released: 12/06/2012 Document Revised: 05/29/2019 Document Reviewed: 03/15/2017  [employer] Patient Education © 2020 [employer]      Depression / Suicide Risk    As you are discharged from this [facility] facility, it is important to learn how to keep safe from harming yourself.    Recognize the warning signs:  · Abrupt changes in personality, positive or negative- including increase in energy   · Giving away possessions  · Change in eating patterns- significant weight changes-  positive or negative  · Change in sleeping patterns- unable to sleep or sleeping all the time   · Unwillingness or inability to communicate  · Depression  · Unusual sadness, discouragement and loneliness  · Talk of wanting to die  · Neglect of personal appearance   · Rebelliousness- reckless behavior  · Withdrawal from people/activities they love  · Confusion- inability to concentrate     If you or a loved one observes any of these behaviors or has concerns about self-harm, here's what you can do:  · Talk about it- your feelings and  reasons for harming yourself  · Remove any means that you might use to hurt yourself (examples: pills, rope, extension cords, firearm)  · Get professional help from the community (Mental Health, Substance Abuse, psychological counseling)  · Do not be alone:Call your Safe Contact- someone whom you trust who will be there for you.  · Call your local CRISIS HOTLINE 421-8280 or 244-462-1501  · Call your local Children's Mobile Crisis Response Team [place] (663) 321-5597 or www.UpRace  · Call the toll free National Suicide Prevention Hotlines   · National Suicide Prevention Lifeline 651-420-BDYT (8642)  · National Hope Line Network 800-SUICIDE (749-0256)

## 2022-01-12 NOTE — FLOWSHEET NOTE
01/12/22 1528   Events/Summary/Plan   Events/Summary/Plan RT assessment   Vital Signs   Pulse 68   Respiration 18   Pulse Oximetry 94 %   $ Pulse Oximetry (Spot Check) Yes   Respiratory Assessment   Level of Consciousness Alert   Chest Exam   Work Of Breathing / Effort Within Normal Limits   Secretions   Cough Productive   How Sputum Obtained Spontaneous   Oxygen   O2 (LPM)   (2 lpm at night per home regimen)   O2 Delivery Device None - Room Air

## 2022-01-12 NOTE — PROGRESS NOTES
Assumed pt care. Pt is alert and oriented X4. Pt.denied any pain. Assessment completed. Pt noted to have some left sided weakness and numbness. Pt denied any needs. Safety precautions in place. Hourly rounding in place.

## 2022-01-12 NOTE — PROGRESS NOTES
Patient admitted to facility at 1410 via W/C; accompanied by hospital transport.  Patient assisted to room and positioned in bed for comfort and safety; call light within reach.  Patient assisted with stowing belongings and oriented to room and facility.  Admission assessment performed and documented in computer.  Admission paperwork completed; signed copies placed in chart.  Will continue to monitor.

## 2022-01-12 NOTE — PROGRESS NOTES
Telemetry Shift Summary     Rhythm afib  HR Range 38-72  Ectopy rPVC  Measurements -/.12/-           Normal Values  Rhythm SR  HR Range    Measurements 0.12-0.20 / 0.06-0.10  / 0.30-0.52

## 2022-01-12 NOTE — CARE PLAN
The patient is Stable - Low risk of patient condition declining or worsening    Shift Goals  Clinical Goals: NIHS; Q4 Neuro; monitor BP goal 180-190s  Patient Goals: Dischrage to Rehab    Progress made toward(s) clinical / shift goals:  Neuro checks every 4 hours; NIHS is 1 (unchanged from previous). Monitor blood pressure goal for systolic is 180s-190s. Work with interdisciplinary team for discharge. Plan is to discharge to rehab.    Problem: Knowledge Deficit - Stroke Education  Goal: Patient's knowledge of stroke and risk factors will improve  Outcome: Progressing     Problem: Neuro Status  Goal: Neuro status will remain stable or improve  Outcome: Progressing       Patient is not progressing towards the following goals:N/A

## 2022-01-12 NOTE — DISCHARGE PLANNING
[facility] Transitional Care Coordination    Referral from:  Dr. Rosalio Solis  \  Insurance Provider on Facesheet: MCR/ANT    Potential Rehab Diagnosis: TBD    Chart review indicates patient may have on going medical management and may have therapy needs to possibly meet inpatient rehab facility criteria with the goal of returning to community.    D/C support: TBD     Physiatry consultation forwarded per protocol.  Dr. Rosalio Solis has medically cleared.  Spoke with Caterina, spouse regarding [facility] and D/C resources/support.  She is hopeful for an admission.  They live in a 2LV home with 2St to enter.  David will need to negotiate a flight of stairs.     Last Covid test:       Thank you for the referral.     1035-Dr. Mario has accepted.  Transport has been arranged via [facility] @ 1300.  Dr. Rosalio Solis & Sarah, daughter are aware.  Msg placed to Marcos MARCUS & Lynsey MARCUS.

## 2022-01-12 NOTE — PROGRESS NOTES
Telemetry Shift Summary    Rhythm A fib/flutter  HR Range 52-69, down to 35  Ectopy rare PVCs  Measurements -/.14/-        Normal Values  Rhythm SR  HR Range    Measurements 0.12-0.20 / 0.06-0.10  / 0.30-0.52

## 2022-01-12 NOTE — PROGRESS NOTES
Assessment completed, patient is alert and oriented x 4, neuro check and NIHSS completed, see flowsheet. Clarified hydralazine prn order, with Dr. Santamaria, per Dr. Santamaria he will fix prn order, RN to give prn Vasotec at this time. Patient agreeable to Rehab, awaiting on acceptance.

## 2022-01-13 DIAGNOSIS — I63.411 CEREBROVASCULAR ACCIDENT (CVA) DUE TO EMBOLISM OF RIGHT MIDDLE CEREBRAL ARTERY (HCC): ICD-10-CM

## 2022-01-13 PROBLEM — E55.9 VITAMIN D DEFICIENCY: Status: ACTIVE | Noted: 2022-01-13

## 2022-01-13 LAB
25(OH)D3 SERPL-MCNC: 10 NG/ML (ref 30–100)
ALBUMIN SERPL BCP-MCNC: 3.6 G/DL (ref 3.2–4.9)
ALBUMIN/GLOB SERPL: 1.8 G/DL
ALP SERPL-CCNC: 101 U/L (ref 30–99)
ALT SERPL-CCNC: 8 U/L (ref 2–50)
ANION GAP SERPL CALC-SCNC: 8 MMOL/L (ref 7–16)
AST SERPL-CCNC: 16 U/L (ref 12–45)
BASOPHILS # BLD AUTO: 0.4 % (ref 0–1.8)
BASOPHILS # BLD: 0.03 K/UL (ref 0–0.12)
BILIRUB SERPL-MCNC: 1.7 MG/DL (ref 0.1–1.5)
BUN SERPL-MCNC: 12 MG/DL (ref 8–22)
CALCIUM SERPL-MCNC: 8.7 MG/DL (ref 8.5–10.5)
CHLORIDE SERPL-SCNC: 95 MMOL/L (ref 96–112)
CO2 SERPL-SCNC: 28 MMOL/L (ref 20–33)
CREAT SERPL-MCNC: 0.81 MG/DL (ref 0.5–1.4)
EOSINOPHIL # BLD AUTO: 0.2 K/UL (ref 0–0.51)
EOSINOPHIL NFR BLD: 2.4 % (ref 0–6.9)
ERYTHROCYTE [DISTWIDTH] IN BLOOD BY AUTOMATED COUNT: 48.1 FL (ref 35.9–50)
GLOBULIN SER CALC-MCNC: 2 G/DL (ref 1.9–3.5)
GLUCOSE SERPL-MCNC: 85 MG/DL (ref 65–99)
HCT VFR BLD AUTO: 43.9 % (ref 42–52)
HGB BLD-MCNC: 14.8 G/DL (ref 14–18)
IMM GRANULOCYTES # BLD AUTO: 0.12 K/UL (ref 0–0.11)
IMM GRANULOCYTES NFR BLD AUTO: 1.4 % (ref 0–0.9)
LYMPHOCYTES # BLD AUTO: 2.49 K/UL (ref 1–4.8)
LYMPHOCYTES NFR BLD: 29.4 % (ref 22–41)
MAGNESIUM SERPL-MCNC: 1.9 MG/DL (ref 1.5–2.5)
MCH RBC QN AUTO: 32 PG (ref 27–33)
MCHC RBC AUTO-ENTMCNC: 33.7 G/DL (ref 33.7–35.3)
MCV RBC AUTO: 95 FL (ref 81.4–97.8)
MONOCYTES # BLD AUTO: 0.92 K/UL (ref 0–0.85)
MONOCYTES NFR BLD AUTO: 10.9 % (ref 0–13.4)
NEUTROPHILS # BLD AUTO: 4.7 K/UL (ref 1.82–7.42)
NEUTROPHILS NFR BLD: 55.5 % (ref 44–72)
NRBC # BLD AUTO: 0 K/UL
NRBC BLD-RTO: 0 /100 WBC
NT-PROBNP SERPL IA-MCNC: 1773 PG/ML (ref 0–125)
OSMOLALITY UR: 534 MOSM/KG H2O (ref 300–900)
PLATELET # BLD AUTO: 327 K/UL (ref 164–446)
PMV BLD AUTO: 9 FL (ref 9–12.9)
POTASSIUM SERPL-SCNC: 4 MMOL/L (ref 3.6–5.5)
PROT SERPL-MCNC: 5.6 G/DL (ref 6–8.2)
RBC # BLD AUTO: 4.62 M/UL (ref 4.7–6.1)
SARS-COV-2 RNA RESP QL NAA+PROBE: NOTDETECTED
SODIUM SERPL-SCNC: 131 MMOL/L (ref 135–145)
SODIUM UR-SCNC: 125 MMOL/L
SPECIMEN SOURCE: NORMAL
WBC # BLD AUTO: 8.5 K/UL (ref 4.8–10.8)

## 2022-01-13 PROCEDURE — 94640 AIRWAY INHALATION TREATMENT: CPT

## 2022-01-13 PROCEDURE — A9270 NON-COVERED ITEM OR SERVICE: HCPCS | Performed by: HOSPITALIST

## 2022-01-13 PROCEDURE — 36415 COLL VENOUS BLD VENIPUNCTURE: CPT

## 2022-01-13 PROCEDURE — 97535 SELF CARE MNGMENT TRAINING: CPT

## 2022-01-13 PROCEDURE — 700102 HCHG RX REV CODE 250 W/ 637 OVERRIDE(OP): Performed by: HOSPITALIST

## 2022-01-13 PROCEDURE — 99222 1ST HOSP IP/OBS MODERATE 55: CPT | Performed by: HOSPITALIST

## 2022-01-13 PROCEDURE — 80053 COMPREHEN METABOLIC PANEL: CPT

## 2022-01-13 PROCEDURE — 82306 VITAMIN D 25 HYDROXY: CPT

## 2022-01-13 PROCEDURE — 97530 THERAPEUTIC ACTIVITIES: CPT

## 2022-01-13 PROCEDURE — 97165 OT EVAL LOW COMPLEX 30 MIN: CPT

## 2022-01-13 PROCEDURE — 83735 ASSAY OF MAGNESIUM: CPT

## 2022-01-13 PROCEDURE — 83880 ASSAY OF NATRIURETIC PEPTIDE: CPT

## 2022-01-13 PROCEDURE — 770010 HCHG ROOM/CARE - REHAB SEMI PRIVAT*

## 2022-01-13 PROCEDURE — A9270 NON-COVERED ITEM OR SERVICE: HCPCS | Performed by: PHYSICAL MEDICINE & REHABILITATION

## 2022-01-13 PROCEDURE — 94760 N-INVAS EAR/PLS OXIMETRY 1: CPT

## 2022-01-13 PROCEDURE — 92610 EVALUATE SWALLOWING FUNCTION: CPT

## 2022-01-13 PROCEDURE — 85025 COMPLETE CBC W/AUTO DIFF WBC: CPT

## 2022-01-13 PROCEDURE — 92523 SPEECH SOUND LANG COMPREHEN: CPT

## 2022-01-13 PROCEDURE — 97162 PT EVAL MOD COMPLEX 30 MIN: CPT

## 2022-01-13 PROCEDURE — 99233 SBSQ HOSP IP/OBS HIGH 50: CPT | Performed by: PHYSICAL MEDICINE & REHABILITATION

## 2022-01-13 PROCEDURE — 700102 HCHG RX REV CODE 250 W/ 637 OVERRIDE(OP): Performed by: PHYSICAL MEDICINE & REHABILITATION

## 2022-01-13 RX ORDER — FLUTICASONE PROPIONATE 110 UG/1
2 AEROSOL, METERED RESPIRATORY (INHALATION)
Status: DISCONTINUED | OUTPATIENT
Start: 2022-01-13 | End: 2022-01-19 | Stop reason: HOSPADM

## 2022-01-13 RX ORDER — GUAIFENESIN 600 MG/1
600 TABLET, EXTENDED RELEASE ORAL EVERY 12 HOURS
Status: DISCONTINUED | OUTPATIENT
Start: 2022-01-13 | End: 2022-01-19 | Stop reason: HOSPADM

## 2022-01-13 RX ORDER — VITAMIN B COMPLEX
2000 TABLET ORAL DAILY
Status: DISCONTINUED | OUTPATIENT
Start: 2022-01-13 | End: 2022-01-19 | Stop reason: HOSPADM

## 2022-01-13 RX ORDER — GUAIFENESIN/DEXTROMETHORPHAN 100-10MG/5
5 SYRUP ORAL EVERY 6 HOURS PRN
Status: DISCONTINUED | OUTPATIENT
Start: 2022-01-13 | End: 2022-01-19 | Stop reason: HOSPADM

## 2022-01-13 RX ADMIN — BUPROPION HYDROCHLORIDE 150 MG: 150 TABLET, EXTENDED RELEASE ORAL at 08:07

## 2022-01-13 RX ADMIN — SENNOSIDES AND DOCUSATE SODIUM 2 TABLET: 50; 8.6 TABLET ORAL at 22:12

## 2022-01-13 RX ADMIN — GUAIFENESIN 600 MG: 600 TABLET, EXTENDED RELEASE ORAL at 11:08

## 2022-01-13 RX ADMIN — UMECLIDINIUM BROMIDE AND VILANTEROL TRIFENATATE 1 PUFF: 62.5; 25 POWDER RESPIRATORY (INHALATION) at 10:08

## 2022-01-13 RX ADMIN — FINASTERIDE 5 MG: 5 TABLET, FILM COATED ORAL at 08:06

## 2022-01-13 RX ADMIN — APIXABAN 5 MG: 5 TABLET, FILM COATED ORAL at 22:11

## 2022-01-13 RX ADMIN — FLUTICASONE PROPIONATE 220 MCG: 110 AEROSOL, METERED RESPIRATORY (INHALATION) at 10:08

## 2022-01-13 RX ADMIN — ENALAPRIL MALEATE 20 MG: 5 TABLET ORAL at 08:06

## 2022-01-13 RX ADMIN — Medication 1 TABLET: at 08:07

## 2022-01-13 RX ADMIN — OMEPRAZOLE 20 MG: 20 CAPSULE, DELAYED RELEASE ORAL at 08:07

## 2022-01-13 RX ADMIN — FLUTICASONE PROPIONATE 220 MCG: 110 AEROSOL, METERED RESPIRATORY (INHALATION) at 22:11

## 2022-01-13 RX ADMIN — Medication 2000 UNITS: at 17:54

## 2022-01-13 RX ADMIN — FOLIC ACID 1 MG: 1 TABLET ORAL at 08:07

## 2022-01-13 RX ADMIN — GUAIFENESIN 600 MG: 600 TABLET, EXTENDED RELEASE ORAL at 22:11

## 2022-01-13 RX ADMIN — THIAMINE HCL TAB 100 MG 100 MG: 100 TAB at 08:07

## 2022-01-13 RX ADMIN — HYDRALAZINE HYDROCHLORIDE 10 MG: 10 TABLET, FILM COATED ORAL at 08:02

## 2022-01-13 RX ADMIN — APIXABAN 5 MG: 5 TABLET, FILM COATED ORAL at 08:07

## 2022-01-13 RX ADMIN — ATORVASTATIN CALCIUM 40 MG: 40 TABLET, FILM COATED ORAL at 22:11

## 2022-01-13 ASSESSMENT — BRIEF INTERVIEW FOR MENTAL STATUS (BIMS)
ASKED TO RECALL BED: YES, AFTER CUEING (A PIECE OF FURNITURE")"
ASKED TO RECALL SOCK: YES, NO CUE REQUIRED
ASKED TO RECALL BLUE: YES, NO CUE REQUIRED
WHAT MONTH IS IT: ACCURATE WITHIN 5 DAYS
BIMS SUMMARY SCORE: 14
WHAT DAY OF THE WEEK IS IT: CORRECT
WHAT MONTH IS IT: ACCURATE WITHIN 5 DAYS
ASKED TO RECALL BED: YES, AFTER CUEING (A PIECE OF FURNITURE")"
INITIAL REPETITION OF BED BLUE SOCK - FIRST ATTEMPT: 3
ASKED TO RECALL SOCK: YES, NO CUE REQUIRED
WHAT YEAR IS IT: CORRECT
WHAT YEAR IS IT: CORRECT
WHAT DAY OF THE WEEK IS IT: CORRECT
INITIAL REPETITION OF BED BLUE SOCK - FIRST ATTEMPT: 3
ASKED TO RECALL BLUE: YES, NO CUE REQUIRED
BIMS SUMMARY SCORE: 14

## 2022-01-13 ASSESSMENT — ACTIVITIES OF DAILY LIVING (ADL)
BED_CHAIR_WHEELCHAIR_TRANSFER_DESCRIPTION: INCREASED TIME;SUPERVISION FOR SAFETY;VERBAL CUEING
TOILET_TRANSFER_DESCRIPTION: INCREASED TIME;SUPERVISION FOR SAFETY;VERBAL CUEING
TOILETING: INDEPENDENT

## 2022-01-13 ASSESSMENT — GAIT ASSESSMENTS
DISTANCE (FEET): 600
GAIT LEVEL OF ASSIST: STANDBY ASSIST

## 2022-01-13 ASSESSMENT — 6 MINUTE WALK TEST (6MWT)
NUMBER OF RESTS: 0
LEVEL OF ASSISTANCE: SUPERVISION
TOTAL DISTANCE WALKED (FT): 508
GAIT SPEED - METERS PER SECOND: .43

## 2022-01-13 NOTE — H&P
REHABILITATION HISTORY AND PHYSICAL/POST ADMISSION EVALUATION    1/12/2022  4:02 PM  David Cho  [id number]  Admission  1/12/2022  2:04 PM  [facility] Code/Reason for admission: 0001.1 - Stroke: Left Body Involvement (Right Brain)   Etiologic diagnosis/problem: Ischemic stroke (HCC)  Chief Complaint: left hand weakness    HPI:  Patient is a 81 y.o. male  with a past medical history of hypertension, BPH, COPD, hyperlipidemia, depression, admitted to [facility] on 1/10, with left-sided weakness and numbness.  Admission labs with hyponatremia sodium of 127, borderline hyperkalemia potassium of 5.1, elevated alk phos.  EKG with atrial fibrillation.  Head CT without acute findings.  Negative perfusion scan.  Negative CTA of the head.  CTA of the neck with moderate stenosis of bilateral carotid arteries, severe stenosis of the proximal right vertebral artery, occlusion of the left vertebral artery at 67 of undetermined age, moderate proximal right subclavian stenosis.  Patient also with hypertension systolics in the 220s.  He was admitted to complete stroke work-up. HgbA1c 6.1, LDL 56, ECHO EF 65 % with moderate to severe mitral regurgitation with severely dilated left atrium, moderate tricuspid regurgitation, right ventricular systolic pressure 58 mmHg.  MRI confirmed multifocal areas of acute cortical infarcts in the right frontal and parietal lobes, multiple chronic lacunar infarcts, severe chronic microvascular ischemic disease with moderate cerebral volume loss.  Cardiology was consulted due to bradycardia arrhythmia. Recommendation for outpatient follow-up and to avoid AV woody agents.  He was started on anticoagulation for atrial fibrillation.    Patient current reports left hand weakness. He is right hand dominant. He denies any changes in his vision or numbness/tingling. He is hard of hearing and does not have hearing aides. No issues with his bowel or bladder. He denies any pain. He  reports weight loss secondary to decreased oral intake as his upper dentures don't fit right, and he needs more work done on his lower teeth.    Patient was evaluated by Rehab Medicine physician and Physical Therapy, Occupational Therapy and Speech Therapy and determined to be appropriate for acute inpatient rehab and was transferred to [facility] on 1/12/2022  2:04 PM.    With this acute therapeutic intervention, this patient hopes to improve his functional status, and return to independent living with the supportive care of spouse.    REVIEW OF SYSTEMS:     A complete review of systems was performed and was negative in detail with the exception of items mentioned elsewhere in this document.    PMH:  Past Medical History:   Diagnosis Date   • Alcohol use    • Cholesterol serum elevated    • COPD (chronic obstructive pulmonary disease) (HCC)    • Depression    • Enlarged prostate    • Hypertension        PSH:  Past Surgical History:   Procedure Laterality Date   • APPENDECTOMY     • OTHER  appendectomy       Family History   Problem Relation Age of Onset   • Heart Disease Mother    • Other Mother         turberculosis   • Cancer Father         colon cancer   • Heart Disease Step-Sister         MEDICATIONS:  Current Facility-Administered Medications   Medication Dose   • hydrOXYzine HCl (ATARAX) tablet 50 mg  50 mg   • QUEtiapine (Seroquel) tablet 25 mg  25 mg   • melatonin tablet 3 mg  3 mg   • Respiratory Therapy Consult     • Pharmacy Consult Request ...Pain Management Review 1 Each  1 Each   • hydrALAZINE (APRESOLINE) tablet 10 mg  10 mg   • acetaminophen (Tylenol) tablet 650 mg  650 mg   • lactulose 20 GM/30ML solution 30 mL  30 mL   • docusate sodium (ENEMEEZ) enema 283 mg  283 mg   • sodium phosphate (Fleet) enema 133 mL  1 Each   • [START ON 1/13/2022] omeprazole (PRILOSEC) capsule 20 mg  20 mg   • artificial tears ophthalmic solution 1 Drop  1 Drop   • benzocaine-menthol (CEPACOL) lozenge  1 Lozenge  1 Lozenge   • mag hydrox-al hydrox-simeth (MAALOX PLUS ES or MYLANTA DS) suspension 20 mL  20 mL   • ondansetron (ZOFRAN ODT) dispertab 4 mg  4 mg    Or   • ondansetron (ZOFRAN) syringe/vial injection 4 mg  4 mg   • traZODone (DESYREL) tablet 50 mg  50 mg   • sodium chloride (OCEAN) 0.65 % nasal spray 2 Spray  2 Spray   • midazolam (VERSED) 5 mg/mL (1 mL vial)  5 mg   • senna-docusate (PERICOLACE or SENOKOT S) 8.6-50 MG per tablet 2 Tablet  2 Tablet    And   • polyethylene glycol/lytes (MIRALAX) PACKET 1 Packet  1 Packet    And   • magnesium hydroxide (MILK OF MAGNESIA) suspension 30 mL  30 mL    And   • bisacodyl (DULCOLAX) suppository 10 mg  10 mg   • apixaban (ELIQUIS) tablet 5 mg  5 mg   • atorvastatin (LIPITOR) tablet 40 mg  40 mg   • [START ON 1/13/2022] buPROPion SR (WELLBUTRIN-SR) tablet 150 mg  150 mg   • [START ON 1/13/2022] enalapril (VASOTEC) tablet 20 mg  20 mg   • [START ON 1/13/2022] finasteride (PROSCAR) tablet 5 mg  5 mg   • [START ON 1/13/2022] folic acid (FOLVITE) tablet 1 mg  1 mg   • [START ON 1/13/2022] therapeutic multivitamin-minerals (THERAGRAN-M) tablet 1 Tablet  1 Tablet   • [START ON 1/13/2022] thiamine (Vitamin B-1) tablet 100 mg  100 mg   • [START ON 1/13/2022] umeclidinium-vilanterol (ANORO ELLIPTA) inhaler 1 Puff  1 Puff       ALLERGIES:  Patient has no known allergies.    PSYCHOSOCIAL HISTORY:  Pre-morbidly, this patient lived in a two level home with Three steps to enter, with spouse.  Who may not be able to provide much physical assistance at discharge. Patient used to work as an electric company .     Patient denies tobacco and drugs, drinks 3-4 beers daily.    LEVEL OF FUNCTION PRIOR TO DISABILTY:  Independent    LEVEL OF FUNCTION PRIOR TO ADMISSION to [facility]:  PT:    Gait Level Of Assist: Contact Guard Assist  Assistive Device: None  Distance (Feet): 125  Deviation:  (L LE mild delay with swing/stance phase)  # of Stairs Climbed:  "2  Level of Assist with Stairs: Contact Guard Assist  Supine to Sit: Supervised  Sit to Supine: Supervised  Scooting: Modified Independent  Sit to Stand: Contact Guard Assist  Bed, Chair, Wheelchair Transfer: Contact Guard Assist  Toilet Transfers: Standby Assist  Transfer Method: Stand Step  Sitting Edge of Bed: 18 min  Standin-7 min    OT:    Eating: Minimal Assist  Upper Body Dressing: Moderate Assist  Lower Body Dressing: Moderate Assist (maxA socks, ModA pants)  Toileting: Contact Guard Assist (standing to use urinal, hygiene on toilet not tested)    SLP:    Screened for dysphagia, no swallowing concerns.  Cognitive assessment not completed.    CURRENT LEVEL OF FUNCTION:   Same as level of function prior to admission to [facility]    PHYSICAL EXAM:     VITAL SIGNS:   height is 1.778 m (5' 10\") and weight is 62 kg (136 lb 11 oz). His oral temperature is 37.2 °C (99 °F). His blood pressure is 133/83 and his pulse is 68. His respiration is 18 and oxygen saturation is 94%.     GENERAL: No apparent distress, thin  HEENT: Normocephalic/atraumatic, EOMI, PERRL and No nystagmus, poor dentition, upper dentures  CARDIAC: Regular rate and rhythm, normal S1, S2, no murmurs, no peripheral edema   LUNGS: barrel chested, clear to auscultation, normal respiratory effort, on room air   ABDOMINAL: bowel sounds present, soft, nontender and nondistended    EXTREMITIES: no edema or 2+ bilateral DP/PT pulses  MSK: no joint swelling    NEURO:    Mental status: alert  Speech: fluent, no aphasia or dysarthria    CRANIAL NERVES:  2,3: visual acuity grossly intact, PERRL  3,4,6: EOMI bilaterally, no nystagmus or diplopia  5: intact in all branches  7: no facial asymmetry  8: hard of hearing  9,10: symmetric palate elevation  11: SCM/Trapezius strength 5/5 bilaterally  12: tongue protrudes midline    Motor:  Shoulder flexors:  Right -  5/5, Left -  4+/5  Elbow flexors:  Right -  5/5, Left -  4+/5  Elbow extensors:  " Right -  5/5, Left -  4+/5  Weak  on left  Hip flexors:  Right -  5/5, Left -  5/5  Knee ext:  Right -  5/5, Left -  5/5  Dorsiflexors:  Right -  5/5, Left -  5/5  Plantar flexors:  Right -  5/5, Left -  5/5     Sensory:   intact to light touch through out        RADIOLOGY:        Results for orders placed during the hospital encounter of 01/10/22    MR-BRAIN-W/O    Impression  1.  Multifocal areas of acute cortical infarcts in the right frontal and parietal lobes.  2.  Multiple chronic lacunar infarcts.  3.  Severe chronic microvascular ischemic disease.  4.  Moderate cerebral volume loss.                                                    Results for orders placed during the hospital encounter of 01/10/22    CT-CTA NECK WITH & W/O-POST PROCESSING    Impression  1.  Moderate stenoses of the carotid arteries bilaterally.  2.  Severe stenosis of the proximal right vertebral artery.  3.  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 This is of indeterminate age.  4.  Moderate proximal right subclavian stenosis.                        LABS:  Recent Labs     01/10/22  2009 01/11/22  0009 01/11/22  0415   SODIUM 125* 127*  128* 128*   POTASSIUM 3.8 3.6  3.7 3.6   CHLORIDE 89* 92*  91* 93*   CO2 25 25  25 26   GLUCOSE 121* 99  96 87   BUN 12 11  11 11   CREATININE 0.65 0.63  0.66 0.61   CALCIUM 8.7 8.4  8.6 8.3*     Recent Labs     01/10/22  1530 01/11/22  0009   WBC 9.9 9.5   RBC 4.80 4.41*   HEMOGLOBIN 15.5 14.0   HEMATOCRIT 45.1 41.4*   MCV 94.0 93.9   MCH 32.3 31.7   MCHC 34.4 33.8   RDW 46.3 46.3   PLATELETCT 358 307   MPV 8.8* 8.9*     Recent Labs     01/10/22  1530   APTT 26.9   INR 1.09       PRIMARY REHAB DIAGNOSIS:    This patient is a 81 y.o. male admitted for acute inpatient rehabilitation with Ischemic stroke (HCC).    IMPAIRMENTS:   Cognitive  ADLs/IADLs  Mobility  Speech  Swallow    SECONDARY  DIAGNOSIS/MEDICAL CO-MORBIDITIES AFFECTING FUNCTION:    Atrial fibrillation  Hypertension  Bradycardia  Pulmonary hypertension  Mitral regurgitation   BPH  Hyponatremia  Alcohol use  COPD  History of depression    RELEVANT CHANGES SINCE PREADMISSION EVALUATION:    Status unchanged    The patient's rehabilitation potential is Good  The patient's medical prognosis is good    PLAN:   Discussion and Recommendations, discussed with the patient and/or family:   1. The patient requires an acute inpatient rehabilitation program with a coordinated program of care at an intensity and frequency not available at a lower level of care. This recommendation is substantiated by the patient's medical physicians who recommend that the patient's intervention and assessment of medical issues needs to be done at an acute level of care for patient's safety and maximum outcome.     2. A coordinated program of care will be supplied by an interdisciplinary team of physical therapy, occupational therapy, rehab physician, rehab nursing, and, if needed, speech therapy and rehab psychology. Rehab team presents a patient-specific rehabilitation and education program concentrating on prevention of future problems related to accessibility, mobility, skin, bowel, bladder, sexuality, and psychosocial and medical/surgical problems.     3. Need for Rehabilitation Physician: The rehab physician will be evaluating the patient on a multi-weekly basis to help coordinate the program of care. The rehab physician communicates between medical physicians, therapists, and nurses to maximize the patient's potential outcome. Specific areas in which the rehab physician will be providing daily assessment include the following:   A. Assessing the patient's heart rate and blood pressure response (vitals monitoring) to activity and making adjustments in medications or conservative measures as needed.   B. The rehab physician will be assessing the frequency at which  the program can be increased to allow the patient to reach optimal functional outcome.   C. The rehab physician will also provide assessments in daily skin care, especially in light of patient's impairments in mobility.   D. The rehab physician will provide special expertise in understanding how to work with functional impairment and recommend appropriate interventions, compensatory techniques, and education that will facilitate the patient's outcome.     4. Rehab R.N.   The rehab RN will be working with patient to carry over in room mobility and activities of daily living when the patient is not in 3 hours of skilled therapy. Rehab nursing will be working in conjunction with rehab physician to address all the medical issues above and continue to assess laboratory work and discuss abnormalities with the treating physicians, assess vitals, and response to activity, and discuss and report abnormalities with the rehab physician. Rehab RN will also continue daily skin care, supervise bladder/bowel program, instruct in medication administration, and ensure patient safety.     5. Therapies to treat at intensity and frequency of (may change after completion of evaluation by all therapeutic disciplines):       PT:  Physical therapy to address mobility, transfer, gait training and evaluation for adaptive equipment needs 1hour/day at least 5 days/week for the duration of the ELOS (see below)       OT:  Occupational therapy to address ADLs, self-care, home management training, functional mobility/transfers and assistive device evaluation, and community re-integration 1hour/day at least 5 days/week for the duration of the ELOS (see below).        ST/Dysphagia:  Speech therapy to address speech, language, and cognitive deficits as well as swallowing difficulties with retraining/dysphagia management and community re-integration with comprehension, expression, cognitive training 1hour/day at least 5 days/week for the duration of  the ELOS (see below).     6. Medical management / Rehabilitation Issues/Adverse Potential affecting function as part of rehabilitation plan.    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    The patient's CODE STATUS was confirmed as FULL CODE on admission, with the patient and/or family at bedside.    REHABILITATION ISSUES/ADVERSE POTENTIAL:  1.  CVA (Cerebrovascular Accident): Continue Eliquis for secondary prophylaxis as well as lipid and blood pressure management. Patient demonstrates functional deficits in strength, balance, coordination, and ADL's. Patient is admitted to [facility] for comprehensive rehabilitation therapy as described below.   Rehabilitation nursing monitors bowel and bladder control, educates on medication administration, co-morbidities and monitors patient safety.    2.  DVT prophylaxis:  Patient is on Eliquis for anticoagulation upon transfer. Encourage OOB. Monitor daily for signs and symptoms of DVT including but not limited to swelling and pain to prevent the development of DVT that may interfere with therapies.    3.  Pain: No issues with pain currently / Controlled with as needed oral analgesics.    4.  Nutrition/Dysphagia: Dietician monitors nutrient intake, recommend supplements prn and provide nutrition education to pt/family to promote optimal nutrition for wound healing/recovery.      5.  Bladder/bowel:  Start bowel and bladder program, to prevent constipation, urinary retention (which may lead to UTI), and urinary incontinence (which will impact upon pt's functional independence).   - TV Q3h while awake with post void bladder scans, I&O cath for PVRs >400  - up to commode after meal     6.  Skin/dermal ulcer prophylaxis: Monitor for new skin conditions with q.2 h. turns as required to prevent the development of skin breakdown.     7.  GI prophylaxis: Omeprazole to prevent gastritis or GI bleed that would interfere with therapies.    8. Respiratory therapy: RT performs O2 management prn, breathing retraining, pulmonary hygiene and bronchospasm management prn to optimize participation in therapies.    Pt was seen today for 72 min, and entire time spent in face-to-face contact was >50% in counseling and coordination of care as detailed in A/P above.        GOALS/EXPECTED LEVEL OF FUNCTION BASED ON CURRENT MEDICAL AND FUNCTIONAL STATUS (may change based on patient's medical status and rate of impairment recovery):  Transfers:   Modified Independent  Mobility/Gait:   Modified Independent  ADL's:   Minimal Assistance  Cognition:  Least Verbal Cues  Swallowing:  Least Restrictive Diet      DISPOSITION: Discharge to pre-morbid independent living setting with the supportive care of patient's spouse.      ELOS: 14 days    Eusebia Mario M.D.  Physical Medicine and Rehabilitation

## 2022-01-13 NOTE — ASSESSMENT & PLAN NOTE
Observe blood pressure trends on Enalapril  Monitor for orthostatic hypotension on Flomax and Proscar  Outpt meds include Enalapril

## 2022-01-13 NOTE — CONSULTS
DATE OF SERVICE:  1/13/2021    REQUESTING PHYSICIAN:  Eusebia Mario MD    CHIEF COMPLAINT / REASON FOR CONSULTATION:   Hypertension  Afib  Hyponatremia    HISTORY OF PRESENT ILLNESS:  This is an 80 y/o male with a PMH significant for hypertension, BPH, COPD, and depression who was admitted to [facility] on 1/10 with left-sided weakness and numbness.  Admission labs showed hyponatremia with a sodium of 127, and borderline hyperkalemia potassium of 5.1.  EKG showed atrial fibrillation.  Head CT without acute findings.  Negative perfusion scan.  Negative CTA of the head.  CTA of the neck with moderate stenosis of bilateral carotid arteries, severe stenosis of the proximal right vertebral artery, occlusion of the left vertebral artery at 67 of undetermined age, moderate proximal right subclavian stenosis.  Patient also with hypertension systolics in the 220s.  He was admitted to complete stroke work-up.  Echo showed an EF of 65% with moderate to severe mitral regurgitation with severely dilated left atrium, moderate tricuspid regurgitation, and RVSP of 58.  MRI showed multifocal areas of acute cortical infarcts in the right frontal and parietal lobes, multiple chronic lacunar infarcts, severe chronic microvascular ischemic disease with moderate cerebral volume loss.  Cardiology was consulted due to bradycardia arrhythmia.  Recommendation was for outpatient follow-up and to avoid AV woody agents.  He was started on anticoagulation for atrial fibrillation and stroke.     Because of the patient's weakness and debility, Rehab was consulted, evaluated the patient, and was deemed a good Rehab candidate.  The patient was transferred over to the Rehab facility on 1/12/2021.      The patient denies fever, chills, nausea, vomiting, headaches, blurry vision, or chest pain.    REVIEW OF SYSTEMS: All review of systems are negative pre AMA and CMS criteria except for that stated in the HPI.    PAST  MEDICAL HISTORY:  Past Medical History:   Diagnosis Date   • Alcohol use    • Cholesterol serum elevated    • COPD (chronic obstructive pulmonary disease) (HCC)    • Depression    • Enlarged prostate    • Hypertension        PAST SURGICAL HISTORY:  Past Surgical History:   Procedure Laterality Date   • APPENDECTOMY     • OTHER  appendectomy       No Known Allergies    CURRENT MEDICATIONS:    Current Facility-Administered Medications:   •  guaiFENesin ER  •  guaiFENesin dextromethorphan  •  fluticasone  •  hydrOXYzine HCl  •  QUEtiapine  •  melatonin  •  Respiratory Therapy Consult  •  Pharmacy Consult Request  •  hydrALAZINE  •  acetaminophen  •  lactulose  •  docusate sodium  •  sodium phosphate  •  omeprazole  •  artificial tears  •  benzocaine-menthol  •  mag hydrox-al hydrox-simeth  •  ondansetron **OR** ondansetron  •  traZODone  •  sodium chloride  •  midazolam  •  senna-docusate **AND** polyethylene glycol/lytes **AND** magnesium hydroxide **AND** bisacodyl  •  apixaban  •  atorvastatin  •  buPROPion SR  •  enalapril  •  finasteride  •  folic acid  •  therapeutic multivitamin-minerals  •  thiamine  •  umeclidinium-vilanterol    Social History     Socioeconomic History   • Marital status:      Spouse name: Not on file   • Number of children: Not on file   • Years of education: Not on file   • Highest education level: Not on file   Occupational History   • Not on file   Tobacco Use   • Smoking status: Former Smoker     Packs/day: 1.00     Years: 50.00     Pack years: 50.00     Types: Cigarettes     Quit date: 2016     Years since quittin.4   • Smokeless tobacco: Never Used   • Tobacco comment: continued abstinance   Vaping Use   • Vaping Use: Never used   Substance and Sexual Activity   • Alcohol use: Yes   • Drug use: No   • Sexual activity: Not on file   Other Topics Concern   • Not on file   Social History Narrative   • Not on file     Social Determinants of Health     Financial Resource  Strain:    • Difficulty of Paying Living Expenses: Not on file   Food Insecurity:    • Worried About Running Out of Food in the Last Year: Not on file   • Ran Out of Food in the Last Year: Not on file   Transportation Needs:    • Lack of Transportation (Medical): Not on file   • Lack of Transportation (Non-Medical): Not on file   Physical Activity:    • Days of Exercise per Week: Not on file   • Minutes of Exercise per Session: Not on file   Stress:    • Feeling of Stress : Not on file   Social Connections:    • Frequency of Communication with Friends and Family: Not on file   • Frequency of Social Gatherings with Friends and Family: Not on file   • Attends Mandaeism Services: Not on file   • Active Member of Clubs or Organizations: Not on file   • Attends Club or Organization Meetings: Not on file   • Marital Status: Not on file   Intimate Partner Violence:    • Fear of Current or Ex-Partner: Not on file   • Emotionally Abused: Not on file   • Physically Abused: Not on file   • Sexually Abused: Not on file   Housing Stability:    • Unable to Pay for Housing in the Last Year: Not on file   • Number of Places Lived in the Last Year: Not on file   • Unstable Housing in the Last Year: Not on file       FAMILY HISTORY:  was reviewed and is not pertinent to this consultation.    PHYSICAL EXAMINATION:  VITAL SIGNS:  Temp is 98.7, blood pressure is 126/74, heart rate is 78, respiratory rate is 18.  GENERAL:  Patient was lying in bed in no distress.  HEENT:  Pupils were equal, round and reactive to light and accomodation.  Oral mucosa was pink and moist.  NECK:  Soft.  Supple.  No JVD.  HEART:  Iregular rhythm.  Normal S1 and S2.  No murmurs were appreciated.  LUNGS:  Are clear to auscultation bilaterally.  ABDOMEN:  Soft, non tender, non distended.  Bowels sound were positive in all four quadrants.  EXTREMITIES:  No clubbing, cyanosis.  There was no lower extremity edema.  NEUROLOGIC:  Cranial nerves two through twelve  were grossly intact.    LABS:  Lab Results   Component Value Date/Time    SODIUM 131 (L) 01/13/2022 05:33 AM    POTASSIUM 4.0 01/13/2022 05:33 AM    CHLORIDE 95 (L) 01/13/2022 05:33 AM    CO2 28 01/13/2022 05:33 AM    GLUCOSE 85 01/13/2022 05:33 AM    BUN 12 01/13/2022 05:33 AM    CREATININE 0.81 01/13/2022 05:33 AM      Lab Results   Component Value Date/Time    WBC 8.5 01/13/2022 05:33 AM    RBC 4.62 (L) 01/13/2022 05:33 AM    HEMOGLOBIN 14.8 01/13/2022 05:33 AM    HEMATOCRIT 43.9 01/13/2022 05:33 AM    MCV 95.0 01/13/2022 05:33 AM    MCH 32.0 01/13/2022 05:33 AM    MCHC 33.7 01/13/2022 05:33 AM    MPV 9.0 01/13/2022 05:33 AM    NEUTSPOLYS 55.50 01/13/2022 05:33 AM    LYMPHOCYTES 29.40 01/13/2022 05:33 AM    MONOCYTES 10.90 01/13/2022 05:33 AM    EOSINOPHILS 2.40 01/13/2022 05:33 AM    BASOPHILS 0.40 01/13/2022 05:33 AM      Lab Results   Component Value Date/Time    PROTHROMBTM 13.3 01/10/2022 03:30 PM    INR 1.09 01/10/2022 03:30 PM        Alcohol use  On MVI, B1 and folate supplements    BPH (benign prostatic hyperplasia)  On Proscar    Hypertension  BP a little labile and improved recently  On Vasotec  Note: on Proscar  Note: home meds include Vasotec  Will monitor another day before adjusting meds    Hyponatremia  Has hx of mildly low sodium levels  Na: 127 --> 128 --> 131 (1/13)  Likely 2nd to etoh use  Likely 2nd to diminished oral intake -- doesn't eat much at home; has issues with his dentures  Pt eating better at the Rehab -- will increase salting foods  On mild fluid restriction --> will d/c  Note: on Vasotec (has PM low SE of hypo-na) -- was taking at home  Cont to monitor    Major depressive disorder  On Wellbutrin    Chronic obstructive pulmonary disease (HCC)  On Anoro Ellipta  Resp & O2 protocols as needed    Atrial fibrillation (HCC)  HR ok  On Eliquis  Note: had bradycardia at [facility] -- avoid AVN blockers -- will f/u with Cardio  Monitor    Ischemic stroke (HCC)  MRI: showed multifocal areas of  acute cortical infarcts in the right frontal and parietal lobes.          multiple chronic lacunar infarcts; severe chronic microvascular ischemic disease.  On Eliquis (has afib)  On Lipitor    Vitamin D deficiency  Vit D: 10  Will start supplements      This case has been discussed with the attending Physiatrist.    Thank you for the consultation.  Will follow the patient with you.

## 2022-01-13 NOTE — THERAPY
"Speech Language Pathology   Initial Assessment     Patient Name: David Cho  AGE:  81 y.o., SEX:  male  Medical Record #: [id number]  Today's Date: 1/13/2022     Subjective    Per chart review - \"Patient is a 81 y.o. male  with a past medical history of hypertension, BPH, COPD, hyperlipidemia, depression, admitted to [facility] on 1/10, with left-sided weakness and numbness.  Admission labs with hyponatremia sodium of 127, borderline hyperkalemia potassium of 5.1, elevated alk phos.  EKG with atrial fibrillation.  Head CT without acute findings.  Negative perfusion scan.  Negative CTA of the head.  CTA of the neck with moderate stenosis of bilateral carotid arteries, severe stenosis of the proximal right vertebral artery, occlusion of the left vertebral artery at 67 of undetermined age, moderate proximal right subclavian stenosis.  Patient also with hypertension systolics in the 220s.  He was admitted to complete stroke work-up. HgbA1c 6.1, LDL 56, ECHO EF 65 % with moderate to severe mitral regurgitation with severely dilated left atrium, moderate tricuspid regurgitation, right ventricular systolic pressure 58 mmHg.  MRI confirmed multifocal areas of acute cortical infarcts in the right frontal and parietal lobes, multiple chronic lacunar infarcts, severe chronic microvascular ischemic disease with moderate cerebral volume loss.  Cardiology was consulted due to bradycardia arrhythmia. Recommendation for outpatient follow-up and to avoid AV woody agents.  He was started on anticoagulation for atrial fibrillation.     Patient current reports left hand weakness. He is right hand dominant. He denies any changes in his vision or numbness/tingling. He is hard of hearing and does not have hearing aides. No issues with his bowel or bladder. He denies any pain. He reports weight loss secondary to decreased oral intake as his upper dentures don't fit right, and he needs more work done on his " "lower teeth.     Patient was evaluated by Rehab Medicine physician and Physical Therapy, Occupational Therapy and Speech Therapy and determined to be appropriate for acute inpatient rehab and was transferred to [facility] on 1/12/2022  2:04 PM.     With this acute therapeutic intervention, this patient hopes to improve his functional status, and return to independent living with the supportive care of spouse.\"     Objective       01/13/22 0803   Precautions   Precautions Fall Risk   Comments [place]   Prior Living Situation   Prior Services None;Home-Independent   Housing / Facility 2 Story Apartment / Condo   Lives with - Patient's Self Care Capacity Spouse   Prior Level Of Function   Communication Within Functional Limits   Swallow Within Functional Limits   Dentition Poor Quality    Dentures Uppers   Hearing Impaired Both Ears   Hearing Aid None   Vision Reading    Patient's Primary Language English   Education Some College or Trade School   Occupation (Pre-Hospital Vocational) Retired Due To Age   Cognitive Pattern Assessment   Cognitive Pattern Assessment Used BIMS   Brief Interview for Mental Status (BIMS)   Repetition of Three Words (First Attempt) 3   Temporal Orientation: Year Correct   Temporal Orientation: Month Accurate within 5 days   Temporal Orientation: Day Correct   Recall: \"Sock\" Yes, no cue required   Recall: \"Blue\" Yes, no cue required   Recall: \"Bed\" Yes, after cueing (\"a piece of furniture\")   BIMS Summary Score 14   Labial Function   Labial Function (WDL) WDL   Lingual Function   Lingual Function (WDL) WDL   Jaw Function   Jaw Function (WDL) WDL   Velar Function   Velar Function (WDL) WDL   Laryngeal Function   Laryngeal Function (WDL) WDL   Swallowing   Swallowing (WDL) WDL   Dysphagia Strategies / Recommendations   Strategies / Interventions Recommended (Yes / No) No   Barriers To Oral Feeding   Barriers To Oral Feeding None   Cervical Ausculatation Not Tested   Pre-Feeding " Oral Stimulation Trial Not Tested   Swallowing/Nutritional Status   Swallowing/Nutritional Status Regular food   Eating   Assistance Needed Set-up / clean-up   Physical Assistance Level No physical assistance   CARE Score - Eating 5   Eating Discharge Goal   Discharge Goal 6   Functional Level of Assist   Eating Modified Independent   Eating Description Set-up of equipment or meal/tube feeding   Comprehension Supervision   Comprehension Description Glasses;Hearing aids/amplifiers;Verbal cues   Expression Modified Independent   Social Interaction Independent   Problem Solving Minimal Assist   Problem Solving Description Verbal cueing;Therapy schedule;Supervision;Increased time   Memory Moderate Assist   Memory Description Verbal cueing;Therapy schedule;Supervision;Increased time;Seat belt   Outcome Measures   Outcome Measures Utilized SCCAN   SCCAN (Scales of Cognitive and Communicative Ability for Neurorehabilitation)   Oral Expression - Raw Score 18   Oral Expression - Scale Performance Score 95   Orientation - Raw Score 12   Orientation - Scale Performance Score 100   Memory - Raw Score 10   Memory - Scale Performance Score 53   Speech Comprehension - Raw Score 13   Speech Comprehension - Scale Performance Score 100   Reading Comprehension - Raw Score 11   Reading Comprehension - Scale Performance Score 92   Writing - Raw Score 5   Writing - Scale Performance Score 71   Attention - Raw Score 11   Attention - Scale Performance Score 69   Problem Solving - Raw Score 19   Problem Solving - Scale Performance Score 83   SCCAN Total Raw Score 78   SCCAN Degree of Severity Mild Impairment   Problem List   Problem List Attention Deficit;Hearing Deficit;Memory Deficit;Executive Function Deficit;Impaired Safety;Impaired Judgement;Numerical Function Deficit   Current Discharge Plan   Current Discharge Plan Return to Prior Living Situation   Benefit   Therapy Benefit Patient would benefit from Inpatient Rehab Speech-Language  "Pathology to address above identified deficits.   Interdisciplinary Plan of Care Collaboration   IDT Collaboration with  Certified Nursing Assistant;Nursing;Physician;Occupational Therapist;Physical Therapist   Patient Position at End of Therapy Seated;Edge of Bed;Bed Alarm On;Call Light within Reach;Tray Table within Reach;Phone within Reach   Collaboration Comments CLOF, POC   Speech Language Pathologist Assigned   Assigned SLP / Extension LC 60 SPLIT OK   SLP Total Time Spent   SLP Individual Total Time Spent (Mins) 60   Evaluation Charges   Charges Yes   SLP Speech Language Evaluation Speech Sound Language Comprehension   SLP Oral Pharyngeal Evaluation Oral Pharyngeal Evaluation       Assessment    Patient is 81 y.o. male with a diagnosis of ischemic stroke.  Additional factors influencing patient status/progress (ie: cognitive factors, co-morbidities, social support, etc): pt reports being indep and able to care for self, indep with meds and finances, driving previously, lives with SO in [address] [facility] home in [place]. Pt noted to be hard of hearing however reporting no use of HA, pt required repetitions of questions/instruction due to hearing impairment.     Swallow evaluation completed with pt assessed with regular diet with thin liquids. Pt reporting upper dentures do not fit \"great\" and pt noted with poor lower dentition. OMEX WFL otherwise. Pt with noted cough prior to intake, during intake and following intake. Pt endorses long time cough, COPD and consistent phylum. Pt reporting prior use of mucinex and robitussin to clear phylum. Pt tolerating regular textures, thin liquids and medications consumed whole with liquid wash with no overt s/sx of asp/pen noted. Mastication time WFL and pt presenting with timely onset of swallow. Appropriate rate of intake and bite/sip sizes observed. Rec: pt remain on current diet with regular textures and thin liquids, meds whole with liquid wash. No further swallow intervention " recommended nor warranted at this time.     Pt reporting no newly noted cognitive impairments following onset of stroke. Pt assessed with use of SCCAN and achieved a raw score of 78 which indicates MILD cognitive impairments. Pt presenting with greatest impairments in areas of memory 53%, attention 71% and problem solving 83%. Pt would benefit from cognitive intervention to address the above areas and determine indep with medication management and financial management tasks.    Plan  Recommend Speech Therapy 30-60 minutes per day 5-6 days per week for 1 weeks for the following treatments:  SLP Self Care / ADL Training , SLP Cognitive Skill Development and SLP Group Treatment.    Goals:  Long term and short term goals have been discussed with patient and they are in agreement.    Speech Therapy Problems (Active)     Problem: Memory STGs     Dates: Start: 01/13/22       Goal: STG-Within one week, patient will learn and implement functional memory strategies to assist in recall of information related to hospitalization and safety with 80% accuracy provided MIN A     Dates: Start: 01/13/22             Problem: Problem Solving STGs     Dates: Start: 01/13/22       Goal: STG-Within one week, patient will complete alternating attention tasks with 80% accuracy provided MIN A     Dates: Start: 01/13/22          Goal: STG-Within one week, patient will complete medication management and financial management tasks with 80% accuracy provided SPV     Dates: Start: 01/13/22             Problem: Speech/Swallowing LTGs     Dates: Start: 01/13/22       Goal: LTG-By discharge, patient will complete functional problem solving and recall information with 80% accuracy provided MOD I     Dates: Start: 01/13/22

## 2022-01-13 NOTE — PROGRESS NOTES
"Rehab Progress Note     Date of Service: 2022  Chief Complaint: Follow-up stroke    Interval Events (Subjective)  Patient seen and examined in his room.  He reports he slept well last night.  He was seen and evaluated by speech therapy this morning and was not found to have any swallowing issues.  He does have mild cognitive impairments.  His sodium level has improved today.  He continues to be hypertension.  He reports due to a chronic cough and phlegm production he uses Mucinex and Robitussin-DM at home.  Patient denies any pain.  He reports he still has difficulty with his left arm with strength coordination.    ROS: No changes to bowel, bladder, pain, mood, or sleep.       Objective:  VITAL SIGNS: /74   Pulse 78   Temp 37.1 °C (98.7 °F) (Oral)   Resp 18   Ht 1.778 m (5' 10\")   Wt 62 kg (136 lb 11 oz)   SpO2 97%   BMI 19.61 kg/m²   Gen: alert, no apparent distress  Neuro: notable for mild left hemiparesis, left-sided incoordination  MSK: Left first dorsal interossei muscle wasting, chronic numbness and paralysis of left index finger    Recent Results (from the past 72 hour(s))   EKG    Collection Time: 01/10/22  3:20 PM   Result Value Ref Range    Report       [facility] Emergency Dept.    Test Date:  2022-01-10  Pt Name:    ENRICO ZARAGOZA               Department: [facility]  MRN:        [id number]                      [facility]  Gender:     Male                         Technician: HO  :        1940                   Requested By:KAYLIN FRANK  Order #:    [id number]                    Reading MD:    Measurements  Intervals                                Axis  Rate:       67                           P:  CO:                                      QRS:        55  QRSD:       108                          T:          -7  QT:         440  QTc:        465    Interpretive Statements  ATRIAL FIBRILLATION  BORDERLINE INTRAVENTRICULAR CONDUCTION DELAY  Compared to ECG " 12/25/2018 15:21:26  Sinus tachycardia no longer present  ST (T wave) deviation no longer present     CBC WITH DIFFERENTIAL    Collection Time: 01/10/22  3:30 PM   Result Value Ref Range    WBC 9.9 4.8 - 10.8 K/uL    RBC 4.80 4.70 - 6.10 M/uL    Hemoglobin 15.5 14.0 - 18.0 g/dL    Hematocrit 45.1 42.0 - 52.0 %    MCV 94.0 81.4 - 97.8 fL    MCH 32.3 27.0 - 33.0 pg    MCHC 34.4 33.7 - 35.3 g/dL    RDW 46.3 35.9 - 50.0 fL    Platelet Count 358 164 - 446 K/uL    MPV 8.8 (L) 9.0 - 12.9 fL    Neutrophils-Polys 75.00 (H) 44.00 - 72.00 %    Lymphocytes 16.00 (L) 22.00 - 41.00 %    Monocytes 6.00 0.00 - 13.40 %    Eosinophils 1.00 0.00 - 6.90 %    Basophils 0.00 0.00 - 1.80 %    Nucleated RBC 0.00 /100 WBC    Neutrophils (Absolute) 7.52 (H) 1.82 - 7.42 K/uL    Lymphs (Absolute) 1.58 1.00 - 4.80 K/uL    Monos (Absolute) 0.59 0.00 - 0.85 K/uL    Eos (Absolute) 0.10 0.00 - 0.51 K/uL    Baso (Absolute) 0.00 0.00 - 0.12 K/uL    NRBC (Absolute) 0.00 K/uL   COMP METABOLIC PANEL    Collection Time: 01/10/22  3:30 PM   Result Value Ref Range    Sodium 127 (L) 135 - 145 mmol/L    Potassium 5.1 3.6 - 5.5 mmol/L    Chloride 89 (L) 96 - 112 mmol/L    Co2 25 20 - 33 mmol/L    Anion Gap 13.0 7.0 - 16.0    Glucose 132 (H) 65 - 99 mg/dL    Bun 13 8 - 22 mg/dL    Creatinine 0.90 0.50 - 1.40 mg/dL    Calcium 9.1 8.4 - 10.2 mg/dL    AST(SGOT) 26 12 - 45 U/L    ALT(SGPT) 12 2 - 50 U/L    Alkaline Phosphatase 123 (H) 30 - 99 U/L    Total Bilirubin 0.8 0.1 - 1.5 mg/dL    Albumin 4.3 3.2 - 4.9 g/dL    Total Protein 6.7 6.0 - 8.2 g/dL    Globulin 2.4 1.9 - 3.5 g/dL    A-G Ratio 1.8 g/dL   PROTHROMBIN TIME    Collection Time: 01/10/22  3:30 PM   Result Value Ref Range    PT 13.3 12.0 - 14.6 sec    INR 1.09 0.87 - 1.13   APTT    Collection Time: 01/10/22  3:30 PM   Result Value Ref Range    APTT 26.9 24.7 - 36.0 sec   COD (ADULT)    Collection Time: 01/10/22  3:30 PM   Result Value Ref Range    ABO Grouping Only O     Rh Grouping Only POS     Antibody  Screen-Cod NEG    TROPONIN    Collection Time: 01/10/22  3:30 PM   Result Value Ref Range    Troponin T 19 6 - 19 ng/L   ESTIMATED GFR    Collection Time: 01/10/22  3:30 PM   Result Value Ref Range    GFR If African American >60 >60 mL/min/1.73 m 2    GFR If Non African American >60 >60 mL/min/1.73 m 2   DIFFERENTIAL MANUAL    Collection Time: 01/10/22  3:30 PM   Result Value Ref Range    Bands-Stabs 1.00 0.00 - 10.00 %    Metamyelocytes 1.00 %    Manual Diff Status PERFORMED    PLATELET ESTIMATE    Collection Time: 01/10/22  3:30 PM   Result Value Ref Range    Plt Estimation Normal    MORPHOLOGY    Collection Time: 01/10/22  3:30 PM   Result Value Ref Range    RBC Morphology Normal    ETHYL ALCOHOL (BLOOD)    Collection Time: 01/10/22  3:30 PM   Result Value Ref Range    Diagnostic Alcohol <10.1 0.0 - 10.0 mg/dL   POCT glucose device results    Collection Time: 01/10/22  3:31 PM   Result Value Ref Range    Glucose - Accu-Ck 136 (H) 65 - 99 mg/dL   Hemoglobin A1C    Collection Time: 01/10/22  3:35 PM   Result Value Ref Range    Glycohemoglobin 6.1 (H) 4.0 - 5.6 %    Est Avg Glucose 128 mg/dL   Basic Metabolic Panel    Collection Time: 01/10/22  8:09 PM   Result Value Ref Range    Sodium 125 (L) 135 - 145 mmol/L    Potassium 3.8 3.6 - 5.5 mmol/L    Chloride 89 (L) 96 - 112 mmol/L    Co2 25 20 - 33 mmol/L    Glucose 121 (H) 65 - 99 mg/dL    Bun 12 8 - 22 mg/dL    Creatinine 0.65 0.50 - 1.40 mg/dL    Calcium 8.7 8.4 - 10.2 mg/dL    Anion Gap 11.0 7.0 - 16.0   TROPONIN    Collection Time: 01/10/22  8:09 PM   Result Value Ref Range    Troponin T 16 6 - 19 ng/L   ESTIMATED GFR    Collection Time: 01/10/22  8:09 PM   Result Value Ref Range    GFR If African American >60 >60 mL/min/1.73 m 2    GFR If Non African American >60 >60 mL/min/1.73 m 2   EKG (NOW)    Collection Time: 01/10/22  8:18 PM   Result Value Ref Range    Report       [facility]    Test Date:  2022-01-10  Pt Name:    ENRICO ZARAGOZA                Department: [facility]  MRN:        [id number]                      Room:       3304  Gender:     Male                         Technician: [id number]  :        1940                   Requested By:ER TRIAGE PROTOCOL  Order #:    [id number]                    Reading MD: Jimbo Clemente MD    Measurements  Intervals                                Axis  Rate:       69                           P:  MD:                                      QRS:        28  QRSD:       124                          T:          -30  QT:         478  QTc:        512    Interpretive Statements  Atrial fibrillation  Probable left ventricular hypertrophy  Borderline T abnormalities, inferior leads  Prolonged QT interval  Compared to ECG 01/10/2022 15:20:43  T-wave abnormality now present  Prolonged QT interval now present  Electronically Signed On 2022 4:09:31 PST by Jimbo Clemente MD     Troponin in four (4) hours    Collection Time: 22 12:09 AM   Result Value Ref Range    Troponin T 18 6 - 19 ng/L   Basic Metabolic Panel    Collection Time: 22 12:09 AM   Result Value Ref Range    Sodium 128 (L) 135 - 145 mmol/L    Potassium 3.7 3.6 - 5.5 mmol/L    Chloride 91 (L) 96 - 112 mmol/L    Co2 25 20 - 33 mmol/L    Glucose 96 65 - 99 mg/dL    Bun 11 8 - 22 mg/dL    Creatinine 0.66 0.50 - 1.40 mg/dL    Calcium 8.6 8.4 - 10.2 mg/dL    Anion Gap 12.0 7.0 - 16.0   Lipid Profile    Collection Time: 22 12:09 AM   Result Value Ref Range    Cholesterol,Tot 145 100 - 199 mg/dL    Triglycerides 88 0 - 149 mg/dL    HDL 71 >=40 mg/dL    LDL 56 <100 mg/dL   CBC with Differential    Collection Time: 22 12:09 AM   Result Value Ref Range    WBC 9.5 4.8 - 10.8 K/uL    RBC 4.41 (L) 4.70 - 6.10 M/uL    Hemoglobin 14.0 14.0 - 18.0 g/dL    Hematocrit 41.4 (L) 42.0 - 52.0 %    MCV 93.9 81.4 - 97.8 fL    MCH 31.7 27.0 - 33.0 pg    MCHC 33.8 33.7 - 35.3 g/dL    RDW 46.3 35.9 - 50.0 fL    Platelet Count 307 164 - 446 K/uL    MPV 8.9 (L) 9.0 - 12.9  fL    Neutrophils-Polys 54.70 44.00 - 72.00 %    Lymphocytes 31.30 22.00 - 41.00 %    Monocytes 11.10 0.00 - 13.40 %    Eosinophils 0.80 0.00 - 6.90 %    Basophils 0.40 0.00 - 1.80 %    Immature Granulocytes 1.70 (H) 0.00 - 0.90 %    Nucleated RBC 0.00 /100 WBC    Neutrophils (Absolute) 5.18 1.82 - 7.42 K/uL    Lymphs (Absolute) 2.97 1.00 - 4.80 K/uL    Monos (Absolute) 1.05 (H) 0.00 - 0.85 K/uL    Eos (Absolute) 0.08 0.00 - 0.51 K/uL    Baso (Absolute) 0.04 0.00 - 0.12 K/uL    Immature Granulocytes (abs) 0.16 (H) 0.00 - 0.11 K/uL    NRBC (Absolute) 0.00 K/uL   Comp Metabolic Panel (CMP)    Collection Time: 01/11/22 12:09 AM   Result Value Ref Range    Sodium 127 (L) 135 - 145 mmol/L    Potassium 3.6 3.6 - 5.5 mmol/L    Chloride 92 (L) 96 - 112 mmol/L    Co2 25 20 - 33 mmol/L    Anion Gap 10.0 7.0 - 16.0    Glucose 99 65 - 99 mg/dL    Bun 11 8 - 22 mg/dL    Creatinine 0.63 0.50 - 1.40 mg/dL    Calcium 8.4 8.4 - 10.2 mg/dL    AST(SGOT) 16 12 - 45 U/L    ALT(SGPT) 8 2 - 50 U/L    Alkaline Phosphatase 100 (H) 30 - 99 U/L    Total Bilirubin 0.8 0.1 - 1.5 mg/dL    Albumin 3.6 3.2 - 4.9 g/dL    Total Protein 5.4 (L) 6.0 - 8.2 g/dL    Globulin 1.8 (L) 1.9 - 3.5 g/dL    A-G Ratio 2.0 g/dL   Magnesium    Collection Time: 01/11/22 12:09 AM   Result Value Ref Range    Magnesium 1.9 1.5 - 2.5 mg/dL   TSH WITH REFLEX TO FT4    Collection Time: 01/11/22 12:09 AM   Result Value Ref Range    TSH 2.210 0.380 - 5.330 uIU/mL   ESTIMATED GFR    Collection Time: 01/11/22 12:09 AM   Result Value Ref Range    GFR If African American >60 >60 mL/min/1.73 m 2    GFR If Non African American >60 >60 mL/min/1.73 m 2   ESTIMATED GFR    Collection Time: 01/11/22 12:09 AM   Result Value Ref Range    GFR If African American >60 >60 mL/min/1.73 m 2    GFR If Non African American >60 >60 mL/min/1.73 m 2   EKG in four (4) hours    Collection Time: 01/11/22 12:21 AM   Result Value Ref Range    Report       [facility]    Test Date:   2022  Pt Name:    ENRICO ZARAGOZA               Department: MED  MRN:        [id number]                      Room:       3304  Gender:     Male                         Technician: [id number]  :        1940                   Requested By:IRIS COPE  Order #:    [id number]                    Reading MD: Jimbo Clemente MD    Measurements  Intervals                                Axis  Rate:       58                           P:  HI:                                      QRS:        48  QRSD:       118                          T:          15  QT:         481  QTc:        473    Interpretive Statements  Atrial fibrillation  Probable left ventricular hypertrophy  Compared to ECG 01/10/2022 20:18:38  T-wave abnormality no longer present  Prolonged QT interval no longer present  Electronically Signed On 2022 4:10:22 PST by Jimbo Clemente MD     Basic Metabolic Panel    Collection Time: 22  4:15 AM   Result Value Ref Range    Sodium 128 (L) 135 - 145 mmol/L    Potassium 3.6 3.6 - 5.5 mmol/L    Chloride 93 (L) 96 - 112 mmol/L    Co2 26 20 - 33 mmol/L    Glucose 87 65 - 99 mg/dL    Bun 11 8 - 22 mg/dL    Creatinine 0.61 0.50 - 1.40 mg/dL    Calcium 8.3 (L) 8.4 - 10.2 mg/dL    Anion Gap 9.0 7.0 - 16.0   ESTIMATED GFR    Collection Time: 22  4:15 AM   Result Value Ref Range    GFR If African American >60 >60 mL/min/1.73 m 2    GFR If Non African American >60 >60 mL/min/1.73 m 2   EC-ECHOCARDIOGRAM COMPLETE W/O CONT    Collection Time: 22  8:48 AM   Result Value Ref Range    Eject.Frac. MOD BP 58.97     Eject.Frac. MOD 4C 60.13     Eject.Frac. MOD 2C 59.65     Left Ventrical Ejection Fraction 60    SARS-CoV-2 PCR (24 hour In-House): Collect NP swab in VTM    Collection Time: 22  2:20 PM    Specimen: Nasopharyngeal; Respirate   Result Value Ref Range    SARS-CoV-2 Source NP Swab     SARS-CoV-2 by PCR NotDetected    OSMOLALITY URINE    Collection Time: 22  8:25 PM   Result Value  Ref Range    Osmolality Urine 534 300 - 900 mOsm/kg H2O   URINE SODIUM RANDOM    Collection Time: 01/12/22  8:25 PM   Result Value Ref Range    Sodium, Urine -per volume 125 mmol/L   CBC with Differential    Collection Time: 01/13/22  5:33 AM   Result Value Ref Range    WBC 8.5 4.8 - 10.8 K/uL    RBC 4.62 (L) 4.70 - 6.10 M/uL    Hemoglobin 14.8 14.0 - 18.0 g/dL    Hematocrit 43.9 42.0 - 52.0 %    MCV 95.0 81.4 - 97.8 fL    MCH 32.0 27.0 - 33.0 pg    MCHC 33.7 33.7 - 35.3 g/dL    RDW 48.1 35.9 - 50.0 fL    Platelet Count 327 164 - 446 K/uL    MPV 9.0 9.0 - 12.9 fL    Neutrophils-Polys 55.50 44.00 - 72.00 %    Lymphocytes 29.40 22.00 - 41.00 %    Monocytes 10.90 0.00 - 13.40 %    Eosinophils 2.40 0.00 - 6.90 %    Basophils 0.40 0.00 - 1.80 %    Immature Granulocytes 1.40 (H) 0.00 - 0.90 %    Nucleated RBC 0.00 /100 WBC    Neutrophils (Absolute) 4.70 1.82 - 7.42 K/uL    Lymphs (Absolute) 2.49 1.00 - 4.80 K/uL    Monos (Absolute) 0.92 (H) 0.00 - 0.85 K/uL    Eos (Absolute) 0.20 0.00 - 0.51 K/uL    Baso (Absolute) 0.03 0.00 - 0.12 K/uL    Immature Granulocytes (abs) 0.12 (H) 0.00 - 0.11 K/uL    NRBC (Absolute) 0.00 K/uL   Comp Metabolic Panel (CMP)    Collection Time: 01/13/22  5:33 AM   Result Value Ref Range    Sodium 131 (L) 135 - 145 mmol/L    Potassium 4.0 3.6 - 5.5 mmol/L    Chloride 95 (L) 96 - 112 mmol/L    Co2 28 20 - 33 mmol/L    Anion Gap 8.0 7.0 - 16.0    Glucose 85 65 - 99 mg/dL    Bun 12 8 - 22 mg/dL    Creatinine 0.81 0.50 - 1.40 mg/dL    Calcium 8.7 8.5 - 10.5 mg/dL    AST(SGOT) 16 12 - 45 U/L    ALT(SGPT) 8 2 - 50 U/L    Alkaline Phosphatase 101 (H) 30 - 99 U/L    Total Bilirubin 1.7 (H) 0.1 - 1.5 mg/dL    Albumin 3.6 3.2 - 4.9 g/dL    Total Protein 5.6 (L) 6.0 - 8.2 g/dL    Globulin 2.0 1.9 - 3.5 g/dL    A-G Ratio 1.8 g/dL   Magnesium    Collection Time: 01/13/22  5:33 AM   Result Value Ref Range    Magnesium 1.9 1.5 - 2.5 mg/dL   Vitamin D, 25-hydroxy (blood)    Collection Time: 01/13/22  5:33 AM    Result Value Ref Range    25-Hydroxy   Vitamin D 25 10 (L) 30 - 100 ng/mL   proBrain Natriuretic Peptide, NT    Collection Time: 01/13/22  5:33 AM   Result Value Ref Range    NT-proBNP 1773 (H) 0 - 125 pg/mL   ESTIMATED GFR    Collection Time: 01/13/22  5:33 AM   Result Value Ref Range    GFR If African American >60 >60 mL/min/1.73 m 2    GFR If Non African American >60 >60 mL/min/1.73 m 2       Current Facility-Administered Medications   Medication Frequency   • guaiFENesin ER (MUCINEX) tablet 600 mg Q12HRS   • guaiFENesin dextromethorphan (ROBITUSSIN DM) 100-10 MG/5ML syrup 5 mL Q6HRS PRN   • fluticasone (FLOVENT HFA) 110 MCG/ACT inhaler 220 mcg Q12HRS (RT)   • hydrOXYzine HCl (ATARAX) tablet 50 mg Q6HRS PRN   • QUEtiapine (Seroquel) tablet 25 mg TID PRN   • melatonin tablet 3 mg HS PRN   • Respiratory Therapy Consult Continuous RT   • Pharmacy Consult Request ...Pain Management Review 1 Each PHARMACY TO DOSE   • hydrALAZINE (APRESOLINE) tablet 10 mg Q8HRS PRN   • acetaminophen (Tylenol) tablet 650 mg Q4HRS PRN   • lactulose 20 GM/30ML solution 30 mL QDAY PRN   • docusate sodium (ENEMEEZ) enema 283 mg QDAY PRN   • sodium phosphate (Fleet) enema 133 mL QDAY PRN   • omeprazole (PRILOSEC) capsule 20 mg QAM AC   • artificial tears ophthalmic solution 1 Drop PRN   • benzocaine-menthol (CEPACOL) lozenge 1 Lozenge Q2HRS PRN   • mag hydrox-al hydrox-simeth (MAALOX PLUS ES or MYLANTA DS) suspension 20 mL Q2HRS PRN   • ondansetron (ZOFRAN ODT) dispertab 4 mg 4X/DAY PRN    Or   • ondansetron (ZOFRAN) syringe/vial injection 4 mg 4X/DAY PRN   • traZODone (DESYREL) tablet 50 mg QHS PRN   • sodium chloride (OCEAN) 0.65 % nasal spray 2 Spray PRN   • midazolam (VERSED) 5 mg/mL (1 mL vial) PRN   • senna-docusate (PERICOLACE or SENOKOT S) 8.6-50 MG per tablet 2 Tablet BID    And   • polyethylene glycol/lytes (MIRALAX) PACKET 1 Packet QDAY PRN    And   • magnesium hydroxide (MILK OF MAGNESIA) suspension 30 mL QDAY PRN    And   •  bisacodyl (DULCOLAX) suppository 10 mg QDAY PRN   • apixaban (ELIQUIS) tablet 5 mg BID   • atorvastatin (LIPITOR) tablet 40 mg Q EVENING   • buPROPion SR (WELLBUTRIN-SR) tablet 150 mg DAILY   • enalapril (VASOTEC) tablet 20 mg DAILY   • finasteride (PROSCAR) tablet 5 mg DAILY   • folic acid (FOLVITE) tablet 1 mg DAILY   • therapeutic multivitamin-minerals (THERAGRAN-M) tablet 1 Tablet DAILY   • thiamine (Vitamin B-1) tablet 100 mg DAILY   • umeclidinium-vilanterol (ANORO ELLIPTA) inhaler 1 Puff QDAILY (RT)       Orders Placed This Encounter   Procedures   • Diet Order Diet: Regular; Fluid modifications: (optional): 1500 ml Fluid Restriction     Standing Status:   Standing     Number of Occurrences:   1     Order Specific Question:   Diet:     Answer:   Regular [1]     Order Specific Question:   Fluid modifications: (optional)     Answer:   1500 ml Fluid Restriction [9]       Assessment:  Active Hospital Problems    Diagnosis    • *Ischemic stroke (HCC)    • Vitamin D deficiency    • Atrial fibrillation (HCC)    • Impaired mobility and ADLs    • Pulmonary hypertension (HCC)    • Mitral regurgitation    • Bradycardia    • BPH (benign prostatic hyperplasia)    • Vitamin B1 deficiency    • Anemia    • Alcohol use    • Hyponatremia    • Major depressive disorder    • Chronic obstructive pulmonary disease (HCC)    • Hypertension    • Dyslipidemia      This patient is a 81 y.o. male admitted for acute inpatient rehabilitation with Ischemic stroke (HCC).    Medical Decision Making and Plan:    Right frontal/parietal lobe ischemic stroke  Cardioembolic  Mild right-sided weakness  Mild cognitive impairments  Continue full rehab program  PT/OT/SLP, 1 hr each discipline, 5 days per week    Eliquis  Statin    [facility], Dr. 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  I spent greater than 50% of the time for patient care, counseling, and coordination on this date, including patient face-to face time, unit/floor time with review of records/pertinent lab data and studies, as well as discussing diagnostic evaluation/work up, planned therapeutic interventions, and future disposition of care, as per the interval events/subjective and the assessment and plan as noted above.    Eusebia Mario M.D.   Physical Medicine and Rehabilitation

## 2022-01-13 NOTE — DISCHARGE PLANNING
CASE MANAGEMENT INITIAL ASSESSMENT    Admit Date:  1/12/2022     I spoke with patients wife to discuss role of case management / discharge planning / team conference.     Patient is a  81 y.o. male transferred from [facility] stroke.    Diagnosis: Ischemic stroke ([facility]) [I63.9]    Co-morbidities:   Patient Active Problem List    Diagnosis Date Noted   • Atrial fibrillation ([facility]) 01/12/2022   • Ischemic stroke ([facility]) 01/12/2022   • Impaired mobility and ADLs 01/12/2022   • Pulmonary hypertension ([facility]) 01/12/2022   • Mitral regurgitation 01/12/2022   • Bradycardia 01/12/2022   • CVA (cerebral vascular accident) ([facility]) 01/11/2022   • BPH (benign prostatic hyperplasia) 01/11/2022   • Transit speech abnormality 01/10/2022   • Transit left upper extremity numbness and weakness 01/10/2022   • Transit slurred speech 01/10/2022   • Suspected COVID-19 virus infection 01/05/2022   • Laceration of scalp 01/05/2022   • Aneurysm of infrarenal abdominal aorta ([facility]) 11/18/2021   • 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   • Vitamin B1 deficiency 08/19/2021   • Anemia 08/12/2021   • Risk for falls 07/07/2021   • Hearing difficulty of both ears 07/07/2021   • Alcohol use 07/07/2021   • Hyponatremia 12/16/2020   • Screening for colorectal cancer 12/16/2020   • Hypokalemia 12/25/2018   • Hypomagnesemia 12/25/2018   • CAP (community acquired pneumonia) 12/23/2018   • Former smoker 08/08/2018   • Nocturia 02/28/2018   • Major depressive disorder 02/28/2018   • Abnormal CT scan, lung 04/11/2017   • Nocturnal hypoxia 04/11/2017   • Chronic obstructive pulmonary disease (HCC) 10/10/2016   • Hypertension 10/10/2016   • Snoring 10/10/2016   • Lung nodules 10/05/2016   • Dyslipidemia 10/05/2016   • Hypertensive urgency 10/04/2016     Prior Living Situation:  Housing / Facility: 2 Story Apartment / Condo  Lives with - Patient's Self Care Capacity: Spouse    Prior Level of Function:  Medication Management: Independent  Finances: Independent  Home  Management: Independent  Shopping: Independent  Prior Level Of Mobility: Independent Without Device in Community,Independent Without Device in Home  Driving / Transportation: Driving Independent    Support Systems:  Primary : Caterina Cho-wife: [phone]  Other support systems: Daughter Sarah  Advance Directives: Yes  Power of  (Name & Phone): None    Previous Services Utilized:   Equipment Owned: Single Point Cane,Front-Wheel Walker (was not using)  Prior Services: None,Home-Independent    Other Information:  Occupation (Pre-Hospital Vocational): Retired Due To Age     Primary Payor Source: Medicare A,Medicare B  Secondary Payor Source: Other (Comments) ([employer])  Primary Care Practitioner : Katelyn Olea    Patient / Family Goal:  Additional Case Management Questions:  Have you ever received case management services for yourself or a family member? No     Do you feel you have and an understanding of what services  provide? Yes    Do you have any additional questions regarding case management?  No        CASE MANAGEMENT PLAN OF CARE   Individualized Goals:   Patient / Family Goal: Per Wife:   1. Be less grouchy   2. Address depression    3. Try to enjoy life more    Barriers:   1. Functional limitation  2. Depression  3. Stairs    Plan:  1. Continue to follow patient through hospitalization and provide discharge planning in collaboration with patient, family, physicians and ancillary services.     2. Utilize community resources to ensure a safe discharge.

## 2022-01-13 NOTE — PROGRESS NOTES
Telemetry Shift Summary    Rhythm A fib/flutter  HR Range 46-60  Ectopy occasional PVCs  Measurements -/.10/-        Normal Values  Rhythm SR  HR Range    Measurements 0.12-0.20 / 0.06-0.10  / 0.30-0.52

## 2022-01-13 NOTE — THERAPY
"Physical Therapy   Initial Evaluation     Patient Name: David Cho  Age:  81 y.o., Sex:  male  Medical Record #: [id number]  Today's Date: 1/13/2022     Subjective    Patient agreeable to PT; reports would like to \"take a nap\" when finished with session.     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  Pt reports was IND PLOF without AD, driving IND but now limited due to L hemiplegia effects on L hand use and , and reports was able to carry coffee up/down stairs to SO.   Prior Level of Functional Mobility   Bed Mobility Independent   Transfer Status Independent   Ambulation Independent   Distance Ambulation (Feet)   (community distances but not on a walking program 2° COPD Hx)   Assistive Devices Used None   Stairs Independent   Prior Functioning: Everyday Activities   Self Care Independent   Indoor Mobility (Ambulation) Independent   Stairs Independent   Functional Cognition Independent   Prior Device Use None of the given options   Vitals   O2 (LPM) 0   O2 Delivery Device None - Room Air   Vitals Comments Pt uses 2L oxygen at NOC only, do not wean.   Pain 0 - 10 Group   Comfort Goal 0   Therapist Pain Assessment 0   Cognition    Speech/ Communication Hard of Hearing   Safety Awareness Impaired   Comments Pt has reading glasses present.   ABS (Agitated Behavior Scale)   Agitated Behavior Scale Performed No   Passive ROM Lower Body   Comments WFL   Active ROM Lower Body    Comments WFL   Strength Lower Body   Comments Mildly impaired LLE strength compared with RLE, grossly 4/5 MMTs at this time. "   Sensation Lower Body   Comments Proprioception WNL at B ankles, except for slight difficulty with relaxing during testing on R more than L.  Pt denies N/T and saddle symptoms.   Lower Body Muscle Tone   Comments Slight difficulty with relaxing during testing on R more than L ankles.   Bed Mobility    Supine to Sit Modified Independent   Sit to Supine Modified Independent   Sit to Stand Standby Assist   Scooting Supervised   Rolling Supervised   Roll Left and Right   Assistance Needed Supervision   Physical Assistance Level No physical assistance   CARE Score - Roll Left and Right 4   Roll Left and Right Discharge Goal   Discharge Goal 6   Sit to Lying   Assistance Needed Adaptive equipment   Physical Assistance Level No physical assistance   CARE Score - Sit to Lying 6   Sit to Lying Discharge Goal   Discharge Goal 6   Lying to Sitting on Side of Bed   Assistance Needed Adaptive equipment   Physical Assistance Level No physical assistance   CARE Score - Lying to Sitting on Side of Bed 6   Lying to Sitting on Side of Bed Discharge Goal   Discharge Goal 6   Sit to Stand   Assistance Needed Supervision   Physical Assistance Level No physical assistance   CARE Score - Sit to Stand 4   Sit to Stand Discharge Goal   Discharge Goal 6   Chair/Bed-to-Chair Transfer   Assistance Needed Supervision   Physical Assistance Level No physical assistance   CARE Score - Chair/Bed-to-Chair Transfer 4   Chair/Bed-to-Chair Transfer Discharge Goal   Discharge Goal 6   Car Transfer   Reason if not Attempted Environmental limitations   CARE Score - Car Transfer 10   Car Transfer Discharge Goal   Discharge Goal 6   Walk 10 Feet   Assistance Needed Supervision   Physical Assistance Level No physical assistance   CARE Score - Walk 10 Feet 4   Walk 10 Feet Discharge Goal   Discharge Goal 6   Walk 50 Feet with Two Turns   Assistance Needed Supervision   Physical Assistance Level No physical assistance   CARE Score - Walk 50 Feet with Two  Turns 4   Walk 50 Feet with Two Turns Discharge Goal   Discharge Goal 6   Walk 150 Feet   Assistance Needed Supervision   Physical Assistance Level No physical assistance   CARE Score - Walk 150 Feet 4   Walk 150 Feet Discharge Goal   Discharge Goal 6   Walking 10 Feet on Uneven Surfaces   Assistance Needed Supervision   Physical Assistance Level No physical assistance   CARE Score - Walking 10 Feet on Uneven Surfaces 4   Walking 10 Feet on Uneven Surfaces Discharge Goal   Discharge Goal 6   1 Step (Curb)   Assistance Needed Verbal cues   Physical Assistance Level No physical assistance   CARE Score - 1 Step (Curb) 4   1 Step (Curb) Discharge Goal   Discharge Goal 6   4 Steps   Assistance Needed Verbal cues   Physical Assistance Level No physical assistance   CARE Score - 4 Steps 4   4 Steps Discharge Goal   Discharge Goal 6   12 Steps   Assistance Needed Verbal cues   Physical Assistance Level No physical assistance   CARE Score - 12 Steps 4   12 Steps Discharge Goal   Discharge Goal 6   Picking Up Object   Assistance Needed Verbal cues   Physical Assistance Level No physical assistance   CARE Score - Picking Up Object 4   Picking Up Object Discharge Goal   Discharge Goal 6   Wheel 50 Feet with Two Turns   Reason if not Attempted Activity not applicable   CARE Score - Wheel 50 Feet with Two Turns 9   Wheel 50 Feet with Two Turns Discharge Goal   Discharge Goal 9   Wheel 150 Feet   Reason if not Attempted Activity not applicable   CARE Score - Wheel 150 Feet 9   Wheel 150 Feet Discharge Goal   Discharge Goal 9   Gait Functional Level of Assist    Gait Level Of Assist Standby Assist   Assistive Device None   Distance (Feet) 600  (without fatigue noted; completed 508 feet for 6MWT)   # of Times Distance was Traveled 1  (and 2 short dsitances within room)   Deviation Bradykinetic;Increased Base Of Support;Decreased Heel Strike;Other (Comment)  (SBA for 2 small L LOBs with stepping strategy self-correctio)   Wheelchair  "Functional Level of Assist   Wheelchair Assist   (n/a due to ambulation status)   Distance Wheelchair (Feet or Distance)   (n/a)   Wheelchair Description   (n/a)   Stairs Functional Level of Assist   Level of Assist with Stairs Standby Assist   # of Stairs Climbed 20  (reps at 9.75\" step)   Stairs Description Extra time;Supervision for safety;Verbal cueing  (use of 1 railing)   Transfer Functional Level of Assist   Bed, Chair, Wheelchair Transfer Standby Assist   Bed Chair Wheelchair Transfer Description Increased time;Supervision for safety;Verbal cueing   Problem List    Problems Impaired Transfers;Impaired Ambulation;Impaired Balance   Precautions   Precautions Fall Risk   Comments [place], BP LUE only, 1500mL fluid restriction, Admit isolation precautions, L maren   Current Discharge Plan   Current Discharge Plan Return to Prior Living Situation   Interdisciplinary Plan of Care Collaboration   IDT Collaboration with  Occupational Therapist;Respiratory Therapist   Patient Position at End of Therapy In Bed;Call Light within Reach;Tray Table within Reach;Phone within Reach   Collaboration Comments CLOF, Hx and PLOF, progress, strengths & barriers; RT with pt prior to PT eval today   Benefit   Therapy Benefit Patient Would Benefit from Inpatient Rehabilitation Physical Therapy to Maximize Functional [place] with ADLs, IADLs and Mobility.   Strengths & Barriers   Strengths Able to follow instructions;Adequate strength;Alert and oriented;Effective communication skills;Independent prior level of function;Making steady progress towards goals;Manages pain appropriately;Motivated for self care and independence;Pleasant and cooperative;Willingly participates in therapeutic activities   Barriers Decreased endurance;Hearing impairment;Home accessibility;Impaired balance;Poor family support   PT Total Time Spent   PT Individual Total Time Spent (Mins) 60   PT Charge Group   Charges Yes   PT Therapeutic Activities 1   PT " "Evaluation PT Evaluation Mod        01/13/22 1001   Outcome Measures   Outcome Measures Utilized 6 Minute Walk Test   6 Minute Walk Test   Distance (feet) 508   Gait Speed (meters per second) 0.43   Number of Rests 0   Level of Assistance 5  (SBA, no AD)     Initated education (including call light usage, passport review, CVA education, rehab expectations) and room board updated.      Assessment  Patient is 81 y.o. male with a diagnosis of Ischemic CVA with L hemiplegia.    From H&P: \"CTA of the neck with moderate stenosis of bilateral carotid arteries, severe stenosis of the proximal right vertebral artery, occlusion of the left vertebral artery at 67 of undetermined age, moderate proximal right subclavian stenosis.  Patient also with hypertension systolics in the 220s...ECHO EF 65 % with moderate to severe mitral regurgitation with severely dilated left atrium, moderate tricuspid regurgitation, right ventricular systolic pressure 58 mmHg.  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      Plan  Recommend Physical Therapy  minutes per day 5-7 days per week for 1 weeks for the following treatments:  PT Group Therapy, PT Gait Training, PT Therapeutic Exercises, PT Neuro Re-Ed/Balance and PT Therapeutic " Activity.    Passport items to be completed:  Passport items to be completed:  Get in/out of bed safely, in/out of a vehicle, safely use mobility device, walk or wheel around home/community, navigate up and down stairs, show how to get up/down from the ground, ensure home is accessible, demonstrate HEP, complete caregiver training    Goals:  Long term and short term goals have been discussed with patient and they are in agreement.    Physical Therapy Problems (Active)     Problem: Balance     Dates: Start: 01/13/22       Goal: STG-Within one week, patient will achieve a Low fall risk score on the [place] Balance Scale assessment.     Dates: Start: 01/13/22             Problem: Mobility     Dates: Start: 01/13/22       Goal: STG-Within one week, patient will ambulate community distances of 500 feet at a gait velocity of 0.8 m/s at IND level without an AD.     Dates: Start: 01/13/22          Goal: STG-Within one week, patient will ambulate up/down 14 stairs at IND level with use of railings.     Dates: Start: 01/13/22             Problem: Mobility Transfers     Dates: Start: 01/13/22       Goal: STG-Within one week, patient will transfer bed to chair at IND level without an AD.     Dates: Start: 01/13/22          Goal: STG-Within one week, patient will transfer in/out of car at IND level without an AD.     Dates: Start: 01/13/22             Problem: PT-Long Term Goals     Dates: Start: 01/13/22       Goal: LTG-By discharge, patient will ambulate community distances of 500 feet at a gait velocity of 0.8 m/s at IND level without an AD.     Dates: Start: 01/13/22          Goal: LTG-By discharge, patient will transfer bed to chair at IND level without an AD.     Dates: Start: 01/13/22          Goal: LTG-By discharge, patient will ambulate up/down 14 stairs at IND level with use of railings.     Dates: Start: 01/13/22          Goal: LTG-By discharge, patient will transfer in/out of a car at IND level without an AD.     Dates:  Start: 01/13/22          Goal: LTG-By discharge, patient will achieve a Low fall risk score on the [place] Balance Scale assessment.     Dates: Start: 01/13/22

## 2022-01-13 NOTE — THERAPY
Occupational Therapy   Initial Evaluation     Patient Name: David Cho  Age:  81 y.o., Sex:  male  Medical Record #: [id number]  Today's Date: 1/13/2022     Subjective    Pt resting in bed, agreeable to OT eval. Pt reports that his L had weakness is biggest barrier.      Objective       01/13/22 0701   Prior Living Situation   Prior Services None;Home-Independent   Housing / Facility 2 Story Apartment / Condo  (in [place])   Steps Into Home 3   Steps In Home   (FOS)   Rail Right Rail  (Steps in Home);Left Rail (Steps in Home)  (no rail for steps to enter home)   Elevator No   Bathroom Set up Walk In Shower;Grab Bars  (spouse's bathroom is tub/shower with chair)   Equipment Owned Single Point Cane;Front-Wheel Walker  (was not using)   Lives with - Patient's Self Care Capacity Spouse   Comments Pt reports that his Spouse has several medical issues that limit her mobility, and she will not be able to provide physical assistance at d/c. Pt normally is the one that does all the IADL's in the home, grocery shopping etc   Prior Level of ADL Function   Self Feeding Independent   Grooming / Hygiene Independent   Bathing Independent   Dressing Independent   Toileting Independent   Prior Level of IADL Function   Medication Management Independent   Laundry Independent   Kitchen Mobility Independent   Finances Independent   Home Management Independent   Shopping Independent   Prior Level Of Mobility Independent Without Device in Community;Independent Without Device in Home   Driving / Transportation Driving Independent   Occupation (Pre-Hospital Vocational) 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 )   Prior Functioning: Everyday Activities   Self Care Independent   Indoor Mobility (Ambulation) Independent   Stairs Independent   Functional Cognition Independent   Prior Device Use None of the given options   Vitals   Vitals Comments CNA took vitals at start of eval; notified nursing of high BP   Cognition   "  Cognition / Consciousness X   Speech/ Communication Hard of Hearing   Orientation Level Oriented x 4   Level of Consciousness Alert   Safety Awareness Impaired   ABS (Agitated Behavior Scale)   Agitated Behavior Scale Performed No   Cognitive Pattern Assessment   Cognitive Pattern Assessment Used BIMS   Brief Interview for Mental Status (BIMS)   Repetition of Three Words (First Attempt) 3   Temporal Orientation: Year Correct   Temporal Orientation: Month Accurate within 5 days   Temporal Orientation: Day Correct   Recall: \"Sock\" Yes, no cue required   Recall: \"Blue\" Yes, no cue required   Recall: \"Bed\" Yes, after cueing (\"a piece of furniture\")   BIMS Summary Score 14   Vision Screen   Vision Not tested  (wears reading glasses; denies difficulty/changes w/ vision)   Passive ROM Upper Body   Passive ROM Upper Body X   Comments limited at L index finger d/t hx of tendon injury.   Active ROM Upper Body   Active ROM Upper Body  X   Dominant Hand Right   Comments decreased L finger flex/ext and supination.   Strength Upper Body   Upper Body Strength  X   Lt Shoulder Flexion Strength 4 (G)   Lt Shoulder Extension Strength 4 (G)   Lt Shoulder Abduction Strength 4 (G)   Lt Elbow Flexion Strength 4 (G)   Lt Elbow Extension Strength 4 (G)   Lt Wrist Flexion Strength 3 (F)   Lt Wrist Extension Strength 3 (F)   Left  Impaired   Sensation Upper Body   Upper Extremity Sensation  WDL   Comments intact to light touch   Upper Body Muscle Tone   Upper Body Muscle Tone  WDL   Balance Assessment   Sitting Balance (Static) Normal   Sitting Balance (Dynamic) Good   Standing Balance (Static) Fair +   Standing Balance (Dynamic) Fair   Weight Shift Sitting Normal   Weight Shift Standing Good   Bed Mobility    Supine to Sit Standby Assist   Sit to Stand Contact Guard Assist   Scooting Standby Assist   Rolling Supervised   Coordination Upper Body   Coordination X   Fine Motor Coordination LUE impaired   Gross Motor Coordination LUE " impaired   Eating   Assistance Needed Set-up / clean-up   Physical Assistance Level No physical assistance   CARE Score - Eating 5   Eating Discharge Goal   Discharge Goal 6   Oral Hygiene   Assistance Needed Set-up / clean-up;Supervision   Physical Assistance Level No physical assistance   CARE Score - Oral Hygiene 4   Oral Hygiene Discharge Goal   Discharge Goal 6   Shower/Bathe Self   Assistance Needed Set-up / clean-up;Supervision;Incidental touching   Physical Assistance Level 25% or less   CARE Score - Shower/Bathe Self 3   Shower/Bathe Self Discharge Goal   Discharge Goal 6   Upper Body Dressing   Assistance Needed Set-up / clean-up;Supervision   Physical Assistance Level No physical assistance   CARE Score - Upper Body Dressing 4   Upper Body Dressing Discharge Goal   Discharge Goal 6   Lower Body Dressing   Assistance Needed Set-up / clean-up;Supervision;Incidental touching   Physical Assistance Level 25% or less   CARE Score - Lower Body Dressing 3   Lower Body Dressing Discharge Goal   Discharge Goal 6   Putting On/Taking Off Footwear   Assistance Needed Physical assistance   Physical Assistance Level 26%-50%   CARE Score - Putting On/Taking Off Footwear 3   Putting On/Taking Off Footwear Discharge Goal   Discharge Goal 6   Toileting Hygiene   Assistance Needed Set-up / clean-up;Supervision;Incidental touching   Physical Assistance Level 25% or less   CARE Score - Toileting Hygiene 3   Toileting Hygiene Discharge Goal   Discharge Goal 6   Toilet Transfer   Assistance Needed Supervision;Physical assistance   Physical Assistance Level 25% or less   CARE Score - Toilet Transfer 3   Toilet Transfer Discharge Goal   Discharge Goal 6   Hearing, Speech, and Vision   Expression of Ideas and Wants Without difficulty   Understanding Verbal and Non-Verbal Content Understands   Functional Level of Assist   Eating Supervision   Eating Description Increased time;Set-up of equipment or meal/tube feeding   Grooming  Standby Assist;Standing   Grooming Description Increased time;Supervision for safety  (shaving, oral care and combing hair standing at sink)   Bathing Contact Guard Assist   Bathing Description Grab bar;Increased time;Supervision for safety;Verbal cueing  (intermittent CGA; stood for duration of shower)   Upper Body Dressing Stand by Assist   Upper Body Dressing Description Increased time;Set-up of equipment  (don/doff pullover shirt)   Lower Body Dressing Minimal Assist   Lower Body Dressing Description Increased time;Set-up of equipment;Supervision for safety  (SBA to don/doff underwear/pants; SBA doff socks, A to don)   Toileting Contact Guard Assist   Toileting Description Increased time;Supervision for safety;Assist for standing balance  (standing at toilet to void)   Toilet Transfers Contact Guard Assist   Toilet Transfer Description Increased time;Supervision for safety;Verbal cueing  (no AD)   Tub / Shower Transfers Contact Guard Assist   Tub Shower Transfer Description Assist with two limbs;Set-up of equipment;Supervision for safety;Verbal cueing  (no AD; did not use shower bench)   Problem List   Problem List Decreased Active Daily Living Skills;Decreased Homemaking Skills;Decreased Upper Extremity Strength Left;Decreased Upper Extremity AROM Left;Decreased Functional Mobility;Decreased Activity Tolerance;Impaired Coordination Left Upper Extremity;Impaired Postural Control / Balance   Precautions   Precautions Fall Risk   Comments [place], 1500 mL fluid restriction, droplet prec   Current Discharge Plan   Current Discharge Plan Return to Prior Living Situation   Benefit    Therapy Benefit Patient Would Benefit from Inpatient Rehab Occupational Therapy to Maximize [place] with ADLs, IADLs and Functional Mobility.   Interdisciplinary Plan of Care Collaboration   IDT Collaboration with  Certified Nursing Assistant;Nursing;Physical Therapist;Speech Therapist   Patient Position at End of Therapy Edge of  Bed;Call Light within Reach;Tray Table within Reach;Bed Alarm On   Collaboration Comments CLOF, POC   Equipment Needs   Assistive Device / DME Grab Bars In Shower / Tub;Grab Bars By Toilet;Shower Chair   Adaptive Equipment Other (Comments)  (tbd)   Strengths & Barriers   Strengths Alert and oriented;Effective communication skills;Independent prior level of function;Making steady progress towards goals;Motivated for self care and independence;Pleasant and cooperative;Supportive family;Willingly participates in therapeutic activities   Barriers Hemiparesis;Impaired activity tolerance;Impaired balance   OT Total Time Spent   OT Individual Total Time Spent (Mins) 60   OT Charge Group   Charges Yes   OT Self Care / ADL 1   OT Evaluation OT Evaluation Low       Assessment  Patient is 81 y.o. male with a diagnosis of ischemic stroke.  Additional factors influencing patient status / progress (ie: cognitive factors, co-morbidities, social support, etc): Per H&P pt has PMHx significant for hypertension, BPH, COPD, hyperlipidemia, depression.    Pt lives in a 2 story condo with 3 GRACE and FOS inside which go up to his bedroom. Pt lives with his spouse, and reports independent PLOF with ADLs, IADLs, mobility without AD, and driving. Spouse does not drive and cannot provide physical assistance at d/c. Pt reports that he usually completes all IADLs.    During OT evaluation pt required min A to SBA with ADLs and CGA for mobility/transfers without an assistive device. He is functioning below his baseline, and presents with LUE weakness (distal>proximal), impaired balance, decreased activity tolerance and mildly impaired safety awareness. Pt very pleasant and highly motivated to improve his LUE functional use.       Plan  Recommend Occupational Therapy  minutes per day 5-7 days per week for 10-14 days for the following treatments:  OT E Stim Attended, OT Group Therapy, OT Self Care/ADL, OT Cognitive Skill Dev, OT Community  Reintegration, OT Manual Ther Technique, OT Neuro Re-Ed/Balance, OT Sensory Int Techniques, OT Therapeutic Activity, OT Evaluation and OT Therapeutic Exercise.    Passport items to be completed:  Perform bathroom transfers, complete dressing, complete feeding, get ready for the day, prepare a simple meal, participate in household tasks, adapt home for safety needs, demonstrate home exercise program, complete caregiver training     Goals:  Long term and short term goals have been discussed with patient and they are in agreement.    Occupational Therapy Goals (Active)     Problem: Bathing     Dates: Start: 01/13/22       Goal: STG-Within one week, patient will bathe at supervision level using AE as needed.     Dates: Start: 01/13/22             Problem: Dressing     Dates: Start: 01/13/22       Goal: STG-Within one week, patient will dress LB at supervision to mod I level using AE as needed.      Dates: Start: 01/13/22             Problem: Functional Transfers     Dates: Start: 01/13/22       Goal: STG-Within one week, patient will transfer to step in shower at supervision level using AE/DME as needed.      Dates: Start: 01/13/22             Problem: IADL's     Dates: Start: 01/13/22       Goal: STG-Within one week, patient will access kitchen area at supervision level using AE/DME as needed.      Dates: Start: 01/13/22             Problem: OT Long Term Goals     Dates: Start: 01/13/22       Goal: LTG-By discharge, patient will complete basic self care tasks at Mod I level using AE as needed.      Dates: Start: 01/13/22          Goal: LTG-By discharge, patient will perform bathroom transfers at mod I level using AE/DME as needed.      Dates: Start: 01/13/22          Goal: LTG-By discharge, patient will complete basic home management at supervision to Mod I level using AE as needed.      Dates: Start: 01/13/22

## 2022-01-13 NOTE — CARE PLAN
"The patient is Stable - Low risk of patient condition declining or worsening      Patient is not progressing towards the following goals:      Problem: Fall Risk - Rehab  Goal: Patient will remain free from falls  Outcome: Not Met  Note: Kitty Velasquez Fall risk Assessment Score: 12    Moderate fall risk Interventions  - Bed and strip alarm   - Yellow sign by the door   - Yellow wrist band \"Fall risk\"  - Fall risk education provided     "

## 2022-01-14 PROCEDURE — 770010 HCHG ROOM/CARE - REHAB SEMI PRIVAT*

## 2022-01-14 PROCEDURE — 97130 THER IVNTJ EA ADDL 15 MIN: CPT

## 2022-01-14 PROCEDURE — A9270 NON-COVERED ITEM OR SERVICE: HCPCS | Performed by: PHYSICAL MEDICINE & REHABILITATION

## 2022-01-14 PROCEDURE — 97110 THERAPEUTIC EXERCISES: CPT

## 2022-01-14 PROCEDURE — 99231 SBSQ HOSP IP/OBS SF/LOW 25: CPT | Performed by: PHYSICAL MEDICINE & REHABILITATION

## 2022-01-14 PROCEDURE — 700102 HCHG RX REV CODE 250 W/ 637 OVERRIDE(OP): Performed by: HOSPITALIST

## 2022-01-14 PROCEDURE — 94640 AIRWAY INHALATION TREATMENT: CPT

## 2022-01-14 PROCEDURE — 97530 THERAPEUTIC ACTIVITIES: CPT

## 2022-01-14 PROCEDURE — 94760 N-INVAS EAR/PLS OXIMETRY 1: CPT

## 2022-01-14 PROCEDURE — 97110 THERAPEUTIC EXERCISES: CPT | Mod: CQ

## 2022-01-14 PROCEDURE — 97129 THER IVNTJ 1ST 15 MIN: CPT

## 2022-01-14 PROCEDURE — 99232 SBSQ HOSP IP/OBS MODERATE 35: CPT | Performed by: HOSPITALIST

## 2022-01-14 PROCEDURE — 700102 HCHG RX REV CODE 250 W/ 637 OVERRIDE(OP): Performed by: PHYSICAL MEDICINE & REHABILITATION

## 2022-01-14 PROCEDURE — A9270 NON-COVERED ITEM OR SERVICE: HCPCS | Performed by: HOSPITALIST

## 2022-01-14 PROCEDURE — 97116 GAIT TRAINING THERAPY: CPT

## 2022-01-14 RX ADMIN — THIAMINE HCL TAB 100 MG 100 MG: 100 TAB at 09:07

## 2022-01-14 RX ADMIN — Medication 1 TABLET: at 09:08

## 2022-01-14 RX ADMIN — FINASTERIDE 5 MG: 5 TABLET, FILM COATED ORAL at 09:07

## 2022-01-14 RX ADMIN — ENALAPRIL MALEATE 20 MG: 5 TABLET ORAL at 09:06

## 2022-01-14 RX ADMIN — ATORVASTATIN CALCIUM 40 MG: 40 TABLET, FILM COATED ORAL at 19:59

## 2022-01-14 RX ADMIN — FLUTICASONE PROPIONATE 220 MCG: 110 AEROSOL, METERED RESPIRATORY (INHALATION) at 19:58

## 2022-01-14 RX ADMIN — GUAIFENESIN 600 MG: 600 TABLET, EXTENDED RELEASE ORAL at 09:13

## 2022-01-14 RX ADMIN — Medication 2000 UNITS: at 09:08

## 2022-01-14 RX ADMIN — OMEPRAZOLE 20 MG: 20 CAPSULE, DELAYED RELEASE ORAL at 09:08

## 2022-01-14 RX ADMIN — FOLIC ACID 1 MG: 1 TABLET ORAL at 09:08

## 2022-01-14 RX ADMIN — APIXABAN 5 MG: 5 TABLET, FILM COATED ORAL at 20:00

## 2022-01-14 RX ADMIN — APIXABAN 5 MG: 5 TABLET, FILM COATED ORAL at 09:08

## 2022-01-14 RX ADMIN — GUAIFENESIN AND DEXTROMETHORPHAN 5 ML: 100; 10 SYRUP ORAL at 09:03

## 2022-01-14 RX ADMIN — FLUTICASONE PROPIONATE 220 MCG: 110 AEROSOL, METERED RESPIRATORY (INHALATION) at 11:47

## 2022-01-14 RX ADMIN — GUAIFENESIN 600 MG: 600 TABLET, EXTENDED RELEASE ORAL at 20:00

## 2022-01-14 RX ADMIN — BUPROPION HYDROCHLORIDE 150 MG: 150 TABLET, EXTENDED RELEASE ORAL at 09:06

## 2022-01-14 RX ADMIN — UMECLIDINIUM BROMIDE AND VILANTEROL TRIFENATATE 1 PUFF: 62.5; 25 POWDER RESPIRATORY (INHALATION) at 11:46

## 2022-01-14 ASSESSMENT — GAIT ASSESSMENTS
GAIT LEVEL OF ASSIST: STANDBY ASSIST
DEVIATION: BRADYKINETIC;INCREASED BASE OF SUPPORT;DECREASED HEEL STRIKE
GAIT LEVEL OF ASSIST: STANDBY ASSIST
DEVIATION: INCREASED BASE OF SUPPORT;BRADYKINETIC;DECREASED HEEL STRIKE
DISTANCE (FEET): 250

## 2022-01-14 ASSESSMENT — ENCOUNTER SYMPTOMS
CHILLS: 0
ABDOMINAL PAIN: 0
DIARRHEA: 0
FEVER: 0
NERVOUS/ANXIOUS: 0
NAUSEA: 0
VOMITING: 0
SHORTNESS OF BREATH: 0

## 2022-01-14 ASSESSMENT — PAIN DESCRIPTION - PAIN TYPE: TYPE: ACUTE PAIN

## 2022-01-14 NOTE — PROGRESS NOTES
"Rehab Progress Note     Date of Service: 1/14/2022  Chief Complaint: Follow-up stroke    Interval Events (Subjective)    Patient seen and examined today in his room.  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  He continues to have difficulty with this as well as coordination of his left arm.  He slept well last night.  His blood pressure has improved.  He does confirm a chronic productive cough for many years.  He denies any pain.  He has no new complaints today.    ROS: No changes to bowel, bladder, pain, mood, or sleep.       Objective:  VITAL SIGNS: /79   Pulse 75   Temp 36.4 °C (97.6 °F) (Oral)   Resp 18   Ht 1.778 m (5' 10\")   Wt 62 kg (136 lb 11 oz)   SpO2 98%   BMI 19.61 kg/m²   Gen: alert, no apparent distress  Neuro: notable for left-sided incoordination and weakness, mostly in the left arm and hand    Recent Results (from the past 72 hour(s))   SARS-CoV-2 PCR (24 hour In-House): Collect NP swab in VTM    Collection Time: 01/12/22  2:20 PM    Specimen: Nasopharyngeal; Respirate   Result Value Ref Range    SARS-CoV-2 Source NP Swab     SARS-CoV-2 by PCR NotDetected    OSMOLALITY URINE    Collection Time: 01/12/22  8:25 PM   Result Value Ref Range    Osmolality Urine 534 300 - 900 mOsm/kg H2O   URINE SODIUM RANDOM    Collection Time: 01/12/22  8:25 PM   Result Value Ref Range    Sodium, Urine -per volume 125 mmol/L   CBC with Differential    Collection Time: 01/13/22  5:33 AM   Result Value Ref Range    WBC 8.5 4.8 - 10.8 K/uL    RBC 4.62 (L) 4.70 - 6.10 M/uL    Hemoglobin 14.8 14.0 - 18.0 g/dL    Hematocrit 43.9 42.0 - 52.0 %    MCV 95.0 81.4 - 97.8 fL    MCH 32.0 27.0 - 33.0 pg    MCHC 33.7 33.7 - 35.3 g/dL    RDW 48.1 35.9 - 50.0 fL    Platelet Count 327 164 - 446 K/uL    MPV 9.0 9.0 - 12.9 fL    Neutrophils-Polys 55.50 44.00 - 72.00 %    Lymphocytes 29.40 22.00 - 41.00 %    Monocytes 10.90 0.00 - 13.40 % "    Eosinophils 2.40 0.00 - 6.90 %    Basophils 0.40 0.00 - 1.80 %    Immature Granulocytes 1.40 (H) 0.00 - 0.90 %    Nucleated RBC 0.00 /100 WBC    Neutrophils (Absolute) 4.70 1.82 - 7.42 K/uL    Lymphs (Absolute) 2.49 1.00 - 4.80 K/uL    Monos (Absolute) 0.92 (H) 0.00 - 0.85 K/uL    Eos (Absolute) 0.20 0.00 - 0.51 K/uL    Baso (Absolute) 0.03 0.00 - 0.12 K/uL    Immature Granulocytes (abs) 0.12 (H) 0.00 - 0.11 K/uL    NRBC (Absolute) 0.00 K/uL   Comp Metabolic Panel (CMP)    Collection Time: 01/13/22  5:33 AM   Result Value Ref Range    Sodium 131 (L) 135 - 145 mmol/L    Potassium 4.0 3.6 - 5.5 mmol/L    Chloride 95 (L) 96 - 112 mmol/L    Co2 28 20 - 33 mmol/L    Anion Gap 8.0 7.0 - 16.0    Glucose 85 65 - 99 mg/dL    Bun 12 8 - 22 mg/dL    Creatinine 0.81 0.50 - 1.40 mg/dL    Calcium 8.7 8.5 - 10.5 mg/dL    AST(SGOT) 16 12 - 45 U/L    ALT(SGPT) 8 2 - 50 U/L    Alkaline Phosphatase 101 (H) 30 - 99 U/L    Total Bilirubin 1.7 (H) 0.1 - 1.5 mg/dL    Albumin 3.6 3.2 - 4.9 g/dL    Total Protein 5.6 (L) 6.0 - 8.2 g/dL    Globulin 2.0 1.9 - 3.5 g/dL    A-G Ratio 1.8 g/dL   Magnesium    Collection Time: 01/13/22  5:33 AM   Result Value Ref Range    Magnesium 1.9 1.5 - 2.5 mg/dL   Vitamin D, 25-hydroxy (blood)    Collection Time: 01/13/22  5:33 AM   Result Value Ref Range    25-Hydroxy   Vitamin D 25 10 (L) 30 - 100 ng/mL   proBrain Natriuretic Peptide, NT    Collection Time: 01/13/22  5:33 AM   Result Value Ref Range    NT-proBNP 1773 (H) 0 - 125 pg/mL   ESTIMATED GFR    Collection Time: 01/13/22  5:33 AM   Result Value Ref Range    GFR If African American >60 >60 mL/min/1.73 m 2    GFR If Non African American >60 >60 mL/min/1.73 m 2       Current Facility-Administered Medications   Medication Frequency   • guaiFENesin ER (MUCINEX) tablet 600 mg Q12HRS   • guaiFENesin dextromethorphan (ROBITUSSIN DM) 100-10 MG/5ML syrup 5 mL Q6HRS PRN   • fluticasone (FLOVENT HFA) 110 MCG/ACT inhaler 220 mcg Q12HRS (RT)   • vitamin D3  (cholecalciferol) tablet 2,000 Units DAILY   • hydrOXYzine HCl (ATARAX) tablet 50 mg Q6HRS PRN   • QUEtiapine (Seroquel) tablet 25 mg TID PRN   • melatonin tablet 3 mg HS PRN   • Respiratory Therapy Consult Continuous RT   • Pharmacy Consult Request ...Pain Management Review 1 Each PHARMACY TO DOSE   • hydrALAZINE (APRESOLINE) tablet 10 mg Q8HRS PRN   • acetaminophen (Tylenol) tablet 650 mg Q4HRS PRN   • lactulose 20 GM/30ML solution 30 mL QDAY PRN   • docusate sodium (ENEMEEZ) enema 283 mg QDAY PRN   • sodium phosphate (Fleet) enema 133 mL QDAY PRN   • omeprazole (PRILOSEC) capsule 20 mg QAM AC   • artificial tears ophthalmic solution 1 Drop PRN   • benzocaine-menthol (CEPACOL) lozenge 1 Lozenge Q2HRS PRN   • mag hydrox-al hydrox-simeth (MAALOX PLUS ES or MYLANTA DS) suspension 20 mL Q2HRS PRN   • ondansetron (ZOFRAN ODT) dispertab 4 mg 4X/DAY PRN    Or   • ondansetron (ZOFRAN) syringe/vial injection 4 mg 4X/DAY PRN   • traZODone (DESYREL) tablet 50 mg QHS PRN   • sodium chloride (OCEAN) 0.65 % nasal spray 2 Spray PRN   • midazolam (VERSED) 5 mg/mL (1 mL vial) PRN   • senna-docusate (PERICOLACE or SENOKOT S) 8.6-50 MG per tablet 2 Tablet BID    And   • polyethylene glycol/lytes (MIRALAX) PACKET 1 Packet QDAY PRN    And   • magnesium hydroxide (MILK OF MAGNESIA) suspension 30 mL QDAY PRN    And   • bisacodyl (DULCOLAX) suppository 10 mg QDAY PRN   • apixaban (ELIQUIS) tablet 5 mg BID   • atorvastatin (LIPITOR) tablet 40 mg Q EVENING   • buPROPion SR (WELLBUTRIN-SR) tablet 150 mg DAILY   • enalapril (VASOTEC) tablet 20 mg DAILY   • finasteride (PROSCAR) tablet 5 mg DAILY   • folic acid (FOLVITE) tablet 1 mg DAILY   • therapeutic multivitamin-minerals (THERAGRAN-M) tablet 1 Tablet DAILY   • thiamine (Vitamin B-1) tablet 100 mg DAILY   • umeclidinium-vilanterol (ANORO ELLIPTA) inhaler 1 Puff QDAILY (RT)       Orders Placed This Encounter   Procedures   • Diet Order Diet: Regular     Standing Status:   Standing      Number of Occurrences:   1     Order Specific Question:   Diet:     Answer:   Regular [1]       Assessment:  Active Hospital Problems    Diagnosis    • *Ischemic stroke (HCC)    • Vitamin D deficiency    • Atrial fibrillation (HCC)    • Impaired mobility and ADLs    • Pulmonary hypertension (HCC)    • Mitral regurgitation    • Bradycardia    • BPH (benign prostatic hyperplasia)    • Vitamin B1 deficiency    • Anemia    • Alcohol use    • Hyponatremia    • Major depressive disorder    • Chronic obstructive pulmonary disease (HCC)    • Hypertension    • Dyslipidemia      This patient is a 81 y.o. male admitted for acute inpatient rehabilitation with Ischemic stroke (HCC).    Medical Decision Making and Plan:    Right frontal/parietal lobe ischemic stroke  Cardioembolic  Mild right-sided weakness  Mild cognitive impairments  Continue full rehab program  PT/OT/SLP, 1 hr each discipline, 5 days per week    Eliquis  Statin    [facility], Dr. Trejo, referrals made    Atrial fibrillation  Eliquis     Hypertension  Enalapril  Appreciate hospitalist assistance     Bradycardia  Per cardiology, avoid AV woody blockade  Appreciate hospitalist assistance     Pulmonary hypertension  Monitor need for oxygen     Mitral regurgitation   Outpatient follow up with cardiology     Bladder program  BPH  Finasteride  Check PVRs - 179, 192  Continue to monitor  May benefit from starting Flomax     Hyponatremia, improved  Likely SIADH, versus glucocorticoid or mineralocorticoid deficiency based on urine studies  Checked urine sodium and osmoles  Discontinue 1.5 L fluid restriction  Discontinue salt tabs  Appreciate hospitalist assistance     Alcohol use  Will need counseling  Vitamins    Vitamin D deficiency, 10  Continue supplementation     COPD  Ellipta  Mucinex  Flonase  As needed Robitussin DM  Respiratory therapy     History of depression  Wellbutrin  Monitor mood/need for psychology consult    Bowel  program  Continue bowel medications  Last BM 1/14    DVT prophylaxis  Eliquis    Total time:  16 minutes.  I spent greater than 50% of the time for patient care, counseling, and coordination on this date, including patient face-to face time, unit/floor time with review of records/pertinent lab data and studies, as well as discussing diagnostic evaluation/work up, planned therapeutic interventions, and future disposition of care, as per the interval events/subjective and the assessment and plan as noted above.    I have performed a physical exam, reviewed and updated ROS, as well as the assessment and plan today 1/14/2022. In review of note from 1/13/2021 there are no new changes except as documented above.          Eusebia Mario M.D.   Physical Medicine and Rehabilitation

## 2022-01-14 NOTE — CARE PLAN
"  Problem: Fall Risk - Rehab  Goal: Patient will remain free from falls  Note: Kitty Velasquez Fall risk Assessment Score: 12  Moderate fall risk Interventions  - Bed and strip alarm   - Yellow sign by the door   - Yellow wrist band \"Fall risk\"  - Room near to the nurse station  - Do not leave patient unattended in the bathroom  - Fall risk education provided   The patient is Stable - Low risk of patient condition declining or worsening     Patient is not progressing towards the following goals:      "

## 2022-01-14 NOTE — THERAPY
Physical Therapy   Daily Treatment     Patient Name: David Cho  Age:  81 y.o., Sex:  male  Medical Record #: [id number]  Today's Date: 1/14/2022     Precautions  Precautions: Fall Risk  Comments: [place], BP LUE only, 1500mL fluid restriction, Admit isolation precautions, L maren    Subjective    Pt in bathroom w/ CNA upon arrival, agreeable to session.    Objective       01/14/22 0931   Pain 0 - 10 Group   Pain Rating Scale (NPRS) 0   Gait Functional Level of Assist    Gait Level Of Assist Standby Assist   Assistive Device None   Distance (Feet) 250   # of Times Distance was Traveled 2   Deviation Bradykinetic;Increased Base Of Support;Decreased Heel Strike   Standing Lower Body Exercises   Standing Lower Body Exercises Yes  (in // bars w/ #1.5)   Hip Extension 3 sets of 10;Bilateral    Hip Abduction 3 sets of 10;Bilateral   Marching 3 sets of 10   Heel Rise 3 sets of 10   Toe Rise 3 sets of 10   Bed Mobility    Sit to Stand Standby Assist   Interdisciplinary Plan of Care Collaboration   Patient Position at End of Therapy Seated;Call Light within Reach;Tray Table within Reach;Phone within Reach   PT Total Time Spent   PT Individual Total Time Spent (Mins) 30   PT Charge Group   PT Therapeutic Exercise 2   Supervising Physical Therapist Horacio Tanner       Assessment    Pt perform good activity tolerance during session, able to complete 3 sets of standing therex w/o rest break in between. Pt was pleasant and cooperative.    Strengths: Able to follow instructions,Adequate strength,Alert and oriented,Effective communication skills,Independent prior level of function,Making steady progress towards goals,Manages pain appropriately,Motivated for self care and independence,Pleasant and cooperative,Willingly participates in therapeutic activities  Barriers: Decreased endurance,Hearing impairment,Home accessibility,Impaired balance,Poor family support    Plan    Ambulation without AD but with use of LUE or BUEs; Standing  balance activities; Dual tasking; Decreased A with transfers; Establish HEP; [place] Balance Scale; Safety education.    Passport items to be completed:  Get in/out of bed safely, in/out of a vehicle, safely use mobility device, walk or wheel around home/community, navigate up and down stairs, show how to get up/down from the ground, ensure home is accessible, demonstrate HEP, complete caregiver training    Physical Therapy Problems (Active)     Problem: Balance     Dates: Start: 01/13/22       Goal: STG-Within one week, patient will achieve a Low fall risk score on the [place] Balance Scale assessment.     Dates: Start: 01/13/22             Problem: Mobility     Dates: Start: 01/13/22       Goal: STG-Within one week, patient will ambulate community distances of 500 feet at a gait velocity of 0.8 m/s at IND level without an AD.     Dates: Start: 01/13/22          Goal: STG-Within one week, patient will ambulate up/down 14 stairs at IND level with use of railings.     Dates: Start: 01/13/22             Problem: Mobility Transfers     Dates: Start: 01/13/22       Goal: STG-Within one week, patient will transfer bed to chair at IND level without an AD.     Dates: Start: 01/13/22          Goal: STG-Within one week, patient will transfer in/out of car at IND level without an AD.     Dates: Start: 01/13/22             Problem: PT-Long Term Goals     Dates: Start: 01/13/22       Goal: LTG-By discharge, patient will ambulate community distances of 500 feet at a gait velocity of 0.8 m/s at IND level without an AD.     Dates: Start: 01/13/22          Goal: LTG-By discharge, patient will transfer bed to chair at IND level without an AD.     Dates: Start: 01/13/22          Goal: LTG-By discharge, patient will ambulate up/down 14 stairs at IND level with use of railings.     Dates: Start: 01/13/22          Goal: LTG-By discharge, patient will transfer in/out of a car at IND level without an AD.     Dates: Start: 01/13/22           Goal: LTG-By discharge, patient will achieve a Low fall risk score on the [place] Balance Scale assessment.     Dates: Start: 01/13/22

## 2022-01-14 NOTE — THERAPY
Speech Language Pathology  Daily Treatment     Patient Name: David Cho  Age:  81 y.o., Sex:  male  Medical Record #: [id number]  Today's Date: 1/14/2022     Precautions  Precautions: Fall Risk  Comments: [place], BP LUE only, 1500mL fluid restriction, Admit isolation precautions, L maren    Subjective    Pt pleasant and cooperative.      Objective       01/14/22 1003   SLP Total Time Spent   SLP Individual Total Time Spent (Mins) 60   Treatment Charges   SLP Cognitive Skill Development First 15 Minutes 1   SLP Cognitive Skill Development Additional 15 Minutes 3       Assessment    Reviewed results of SCCAN with discussion re: areas of weakness being short term memory and attention. Pt presented with attention tasks on this date. Pt completed who has more - coins, with correct completion of 38/40 trials indep, corrected with MIN cues. Pt then completed attention task which required pt to locate target word within a short story. Pt predicted that he would locate 100% of targets, pt indep located 8/15. With additional review pt unable to locate any of the remaining 7 targets. Pt required MOD A to locate remaining targets. Pt with large coughing spell noted during session, pt with wet cough attempting to expel secretions. SLP reviewed med list, mucinex added and scheduled, robitussin DM is as needed with education provided to pt for requesting medication as he feel necessary.        Plan    Initiate memory log with pt, formulate medication list with current medication regimen.     Speech Therapy Problems (Active)     Problem: Memory STGs     Dates: Start: 01/13/22       Goal: STG-Within one week, patient will learn and implement functional memory strategies to assist in recall of information related to hospitalization and safety with 80% accuracy provided MIN A     Dates: Start: 01/13/22             Problem: Problem Solving STGs     Dates: Start: 01/13/22       Goal: STG-Within one week, patient will complete  alternating attention tasks with 80% accuracy provided MIN A     Dates: Start: 01/13/22          Goal: STG-Within one week, patient will complete medication management and financial management tasks with 80% accuracy provided SPV     Dates: Start: 01/13/22             Problem: Speech/Swallowing LTGs     Dates: Start: 01/13/22       Goal: LTG-By discharge, patient will complete functional problem solving and recall information with 80% accuracy provided MOD I     Dates: Start: 01/13/22

## 2022-01-14 NOTE — THERAPY
Occupational Therapy  Daily Treatment     Patient Name: David Cho  Age:  81 y.o., Sex:  male  Medical Record #: [id number]  Today's Date: 1/14/2022     Precautions  Precautions: (P) Fall Risk  Comments: (P) [place], BP LUE only, fluid restriction         Subjective    Pt received up in chair, agreeable to OT session     Objective       01/14/22 1301   Precautions   Precautions Fall Risk   Comments [place], BP LUE only, fluid restriction   Sitting Upper Body Exercises   Sitting Upper Body Exercises Yes   Chest Press 2 sets of 10;Bilateral  (2# dumbells)   Front Arm Raise 2 sets of 10;Bilateral  (2# dumbells)   External Shoulder Rotation 2 sets of 15;Bilateral  (2# dumbells)   Bicep Curls 2 sets of 15;Bilateral  (2# dumbells)   Pronation / Supination 2 sets of 15;Bilateral  (2# dumbells)   Other Exercise unweighted ball toss 2x30 reps for gross grasp/release   Comments mirror used for visual feedback to promote symmetry   Hand Strengthening   Hand Strengthening Left ;Left Pinch;Left Finger Flexion;Left Finger Extension;Left Thumb Opposition;Theraputty (Comment on Resistance);Gross Grasp Left   Comment issued theraputty level II, educated on HEP for gross grasp and sustained pinch, pt able to return demo appropriately. pt removed 7 small beads given increased time, difficulty manipulating beads to  off table after locating in putty. blocked practice lateral and tip to tip pinch using level 1 resistance clothespin, mod cues to avoid compensatory grasp, required setup assist for initial positioning   OT Total Time Spent   OT Individual Total Time Spent (Mins) 60   OT Charge Group   Charges Yes   OT Therapy Activity 2   OT Therapeutic Exercise  2       Assessment    Pt tolerated session well, focus on forced use LUE toward increased functional use. Pt presents with deficits in gross and fine motor control though improved with blocked practice during session, issued theraputty HEP for in-room use.     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 higher level balance and household mobility    Occupational Therapy Goals (Active)     Problem: Bathing     Dates: Start: 01/13/22       Goal: STG-Within one week, patient will bathe at supervision level using AE as needed.     Dates: Start: 01/13/22             Problem: Dressing     Dates: Start: 01/13/22       Goal: STG-Within one week, patient will dress LB at supervision to mod I level using AE as needed.      Dates: Start: 01/13/22             Problem: Functional Transfers     Dates: Start: 01/13/22       Goal: STG-Within one week, patient will transfer to step in shower at supervision level using AE/DME as needed.      Dates: Start: 01/13/22             Problem: IADL's     Dates: Start: 01/13/22       Goal: STG-Within one week, patient will access kitchen area at supervision level using AE/DME as needed.      Dates: Start: 01/13/22             Problem: OT Long Term Goals     Dates: Start: 01/13/22       Goal: LTG-By discharge, patient will complete basic self care tasks at Mod I level using AE as needed.      Dates: Start: 01/13/22          Goal: LTG-By discharge, patient will perform bathroom transfers at mod I level using AE/DME as needed.      Dates: Start: 01/13/22          Goal: LTG-By discharge, patient will complete basic home management at supervision to Mod I level using AE as needed.      Dates: Start: 01/13/22

## 2022-01-14 NOTE — PROGRESS NOTES
[facility] Medicine Daily Progress Note    Date of Service  1/14/2022    Chief Complaint:  Hypertension  Afib  Hyponatremia    Interval History:  No complaints.  Doing ok.    Review of Systems  Review of Systems   Constitutional: Negative for chills and fever.   Respiratory: Negative for shortness of breath.    Cardiovascular: Negative for chest pain.   Gastrointestinal: Negative for abdominal pain, diarrhea, nausea and vomiting.   Psychiatric/Behavioral: The patient is not nervous/anxious.         Physical Exam  Temp:  [36.4 °C (97.6 °F)-37.1 °C (98.7 °F)] 36.4 °C (97.6 °F)  Pulse:  [66-96] 75  Resp:  [16-18] 18  BP: (109-160)/(72-84) 142/79  SpO2:  [92 %-98 %] 98 %    Physical Exam  Vitals and nursing note reviewed.   Constitutional:       Appearance: Normal appearance.   HENT:      Head: Atraumatic.   Eyes:      Conjunctiva/sclera: Conjunctivae normal.      Pupils: Pupils are equal, round, and reactive to light.   Cardiovascular:      Rate and Rhythm: Normal rate. Rhythm irregular.   Pulmonary:      Effort: Pulmonary effort is normal.      Breath sounds: Normal breath sounds.   Abdominal:      General: Bowel sounds are normal.      Palpations: Abdomen is soft.   Musculoskeletal:      Cervical back: Normal range of motion and neck supple.      Right lower leg: No edema.      Left lower leg: No edema.   Skin:     General: Skin is warm and dry.   Neurological:      Mental Status: He is alert and oriented to person, place, and time.   Psychiatric:         Mood and Affect: Mood normal.         Behavior: Behavior normal.         Fluids    Intake/Output Summary (Last 24 hours) at 1/14/2022 0821  Last data filed at 1/14/2022 0800  Gross per 24 hour   Intake 720 ml   Output 550 ml   Net 170 ml       Laboratory  Recent Labs     01/13/22  0533   WBC 8.5   RBC 4.62*   HEMOGLOBIN 14.8   HEMATOCRIT 43.9   MCV 95.0   MCH 32.0   MCHC 33.7   RDW 48.1   PLATELETCT 327   MPV 9.0     Recent Labs     01/13/22  0533   SODIUM 131*    POTASSIUM 4.0   CHLORIDE 95*   CO2 28   GLUCOSE 85   BUN 12   CREATININE 0.81   CALCIUM 8.7                   Imaging    Assessment/Plan  * Ischemic stroke (HCC)- (present on admission)  Assessment & Plan  MRI: showed multifocal areas of acute cortical infarcts in the right frontal and parietal lobes.          multiple chronic lacunar infarcts; severe chronic microvascular ischemic disease.  On Eliquis (has afib)  On Lipitor    Vitamin D deficiency- (present on admission)  Assessment & Plan  Vit D: 10  On supplements    Atrial fibrillation (HCC)- (present on admission)  Assessment & Plan  HR ok  On Eliquis  Note: had bradycardia at [facility] -- avoid AVN blockers -- will f/u with Cardio  Monitor    BPH (benign prostatic hyperplasia)- (present on admission)  Assessment & Plan  On Proscar    Alcohol use- (present on admission)  Assessment & Plan  On MVI, B1 and folate supplements    Hyponatremia- (present on admission)  Assessment & Plan  Has hx of mildly low sodium levels  Na: 127 --> 128 --> 131 (1/13)  Pt eating better at the Rehab and will increase salting foods  Off mild fluid restriction  Likely 2nd to etoh use  Likely 2nd to diminished oral intake -- doesn't eat much at home; has issues with his dentures  Note: on Vasotec (has PM low SE of hypo-na) -- was taking at home  Cont to monitor    Major depressive disorder- (present on admission)  Assessment & Plan  On Wellbutrin    Hypertension- (present on admission)  Assessment & Plan  BP better recently  On Vasotec  Note: on Proscar  Note: home meds include Vasotec  Cont to monitor    Chronic obstructive pulmonary disease (HCC)- (present on admission)  Assessment & Plan  On Anoro Ellipta  Resp & O2 protocols as needed

## 2022-01-14 NOTE — CARE PLAN
The patient is Stable - Low risk of patient condition declining or worsening    Problem: Respiratory  Goal: Patient will understand use and administration of respiratory medications to improve respiratory function  Outcome: Progressing  Note: Pt wears 2L oxygen via nasal cannula at HS. Saturation at 94%. No s/s of respiratory distress. Will continue to monitor.     Problem: Bladder / Voiding  Goal: Patient will establish and maintain regular urinary output  Outcome: Progressing  Note: Pt continent of bladder. Voiding adequately using urinal. He denies dysuria.

## 2022-01-14 NOTE — THERAPY
Physical Therapy   Daily Treatment     Patient Name: David Cho  Age:  81 y.o., Sex:  male  Medical Record #: [id number]  Today's Date: 1/14/2022     Precautions  Precautions: Fall Risk  Comments: Nikolai, BP LUE only, fluid restriction    Subjective    Patient agreeable to PT.     Objective       01/14/22 0931   Pain 0 - 10 Group   Pain Rating Scale (NPRS) 0   Gait Functional Level of Assist    Gait Level Of Assist Standby Assist   Assistive Device None   Distance (Feet) 250   # of Times Distance was Traveled 2   Deviation Bradykinetic;Increased Base Of Support;Decreased Heel Strike   Standing Lower Body Exercises   Standing Lower Body Exercises Yes  (in // bars w/ #1.5)   Hip Extension 3 sets of 10;Bilateral    Hip Abduction 3 sets of 10;Bilateral   Marching 3 sets of 10   Heel Rise 3 sets of 10   Toe Rise 3 sets of 10   Bed Mobility    Sit to Stand Standby Assist   Interdisciplinary Plan of Care Collaboration   IDT Collaboration with  Nursing   Patient Position at End of Therapy Seated;Call Light within Reach;Tray Table within Reach;Phone within Reach   Collaboration Comments CLOF, ambulation with staff, room board updated   PT Total Time Spent   PT Individual Total Time Spent (Mins) 30   PT Charge Group   PT Therapeutic Exercise 2   Supervising Physical Therapist Horacio Tanner     1 car transfer performed at SBA level, cueing, and mildly impaired safety; good problem solving; no AD; updated IRF-MORENA.    Assessment    Patient has improving endurance, no LOB noted today, SBA-SPV with most mobility this session without an AD, cueing is required for safety with new tasks at this time, good motivation, improving safety within room noted and pt verbalizes good use of call light.    Strengths: Able to follow instructions,Adequate strength,Alert and oriented,Effective communication skills,Independent prior level of function,Making steady progress towards goals,Manages pain appropriately,Motivated for self care and  independence,Pleasant and cooperative,Willingly participates in therapeutic activities  Barriers: Decreased endurance,Hearing impairment,Home accessibility,Impaired balance,Poor family support    Plan    Ambulation without AD but with use of LUE or BUEs; Standing balance activities; Dual tasking; Decreased A with transfers; Establish HEP; [place] Balance Scale; Safety education.    Passport items to be completed:  Passport items to be completed:  Get in/out of bed safely, in/out of a vehicle, safely use mobility device, walk or wheel around home/community, navigate up and down stairs, show how to get up/down from the ground, ensure home is accessible, demonstrate HEP, complete caregiver training    Physical Therapy Problems (Active)     Problem: Balance     Dates: Start: 01/13/22       Goal: STG-Within one week, patient will achieve a Low fall risk score on the Gil[place] Balance Scale assessment.     Dates: Start: 01/13/22             Problem: Mobility     Dates: Start: 01/13/22       Goal: STG-Within one week, patient will ambulate community distances of 500 feet at a gait velocity of 0.8 m/s at IND level without an AD.     Dates: Start: 01/13/22          Goal: STG-Within one week, patient will ambulate up/down 14 stairs at IND level with use of railings.     Dates: Start: 01/13/22             Problem: Mobility Transfers     Dates: Start: 01/13/22       Goal: STG-Within one week, patient will transfer bed to chair at IND level without an AD.     Dates: Start: 01/13/22          Goal: STG-Within one week, patient will transfer in/out of car at IND level without an AD.     Dates: Start: 01/13/22             Problem: PT-Long Term Goals     Dates: Start: 01/13/22       Goal: LTG-By discharge, patient will ambulate community distances of 500 feet at a gait velocity of 0.8 m/s at IND level without an AD.     Dates: Start: 01/13/22          Goal: LTG-By discharge, patient will transfer bed to chair at IND level without an AD.      Dates: Start: 01/13/22          Goal: LTG-By discharge, patient will ambulate up/down 14 stairs at IND level with use of railings.     Dates: Start: 01/13/22          Goal: LTG-By discharge, patient will transfer in/out of a car at IND level without an AD.     Dates: Start: 01/13/22          Goal: LTG-By discharge, patient will achieve a Low fall risk score on the [place] Balance Scale assessment.     Dates: Start: 01/13/22

## 2022-01-14 NOTE — CARE PLAN
The patient is Stable - Low risk of patient condition declining or worsening         Progress made toward(s) clinical / shift goals:    Problem: Skin Integrity  Goal: Skin integrity is maintained or improved  Outcome: Progressing     Problem: Fall Risk - Rehab  Goal: Patient will remain free from falls  Outcome: Progressing       Patient is not progressing towards the following goals:

## 2022-01-14 NOTE — DIETARY
"Nutrition Care/ Consult For MST  ( unintentional weight loss)     Assessment:    Admitting Diagnosis: ischemic stroke   Pertinent PMH: BPH, COPD, Hyperlipidemia, Depression, ETOH use, AAA , tobacco use     Additional Information: Called patient on phone.  He states he has a good appetite and has been eating well since hospital admission.  He denies significant GI/ issues.  States weight loss of about 10 lbs. Over the last few months with usual body weight of around 150 lbs.  States continues to consume ETOH.  Noted history of B-1 deficiency per PCP note.  Does confirm he takes B-1 at home.  Otherwise, states poor dentition and needs some \"work done\" on his teeth.  Has dentures but they do not fit well.   Can self feed.   Unable to physically assess patient due to isolation for [place] [id number].      Appetite: Excellent   Diet: Regular   Average PO intake x 3 days: %x3 meals     Labs: Na+ 131, Cl 95, Alk Phos 101, T. Bili 1.7, BTNP 1773, Vitamin D 10   Medications: Eliquis, Lipitor, Wellbutrin, Vasotec, Proscar, Folic Acid, Mucinex. Omeprazole, Senna - Docusate , MVI with minerals, Thiamine, Vitamin D   PRN Medications: Robitussin PRN       Height: 177.8 cm   Weight: 62 kg  Usual Body Weight: 68.2kg x 3 months ago  % Weight Change: 9% = significant   BMI: 19.61- underweight for age with BMI <23 age > 70     Skin: CDI   GI: BM 1/14   : WNL   Vitals: 2L NC PRN, /79      Nutrient Needs:  Kcal: 5275-3813 kcals/day BEE= 1332   Protein: 62- 75 g/day   Fluid: 1500mL /day + output       Malnutrition risk: significant weight loss/ however unable to physically assess patient and PO adequate at this time     Diagnosis:  Unintentional weight loss r/t inadequate oral intake in setting of poor dentition as evidenced by 9% weight loss x 3 months, intake <50% of nutrient needs > 1 month.     Intervention/ Recommendations/POC:  1. High calorie shakes with meals.  Snacks per patient discretion. Continue liberalized diet.  Soft " foods per patient request.   2.Encourage adequate PO/fluid intake.  3. Nutrition rep to see regarding food prefs/ honor within dietary restrictions (if indicated)     Monitor/Evaluation: Monitor PO intake, weight, labs, medication adjustments, skin integrity, GI function, vitals, I/Os, and overall hydration status.  Adjust nutritional POC pending clinical outcomes.    RD following PRN. Intake adequate at this time.   Goal:>/= 75% oral nutrient/fluid intake to promote nutrition optimization/healing.

## 2022-01-15 PROCEDURE — 97129 THER IVNTJ 1ST 15 MIN: CPT

## 2022-01-15 PROCEDURE — 700102 HCHG RX REV CODE 250 W/ 637 OVERRIDE(OP): Performed by: PHYSICAL MEDICINE & REHABILITATION

## 2022-01-15 PROCEDURE — 99232 SBSQ HOSP IP/OBS MODERATE 35: CPT | Performed by: HOSPITALIST

## 2022-01-15 PROCEDURE — 97130 THER IVNTJ EA ADDL 15 MIN: CPT

## 2022-01-15 PROCEDURE — A9270 NON-COVERED ITEM OR SERVICE: HCPCS | Performed by: HOSPITALIST

## 2022-01-15 PROCEDURE — A9270 NON-COVERED ITEM OR SERVICE: HCPCS | Performed by: PHYSICAL MEDICINE & REHABILITATION

## 2022-01-15 PROCEDURE — 94640 AIRWAY INHALATION TREATMENT: CPT

## 2022-01-15 PROCEDURE — 700102 HCHG RX REV CODE 250 W/ 637 OVERRIDE(OP): Performed by: HOSPITALIST

## 2022-01-15 PROCEDURE — 94760 N-INVAS EAR/PLS OXIMETRY 1: CPT

## 2022-01-15 PROCEDURE — 99231 SBSQ HOSP IP/OBS SF/LOW 25: CPT | Mod: MISDOCU | Performed by: STUDENT IN AN ORGANIZED HEALTH CARE EDUCATION/TRAINING PROGRAM

## 2022-01-15 PROCEDURE — 770010 HCHG ROOM/CARE - REHAB SEMI PRIVAT*

## 2022-01-15 RX ADMIN — FOLIC ACID 1 MG: 1 TABLET ORAL at 09:12

## 2022-01-15 RX ADMIN — FINASTERIDE 5 MG: 5 TABLET, FILM COATED ORAL at 09:11

## 2022-01-15 RX ADMIN — BUPROPION HYDROCHLORIDE 150 MG: 150 TABLET, EXTENDED RELEASE ORAL at 09:11

## 2022-01-15 RX ADMIN — UMECLIDINIUM BROMIDE AND VILANTEROL TRIFENATATE 1 PUFF: 62.5; 25 POWDER RESPIRATORY (INHALATION) at 08:16

## 2022-01-15 RX ADMIN — GUAIFENESIN AND DEXTROMETHORPHAN 5 ML: 100; 10 SYRUP ORAL at 17:58

## 2022-01-15 RX ADMIN — GUAIFENESIN 600 MG: 600 TABLET, EXTENDED RELEASE ORAL at 20:30

## 2022-01-15 RX ADMIN — Medication 1 TABLET: at 09:11

## 2022-01-15 RX ADMIN — THIAMINE HCL TAB 100 MG 100 MG: 100 TAB at 09:12

## 2022-01-15 RX ADMIN — APIXABAN 5 MG: 5 TABLET, FILM COATED ORAL at 20:30

## 2022-01-15 RX ADMIN — GUAIFENESIN 600 MG: 600 TABLET, EXTENDED RELEASE ORAL at 09:12

## 2022-01-15 RX ADMIN — APIXABAN 5 MG: 5 TABLET, FILM COATED ORAL at 09:11

## 2022-01-15 RX ADMIN — Medication 2000 UNITS: at 09:11

## 2022-01-15 RX ADMIN — ENALAPRIL MALEATE 20 MG: 5 TABLET ORAL at 08:04

## 2022-01-15 RX ADMIN — ATORVASTATIN CALCIUM 40 MG: 40 TABLET, FILM COATED ORAL at 20:30

## 2022-01-15 RX ADMIN — FLUTICASONE PROPIONATE 220 MCG: 110 AEROSOL, METERED RESPIRATORY (INHALATION) at 19:42

## 2022-01-15 RX ADMIN — FLUTICASONE PROPIONATE 220 MCG: 110 AEROSOL, METERED RESPIRATORY (INHALATION) at 08:15

## 2022-01-15 RX ADMIN — HYDRALAZINE HYDROCHLORIDE 10 MG: 10 TABLET, FILM COATED ORAL at 08:03

## 2022-01-15 RX ADMIN — OMEPRAZOLE 20 MG: 20 CAPSULE, DELAYED RELEASE ORAL at 07:57

## 2022-01-15 ASSESSMENT — ENCOUNTER SYMPTOMS
SHORTNESS OF BREATH: 0
HALLUCINATIONS: 0
HEADACHES: 0
VOMITING: 0
FEVER: 0
NAUSEA: 0
BLURRED VISION: 0
PALPITATIONS: 0
DIZZINESS: 0

## 2022-01-15 ASSESSMENT — PAIN DESCRIPTION - PAIN TYPE: TYPE: ACUTE PAIN

## 2022-01-15 NOTE — CARE PLAN
The patient is Stable - Low risk of patient condition declining or worsening    Shift Goals  Clinical Goals: bp control  Patient Goals: rest    Progress made toward(s) clinical / shift goals: Monitor BP throughout shift, patient is asymptomatic for high and low BP. Educated patient to report any symptoms to staff.       Problem: Knowledge Deficit - Standard  Goal: Patient and family/care givers will demonstrate understanding of plan of care, disease process/condition, diagnostic tests and medications  Outcome: Progressing     Problem: Skin Integrity  Goal: Skin integrity is maintained or improved  Outcome: Progressing     Problem: Respiratory  Goal: Patient will understand use and administration of respiratory medications to improve respiratory function  Outcome: Progressing

## 2022-01-15 NOTE — PROGRESS NOTES
"Rehab Progress Note     Date of Service: 1/15/2022  Chief Complaint: Follow-up stroke    Interval Events (Subjective)  Patient seen and examined today in his room.  He feels well today. He denies any pain. He has no new complaints today. States his BP was elevated this morning, denies headache.    ROS: No changes to bowel, bladder, pain, mood, or sleep.       Objective:  VITAL SIGNS: BP (!) 90/60 Comment: rechecked manually after pt given prn hydralizine  Pulse 61   Temp 36.6 °C (97.9 °F) (Temporal)   Resp 16   Ht 1.778 m (5' 10\")   Wt 62 kg (136 lb 11 oz)   SpO2 96%   BMI 19.61 kg/m²   Gen: alert, no apparent distress  Neuro: notable for left-sided incoordination and weakness, mostly in the left arm and hand    Recent Results (from the past 72 hour(s))   SARS-CoV-2 PCR (24 hour In-House): Collect NP swab in VTM    Collection Time: 01/12/22  2:20 PM    Specimen: Nasopharyngeal; Respirate   Result Value Ref Range    SARS-CoV-2 Source NP Swab     SARS-CoV-2 by PCR NotDetected    OSMOLALITY URINE    Collection Time: 01/12/22  8:25 PM   Result Value Ref Range    Osmolality Urine 534 300 - 900 mOsm/kg H2O   URINE SODIUM RANDOM    Collection Time: 01/12/22  8:25 PM   Result Value Ref Range    Sodium, Urine -per volume 125 mmol/L   CBC with Differential    Collection Time: 01/13/22  5:33 AM   Result Value Ref Range    WBC 8.5 4.8 - 10.8 K/uL    RBC 4.62 (L) 4.70 - 6.10 M/uL    Hemoglobin 14.8 14.0 - 18.0 g/dL    Hematocrit 43.9 42.0 - 52.0 %    MCV 95.0 81.4 - 97.8 fL    MCH 32.0 27.0 - 33.0 pg    MCHC 33.7 33.7 - 35.3 g/dL    RDW 48.1 35.9 - 50.0 fL    Platelet Count 327 164 - 446 K/uL    MPV 9.0 9.0 - 12.9 fL    Neutrophils-Polys 55.50 44.00 - 72.00 %    Lymphocytes 29.40 22.00 - 41.00 %    Monocytes 10.90 0.00 - 13.40 %    Eosinophils 2.40 0.00 - 6.90 %    Basophils 0.40 0.00 - 1.80 %    Immature Granulocytes 1.40 (H) 0.00 - 0.90 %    Nucleated RBC 0.00 /100 WBC    Neutrophils (Absolute) 4.70 1.82 - 7.42 K/uL    " Lymphs (Absolute) 2.49 1.00 - 4.80 K/uL    Monos (Absolute) 0.92 (H) 0.00 - 0.85 K/uL    Eos (Absolute) 0.20 0.00 - 0.51 K/uL    Baso (Absolute) 0.03 0.00 - 0.12 K/uL    Immature Granulocytes (abs) 0.12 (H) 0.00 - 0.11 K/uL    NRBC (Absolute) 0.00 K/uL   Comp Metabolic Panel (CMP)    Collection Time: 01/13/22  5:33 AM   Result Value Ref Range    Sodium 131 (L) 135 - 145 mmol/L    Potassium 4.0 3.6 - 5.5 mmol/L    Chloride 95 (L) 96 - 112 mmol/L    Co2 28 20 - 33 mmol/L    Anion Gap 8.0 7.0 - 16.0    Glucose 85 65 - 99 mg/dL    Bun 12 8 - 22 mg/dL    Creatinine 0.81 0.50 - 1.40 mg/dL    Calcium 8.7 8.5 - 10.5 mg/dL    AST(SGOT) 16 12 - 45 U/L    ALT(SGPT) 8 2 - 50 U/L    Alkaline Phosphatase 101 (H) 30 - 99 U/L    Total Bilirubin 1.7 (H) 0.1 - 1.5 mg/dL    Albumin 3.6 3.2 - 4.9 g/dL    Total Protein 5.6 (L) 6.0 - 8.2 g/dL    Globulin 2.0 1.9 - 3.5 g/dL    A-G Ratio 1.8 g/dL   Magnesium    Collection Time: 01/13/22  5:33 AM   Result Value Ref Range    Magnesium 1.9 1.5 - 2.5 mg/dL   Vitamin D, 25-hydroxy (blood)    Collection Time: 01/13/22  5:33 AM   Result Value Ref Range    25-Hydroxy   Vitamin D 25 10 (L) 30 - 100 ng/mL   proBrain Natriuretic Peptide, NT    Collection Time: 01/13/22  5:33 AM   Result Value Ref Range    NT-proBNP 1773 (H) 0 - 125 pg/mL   ESTIMATED GFR    Collection Time: 01/13/22  5:33 AM   Result Value Ref Range    GFR If African American >60 >60 mL/min/1.73 m 2    GFR If Non African American >60 >60 mL/min/1.73 m 2       Current Facility-Administered Medications   Medication Frequency   • guaiFENesin ER (MUCINEX) tablet 600 mg Q12HRS   • guaiFENesin dextromethorphan (ROBITUSSIN DM) 100-10 MG/5ML syrup 5 mL Q6HRS PRN   • fluticasone (FLOVENT HFA) 110 MCG/ACT inhaler 220 mcg Q12HRS (RT)   • vitamin D3 (cholecalciferol) tablet 2,000 Units DAILY   • hydrOXYzine HCl (ATARAX) tablet 50 mg Q6HRS PRN   • QUEtiapine (Seroquel) tablet 25 mg TID PRN   • melatonin tablet 3 mg HS PRN   • Respiratory Therapy  Consult Continuous RT   • Pharmacy Consult Request ...Pain Management Review 1 Each PHARMACY TO DOSE   • hydrALAZINE (APRESOLINE) tablet 10 mg Q8HRS PRN   • acetaminophen (Tylenol) tablet 650 mg Q4HRS PRN   • lactulose 20 GM/30ML solution 30 mL QDAY PRN   • docusate sodium (ENEMEEZ) enema 283 mg QDAY PRN   • sodium phosphate (Fleet) enema 133 mL QDAY PRN   • omeprazole (PRILOSEC) capsule 20 mg QAM AC   • artificial tears ophthalmic solution 1 Drop PRN   • benzocaine-menthol (CEPACOL) lozenge 1 Lozenge Q2HRS PRN   • mag hydrox-al hydrox-simeth (MAALOX PLUS ES or MYLANTA DS) suspension 20 mL Q2HRS PRN   • ondansetron (ZOFRAN ODT) dispertab 4 mg 4X/DAY PRN    Or   • ondansetron (ZOFRAN) syringe/vial injection 4 mg 4X/DAY PRN   • traZODone (DESYREL) tablet 50 mg QHS PRN   • sodium chloride (OCEAN) 0.65 % nasal spray 2 Spray PRN   • midazolam (VERSED) 5 mg/mL (1 mL vial) PRN   • senna-docusate (PERICOLACE or SENOKOT S) 8.6-50 MG per tablet 2 Tablet BID    And   • polyethylene glycol/lytes (MIRALAX) PACKET 1 Packet QDAY PRN    And   • magnesium hydroxide (MILK OF MAGNESIA) suspension 30 mL QDAY PRN    And   • bisacodyl (DULCOLAX) suppository 10 mg QDAY PRN   • apixaban (ELIQUIS) tablet 5 mg BID   • atorvastatin (LIPITOR) tablet 40 mg Q EVENING   • buPROPion SR (WELLBUTRIN-SR) tablet 150 mg DAILY   • enalapril (VASOTEC) tablet 20 mg DAILY   • finasteride (PROSCAR) tablet 5 mg DAILY   • folic acid (FOLVITE) tablet 1 mg DAILY   • therapeutic multivitamin-minerals (THERAGRAN-M) tablet 1 Tablet DAILY   • thiamine (Vitamin B-1) tablet 100 mg DAILY   • umeclidinium-vilanterol (ANORO ELLIPTA) inhaler 1 Puff QDAILY (RT)       Orders Placed This Encounter   Procedures   • Diet Order Diet: Regular     Standing Status:   Standing     Number of Occurrences:   1     Order Specific Question:   Diet:     Answer:   Regular [1]       Assessment:  Active Hospital Problems    Diagnosis    • *Ischemic stroke (HCC)    • Vitamin D deficiency     • Atrial fibrillation (HCC)    • Impaired mobility and ADLs    • Pulmonary hypertension (HCC)    • Mitral regurgitation    • Bradycardia    • BPH (benign prostatic hyperplasia)    • Vitamin B1 deficiency    • Anemia    • Alcohol use    • Hyponatremia    • Major depressive disorder    • Chronic obstructive pulmonary disease (HCC)    • Hypertension    • Dyslipidemia      This patient is a 81 y.o. male admitted for acute inpatient rehabilitation with Ischemic stroke (HCC).    Medical Decision Making and Plan:    Right frontal/parietal lobe ischemic stroke  Cardioembolic  Mild right-sided weakness  Mild cognitive impairments  Continue full rehab program  PT/OT/SLP, 1 hr each discipline, 5 days per week    Eliquis  Statin    [facility], Dr. Trejo, referrals made    Atrial fibrillation  Eliquis     Hypertension  Enalapril  Appreciate hospitalist assistance     Bradycardia  Per cardiology, avoid AV woody blockade  Appreciate hospitalist assistance     Pulmonary hypertension  Monitor need for oxygen     Mitral regurgitation   Outpatient follow up with cardiology     Bladder program  BPH  Finasteride  Check PVRs - 161, 179, 192  Continue to monitor  May benefit from starting Flomax     Hyponatremia, improved  Likely SIADH, versus glucocorticoid or mineralocorticoid deficiency based on urine studies  Checked urine sodium and osmoles  Discontinue 1.5 L fluid restriction  Discontinue salt tabs  Appreciate hospitalist assistance     Alcohol use  Will need counseling  Vitamins    Vitamin D deficiency, 10  Continue supplementation     COPD  Ellipta  Mucinex  Flonase  As needed Robitussin DM  Respiratory therapy     History of depression  Wellbutrin  Monitor mood/need for psychology consult    Bowel program  Continue bowel medications  Last BM 1/14    DVT prophylaxis  Eliquis    Total time:  10 minutes.  I spent greater than 50% of the time for patient care, counseling, and coordination on this  date, including patient face-to face time, unit/floor time with review of records/pertinent lab data and studies, as well as discussing diagnostic evaluation/work up, planned therapeutic interventions, and future disposition of care, as per the interval events/subjective and the assessment and plan as noted above.    I have performed a physical exam, reviewed and updated ROS, as well as the assessment and plan today 1/15/2022. In review of note from 1/13/2021 there are no new changes except as documented above.          Giselle Plunkett M.D.   Physical Medicine and Rehabilitation

## 2022-01-15 NOTE — FLOWSHEET NOTE
01/15/22 0820   Events/Summary/Plan   Events/Summary/Plan mdi x 2 given, pt doing well    Vital Signs   Pulse 61   Respiration 16   Pulse Oximetry 96 %   $ Pulse Oximetry (Spot Check) Yes   Respiratory Assessment   Respiratory Pattern Within Normal Limits   Level of Consciousness Alert   Chest Exam   Work Of Breathing / Effort Within Normal Limits   Breath Sounds   RUL Breath Sounds Clear;Diminished   RML Breath Sounds Diminished   RLL Breath Sounds Crackles   KRISTY Breath Sounds Clear;Diminished   LLL Breath Sounds Diminished   Oxygen   O2 (LPM) 0  (pt wears o2 at noc )   FiO2% 21 %   O2 Delivery Device Room air w/o2 available

## 2022-01-15 NOTE — CARE PLAN
Problem: Fall Risk - Rehab  Goal: Patient will remain free from falls  Note: Kitty Velasquez Fall risk Assessment Score: 8    Low fall risk interventions   - Call light within reach   - Yellow  socks   - Belongings within reach   - Bed in the lowest position         Problem: Respiratory  Goal: Patient will understand use and administration of respiratory medications to improve respiratory function  Note: O2 n/c in place at 2 lpm at HS. No c/o pain. Able to make needs known. Assisted as needed. Will monitor.      The patient is Watcher - Medium risk of patient condition declining or worsening

## 2022-01-15 NOTE — PROGRESS NOTES
[facility] Medicine Daily Progress Note    Date of Service  1/15/2022    Chief Complaint:  Hypertension  Afib  Hyponatremia    Interval History:  No significant events overnight.    Review of Systems  Review of Systems   Constitutional: Negative for fever.   Eyes: Negative for blurred vision.   Respiratory: Negative for shortness of breath.    Cardiovascular: Negative for palpitations.   Gastrointestinal: Negative for nausea and vomiting.   Neurological: Negative for dizziness and headaches.   Psychiatric/Behavioral: Negative for hallucinations.        Physical Exam  Temp:  [36.4 °C (97.6 °F)-37.2 °C (98.9 °F)] 36.4 °C (97.6 °F)  Pulse:  [54-77] 61  Resp:  [16-18] 16  BP: (122-160)/(75-90) 160/90  SpO2:  [93 %-96 %] 96 %    Physical Exam  Vitals and nursing note reviewed.   Constitutional:       General: He is not in acute distress.  HENT:      Mouth/Throat:      Mouth: Mucous membranes are moist.      Pharynx: Oropharynx is clear.   Eyes:      General: No scleral icterus.  Cardiovascular:      Rate and Rhythm: Normal rate. Rhythm irregular.   Pulmonary:      Effort: Pulmonary effort is normal.      Breath sounds: No wheezing or rales.   Abdominal:      General: Bowel sounds are normal.      Palpations: Abdomen is soft.   Musculoskeletal:      Cervical back: No rigidity.      Right lower leg: No edema.      Left lower leg: No edema.   Skin:     General: Skin is warm and dry.   Neurological:      Mental Status: He is alert and oriented to person, place, and time.   Psychiatric:         Mood and Affect: Mood normal.         Behavior: Behavior normal.         Fluids    Intake/Output Summary (Last 24 hours) at 1/15/2022 0829  Last data filed at 1/15/2022 0556  Gross per 24 hour   Intake 480 ml   Output 300 ml   Net 180 ml       Laboratory  Recent Labs     01/13/22  0533   WBC 8.5   RBC 4.62*   HEMOGLOBIN 14.8   HEMATOCRIT 43.9   MCV 95.0   MCH 32.0   MCHC 33.7   RDW 48.1   PLATELETCT 327   MPV 9.0     Recent Labs      01/13/22  0533   SODIUM 131*   POTASSIUM 4.0   CHLORIDE 95*   CO2 28   GLUCOSE 85   BUN 12   CREATININE 0.81   CALCIUM 8.7                   Imaging    Assessment/Plan  * Ischemic stroke (HCC)- (present on admission)  Assessment & Plan  MRI: showed multifocal areas of acute cortical infarcts in the right frontal and parietal lobes.          multiple chronic lacunar infarcts; severe chronic microvascular ischemic disease.  On Eliquis (has afib)  On Lipitor    Vitamin D deficiency- (present on admission)  Assessment & Plan  Vit D: 10  On supplements    Atrial fibrillation (HCC)- (present on admission)  Assessment & Plan  HR ok  On Eliquis  [place] [facility] [place] blockers -- will f/u with Cardio  Monitor    BPH (benign prostatic hyperplasia)- (present on admission)  Assessment & Plan  On Proscar    Alcohol use- (present on admission)  Assessment & Plan  On MVI, B1 and folate supplements    Hyponatremia- (present on admission)  Assessment & Plan  Has hx of mildly low sodium levels  Na: 127 --> 128 --> 131 (1/13)  Pt eating better at the Rehab and will increase salting foods  Off mild fluid restriction  Likely 2nd to etoh use  Likely 2nd to diminished oral intake -- doesn't eat much at home; has issues with his dentures  Note: on Vasotec (has PM low SE of hypo-na) -- was taking at home  Cont to monitor    Major depressive disorder- (present on admission)  Assessment & Plan  On Wellbutrin    Hypertension- (present on admission)  Assessment & Plan  BP a little labile but better recently  On Vasotec  Note: on Proscar  Note: home meds include Vasotec  Monitor    Chronic obstructive pulmonary disease (HCC)- (present on admission)  Assessment & Plan  On Anoro Ellipta  Resp & O2 protocols as needed

## 2022-01-16 LAB
ANION GAP SERPL CALC-SCNC: 11 MMOL/L (ref 7–16)
BUN SERPL-MCNC: 18 MG/DL (ref 8–22)
CALCIUM SERPL-MCNC: 8.7 MG/DL (ref 8.5–10.5)
CHLORIDE SERPL-SCNC: 96 MMOL/L (ref 96–112)
CO2 SERPL-SCNC: 25 MMOL/L (ref 20–33)
CREAT SERPL-MCNC: 0.68 MG/DL (ref 0.5–1.4)
GLUCOSE SERPL-MCNC: 94 MG/DL (ref 65–99)
MAGNESIUM SERPL-MCNC: 1.8 MG/DL (ref 1.5–2.5)
PHOSPHATE SERPL-MCNC: 3.6 MG/DL (ref 2.5–4.5)
POTASSIUM SERPL-SCNC: 4.3 MMOL/L (ref 3.6–5.5)
SODIUM SERPL-SCNC: 132 MMOL/L (ref 135–145)

## 2022-01-16 PROCEDURE — 97530 THERAPEUTIC ACTIVITIES: CPT

## 2022-01-16 PROCEDURE — 94640 AIRWAY INHALATION TREATMENT: CPT

## 2022-01-16 PROCEDURE — 94760 N-INVAS EAR/PLS OXIMETRY 1: CPT

## 2022-01-16 PROCEDURE — 99232 SBSQ HOSP IP/OBS MODERATE 35: CPT | Performed by: HOSPITALIST

## 2022-01-16 PROCEDURE — 97110 THERAPEUTIC EXERCISES: CPT

## 2022-01-16 PROCEDURE — 83735 ASSAY OF MAGNESIUM: CPT

## 2022-01-16 PROCEDURE — A9270 NON-COVERED ITEM OR SERVICE: HCPCS | Performed by: PHYSICAL MEDICINE & REHABILITATION

## 2022-01-16 PROCEDURE — 700102 HCHG RX REV CODE 250 W/ 637 OVERRIDE(OP): Performed by: PHYSICAL MEDICINE & REHABILITATION

## 2022-01-16 PROCEDURE — 84100 ASSAY OF PHOSPHORUS: CPT

## 2022-01-16 PROCEDURE — A9270 NON-COVERED ITEM OR SERVICE: HCPCS | Performed by: HOSPITALIST

## 2022-01-16 PROCEDURE — 97116 GAIT TRAINING THERAPY: CPT

## 2022-01-16 PROCEDURE — 770010 HCHG ROOM/CARE - REHAB SEMI PRIVAT*

## 2022-01-16 PROCEDURE — 700102 HCHG RX REV CODE 250 W/ 637 OVERRIDE(OP): Performed by: HOSPITALIST

## 2022-01-16 PROCEDURE — 80048 BASIC METABOLIC PNL TOTAL CA: CPT

## 2022-01-16 PROCEDURE — 36415 COLL VENOUS BLD VENIPUNCTURE: CPT

## 2022-01-16 PROCEDURE — 97112 NEUROMUSCULAR REEDUCATION: CPT

## 2022-01-16 RX ORDER — ENALAPRIL MALEATE 5 MG/1
30 TABLET ORAL DAILY
Status: DISCONTINUED | OUTPATIENT
Start: 2022-01-17 | End: 2022-01-17

## 2022-01-16 RX ORDER — ENALAPRIL MALEATE 5 MG/1
10 TABLET ORAL ONCE
Status: COMPLETED | OUTPATIENT
Start: 2022-01-16 | End: 2022-01-16

## 2022-01-16 RX ADMIN — THIAMINE HCL TAB 100 MG 100 MG: 100 TAB at 07:58

## 2022-01-16 RX ADMIN — BUPROPION HYDROCHLORIDE 150 MG: 150 TABLET, EXTENDED RELEASE ORAL at 07:58

## 2022-01-16 RX ADMIN — FINASTERIDE 5 MG: 5 TABLET, FILM COATED ORAL at 07:59

## 2022-01-16 RX ADMIN — ENALAPRIL MALEATE 10 MG: 5 TABLET ORAL at 10:17

## 2022-01-16 RX ADMIN — SENNOSIDES AND DOCUSATE SODIUM 2 TABLET: 50; 8.6 TABLET ORAL at 20:15

## 2022-01-16 RX ADMIN — APIXABAN 5 MG: 5 TABLET, FILM COATED ORAL at 20:15

## 2022-01-16 RX ADMIN — ENALAPRIL MALEATE 20 MG: 5 TABLET ORAL at 07:59

## 2022-01-16 RX ADMIN — FLUTICASONE PROPIONATE 220 MCG: 110 AEROSOL, METERED RESPIRATORY (INHALATION) at 09:07

## 2022-01-16 RX ADMIN — GUAIFENESIN 600 MG: 600 TABLET, EXTENDED RELEASE ORAL at 20:15

## 2022-01-16 RX ADMIN — GUAIFENESIN 600 MG: 600 TABLET, EXTENDED RELEASE ORAL at 07:58

## 2022-01-16 RX ADMIN — GUAIFENESIN AND DEXTROMETHORPHAN 5 ML: 100; 10 SYRUP ORAL at 20:15

## 2022-01-16 RX ADMIN — Medication 2000 UNITS: at 07:58

## 2022-01-16 RX ADMIN — APIXABAN 5 MG: 5 TABLET, FILM COATED ORAL at 07:58

## 2022-01-16 RX ADMIN — UMECLIDINIUM BROMIDE AND VILANTEROL TRIFENATATE 1 PUFF: 62.5; 25 POWDER RESPIRATORY (INHALATION) at 09:06

## 2022-01-16 RX ADMIN — OMEPRAZOLE 20 MG: 20 CAPSULE, DELAYED RELEASE ORAL at 07:58

## 2022-01-16 RX ADMIN — FOLIC ACID 1 MG: 1 TABLET ORAL at 07:58

## 2022-01-16 RX ADMIN — Medication 1 TABLET: at 07:59

## 2022-01-16 RX ADMIN — FLUTICASONE PROPIONATE 220 MCG: 110 AEROSOL, METERED RESPIRATORY (INHALATION) at 19:25

## 2022-01-16 RX ADMIN — SENNOSIDES AND DOCUSATE SODIUM 2 TABLET: 50; 8.6 TABLET ORAL at 07:58

## 2022-01-16 RX ADMIN — ACETAMINOPHEN 650 MG: 325 TABLET ORAL at 17:25

## 2022-01-16 RX ADMIN — POLYETHYLENE GLYCOL 3350 1 PACKET: 17 POWDER, FOR SOLUTION ORAL at 20:15

## 2022-01-16 RX ADMIN — ATORVASTATIN CALCIUM 40 MG: 40 TABLET, FILM COATED ORAL at 20:15

## 2022-01-16 ASSESSMENT — ENCOUNTER SYMPTOMS
NERVOUS/ANXIOUS: 0
BLURRED VISION: 0
DIARRHEA: 0
COUGH: 0
FEVER: 0
DIZZINESS: 0

## 2022-01-16 ASSESSMENT — ACTIVITIES OF DAILY LIVING (ADL): BED_CHAIR_WHEELCHAIR_TRANSFER_DESCRIPTION: SUPERVISION FOR SAFETY

## 2022-01-16 ASSESSMENT — BALANCE ASSESSMENTS
SITTING TO STANDING: 4
STANDING UNSUPPORTED WITH FEET TOGETHER: 4
SITTING UNSUPPORTED: 4
PLACE ALTERNATE FOOT ON STEP OR STOOL WHILE STANDING UNSUPPORTED: 2
STANDING UNSUPPORTED WITH EYES CLOSED: 4
STANDING UNSUPPORTED ONE FOOT IN FRONT: 3
STANDING ON ONE LEG: 3
PICK UP OBJECT FROM THE FLOOR FROM A STANDING POSITION: 4
REACHING FORWARD WITH OUTSTRETCHED ARM WHILE STANDING: 4
LONG VERSION TOTAL SCORE (MAX 56): 52
TRANSFERS: 4
LOOK OVER LEFT AND RIGHT SHOULDERS WHILE STANDING: 4
TURN 360 DEGREES: 4
LONG VERSION TOTAL SCORE (MAX 56): 52
STANDING UNSUPPORTED: 4
STANDING TO SITTING: 4

## 2022-01-16 ASSESSMENT — GAIT ASSESSMENTS
DISTANCE (FEET): 1000
GAIT LEVEL OF ASSIST: SUPERVISED
DEVIATION: DECREASED HEEL STRIKE

## 2022-01-16 ASSESSMENT — FIBROSIS 4 INDEX: FIB4 SCORE: 1.4

## 2022-01-16 ASSESSMENT — PAIN DESCRIPTION - PAIN TYPE
TYPE: ACUTE PAIN
TYPE: ACUTE PAIN

## 2022-01-16 NOTE — THERAPY
Speech Language Pathology  Daily Treatment     Patient Name: David Cho  Age:  81 y.o., Sex:  male  Medical Record #: [id number]  Today's Date: 1/15/2022     Precautions  Precautions: Fall Risk  Comments: [place], BP LUE only, fluid restriction    Subjective    Pt pleasant and cooperative during tx.       Objective       01/15/22 1501   Cognition   Moderate Attention Minimal (4)   Functional Memory Activities Minimal (4)   Medication Management  Moderate (3)   SLP Total Time Spent   SLP Individual Total Time Spent (Mins) 60   Treatment Charges   SLP Cognitive Skill Development First 15 Minutes 1   SLP Cognitive Skill Development Additional 15 Minutes 3       Assessment    Medication list created and given to patient.  Pt recalled less than 25% of medications independently.  Memory book introduced.  Pt recalled daily events given supervision.  Alternating attn (pt required to state names of places for each letter of the alphabet): 90% independent.  Pt reported that he independently managed meds and finances prior to onset.           Plan    Meds, attn, recall, finance        Speech Therapy Problems (Active)     Problem: Memory STGs     Dates: Start: 01/13/22       Goal: STG-Within one week, patient will learn and implement functional memory strategies to assist in recall of information related to hospitalization and safety with 80% accuracy provided MIN A     Dates: Start: 01/13/22             Problem: Problem Solving STGs     Dates: Start: 01/13/22       Goal: STG-Within one week, patient will complete alternating attention tasks with 80% accuracy provided MIN A     Dates: Start: 01/13/22          Goal: STG-Within one week, patient will complete medication management and financial management tasks with 80% accuracy provided SPV     Dates: Start: 01/13/22             Problem: Speech/Swallowing LTGs     Dates: Start: 01/13/22       Goal: LTG-By discharge, patient will complete functional problem solving and recall  information with 80% accuracy provided MOD I     Dates: Start: 01/13/22

## 2022-01-16 NOTE — FLOWSHEET NOTE
01/16/22 0912   Events/Summary/Plan   Events/Summary/Plan Inhalers given, 02 spot check   Vital Signs   Pulse 61   Respiration 16   Pulse Oximetry 97 %   $ Pulse Oximetry (Spot Check) Yes   Respiratory Assessment   Level of Consciousness Alert   Chest Exam   Work Of Breathing / Effort Within Normal Limits   Oxygen   O2 Delivery Device None - Room Air

## 2022-01-16 NOTE — PROGRESS NOTES
[facility] Medicine Daily Progress Note    Date of Service  1/16/2022    Chief Complaint:  Hypertension  Afib  Hyponatremia    Interval History:  Discussed about his BP being a little elevated and increasing Vasotec dose.    Review of Systems  Review of Systems   Constitutional: Negative for fever.   Eyes: Negative for blurred vision.   Respiratory: Negative for cough.    Cardiovascular: Negative for chest pain.   Gastrointestinal: Negative for diarrhea.   Musculoskeletal: Negative for joint pain.   Neurological: Negative for dizziness.   Psychiatric/Behavioral: The patient is not nervous/anxious.         Physical Exam  Temp:  [36.4 °C (97.5 °F)-36.9 °C (98.5 °F)] 36.4 °C (97.5 °F)  Pulse:  [55-86] 55  Resp:  [12-15] 12  BP: ()/(60-87) 157/79  SpO2:  [94 %-97 %] 94 %    Physical Exam  Vitals and nursing note reviewed.   Constitutional:       Appearance: He is not diaphoretic.   HENT:      Mouth/Throat:      Pharynx: No oropharyngeal exudate or posterior oropharyngeal erythema.   Neck:      Vascular: No carotid bruit.   Cardiovascular:      Rate and Rhythm: Normal rate. Rhythm irregular.   Pulmonary:      Effort: Pulmonary effort is normal.      Breath sounds: No wheezing or rales.   Abdominal:      General: There is no distension.      Palpations: Abdomen is soft.      Tenderness: There is no abdominal tenderness.   Musculoskeletal:      Right lower leg: No edema.      Left lower leg: No edema.   Skin:     General: Skin is warm and dry.   Neurological:      Mental Status: He is alert and oriented to person, place, and time.   Psychiatric:         Mood and Affect: Mood normal.         Behavior: Behavior normal.         Fluids    Intake/Output Summary (Last 24 hours) at 1/16/2022 0900  Last data filed at 1/16/2022 0542  Gross per 24 hour   Intake 240 ml   Output 400 ml   Net -160 ml       Laboratory      Recent Labs     01/16/22  0602   SODIUM 132*   POTASSIUM 4.3   CHLORIDE 96   CO2 25   GLUCOSE 94   BUN 18    CREATININE 0.68   CALCIUM 8.7                   Imaging    Assessment/Plan  * Ischemic stroke (HCC)- (present on admission)  Assessment & Plan  MRI: showed multifocal areas of acute cortical infarcts in the right frontal and parietal lobes.          multiple chronic lacunar infarcts; severe chronic microvascular ischemic disease.  On Eliquis (has afib)  On Lipitor    Vitamin D deficiency- (present on admission)  Assessment & Plan  Vit D: 10  On supplements    Atrial fibrillation (HCC)- (present on admission)  Assessment & Plan  HR ok  On Eliquis  Note: had bradycardia at [facility] -- avoid AVN blockers -- will f/u with Cardio  Monitor    BPH (benign prostatic hyperplasia)- (present on admission)  Assessment & Plan  On Proscar    Alcohol use- (present on admission)  Assessment & Plan  On MVI, B1 and folate supplements    Hyponatremia- (present on admission)  Assessment & Plan  Has hx of mildly low sodium levels  Na: 127 --> 128 --> 131 (1/13) --> 132 (1/16)  Pt eating better at the Rehab and increase salting foods  Off mild fluid restriction  Likely 2nd to etoh use  Likely 2nd to diminished oral intake -- doesn't eat much at home; has issues with his dentures  Note: on Vasotec (has PM low SE of hypo-na) -- was taking at home  Cont to monitor    Major depressive disorder- (present on admission)  Assessment & Plan  On Wellbutrin    Hypertension- (present on admission)  Assessment & Plan  BP a little elevated  On Vasotec --> will increase dose  Note: on Proscar  Note: home meds include Vasotec  Monitor    Chronic obstructive pulmonary disease (HCC)- (present on admission)  Assessment & Plan  On Anoro Ellipta  Resp & O2 protocols as needed

## 2022-01-16 NOTE — CARE PLAN
The patient is Stable - Low risk of patient condition declining or worsening    Shift Goals  Clinical Goals: Monitor BP readings, ensure safe transfers.  Patient Goals: Increase endurance    Progress made toward(s) clinical / shift goals:  BP monitoring in place, new medication added:Vasotec.    Patient is not progressing towards the following goals:      Problem: Skin Integrity  Goal: Skin integrity is maintained or improved  Outcome: Progressing - Left great toe nail in brown/black, patient states it occurred with a previous fall, denies pain at this time.     Problem: Fall Risk - Rehab  Goal: Patient will remain free from falls  Outcome: Progressing

## 2022-01-16 NOTE — THERAPY
Physical Therapy   Daily Treatment     Patient Name: David Cho  Age:  81 y.o., Sex:  male  Medical Record #: [id number]  Today's Date: 1/16/2022     Precautions  Precautions: Fall Risk  Comments: [place], BP LUE only, fluid restriction    Subjective    Pt agreeable to PT session. Hoping to be able to walk around his room on his own soon.     Objective       01/16/22 0931   Pain   Intervention Declines   Cognition    Level of Consciousness Alert   Sleep/Wake Cycle   Sleep & Rest Awake;Out of bed   Gait Functional Level of Assist    Gait Level Of Assist Supervised   Assistive Device None   Distance (Feet) 1000  (approximately. Also 250ft x3 with multi tasking)   # of Times Distance was Traveled 1   Deviation Decreased Heel Strike  (Slows a little with thought tasks)   Transfer Functional Level of Assist   Bed, Chair, Wheelchair Transfer Supervised   Bed Chair Wheelchair Transfer Description Supervision for safety   Sitting Lower Body Exercises   Comments Provided with handout for seated HEP to perform sitting EOB as pt reported feeling like he's not moving enough.   Bed Mobility    Supine to Sit Modified Independent   Sit to Supine Modified Independent   Sit to Stand Supervised   Neuro-Muscular Treatments   Comments Standing balance on airex foam with ladder ball toss 3x5 with no rest breaks, pt walking to  the fallen balls before returning to continue tossing.   Outcome Measures   Outcome Measures Utilized Gillette Balance Scale   Gillette Balance Scale   Sitting Unsupported (Score 0-4) 4   Change Of Positon: Sitting To Standing (Score 0-4) 4   Change Of Positon: Standing To Sitting (Score 0-4) 4   Transfers (Score 0-4) 4   Standing Unsupported (Score 0-4) 4   Standing With Eyes Closed (Score 0-4) 4   Standing With Feet Together (Score 0-4) 4   Tandem Standing (Score 0-4) 3   Standing On One Leg (Score 0-4) 3   Turning Trunk (Feet Fixed) (Score 0-4) 4   Retrieving Objects From Floor (Score 0-4) 4   Turning 360  Degrees (Score 0-4) 4   Stool Stepping (Score 0-4) 2   Reaching Forward While Standing (Score 0-4) 4   Gillette Balance Total Score (0-56) 52   Interdisciplinary Plan of Care Collaboration   Patient Position at End of Therapy Seated;Edge of Bed;Call Light within Reach;Tray Table within Reach;Phone within Reach   PT Total Time Spent   PT Individual Total Time Spent (Mins) 60   PT Charge Group   PT Gait Training 2   PT Therapeutic Activities 2     Gait multi-taskin laps around back gym each with holding cone with light ball on top to balance it L then R hand, then 2 laps no cone with naming tasks.    Assessment    Pt tolerated therapy session well with various tasks and completion of Gillette. Pt likely is safe to be mod I in the room, but will defer to primary team tomorrow to decide. Pt is pleasant and motivated to participate. He reports his L hand is his biggest concern.  Strengths: Able to follow instructions,Adequate strength,Alert and oriented,Effective communication skills,Independent prior level of function,Making steady progress towards goals,Manages pain appropriately,Motivated for self care and independence,Pleasant and cooperative,Willingly participates in therapeutic activities  Barriers: Decreased endurance,Hearing impairment,Home accessibility,Impaired balance,Poor family support    Plan    Ambulation without AD but with use of LUE or BUEs; Standing balance activities; Dual tasking; Decreased A with transfers; Safety education.     Passport items to be completed:  Get in/out of bed safely, in/out of a vehicle, safely use mobility device, walk or wheel around home/community, navigate up and down stairs, show how to get up/down from the ground, ensure home is accessible, demonstrate HEP, complete caregiver training    Physical Therapy Problems (Active)     Problem: Balance     Dates: Start: 22       Goal: STG-Within one week, patient will achieve a Low fall risk score on the Gillette Balance Scale  assessment.     Dates: Start: 01/13/22             Problem: Mobility     Dates: Start: 01/13/22       Goal: STG-Within one week, patient will ambulate community distances of 500 feet at a gait velocity of 0.8 m/s at IND level without an AD.     Dates: Start: 01/13/22          Goal: STG-Within one week, patient will ambulate up/down 14 stairs at IND level with use of railings.     Dates: Start: 01/13/22             Problem: Mobility Transfers     Dates: Start: 01/13/22       Goal: STG-Within one week, patient will transfer bed to chair at IND level without an AD.     Dates: Start: 01/13/22          Goal: STG-Within one week, patient will transfer in/out of car at IND level without an AD.     Dates: Start: 01/13/22             Problem: PT-Long Term Goals     Dates: Start: 01/13/22       Goal: LTG-By discharge, patient will ambulate community distances of 500 feet at a gait velocity of 0.8 m/s at IND level without an AD.     Dates: Start: 01/13/22          Goal: LTG-By discharge, patient will transfer bed to chair at IND level without an AD.     Dates: Start: 01/13/22          Goal: LTG-By discharge, patient will ambulate up/down 14 stairs at IND level with use of railings.     Dates: Start: 01/13/22          Goal: LTG-By discharge, patient will transfer in/out of a car at IND level without an AD.     Dates: Start: 01/13/22          Goal: LTG-By discharge, patient will achieve a Low fall risk score on the Gillette Balance Scale assessment.     Dates: Start: 01/13/22

## 2022-01-16 NOTE — THERAPY
"Occupational Therapy  Daily Treatment     Patient Name: David Cho  Age:  81 y.o., Sex:  male  Medical Record #: [id number]  Today's Date: 1/16/2022     Precautions  Precautions: Fall Risk  Comments: [place], BP LUE only, fluid restriction         Subjective    \"I can't\" pt stated several times when given tasks at start of session. By end of session, pt found compensatory strategies to participate in same tasks    \"Do you think I should go to the [facility] after all this? I wonder if they can do anything about this finger?\"     Objective       01/16/22 1301   Cognition    Level of Consciousness Alert   Comments Able to complete brain teaser Rush Hour level 1 and 4. Needing directions explained several times, but then able to solve puzzles.   Passive ROM Upper Body   Comments Not able to provide any additional PROM. Joint has a hard stop. Pt reports ROM deficit new, despite old tendon injury   Fine Motor / Dexterity    Comments  [facility]/Neuro Re-ed see below   Balance   Comments ambulation to/from room without AD. Pt reports this is likely his baseline mobility.   Interdisciplinary Plan of Care Collaboration   IDT Collaboration with  Physical Therapist   Patient Position at End of Therapy Edge of Bed;Call Light within Reach;Tray Table within Reach;Phone within Reach   Collaboration Comments re: CLOF/POC   OT Total Time Spent   OT Individual Total Time Spent (Mins) 60   OT Charge Group   OT Neuromuscular Re-education / Balance 1   OT Therapy Activity 2   OT Therapeutic Exercise  1     Rush Hour: Puzzles 1 and 4. Utilized LUE to place and RUE to solve puzzle. Once pt understood brain teaser rules, he played WFL for a new player his age.    5 hand based tendon glides. x5 sequence with OT x5 sequence with visual picture only.      Digit abd/add 2 sets of 10. Very limited ROM. Moving Digit 5 primarily. Able to have more ROM in digits if MPs are flexed.  Digit extension off of table attempted x5 each digit. Only moving digit 3-5 " consistently  Velcro Exercise board: remove and apply 6 variable sized velcro cylinders some with key pinch attachment. Rolled large cylinder with key pinch attachment x 3 down and back  Rolled large cylinder without key pinch attachment x5 down and back     Attempted to rotate popsicle stick in hand, but unable.      Assessment    Pt with flat affect initially, but affect brightened AEB laughter at end of session. Pt's L Digit 2 has hard stop at 45 degrees and unable to passively straighten further. Pt's thumb also presents with impaired active ROM. Significant arthritis. Muscle wasting in thenar region of LUE. Muscle wasting appears to be more chronic, however, pt reports he has not noticed it before.      Strengths: Alert and oriented,Effective communication skills,Independent prior level of function,Making steady progress towards goals,Motivated for self care and independence,Pleasant and cooperative,Supportive family,Willingly participates in therapeutic activities  Barriers: Hemiparesis,Impaired activity tolerance,Impaired balance    Plan    Neuro re-ed of LUE, address orthopedic deficits of L hand, iADLs, stroke education    Passport items to be completed:  Perform bathroom transfers, complete dressing, complete feeding, get ready for the day, prepare a simple meal, participate in household tasks, adapt home for safety needs, demonstrate home exercise program, complete caregiver training     Occupational Therapy Goals (Active)     Problem: Bathing     Dates: Start: 01/13/22       Goal: STG-Within one week, patient will bathe at supervision level using AE as needed.     Dates: Start: 01/13/22             Problem: Dressing     Dates: Start: 01/13/22       Goal: STG-Within one week, patient will dress LB at supervision to mod I level using AE as needed.      Dates: Start: 01/13/22             Problem: Functional Transfers     Dates: Start: 01/13/22       Goal: STG-Within one week, patient will transfer to step  in shower at supervision level using AE/DME as needed.      Dates: Start: 01/13/22             Problem: IADL's     Dates: Start: 01/13/22       Goal: STG-Within one week, patient will access kitchen area at supervision level using AE/DME as needed.      Dates: Start: 01/13/22             Problem: OT Long Term Goals     Dates: Start: 01/13/22       Goal: LTG-By discharge, patient will complete basic self care tasks at Mod I level using AE as needed.      Dates: Start: 01/13/22          Goal: LTG-By discharge, patient will perform bathroom transfers at mod I level using AE/DME as needed.      Dates: Start: 01/13/22          Goal: LTG-By discharge, patient will complete basic home management at supervision to Mod I level using AE as needed.      Dates: Start: 01/13/22

## 2022-01-16 NOTE — CARE PLAN
Problem: Fall Risk - Rehab  Goal: Patient will remain free from falls  Note: Kitty Velasquez Fall risk Assessment Score: 8      Low fall risk interventions   - Call light within reach   - Yellow  socks   - Belongings within reach   - Bed in the lowest position     Problem: Respiratory  Goal: Patient will understand use and administration of respiratory medications to improve respiratory function  Note: O2 n/c in place at 2 lpm every HS.          The patient is Stable - Low risk of patient condition declining or worsening    Shift Goals  Clinical Goals: bp control  Patient Goals: rest

## 2022-01-17 PROCEDURE — 700102 HCHG RX REV CODE 250 W/ 637 OVERRIDE(OP): Performed by: PHYSICAL MEDICINE & REHABILITATION

## 2022-01-17 PROCEDURE — 700102 HCHG RX REV CODE 250 W/ 637 OVERRIDE(OP): Performed by: HOSPITALIST

## 2022-01-17 PROCEDURE — 94640 AIRWAY INHALATION TREATMENT: CPT

## 2022-01-17 PROCEDURE — 97110 THERAPEUTIC EXERCISES: CPT

## 2022-01-17 PROCEDURE — 97130 THER IVNTJ EA ADDL 15 MIN: CPT

## 2022-01-17 PROCEDURE — 97116 GAIT TRAINING THERAPY: CPT

## 2022-01-17 PROCEDURE — 97535 SELF CARE MNGMENT TRAINING: CPT

## 2022-01-17 PROCEDURE — 99233 SBSQ HOSP IP/OBS HIGH 50: CPT | Performed by: PHYSICAL MEDICINE & REHABILITATION

## 2022-01-17 PROCEDURE — 99232 SBSQ HOSP IP/OBS MODERATE 35: CPT | Performed by: HOSPITALIST

## 2022-01-17 PROCEDURE — A9270 NON-COVERED ITEM OR SERVICE: HCPCS | Performed by: PHYSICAL MEDICINE & REHABILITATION

## 2022-01-17 PROCEDURE — A9270 NON-COVERED ITEM OR SERVICE: HCPCS | Performed by: HOSPITALIST

## 2022-01-17 PROCEDURE — 97129 THER IVNTJ 1ST 15 MIN: CPT

## 2022-01-17 PROCEDURE — 97530 THERAPEUTIC ACTIVITIES: CPT

## 2022-01-17 PROCEDURE — 94760 N-INVAS EAR/PLS OXIMETRY 1: CPT

## 2022-01-17 PROCEDURE — 770010 HCHG ROOM/CARE - REHAB SEMI PRIVAT*

## 2022-01-17 RX ORDER — TAMSULOSIN HYDROCHLORIDE 0.4 MG/1
0.4 CAPSULE ORAL
Status: DISCONTINUED | OUTPATIENT
Start: 2022-01-17 | End: 2022-01-19 | Stop reason: HOSPADM

## 2022-01-17 RX ORDER — ENALAPRIL MALEATE 5 MG/1
10 TABLET ORAL ONCE
Status: COMPLETED | OUTPATIENT
Start: 2022-01-17 | End: 2022-01-17

## 2022-01-17 RX ORDER — ENALAPRIL MALEATE 5 MG/1
40 TABLET ORAL DAILY
Status: DISCONTINUED | OUTPATIENT
Start: 2022-01-18 | End: 2022-01-19 | Stop reason: HOSPADM

## 2022-01-17 RX ADMIN — FLUTICASONE PROPIONATE 220 MCG: 110 AEROSOL, METERED RESPIRATORY (INHALATION) at 20:00

## 2022-01-17 RX ADMIN — THIAMINE HCL TAB 100 MG 100 MG: 100 TAB at 07:21

## 2022-01-17 RX ADMIN — ENALAPRIL MALEATE 30 MG: 5 TABLET ORAL at 08:41

## 2022-01-17 RX ADMIN — FLUTICASONE PROPIONATE 220 MCG: 110 AEROSOL, METERED RESPIRATORY (INHALATION) at 07:47

## 2022-01-17 RX ADMIN — BUPROPION HYDROCHLORIDE 150 MG: 150 TABLET, EXTENDED RELEASE ORAL at 07:21

## 2022-01-17 RX ADMIN — POLYETHYLENE GLYCOL 3350 1 PACKET: 17 POWDER, FOR SOLUTION ORAL at 07:21

## 2022-01-17 RX ADMIN — TAMSULOSIN HYDROCHLORIDE 0.4 MG: 0.4 CAPSULE ORAL at 17:21

## 2022-01-17 RX ADMIN — OMEPRAZOLE 20 MG: 20 CAPSULE, DELAYED RELEASE ORAL at 07:21

## 2022-01-17 RX ADMIN — SENNOSIDES AND DOCUSATE SODIUM 2 TABLET: 50; 8.6 TABLET ORAL at 07:20

## 2022-01-17 RX ADMIN — ENALAPRIL MALEATE 10 MG: 5 TABLET ORAL at 10:28

## 2022-01-17 RX ADMIN — Medication 2000 UNITS: at 07:21

## 2022-01-17 RX ADMIN — Medication 1 TABLET: at 07:21

## 2022-01-17 RX ADMIN — UMECLIDINIUM BROMIDE AND VILANTEROL TRIFENATATE 1 PUFF: 62.5; 25 POWDER RESPIRATORY (INHALATION) at 07:46

## 2022-01-17 RX ADMIN — GUAIFENESIN 600 MG: 600 TABLET, EXTENDED RELEASE ORAL at 07:21

## 2022-01-17 RX ADMIN — ATORVASTATIN CALCIUM 40 MG: 40 TABLET, FILM COATED ORAL at 21:40

## 2022-01-17 RX ADMIN — GUAIFENESIN AND DEXTROMETHORPHAN 5 ML: 100; 10 SYRUP ORAL at 21:46

## 2022-01-17 RX ADMIN — APIXABAN 5 MG: 5 TABLET, FILM COATED ORAL at 07:21

## 2022-01-17 RX ADMIN — GUAIFENESIN 600 MG: 600 TABLET, EXTENDED RELEASE ORAL at 21:39

## 2022-01-17 RX ADMIN — FOLIC ACID 1 MG: 1 TABLET ORAL at 07:21

## 2022-01-17 RX ADMIN — APIXABAN 5 MG: 5 TABLET, FILM COATED ORAL at 21:40

## 2022-01-17 RX ADMIN — FINASTERIDE 5 MG: 5 TABLET, FILM COATED ORAL at 07:21

## 2022-01-17 ASSESSMENT — ENCOUNTER SYMPTOMS
SHORTNESS OF BREATH: 0
NAUSEA: 0
FEVER: 0
CHILLS: 0
NERVOUS/ANXIOUS: 0
DIARRHEA: 0
VOMITING: 0
ABDOMINAL PAIN: 0

## 2022-01-17 ASSESSMENT — ACTIVITIES OF DAILY LIVING (ADL)
TOILETING_LEVEL_OF_ASSIST_DESCRIPTION: GRAB BAR;INCREASED TIME;SUPERVISION FOR SAFETY
TOILET_TRANSFER_DESCRIPTION: GRAB BAR;SUPERVISION FOR SAFETY
BED_CHAIR_WHEELCHAIR_TRANSFER_DESCRIPTION: SUPERVISION FOR SAFETY
TUB_SHOWER_TRANSFER_DESCRIPTION: GRAB BAR;SHOWER BENCH;SUPERVISION FOR SAFETY
TOILET_TRANSFER_DESCRIPTION: GRAB BAR

## 2022-01-17 ASSESSMENT — GAIT ASSESSMENTS
DISTANCE (FEET): 1200
GAIT LEVEL OF ASSIST: INDEPENDENT
DEVIATION: DECREASED HEEL STRIKE

## 2022-01-17 ASSESSMENT — PAIN DESCRIPTION - PAIN TYPE: TYPE: ACUTE PAIN

## 2022-01-17 NOTE — PROGRESS NOTES
"Rehab Progress Note     Date of Service: 1/17/2022  Chief Complaint: Follow-up stroke    Interval Events (Subjective)    Patient seen and examined today in the hallway.  He is walking with physical therapy without any assistive device.  He continues to complain of some impaired strength and coordination in his left hand.  He reports his wife can drive after his discharge.  Outpatient pharmacy was confirmed.  Patient has no new complaints.    ROS: No changes to bowel, bladder, pain, mood, or sleep.         Objective:  VITAL SIGNS: /98   Pulse 64   Temp 36.6 °C (97.9 °F) (Temporal)   Resp 18   Ht 1.778 m (5' 10\")   Wt 61 kg (134 lb 7.7 oz)   SpO2 94%   BMI 19.30 kg/m²   Gen: alert, no apparent distress  Neuro: notable for mild left hand incoordination and weakness    Recent Results (from the past 72 hour(s))   Basic Metabolic Panel    Collection Time: 01/16/22  6:02 AM   Result Value Ref Range    Sodium 132 (L) 135 - 145 mmol/L    Potassium 4.3 3.6 - 5.5 mmol/L    Chloride 96 96 - 112 mmol/L    Co2 25 20 - 33 mmol/L    Glucose 94 65 - 99 mg/dL    Bun 18 8 - 22 mg/dL    Creatinine 0.68 0.50 - 1.40 mg/dL    Calcium 8.7 8.5 - 10.5 mg/dL    Anion Gap 11.0 7.0 - 16.0   MAGNESIUM    Collection Time: 01/16/22  6:02 AM   Result Value Ref Range    Magnesium 1.8 1.5 - 2.5 mg/dL   PHOSPHORUS    Collection Time: 01/16/22  6:02 AM   Result Value Ref Range    Phosphorus 3.6 2.5 - 4.5 mg/dL   ESTIMATED GFR    Collection Time: 01/16/22  6:02 AM   Result Value Ref Range    GFR If African American >60 >60 mL/min/1.73 m 2    GFR If Non African American >60 >60 mL/min/1.73 m 2       Current Facility-Administered Medications   Medication Frequency   • [START ON 1/18/2022] enalapril (VASOTEC) tablet 40 mg DAILY   • guaiFENesin ER (MUCINEX) tablet 600 mg Q12HRS   • guaiFENesin dextromethorphan (ROBITUSSIN DM) 100-10 MG/5ML syrup 5 mL Q6HRS PRN   • fluticasone (FLOVENT HFA) 110 MCG/ACT inhaler 220 mcg Q12HRS (RT)   • vitamin D3 " (cholecalciferol) tablet 2,000 Units DAILY   • hydrOXYzine HCl (ATARAX) tablet 50 mg Q6HRS PRN   • QUEtiapine (Seroquel) tablet 25 mg TID PRN   • melatonin tablet 3 mg HS PRN   • Respiratory Therapy Consult Continuous RT   • Pharmacy Consult Request ...Pain Management Review 1 Each PHARMACY TO DOSE   • hydrALAZINE (APRESOLINE) tablet 10 mg Q8HRS PRN   • acetaminophen (Tylenol) tablet 650 mg Q4HRS PRN   • lactulose 20 GM/30ML solution 30 mL QDAY PRN   • docusate sodium (ENEMEEZ) enema 283 mg QDAY PRN   • sodium phosphate (Fleet) enema 133 mL QDAY PRN   • omeprazole (PRILOSEC) capsule 20 mg QAM AC   • artificial tears ophthalmic solution 1 Drop PRN   • benzocaine-menthol (CEPACOL) lozenge 1 Lozenge Q2HRS PRN   • mag hydrox-al hydrox-simeth (MAALOX PLUS ES or MYLANTA DS) suspension 20 mL Q2HRS PRN   • ondansetron (ZOFRAN ODT) dispertab 4 mg 4X/DAY PRN    Or   • ondansetron (ZOFRAN) syringe/vial injection 4 mg 4X/DAY PRN   • traZODone (DESYREL) tablet 50 mg QHS PRN   • sodium chloride (OCEAN) 0.65 % nasal spray 2 Spray PRN   • midazolam (VERSED) 5 mg/mL (1 mL vial) PRN   • senna-docusate (PERICOLACE or SENOKOT S) 8.6-50 MG per tablet 2 Tablet BID    And   • polyethylene glycol/lytes (MIRALAX) PACKET 1 Packet QDAY PRN    And   • magnesium hydroxide (MILK OF MAGNESIA) suspension 30 mL QDAY PRN    And   • bisacodyl (DULCOLAX) suppository 10 mg QDAY PRN   • apixaban (ELIQUIS) tablet 5 mg BID   • atorvastatin (LIPITOR) tablet 40 mg Q EVENING   • buPROPion SR (WELLBUTRIN-SR) tablet 150 mg DAILY   • finasteride (PROSCAR) tablet 5 mg DAILY   • folic acid (FOLVITE) tablet 1 mg DAILY   • therapeutic multivitamin-minerals (THERAGRAN-M) tablet 1 Tablet DAILY   • thiamine (Vitamin B-1) tablet 100 mg DAILY   • umeclidinium-vilanterol (ANORO ELLIPTA) inhaler 1 Puff QDAILY (RT)       Orders Placed This Encounter   Procedures   • Diet Order Diet: Regular     Standing Status:   Standing     Number of Occurrences:   1     Order Specific  Question:   Diet:     Answer:   Regular [1]       Assessment:  Active Hospital Problems    Diagnosis    • *Ischemic stroke (HCC)    • Vitamin D deficiency    • Atrial fibrillation (HCC)    • Impaired mobility and ADLs    • Pulmonary hypertension (HCC)    • Mitral regurgitation    • Bradycardia    • BPH (benign prostatic hyperplasia)    • Vitamin B1 deficiency    • Anemia    • Alcohol use    • Hyponatremia    • Major depressive disorder    • Chronic obstructive pulmonary disease (HCC)    • Hypertension    • Dyslipidemia      This patient is a 81 y.o. male admitted for acute inpatient rehabilitation with Ischemic stroke (HCC).    I led and attended the weekly conference today, and agree with the IDT conference documentation and plan of care as noted below.    Date of conference: 1/17/2022    Goals and barriers: See IDT note.    Biggest barriers: left neglect, left hand weakness, impaired safety awareness, mild memory impairment    Goals in next week: discharge, iADLs    CM/social support: wife supportive    Anticipated DC date: 1/19    Outpatient: PT/OT/SLP    Equip: none needed    Follow up: PCP, stroke [facility], Dr. Trejo     Rx: Volta[facility] on [facility]        Medical Decision Making and Plan:    Right frontal/parietal lobe ischemic stroke  Cardioembolic  Mild left-sided weakness, improved  Left sided hand incoordination, improved  Mild cognitive impairments, improved  Continue full rehab program  PT/OT/SLP, 1 hr each discipline, 5 days per week    Eliquis  Statin    [facility]patient [facility], Dr. Trejo, referrals made    Atrial fibrillation  Eliquis     Hypertension  Enalapril, dose increased  Appreciate hospitalist assistance     Bradycardia  Per cardiology, avoid AV woody blockade  Appreciate hospitalist assistance     Pulmonary hypertension  Monitor need for oxygen     Mitral regurgitation   Outpatient follow up with cardiology     Bladder program  BPH  Finasteride  Check PVRs -  221, still elevated  Start Flomax 0.4 mg tonight  Continue to monitor     Hyponatremia, improved  Likely SIADH, versus glucocorticoid or mineralocorticoid deficiency based on urine studies  Checked urine sodium and osmoles  Discontinued 1.5 L fluid restriction  Discontinued salt tabs  Appreciate hospitalist assistance  Recheck in the morning     Alcohol use  Will need counseling  Vitamins    Vitamin D deficiency, 10  Continue supplementation     COPD  Ellipta  Mucinex  Flonase  As needed Robitussin DM  Respiratory therapy     History of depression  Wellbutrin  Monitor mood/need for psychology consult    Bowel program  Continue bowel medications  Last BM 1/17    DVT prophylaxis  Eliquis    Total time:  40 Minutes.  I spent greater than 50% of the time for patient care, counseling, and coordination on this date, including patient face-to face time, unit/floor time with review of records/pertinent lab data and studies, as well as discussing diagnostic evaluation/work up, planned therapeutic interventions, and future disposition of care, as per the interval events/subjective and the assessment and plan as noted above.          Eusebia Mario M.D.   Physical Medicine and Rehabilitation

## 2022-01-17 NOTE — THERAPY
"Occupational Therapy  Daily Treatment     Patient Name: David Cho  Age:  81 y.o., Sex:  male  Medical Record #: [id number]  Today's Date: 1/17/2022     Precautions  Precautions: Fall Risk  Comments: [place], BP LUE only, fluid restriction         Subjective    \"oh my boxers.\" pt stating after getting into the shower with his boxers still on      Objective       01/17/22 1031   Pain   Intervention Declines   Pain 0 - 10 Group   Pain Rating Scale (NPRS) 0   Cognition    Level of Consciousness Alert   Functional Level of Assist   Eating Modified Independent   Eating Description Increased time;Set-up of equipment or meal/tube feeding  (assist to cut food )   Grooming Supervision   Grooming Description Standing at sink  (shave, brush teeth)   Bathing Supervision   Bathing Description Grab bar;Tub bench;Increased time;Set-up of equipment;Supervision for safety  (stood in shower )   Upper Body Dressing Supervision   Upper Body Dressing Description Supervision for safety  (pt donning/doffing shirt while in standing)   Lower Body Dressing Standby Assist   Lower Body Dressing Description Grab bar;Increased time;Initial preparation for task;Set-up of equipment;Supervision for safety  (cues needed to sit on toilet during LBD)   Toileting Supervision   Toileting Description Grab bar;Increased time;Supervision for safety  (stood to urinate )   Bed, Chair, Wheelchair Transfer Supervised   Bed Chair Wheelchair Transfer Description Supervision for safety   Toilet Transfers Supervised   Toilet Transfer Description Grab bar;Supervision for safety  (ambulated without AD)   Tub / Shower Transfers Standby Assist   Tub Shower Transfer Description Grab bar;Shower bench;Supervision for safety  (ambulated to shower from toilet without use of AD)   Bed Mobility    Supine to Sit Modified Independent   Sit to Supine Modified Independent   Scooting Modified Independent   Rolling Modified Independent   Interdisciplinary Plan of Care " Collaboration   Patient Position at End of Therapy Edge of Bed;Call Light within Reach;Tray Table within Reach;Phone within Reach   OT Total Time Spent   OT Individual Total Time Spent (Mins) 60   OT Charge Group   OT Self Care / ADL 4       Assessment    Pt tolerated session well. Pt peasant and agreeable to shower/ADL session. Pt able to complete all tasks at supervision-SBA level. Pt needed cues for safety and attention- pt getting into shower with boxers still on and getting them soaked. Pt trying to complete LBD while in standing and almost losing his balance, cues needed to sit down on toilet for dressing. Pt demonstrated opening various pill bottles at tabletop with increased time. Pt able to open all types of pill bottles using L hand  To stabilize and R hand to twist/open. Pt reported difficulty with cutting food, pt trialed red foam on fork and holding theraputty on tabletop with fork in L hand and cutting with R hand- will need more practice with this task. Pt demonstrated improved ability to grasp fork with red foam.     Strengths: Alert and oriented,Effective communication skills,Independent prior level of function,Making steady progress towards goals,Motivated for self care and independence,Pleasant and cooperative,Supportive family,Willingly participates in therapeutic activities  Barriers: Hemiparesis,Impaired activity tolerance,Impaired balance    Plan  *IADL's- cooking, laundry, cleaning * prior to d/c Wednesday   Neuro re-ed of CARMELO, address orthopedic deficits of L hand, iADLs, stroke education     Passport items to be completed:  Perform bathroom transfers, complete dressing, complete feeding, get ready for the day, prepare a simple meal, participate in household tasks, adapt home for safety needs, demonstrate home exercise program, complete caregiver training        Occupational Therapy Goals (Active)     Problem: Bathing     Dates: Start: 01/13/22       Goal: STG-Within one week, patient will  bathe at supervision level using AE as needed.     Dates: Start: 01/13/22             Problem: Dressing     Dates: Start: 01/13/22       Goal: STG-Within one week, patient will dress LB at supervision to mod I level using AE as needed.      Dates: Start: 01/13/22             Problem: Functional Transfers     Dates: Start: 01/13/22       Goal: STG-Within one week, patient will transfer to step in shower at supervision level using AE/DME as needed.      Dates: Start: 01/13/22             Problem: IADL's     Dates: Start: 01/13/22       Goal: STG-Within one week, patient will access kitchen area at supervision level using AE/DME as needed.      Dates: Start: 01/13/22             Problem: OT Long Term Goals     Dates: Start: 01/13/22       Goal: LTG-By discharge, patient will complete basic self care tasks at Mod I level using AE as needed.      Dates: Start: 01/13/22          Goal: LTG-By discharge, patient will perform bathroom transfers at mod I level using AE/DME as needed.      Dates: Start: 01/13/22          Goal: LTG-By discharge, patient will complete basic home management at supervision to Mod I level using AE as needed.      Dates: Start: 01/13/22

## 2022-01-17 NOTE — CARE PLAN
The patient is Stable - Low risk of patient condition declining or worsening    Shift Goals  Clinical Goals: Monitor BP, BM  Patient Goals: Gain strength    Progress made toward(s) clinical / shift goals:  BP managed with medication, patient denies pain, 4 bowel movements today.    Patient is not progressing towards the following goals:      Problem: Knowledge Deficit - Standard  Goal: Patient and family/care givers will demonstrate understanding of plan of care, disease process/condition, diagnostic tests and medications  Outcome: Progressing     Problem: Fall Risk - Rehab  Goal: Patient will remain free from falls  Outcome: Progressing

## 2022-01-17 NOTE — CARE PLAN
Problem: IADL's  Goal: STG-Within one week, patient will access kitchen area at supervision level using AE/DME as needed.   Outcome: Not Met     Problem: Dressing  Goal: STG-Within one week, patient will dress LB at supervision to mod I level using AE as needed.   Outcome: Progressing  Note: SBA- cues for safety      Problem: Bathing  Goal: STG-Within one week, patient will bathe at supervision level using AE as needed.  Outcome: Met     Problem: Functional Transfers  Goal: STG-Within one week, patient will transfer to step in shower at supervision level using AE/DME as needed.   Outcome: Met

## 2022-01-17 NOTE — THERAPY
"Physical Therapy   Daily Treatment     Patient Name: David Cho  Age:  81 y.o., Sex:  male  Medical Record #: [id number]  Today's Date: 1/17/2022     Precautions  Precautions: Other (See Comments)  Comments: [place],  LUE only    Subjective    Patient agreeable to PT.     Objective       01/17/22 1231   Precautions   Precautions Other (See Comments)   Comments [place],  LUE only   Vitals   O2 (LPM) 0   O2 Delivery Device None - Room Air   Pain 0 - 10 Group   Comfort Goal 0   Therapist Pain Assessment 0   Cognition    Speech/ Communication Hard of Hearing   Safety Awareness Impaired  (mildly)   Gait Functional Level of Assist    Gait Level Of Assist Independent   Assistive Device None   Distance (Feet) 1200   # of Times Distance was Traveled 2   Deviation Decreased Heel Strike  (prn cueing for path finding)   Wheelchair Functional Level of Assist   Wheelchair Assist   (n/a)   Distance Wheelchair (Feet or Distance)   (n/a)   Wheelchair Description   (n/a)   Stairs Functional Level of Assist   Level of Assist with Stairs Modified Independent   # of Stairs Climbed 30  (mixed heights of stairs)   Stairs Description Extra time;Hand rails   Transfer Functional Level of Assist   Bed, Chair, Wheelchair Transfer Independent   Bed Chair Wheelchair Transfer Description Non-hospital bed   Toilet Transfers Modified Independent   Toilet Transfer Description Grab bar   Standing Lower Body Exercises   Standing Lower Body Exercises   (Standing HEP issued as per below, 1x/day)   Hamstring Curl 1 set of 15   Hip Abduction 1 set of 15   Marching   (pt reports is \"too easy\")   Heel Rise 1 set of 15   Toe Rise 1 set of 15   Mini Squat 1 set of 10;Partial  (with chair behind)   Bed Mobility    Supine to Sit Independent   Sit to Supine Independent   Sit to Stand Independent   Scooting Independent   Rolling Independent   Interdisciplinary Plan of Care Collaboration   IDT Collaboration with  Physician   Patient Position at End of Therapy " Seated;Edge of Bed;Call Light within Reach;Tray Table within Reach;Phone within Reach;Family / Friend in Room  (Pt's SO arrived at 1331)   Collaboration Comments MD present for 5 min of CLOF and POC discussion with patient and PT.  Updated room board.  Completed pt's rehab passport.   PT Total Time Spent   PT Individual Total Time Spent (Mins) 60   PT Charge Group   PT Gait Training 2   PT Therapeutic Exercise 1   PT Therapeutic Activities 1        01/17/22 1231   Roll Left and Right   Assistance Needed Independent   Physical Assistance Level No physical assistance   CARE Score - Roll Left and Right 6   Sit to Lying   Assistance Needed Independent   Physical Assistance Level No physical assistance   CARE Score - Sit to Lying 6   Lying to Sitting on Side of Bed   Assistance Needed Independent   Physical Assistance Level No physical assistance   CARE Score - Lying to Sitting on Side of Bed 6   Sit to Stand   Assistance Needed Independent   Physical Assistance Level No physical assistance   CARE Score - Sit to Stand 6   Chair/Bed-to-Chair Transfer   Assistance Needed Independent   Physical Assistance Level No physical assistance   CARE Score - Chair/Bed-to-Chair Transfer 6   Toilet Transfer   Assistance Needed Adaptive equipment   Physical Assistance Level No physical assistance   CARE Score - Toilet Transfer 6   Car Transfer   Assistance Needed Supervision   Physical Assistance Level No physical assistance   CARE Score - Car Transfer 4   Walk 10 Feet   Assistance Needed Independent   Physical Assistance Level No physical assistance   CARE Score - Walk 10 Feet 6   Walk 50 Feet with Two Turns   Assistance Needed Independent   Physical Assistance Level No physical assistance   CARE Score - Walk 50 Feet with Two Turns 6   Walk 150 Feet   Assistance Needed Independent   Physical Assistance Level No physical assistance   CARE Score - Walk 150 Feet 6   Walking 10 Feet on Uneven Surfaces   Assistance Needed Supervision    Physical Assistance Level No physical assistance   CARE Score - Walking 10 Feet on Uneven Surfaces 4   1 Step (Curb)   Assistance Needed Adaptive equipment   Physical Assistance Level No physical assistance   CARE Score - 1 Step (Curb) 6   4 Steps   Assistance Needed Adaptive equipment   Physical Assistance Level No physical assistance   CARE Score - 4 Steps 6   12 Steps   Assistance Needed Adaptive equipment   Physical Assistance Level No physical assistance   CARE Score - 12 Steps 6   Picking Up Object   Assistance Needed Independent   Physical Assistance Level No physical assistance   CARE Score - Picking Up Object 6   Wheel 50 Feet with Two Turns   Reason if not Attempted Activity not applicable   CARE Score - Wheel 50 Feet with Two Turns 9   Wheel 150 Feet   Reason if not Attempted Activity not applicable   CARE Score - Wheel 150 Feet 9     Ambulation activities performed with pt holding a 2-lb hand weight in L hand.  Performed floor recovery at IND level next to EOM.  Issued and discussed Fall Facts handout.  Completed mobility IRF-Jorge.  SPV with car transfer and SPV-IND with ambulation on uneven surface; no AD.    Assessment    Patient is SPV-IND throughout last 24-hour window; pushing towards IND; mild cognition and safety impairments during demonstration; great verbal carryover with education within session; good social support; consider IND amb trial in room and bed<>toilet.    Strengths: Able to follow instructions,Adequate strength,Alert and oriented,Effective communication skills,Independent prior level of function,Making steady progress towards goals,Manages pain appropriately,Motivated for self care and independence,Pleasant and cooperative,Willingly participates in therapeutic activities  Barriers: Decreased endurance,Hearing impairment,Home accessibility,Impaired balance,Poor family support    Plan    Push for IND ambulation, car transfers, and CLOF; 6-Minute Walk Test; education; higher level  balance & dual tasking with L hand usage.  As of p.m. IDT meeting: d/c scheduled for Wed 1/19/22.    Passport items to be completed: Completed.    Physical Therapy Problems (Active)     Problem: Mobility     Dates: Start: 01/13/22       Goal: STG-Within one week, patient will ambulate community distances of 500 feet at a gait velocity of 0.8 m/s at IND level without an AD.     Dates: Start: 01/13/22       Goal Note filed on 01/17/22 1151 by Jimbo Tanner, PT     SPV for 1000 feet indoors without an AD.            Goal: STG-Within one week, patient will ambulate up/down 14 stairs at IND level with use of railings.     Dates: Start: 01/13/22       Goal Note filed on 01/17/22 1151 by Jimbo Tanner, PT     SBA-SPV for 20 stairs with railings.               Problem: Mobility Transfers     Dates: Start: 01/13/22       Goal: STG-Within one week, patient will transfer bed to chair at IND level without an AD.     Dates: Start: 01/13/22       Goal Note filed on 01/17/22 1151 by Jimbo Tanner, PT     SPV with bed transfers at this time.            Goal: STG-Within one week, patient will transfer in/out of car at IND level without an AD.     Dates: Start: 01/13/22       Goal Note filed on 01/17/22 1151 by Jimbo Tanner, PT     SBA with car transfers at this time.               Problem: PT-Long Term Goals     Dates: Start: 01/13/22       Goal: LTG-By discharge, patient will ambulate community distances of 500 feet at a gait velocity of 0.8 m/s at IND level without an AD.     Dates: Start: 01/13/22          Goal: LTG-By discharge, patient will transfer bed to chair at IND level without an AD.     Dates: Start: 01/13/22          Goal: LTG-By discharge, patient will ambulate up/down 14 stairs at IND level with use of railings.     Dates: Start: 01/13/22          Goal: LTG-By discharge, patient will transfer in/out of a car at IND level without an AD.     Dates: Start: 01/13/22          Goal: LTG-By  discharge, patient will achieve a Low fall risk score on the [place] Balance Scale assessment.     Dates: Start: 01/13/22

## 2022-01-17 NOTE — CARE PLAN
Problem: Fall Risk - Rehab  Goal: Patient will remain free from falls  Note: Kitty Velasquez Fall risk Assessment Score: 8      Low fall risk interventions   - Call light within reach   - Yellow  socks   - Belongings within reach   - Bed in the lowest position     Problem: Respiratory  Goal: Patient will understand use and administration of respiratory medications to improve respiratory function  Note: O2 n/c in place at 2 lpm every HS.     Problem: Bowel Elimination  Goal: Patient will participate in bowel management program  Note: PRN Miralax given for last BM 1/14/22. No BM result at this time.          The patient is Stable - Low risk of patient condition declining or worsening    Shift Goals  Clinical Goals: Monitor BP readings, ensure safe transfers.  Patient Goals: Increase endurance

## 2022-01-17 NOTE — FLOWSHEET NOTE
01/17/22 0751   Events/Summary/Plan   Events/Summary/Plan 02 spot check, Flovent and Anoro given   Vital Signs   Pulse 64   Respiration 18   Pulse Oximetry 94 %   $ Pulse Oximetry (Spot Check) Yes   Respiratory Assessment   Level of Consciousness Alert   Chest Exam   Work Of Breathing / Effort Within Normal Limits   Oxygen   O2 (LPM)   (2 lpm at night per home regimen)   O2 Delivery Device None - Room Air

## 2022-01-17 NOTE — CARE PLAN
Problem: Mobility  Goal: STG-Within one week, patient will ambulate community distances of 500 feet at a gait velocity of 0.8 m/s at IND level without an AD.  Outcome: Not Met  Note: SPV for 1000 feet indoors without an AD.  Goal: STG-Within one week, patient will ambulate up/down 14 stairs at IND level with use of railings.  Outcome: Not Met  Note: SBA-SPV for 20 stairs with railings.     Problem: Mobility Transfers  Goal: STG-Within one week, patient will transfer bed to chair at IND level without an AD.  Outcome: Not Met  Note: SPV with bed transfers at this time.  Goal: STG-Within one week, patient will transfer in/out of car at IND level without an AD.  Outcome: Not Met  Note: SBA with car transfers at this time.     Problem: Balance  Goal: STG-Within one week, patient will achieve a Low fall risk score on the [place] Balance Scale assessment.  Outcome: Met

## 2022-01-17 NOTE — CARE PLAN
Problem: Problem Solving STGs  Goal: STG-Within one week, patient will complete medication management and financial management tasks with 80% accuracy provided SPV  Outcome: Not Met     Problem: Problem Solving STGs  Goal: STG-Within one week, patient will complete alternating attention tasks with 80% accuracy provided MIN A  Outcome: Met     Problem: Memory STGs  Goal: STG-Within one week, patient will learn and implement functional memory strategies to assist in recall of information related to hospitalization and safety with 80% accuracy provided MIN A  Outcome: Met

## 2022-01-17 NOTE — THERAPY
Speech Language Pathology  Daily Treatment     Patient Name: David Cho  Age:  81 y.o., Sex:  male  Medical Record #: [id number]  Today's Date: 1/17/2022     Precautions  Precautions: Fall Risk  Comments: [place], BP LUE only, fluid restriction    Subjective    Pt pleasant and cooperative.      Objective       01/17/22 0833   SLP Total Time Spent   SLP Individual Total Time Spent (Mins) 60   Treatment Charges   SLP Cognitive Skill Development First 15 Minutes 1   SLP Cognitive Skill Development Additional 15 Minutes 3       Assessment    Extensive stroke education provided at this time with written aid given to pt for further review. Discussed types of stroke, stroke risk factors (controllable and non controllable), how to reduce risk of future strokes, importance of medication management and warning signs (FAST). Pt receptive to education, asked appropriate questions and demonstrated understanding of the information provided.     Strengths: Able to follow instructions,Good insight into deficits/needs,Motivated for self care and independence,Pleasant and cooperative,Willingly participates in therapeutic activities  Barriers: Impaired functional cognition (MILD cog impairments with attention and executive functioning)    Plan    Participate in TC determine dc date, family training     Speech Therapy Problems (Active)     Problem: Problem Solving STGs     Dates: Start: 01/13/22       Goal: STG-Within one week, patient will complete medication management and financial management tasks with 80% accuracy provided SPV     Dates: Start: 01/13/22             Problem: Speech/Swallowing LTGs     Dates: Start: 01/13/22       Goal: LTG-By discharge, patient will complete functional problem solving and recall information with 80% accuracy provided MOD I     Dates: Start: 01/13/22

## 2022-01-17 NOTE — PROGRESS NOTES
[facility] Medicine Daily Progress Note    Date of Service  1/17/2022    Chief Complaint:  Hypertension  Afib  Hyponatremia    Interval History:  Discussed about his BP being a little elevated and increasing Vasotec dose again.    Review of Systems  Review of Systems   Constitutional: Negative for chills and fever.   Respiratory: Negative for shortness of breath.    Cardiovascular: Negative for chest pain.   Gastrointestinal: Negative for abdominal pain, diarrhea, nausea and vomiting.   Psychiatric/Behavioral: The patient is not nervous/anxious.         Physical Exam  Temp:  [36.6 °C (97.8 °F)-36.9 °C (98.4 °F)] 36.6 °C (97.9 °F)  Pulse:  [61-73] 64  Resp:  [16-18] 18  BP: (141-157)/(77-98) 157/98  SpO2:  [93 %-98 %] 94 %    Physical Exam  Vitals and nursing note reviewed.   Constitutional:       Appearance: Normal appearance.   HENT:      Head: Atraumatic.   Eyes:      Conjunctiva/sclera: Conjunctivae normal.      Pupils: Pupils are equal, round, and reactive to light.   Cardiovascular:      Rate and Rhythm: Normal rate. Rhythm irregular.      Heart sounds: No murmur heard.      Pulmonary:      Effort: Pulmonary effort is normal.      Breath sounds: No stridor. No wheezing or rales.   Abdominal:      General: There is no distension.      Palpations: Abdomen is soft.      Tenderness: There is no abdominal tenderness.   Musculoskeletal:      Cervical back: Normal range of motion and neck supple.      Right lower leg: No edema.      Left lower leg: No edema.   Skin:     General: Skin is warm and dry.      Findings: No rash.   Neurological:      Mental Status: He is alert and oriented to person, place, and time.   Psychiatric:         Mood and Affect: Mood normal.         Behavior: Behavior normal.         Fluids    Intake/Output Summary (Last 24 hours) at 1/17/2022 0925  Last data filed at 1/17/2022 0600  Gross per 24 hour   Intake 120 ml   Output 900 ml   Net -780 ml       Laboratory      Recent Labs     01/16/22  0602    SODIUM 132*   POTASSIUM 4.3   CHLORIDE 96   CO2 25   GLUCOSE 94   BUN 18   CREATININE 0.68   CALCIUM 8.7                   Imaging    Assessment/Plan  * Ischemic stroke (HCC)- (present on admission)  Assessment & Plan  MRI: showed multifocal areas of acute cortical infarcts in the right frontal and parietal lobes.          multiple chronic lacunar infarcts; severe chronic microvascular ischemic disease.  On Eliquis (has afib)  On Lipitor    Vitamin D deficiency- (present on admission)  Assessment & Plan  Vit D: 10  On supplements    Atrial fibrillation (HCC)- (present on admission)  Assessment & Plan  HR ok  On Eliquis  Note: had bradycardia at [facility] -- avoid AVN blockers -- will f/u with Cardio  Monitor    BPH (benign prostatic hyperplasia)- (present on admission)  Assessment & Plan  On Proscar    Alcohol use- (present on admission)  Assessment & Plan  On MVI, B1 and folate supplements    Hyponatremia- (present on admission)  Assessment & Plan  Has hx of mildly low sodium levels  Na: 127 --> 128 --> 131 (1/13) --> 132 (1/16)  Pt eating better at the Rehab and increase salting foods  Off mild fluid restriction  Likely 2nd to diminished oral intake -- doesn't eat much at home; has issues with his dentures  Note: on Vasotec (has PM low SE of hypo-na) -- was taking at home  Cont to monitor    Major depressive disorder- (present on admission)  Assessment & Plan  On Wellbutrin    Hypertension- (present on admission)  Assessment & Plan  BP a little elevated  On Vasotec --> will increase dose  again  Note: on Proscar  Note: home meds include Vasotec  Monitor    Chronic obstructive pulmonary disease (HCC)- (present on admission)  Assessment & Plan  On Anoro Ellipta  Resp & O2 protocols as needed

## 2022-01-17 NOTE — TELEPHONE ENCOUNTER
Patient had a stroke x1 week ago per wife and he is currently in rehab. Would like us to reach out to schedule in a couple weeks to see how he is doing.

## 2022-01-17 NOTE — DISCHARGE PLANNING
CM spoke to patients daughter Sarah to update on IDT and DC date of 1/19/22.  She will be able to come in for family training tomorrow around 2:30pm.  CM checked with scheduling.  CM available as needs arise.

## 2022-01-18 PROBLEM — R33.9 URINARY RETENTION: Status: ACTIVE | Noted: 2022-01-18

## 2022-01-18 LAB
ANION GAP SERPL CALC-SCNC: 8 MMOL/L (ref 7–16)
BUN SERPL-MCNC: 15 MG/DL (ref 8–22)
CALCIUM SERPL-MCNC: 9 MG/DL (ref 8.5–10.5)
CHLORIDE SERPL-SCNC: 95 MMOL/L (ref 96–112)
CO2 SERPL-SCNC: 28 MMOL/L (ref 20–33)
CREAT SERPL-MCNC: 0.67 MG/DL (ref 0.5–1.4)
GLUCOSE SERPL-MCNC: 104 MG/DL (ref 65–99)
POTASSIUM SERPL-SCNC: 4.4 MMOL/L (ref 3.6–5.5)
SODIUM SERPL-SCNC: 131 MMOL/L (ref 135–145)

## 2022-01-18 PROCEDURE — 94760 N-INVAS EAR/PLS OXIMETRY 1: CPT

## 2022-01-18 PROCEDURE — 700102 HCHG RX REV CODE 250 W/ 637 OVERRIDE(OP): Performed by: HOSPITALIST

## 2022-01-18 PROCEDURE — 99232 SBSQ HOSP IP/OBS MODERATE 35: CPT | Performed by: PHYSICAL MEDICINE & REHABILITATION

## 2022-01-18 PROCEDURE — A9270 NON-COVERED ITEM OR SERVICE: HCPCS | Performed by: HOSPITALIST

## 2022-01-18 PROCEDURE — 700102 HCHG RX REV CODE 250 W/ 637 OVERRIDE(OP): Performed by: PHYSICAL MEDICINE & REHABILITATION

## 2022-01-18 PROCEDURE — RXMED WILLOW AMBULATORY MEDICATION CHARGE: Performed by: PHYSICAL MEDICINE & REHABILITATION

## 2022-01-18 PROCEDURE — 99232 SBSQ HOSP IP/OBS MODERATE 35: CPT | Performed by: HOSPITALIST

## 2022-01-18 PROCEDURE — 80048 BASIC METABOLIC PNL TOTAL CA: CPT

## 2022-01-18 PROCEDURE — 97535 SELF CARE MNGMENT TRAINING: CPT

## 2022-01-18 PROCEDURE — 97116 GAIT TRAINING THERAPY: CPT

## 2022-01-18 PROCEDURE — 97530 THERAPEUTIC ACTIVITIES: CPT

## 2022-01-18 PROCEDURE — A9270 NON-COVERED ITEM OR SERVICE: HCPCS | Performed by: PHYSICAL MEDICINE & REHABILITATION

## 2022-01-18 PROCEDURE — 770010 HCHG ROOM/CARE - REHAB SEMI PRIVAT*

## 2022-01-18 PROCEDURE — 94640 AIRWAY INHALATION TREATMENT: CPT

## 2022-01-18 PROCEDURE — 36415 COLL VENOUS BLD VENIPUNCTURE: CPT

## 2022-01-18 PROCEDURE — 97129 THER IVNTJ 1ST 15 MIN: CPT

## 2022-01-18 PROCEDURE — 97110 THERAPEUTIC EXERCISES: CPT

## 2022-01-18 PROCEDURE — 97130 THER IVNTJ EA ADDL 15 MIN: CPT

## 2022-01-18 RX ORDER — BUPROPION HYDROCHLORIDE 150 MG/1
150 TABLET, EXTENDED RELEASE ORAL DAILY
Qty: 30 TABLET | Refills: 0 | Status: SHIPPED | OUTPATIENT
Start: 2022-01-19 | End: 2022-02-14 | Stop reason: SDUPTHER

## 2022-01-18 RX ORDER — FOLIC ACID 1 MG/1
1 TABLET ORAL DAILY
Qty: 30 TABLET | Refills: 0 | COMMUNITY
Start: 2022-01-18 | End: 2022-05-04

## 2022-01-18 RX ORDER — ATORVASTATIN CALCIUM 40 MG/1
40 TABLET, FILM COATED ORAL EVERY EVENING
Qty: 30 TABLET | Refills: 0 | Status: SHIPPED | OUTPATIENT
Start: 2022-01-18 | End: 2022-02-14 | Stop reason: SDUPTHER

## 2022-01-18 RX ORDER — M-VIT,TX,IRON,MINS/CALC/FOLIC 27MG-0.4MG
1 TABLET ORAL DAILY
Qty: 30 TABLET | Refills: 0 | COMMUNITY
Start: 2022-01-19 | End: 2022-02-04

## 2022-01-18 RX ORDER — ENALAPRIL MALEATE 20 MG/1
40 TABLET ORAL DAILY
Qty: 60 TABLET | Refills: 0 | Status: SHIPPED | OUTPATIENT
Start: 2022-01-19 | End: 2022-02-14 | Stop reason: SDUPTHER

## 2022-01-18 RX ORDER — TAMSULOSIN HYDROCHLORIDE 0.4 MG/1
0.4 CAPSULE ORAL
Qty: 30 CAPSULE | Refills: 0 | Status: SHIPPED | OUTPATIENT
Start: 2022-01-18 | End: 2022-02-14 | Stop reason: SDUPTHER

## 2022-01-18 RX ORDER — LANOLIN ALCOHOL/MO/W.PET/CERES
100 CREAM (GRAM) TOPICAL DAILY
Qty: 30 TABLET | Refills: 0 | COMMUNITY
Start: 2022-01-19 | End: 2022-05-04

## 2022-01-18 RX ORDER — OMEPRAZOLE 20 MG/1
20 CAPSULE, DELAYED RELEASE ORAL
Qty: 30 CAPSULE | Refills: 0 | Status: SHIPPED | OUTPATIENT
Start: 2022-01-19 | End: 2022-02-14 | Stop reason: SDUPTHER

## 2022-01-18 RX ORDER — GUAIFENESIN 600 MG/1
600 TABLET, EXTENDED RELEASE ORAL EVERY 12 HOURS
Qty: 60 TABLET | Refills: 0 | COMMUNITY
Start: 2022-01-18 | End: 2022-02-14

## 2022-01-18 RX ORDER — GUAIFENESIN/DEXTROMETHORPHAN 100-10MG/5
5 SYRUP ORAL EVERY 6 HOURS PRN
Qty: 840 ML | Refills: 0 | COMMUNITY
Start: 2022-01-18 | End: 2022-02-04

## 2022-01-18 RX ORDER — FINASTERIDE 5 MG/1
5 TABLET, FILM COATED ORAL DAILY
Qty: 30 TABLET | Refills: 0 | Status: SHIPPED | OUTPATIENT
Start: 2022-01-19 | End: 2022-02-14 | Stop reason: SDUPTHER

## 2022-01-18 RX ORDER — AMOXICILLIN 250 MG
2 CAPSULE ORAL 2 TIMES DAILY
Qty: 120 TABLET | Refills: 0 | COMMUNITY
Start: 2022-01-18 | End: 2022-02-04

## 2022-01-18 RX ADMIN — GUAIFENESIN 600 MG: 600 TABLET, EXTENDED RELEASE ORAL at 21:45

## 2022-01-18 RX ADMIN — THIAMINE HCL TAB 100 MG 100 MG: 100 TAB at 08:10

## 2022-01-18 RX ADMIN — Medication 1 TABLET: at 08:10

## 2022-01-18 RX ADMIN — FLUTICASONE PROPIONATE 220 MCG: 110 AEROSOL, METERED RESPIRATORY (INHALATION) at 20:00

## 2022-01-18 RX ADMIN — Medication 2000 UNITS: at 08:10

## 2022-01-18 RX ADMIN — BUPROPION HYDROCHLORIDE 150 MG: 150 TABLET, EXTENDED RELEASE ORAL at 08:10

## 2022-01-18 RX ADMIN — FOLIC ACID 1 MG: 1 TABLET ORAL at 08:10

## 2022-01-18 RX ADMIN — APIXABAN 5 MG: 5 TABLET, FILM COATED ORAL at 08:10

## 2022-01-18 RX ADMIN — ATORVASTATIN CALCIUM 40 MG: 40 TABLET, FILM COATED ORAL at 21:45

## 2022-01-18 RX ADMIN — FINASTERIDE 5 MG: 5 TABLET, FILM COATED ORAL at 08:10

## 2022-01-18 RX ADMIN — ENALAPRIL MALEATE 40 MG: 5 TABLET ORAL at 08:11

## 2022-01-18 RX ADMIN — OMEPRAZOLE 20 MG: 20 CAPSULE, DELAYED RELEASE ORAL at 08:10

## 2022-01-18 RX ADMIN — APIXABAN 5 MG: 5 TABLET, FILM COATED ORAL at 21:44

## 2022-01-18 RX ADMIN — UMECLIDINIUM BROMIDE AND VILANTEROL TRIFENATATE 1 PUFF: 62.5; 25 POWDER RESPIRATORY (INHALATION) at 08:50

## 2022-01-18 RX ADMIN — FLUTICASONE PROPIONATE 220 MCG: 110 AEROSOL, METERED RESPIRATORY (INHALATION) at 08:50

## 2022-01-18 RX ADMIN — TAMSULOSIN HYDROCHLORIDE 0.4 MG: 0.4 CAPSULE ORAL at 17:19

## 2022-01-18 RX ADMIN — GUAIFENESIN 600 MG: 600 TABLET, EXTENDED RELEASE ORAL at 08:10

## 2022-01-18 RX ADMIN — SENNOSIDES AND DOCUSATE SODIUM 2 TABLET: 50; 8.6 TABLET ORAL at 08:10

## 2022-01-18 ASSESSMENT — ENCOUNTER SYMPTOMS
SHORTNESS OF BREATH: 0
BRUISES/BLEEDS EASILY: 0
EYES NEGATIVE: 1
VOMITING: 0
PALPITATIONS: 0
COUGH: 0
NAUSEA: 0
POLYDIPSIA: 0
ABDOMINAL PAIN: 0
CHILLS: 0
FEVER: 0

## 2022-01-18 ASSESSMENT — GAIT ASSESSMENTS: DEVIATION: DECREASED HEEL STRIKE

## 2022-01-18 ASSESSMENT — 6 MINUTE WALK TEST (6MWT)
NUMBER OF RESTS: 0
TOTAL DISTANCE WALKED (FT): 972
GAIT SPEED - METERS PER SECOND: .82
LEVEL OF ASSISTANCE: SUPERVISION

## 2022-01-18 ASSESSMENT — ACTIVITIES OF DAILY LIVING (ADL)
SHOWER_TRANSFER_LEVEL_OF_ASSIST: REQUIRES SUPERVISION WITH SHOWER TRANSFER
TOILETING_LEVEL_OF_ASSIST: ABLE TO COMPLETE TOILETING WITHOUT ASSIST
TOILET_TRANSFER_LEVEL_OF_ASSIST: ABLE TO COMPLETE TOILET TRANSFER WITHOUT ASSIST
TOILET_TRANSFER_DESCRIPTION: GRAB BAR

## 2022-01-18 NOTE — PROGRESS NOTES
Hospital Medicine Daily Progress Note      Chief Complaint:  Atrial Fibrillation  Hypertension  Hyponatremia    Interval History:  Doing well.  Labs reviewed.    Review of Systems  Review of Systems   Constitutional: Negative for chills and fever.   HENT: Negative.    Eyes: Negative.    Respiratory: Negative for cough and shortness of breath.    Cardiovascular: Negative for chest pain and palpitations.   Gastrointestinal: Negative for abdominal pain, nausea and vomiting.   Skin: Negative for itching and rash.   Endo/Heme/Allergies: Negative for polydipsia. Does not bruise/bleed easily.        Physical Exam  Temp:  [36.2 °C (97.2 °F)-37.4 °C (99.3 °F)] 36.3 °C (97.4 °F)  Pulse:  [67-71] 71  Resp:  [16-18] 18  BP: (119-151)/(74-87) 151/87  SpO2:  [94 %-97 %] 97 %    Physical Exam  Vitals reviewed.   Constitutional:       General: He is not in acute distress.     Appearance: Normal appearance. He is not ill-appearing.   HENT:      Head: Normocephalic and atraumatic.      Right Ear: External ear normal.      Left Ear: External ear normal.      Nose: Nose normal.      Mouth/Throat:      Pharynx: Oropharynx is clear.   Eyes:      General:         Right eye: No discharge.         Left eye: No discharge.      Extraocular Movements: Extraocular movements intact.      Conjunctiva/sclera: Conjunctivae normal.   Cardiovascular:      Comments: irreg irreg  Pulmonary:      Effort: No respiratory distress.      Breath sounds: No wheezing.      Comments: Decreased BS   Abdominal:      General: Bowel sounds are normal. There is no distension.      Palpations: Abdomen is soft.      Tenderness: There is no abdominal tenderness.   Musculoskeletal:      Cervical back: Normal range of motion and neck supple.      Right lower leg: No edema.      Left lower leg: No edema.   Skin:     General: Skin is warm and dry.   Neurological:      Mental Status: He is alert and oriented to person, place, and time.         Fluids    Intake/Output  Summary (Last 24 hours) at 1/18/2022 1306  Last data filed at 1/18/2022 1200  Gross per 24 hour   Intake 600 ml   Output 251 ml   Net 349 ml       Laboratory      Recent Labs     01/16/22  0602 01/18/22  0708   SODIUM 132* 131*   POTASSIUM 4.3 4.4   CHLORIDE 96 95*   CO2 25 28   GLUCOSE 94 104*   BUN 18 15   CREATININE 0.68 0.67   CALCIUM 8.7 9.0                 Assessment/Plan  * Ischemic stroke (HCC)- (present on admission)  Assessment & Plan  On Eliquis and Lipitor    Vitamin D deficiency- (present on admission)  Assessment & Plan  Vit D level 10  On supplementation    Atrial fibrillation (HCC)- (present on admission)  Assessment & Plan  Not on any rate controlling agents  Monitor for tachydysrhythmias  Anticoagulated on Eliquis    Alcohol use- (present on admission)  Assessment & Plan  On Folate, MVT, and Thiamine    Hyponatremia- (present on admission)  Assessment & Plan  Na+ levels improving  Now off fluid restriction    Major depressive disorder- (present on admission)  Assessment & Plan  On Wellbutrin    Hypertension- (present on admission)  Assessment & Plan  Observe blood pressure trends on Enalapril  Monitor for orthostatic hypotension on Flomax and Proscar  Outpt meds include Enalapril    Chronic obstructive pulmonary disease (HCC)- (present on admission)  Assessment & Plan  On Flovent and Anoro Ellipta  RT protocol    Full Code    Reviewed w/ Dr. Mario

## 2022-01-18 NOTE — CARE PLAN
Problem: Knowledge Deficit - Standard  Goal: Patient and family/care givers will demonstrate understanding of plan of care, disease process/condition, diagnostic tests and medications  Outcome: Progressing     Problem: Skin Integrity  Goal: Skin integrity is maintained or improved  Outcome: Progressing

## 2022-01-18 NOTE — THERAPY
Occupational Therapy  Daily Treatment     Patient Name: David Cho  Age:  81 y.o., Sex:  male  Medical Record #: [id number]  Today's Date: 1/18/2022     Precautions  Precautions: Other (See Comments)  Comments: BP CARMELO GOLDMAN only         Subjective    Pt seated in w/c upon arrival, pleasant and cooperative, agreeable to therapy - spouse present and reports that she is really happy with his progress     Objective       01/18/22 1531   Interdisciplinary Plan of Care Collaboration   Patient Position at End of Therapy Seated;Call Light within Reach;Tray Table within Reach;Family / Friend in Room   OT Total Time Spent   OT Individual Total Time Spent (Mins) 30   OT Charge Group   OT Self Care / ADL 1   OT Therapy Activity 1     FT completed with spouse re: ADL/IADL current functional performance and safety awareness  - standing vs sitting while dressing  - standing showers with good safety when using GB which they already have installed in their bathroom, does not require shower chair for safety  - cooking task successful with good safety awareness  - no driving until approved by physician - pt and spouse agreeable to no driving, spouse will complete for them  - reviewed FM coordination/L hand strengthening exercise program  - L hand dexterity deficits affects ability to cut food, fold clothes    Assessment    Pt and spouse with no further questions/concerns re: d/c tomorrow and home safety. They demo good understanding of all edu provided and agreeable to recommendations     Strengths: Alert and oriented,Effective communication skills,Independent prior level of function,Making steady progress towards goals,Motivated for self care and independence,Pleasant and cooperative,Supportive family,Willingly participates in therapeutic activities  Barriers: Hemiparesis,Impaired activity tolerance,Impaired balance    Plan    D/c tomorrow     Occupational Therapy Goals (Active)     There are no active problems.

## 2022-01-18 NOTE — PROGRESS NOTES
"Rehab Progress Note     Date of Service: 1/18/2022  Chief Complaint: Follow-up stroke    Interval Events (Subjective)    Patient seen and examined today in his room.  We discussed his discharge date which has been sent for tomorrow.  Advised patient he has had some urinary retention for which he was started on Flomax. He is already on Finesteride.  He has no new concerns or complaints and is happy about his discharge tomorrow. We also discussed harm reduction by reducing the amount of alcohol he drinks daily. He reports he would like to go down to 1-2 drinks. He did stop drinking for 6 years, then went on a cruise and picked it up again. His wife does not drink. Advised it will increase his risk of another stroke.    ROS: No changes to bowel, pain, mood, or sleep.           Objective:  VITAL SIGNS: /87   Pulse 71   Temp 36.3 °C (97.4 °F) (Temporal)   Resp 18   Ht 1.778 m (5' 10\")   Wt 61 kg (134 lb 7.7 oz)   SpO2 97%   BMI 19.30 kg/m²   Gen: alert, no apparent distress  Neuro: notable for mild left hand weakness and incoordination      Recent Results (from the past 72 hour(s))   Basic Metabolic Panel    Collection Time: 01/16/22  6:02 AM   Result Value Ref Range    Sodium 132 (L) 135 - 145 mmol/L    Potassium 4.3 3.6 - 5.5 mmol/L    Chloride 96 96 - 112 mmol/L    Co2 25 20 - 33 mmol/L    Glucose 94 65 - 99 mg/dL    Bun 18 8 - 22 mg/dL    Creatinine 0.68 0.50 - 1.40 mg/dL    Calcium 8.7 8.5 - 10.5 mg/dL    Anion Gap 11.0 7.0 - 16.0   MAGNESIUM    Collection Time: 01/16/22  6:02 AM   Result Value Ref Range    Magnesium 1.8 1.5 - 2.5 mg/dL   PHOSPHORUS    Collection Time: 01/16/22  6:02 AM   Result Value Ref Range    Phosphorus 3.6 2.5 - 4.5 mg/dL   ESTIMATED GFR    Collection Time: 01/16/22  6:02 AM   Result Value Ref Range    GFR If African American >60 >60 mL/min/1.73 m 2    GFR If Non African American >60 >60 mL/min/1.73 m 2   Basic Metabolic Panel    Collection Time: 01/18/22  7:08 AM   Result Value " Ref Range    Sodium 131 (L) 135 - 145 mmol/L    Potassium 4.4 3.6 - 5.5 mmol/L    Chloride 95 (L) 96 - 112 mmol/L    Co2 28 20 - 33 mmol/L    Glucose 104 (H) 65 - 99 mg/dL    Bun 15 8 - 22 mg/dL    Creatinine 0.67 0.50 - 1.40 mg/dL    Calcium 9.0 8.5 - 10.5 mg/dL    Anion Gap 8.0 7.0 - 16.0   ESTIMATED GFR    Collection Time: 01/18/22  7:08 AM   Result Value Ref Range    GFR If African American >60 >60 mL/min/1.73 m 2    GFR If Non African American >60 >60 mL/min/1.73 m 2       Current Facility-Administered Medications   Medication Frequency   • enalapril (VASOTEC) tablet 40 mg DAILY   • tamsulosin (FLOMAX) capsule 0.4 mg AFTER DINNER   • guaiFENesin ER (MUCINEX) tablet 600 mg Q12HRS   • guaiFENesin dextromethorphan (ROBITUSSIN DM) 100-10 MG/5ML syrup 5 mL Q6HRS PRN   • fluticasone (FLOVENT HFA) 110 MCG/ACT inhaler 220 mcg Q12HRS (RT)   • vitamin D3 (cholecalciferol) tablet 2,000 Units DAILY   • hydrOXYzine HCl (ATARAX) tablet 50 mg Q6HRS PRN   • QUEtiapine (Seroquel) tablet 25 mg TID PRN   • melatonin tablet 3 mg HS PRN   • Respiratory Therapy Consult Continuous RT   • Pharmacy Consult Request ...Pain Management Review 1 Each PHARMACY TO DOSE   • hydrALAZINE (APRESOLINE) tablet 10 mg Q8HRS PRN   • acetaminophen (Tylenol) tablet 650 mg Q4HRS PRN   • lactulose 20 GM/30ML solution 30 mL QDAY PRN   • docusate sodium (ENEMEEZ) enema 283 mg QDAY PRN   • sodium phosphate (Fleet) enema 133 mL QDAY PRN   • omeprazole (PRILOSEC) capsule 20 mg QAM AC   • artificial tears ophthalmic solution 1 Drop PRN   • benzocaine-menthol (CEPACOL) lozenge 1 Lozenge Q2HRS PRN   • mag hydrox-al hydrox-simeth (MAALOX PLUS ES or MYLANTA DS) suspension 20 mL Q2HRS PRN   • ondansetron (ZOFRAN ODT) dispertab 4 mg 4X/DAY PRN    Or   • ondansetron (ZOFRAN) syringe/vial injection 4 mg 4X/DAY PRN   • traZODone (DESYREL) tablet 50 mg QHS PRN   • sodium chloride (OCEAN) 0.65 % nasal spray 2 Spray PRN   • midazolam (VERSED) 5 mg/mL (1 mL vial) PRN   •  senna-docusate (PERICOLACE or SENOKOT S) 8.6-50 MG per tablet 2 Tablet BID    And   • polyethylene glycol/lytes (MIRALAX) PACKET 1 Packet QDAY PRN    And   • magnesium hydroxide (MILK OF MAGNESIA) suspension 30 mL QDAY PRN    And   • bisacodyl (DULCOLAX) suppository 10 mg QDAY PRN   • apixaban (ELIQUIS) tablet 5 mg BID   • atorvastatin (LIPITOR) tablet 40 mg Q EVENING   • buPROPion SR (WELLBUTRIN-SR) tablet 150 mg DAILY   • finasteride (PROSCAR) tablet 5 mg DAILY   • folic acid (FOLVITE) tablet 1 mg DAILY   • therapeutic multivitamin-minerals (THERAGRAN-M) tablet 1 Tablet DAILY   • thiamine (Vitamin B-1) tablet 100 mg DAILY   • umeclidinium-vilanterol (ANORO ELLIPTA) inhaler 1 Puff QDAILY (RT)       Orders Placed This Encounter   Procedures   • Diet Order Diet: Regular     Standing Status:   Standing     Number of Occurrences:   1     Order Specific Question:   Diet:     Answer:   Regular [1]       Assessment:  Active Hospital Problems    Diagnosis    • *Ischemic stroke (HCC)    • Vitamin D deficiency    • Atrial fibrillation (HCC)    • Impaired mobility and ADLs    • Pulmonary hypertension (HCC)    • Mitral regurgitation    • Bradycardia    • BPH (benign prostatic hyperplasia)    • Vitamin B1 deficiency    • Anemia    • Alcohol use    • Hyponatremia    • Major depressive disorder    • Chronic obstructive pulmonary disease (HCC)    • Hypertension    • Dyslipidemia      This patient is a 81 y.o. male admitted for acute inpatient rehabilitation with Ischemic stroke (HCC).    I led and attended the weekly conference, and agree with the IDT conference documentation and plan of care as noted below.    Date of conference: 1/17/2022    Goals and barriers: See IDT note.    Biggest barriers: left neglect, left hand weakness, impaired safety awareness, mild memory impairment    Goals in next week: discharge, iADLs    CM/social support: wife supportive    Anticipated DC date: 1/19    Outpatient: PT/OT/SLP    Equip: none  needed    Follow up: PCP, stroke bridge, Dr. Trejo     Rx: Volta[facility] on [facility], versus meds to beds      Medical Decision Making and Plan:    Right frontal/parietal lobe ischemic stroke  Cardioembolic  Mild left-sided weakness, improved  Left sided hand incoordination, improved  Mild cognitive impairments, improved  Continue full rehab program  PT/OT/SLP, 1 hr each discipline, 5 days per week    Eliquis  Statin    Outpatient follow-up with [facility], Dr. Trejo, referrals made    Atrial fibrillation  Eliquis     Hypertension  Enalapril, dose increased  Appreciate hospitalist assistance     Bradycardia  Per cardiology, avoid AV woody blockade  Appreciate hospitalist assistance     Pulmonary hypertension  Monitor need for oxygen     Mitral regurgitation   Outpatient follow up with cardiology     Bladder program  BPH  Finasteride  Check PVRs - 221, still elevated  Started Flomax 0.4 mg 01/18  Continue to monitor     Hyponatremia, improved, 131 today  Likely SIADH, versus glucocorticoid or mineralocorticoid deficiency based on urine studies  Checked urine sodium and osmoles  Discontinued 1.5 L fluid restriction  Discontinued salt tabs  Appreciate hospitalist assistance     Alcohol use  Counseling provided today  Vitamins    Vitamin D deficiency, 10  Continue supplementation     COPD  Ellipta  Mucinex  Flonase  As needed Robitussin DM  Respiratory therapy     History of depression  Wellbutrin  Monitor mood/need for psychology consult    Bowel program  Continue bowel medications  Last BM 1/17    DVT prophylaxis  Eliquis    Total time:  26 minutes.  I spent greater than 50% of the time for patient care, counseling, and coordination on this date, including patient face-to face time, unit/floor time with review of records/pertinent lab data and studies, as well as discussing diagnostic evaluation/work up, planned therapeutic interventions, and future disposition of care, as per the interval  events/subjective and the assessment and plan as noted above.    Time today spent discussing alcohol cessation, medications, preparation for discharge tomorrow.     I have performed a physical exam, reviewed and updated ROS, as well as the assessment and plan today 1/18/2022. In review of note from 1/17/2000 there are no new changes except as documented above.          Eusebia Mario M.D.   Physical Medicine and Rehabilitation

## 2022-01-18 NOTE — CARE PLAN
Problem: Fall Risk - Rehab  Goal: Patient will remain free from falls  Outcome: Progressing  Note: Kityt Velasquez Fall risk Assessment Score: 8  Low fall risk interventions   - Call light within reach   - Yellow  socks   - Belongings within reach   - Bed in the lowest position        Problem: Pain - Standard  Goal: Alleviation of pain or a reduction in pain to the patient’s comfort goal  Outcome: Progressing  Note: Assessed for pain and discomfort , denies pain and discomfort, medicated with guaifenesin for cough with relief.O2 2 lnc in use at night,denies sob ,with ease in breathing. The patient is Stable - Low risk of patient condition declining or worsening    Shift Goals  Clinical Goals: Monitor BP, BM  Patient Goals: Gain strength    Progress made toward(s) clinical / shift goals: Pt free from fall and injury.    Patient is not progressing towards the following goals:left arm weak

## 2022-01-18 NOTE — THERAPY
"Occupational Therapy  Daily Treatment     Patient Name: David Cho  Age:  81 y.o., Sex:  male  Medical Record #: [id number]  Today's Date: 1/18/2022     Precautions  Precautions: Other (See Comments)  Comments: JONES, BP LUE only         Subjective    \"I really want to go to the [facility] and see if theres anything  They can do for my L hand.\"      Objective       01/18/22 1031   Pain   Intervention Declines   Pain 0 - 10 Group   Pain Rating Scale (NPRS) 0   Cognition    Level of Consciousness Alert   Functional Level of Assist   Eating Modified Independent   Eating Description Increased time;Set-up of equipment or meal/tube feeding   Grooming Modified Independent   Bathing Supervision   Upper Body Dressing Modified Independent   Lower Body Dressing Supervision   Toileting Modified Independent   Bed, Chair, Wheelchair Transfer Independent   Toilet Transfers Modified Independent   Tub / Shower Transfers Supervised   Fine Motor / Dexterity    Comments  educated pt on C HEP packet- handout issued to pt    IADL Treatments   IADL Treatments Kitchen mobility education;Meal preparation;Home management   Kitchen Mobility Education Pt completed kitchen mobility with mod I- able to reach into high/low cabinets, open stove with no issues    Meal Preparation pt engaged in making brownies using stove- pt required supervision and cues for finding items in kitchen/reading box due to not having his readers on him. No issues with safety awareness- was able to safely open stove and use pot holders    Home Management pt completed washing dishes standing at sink side with mod I and cleaning up counter safely. Pt engaged in folding clothing on tabletop in sitting/standing with increased time and mod I and putting clothing items away into cabinets and carrying clothing across gym with no issues- mod I.    Bed Mobility    Supine to Sit Independent   Sit to Supine Independent   Scooting Independent   Rolling Independent "   Interdisciplinary Plan of Care Collaboration   Patient Position at End of Therapy Edge of Bed;Call Light within Reach;Tray Table within Reach;Phone within Reach   OT Total Time Spent   OT Individual Total Time Spent (Mins) 60   OT Charge Group   OT Therapy Activity 4       Assessment    Pt tolerated session well. Pt completed IADL's- kitchen mobility, meal prep and home management with mod I-supervision level. [facility] [place] issued to pt for further work on L hand coordination. Pt reported having all needed equipment for home I.e. grab bars in bathroom.     Strengths: Alert and oriented,Effective communication skills,Independent prior level of function,Making steady progress towards goals,Motivated for self care and independence,Pleasant and cooperative,Supportive family,Willingly participates in therapeutic activities  Barriers: Hemiparesis,Impaired activity tolerance,Impaired balance    Plan    D/c home with family     Occupational Therapy Goals (Active)     Problem: Dressing     Dates: Start: 01/13/22       Goal: STG-Within one week, patient will dress LB at supervision to mod I level using AE as needed.      Dates: Start: 01/13/22       Goal Note filed on 01/17/22 1204 by Ursula Freeman, OT     SBA- cues for safety                Problem: IADL's     Dates: Start: 01/13/22       Goal: STG-Within one week, patient will access kitchen area at supervision level using AE/DME as needed.      Dates: Start: 01/13/22             Problem: OT Long Term Goals     Dates: Start: 01/13/22       Goal: LTG-By discharge, patient will complete basic self care tasks at Mod I level using AE as needed.      Dates: Start: 01/13/22          Goal: LTG-By discharge, patient will perform bathroom transfers at mod I level using AE/DME as needed.      Dates: Start: 01/13/22          Goal: LTG-By discharge, patient will complete basic home management at supervision to Mod I level using AE as needed.      Dates: Start: 01/13/22

## 2022-01-18 NOTE — THERAPY
Speech Language Pathology  Daily Treatment     Patient Name: David Cho  Age:  81 y.o., Sex:  male  Medical Record #: [id number]  Today's Date: 1/18/2022     Precautions  Precautions: Other (See Comments)  Comments: BP CARMELO GOLDMAN only    Subjective    Family training completed at this time, both SO and daughter present and supportive. Pt expressing excitement re: tomorrows dc.      Objective       01/18/22 1503   SLP Total Time Spent   SLP Individual Total Time Spent (Mins) 30   Treatment Charges   SLP Cognitive Skill Development First 15 Minutes 1   SLP Cognitive Skill Development Additional 15 Minutes 1       Assessment    Reviewed results of SCCAN which indicate MILD cognitive impairment with mild impairment in areas of attention and recall. Rec: supervision with all medication management and financial management tasks to ensure accuracy and safety. Dicussed Stroke education with written aid provided. Reviewed FAST, pt unable to indep recall the warning signs however recalled that it was highlighted in his book. All questions answered at this time, pt and family demonstrating good understanding of information provided.     Strengths: Able to follow instructions,Good insight into deficits/needs,Motivated for self care and independence,Pleasant and cooperative,Willingly participates in therapeutic activities  Barriers: Impaired functional cognition (MILD cog impairments with attention and executive functioning)    Plan    Pt to dc tomorrow with support of family     Speech Therapy Problems (Active)     Problem: Problem Solving STGs     Dates: Start: 01/13/22       Goal: STG-Within one week, patient will complete medication management and financial management tasks with 80% accuracy provided SPV     Dates: Start: 01/13/22             Problem: Speech/Swallowing LTGs     Dates: Start: 01/13/22       Goal: LTG-By discharge, patient will complete functional problem solving and recall information with 80% accuracy  provided MOD I     Dates: Start: 01/13/22

## 2022-01-19 ENCOUNTER — PHARMACY VISIT (OUTPATIENT)
Dept: PHARMACY | Facility: MEDICAL CENTER | Age: 82
End: 2022-01-19
Payer: MEDICARE

## 2022-01-19 VITALS
TEMPERATURE: 97 F | DIASTOLIC BLOOD PRESSURE: 77 MMHG | HEART RATE: 61 BPM | WEIGHT: 134.48 LBS | SYSTOLIC BLOOD PRESSURE: 157 MMHG | BODY MASS INDEX: 19.25 KG/M2 | HEIGHT: 70 IN | RESPIRATION RATE: 17 BRPM | OXYGEN SATURATION: 92 %

## 2022-01-19 PROCEDURE — 700102 HCHG RX REV CODE 250 W/ 637 OVERRIDE(OP): Performed by: PHYSICAL MEDICINE & REHABILITATION

## 2022-01-19 PROCEDURE — A9270 NON-COVERED ITEM OR SERVICE: HCPCS | Performed by: HOSPITALIST

## 2022-01-19 PROCEDURE — A9270 NON-COVERED ITEM OR SERVICE: HCPCS | Performed by: PHYSICAL MEDICINE & REHABILITATION

## 2022-01-19 PROCEDURE — 99239 HOSP IP/OBS DSCHRG MGMT >30: CPT | Performed by: PHYSICAL MEDICINE & REHABILITATION

## 2022-01-19 PROCEDURE — 99231 SBSQ HOSP IP/OBS SF/LOW 25: CPT | Performed by: HOSPITALIST

## 2022-01-19 PROCEDURE — 700102 HCHG RX REV CODE 250 W/ 637 OVERRIDE(OP): Performed by: HOSPITALIST

## 2022-01-19 RX ADMIN — BUPROPION HYDROCHLORIDE 150 MG: 150 TABLET, EXTENDED RELEASE ORAL at 08:28

## 2022-01-19 RX ADMIN — GUAIFENESIN 600 MG: 600 TABLET, EXTENDED RELEASE ORAL at 08:29

## 2022-01-19 RX ADMIN — THIAMINE HCL TAB 100 MG 100 MG: 100 TAB at 08:29

## 2022-01-19 RX ADMIN — FOLIC ACID 1 MG: 1 TABLET ORAL at 08:29

## 2022-01-19 RX ADMIN — ENALAPRIL MALEATE 40 MG: 5 TABLET ORAL at 08:29

## 2022-01-19 RX ADMIN — OMEPRAZOLE 20 MG: 20 CAPSULE, DELAYED RELEASE ORAL at 08:29

## 2022-01-19 RX ADMIN — GUAIFENESIN AND DEXTROMETHORPHAN 5 ML: 100; 10 SYRUP ORAL at 08:33

## 2022-01-19 RX ADMIN — FINASTERIDE 5 MG: 5 TABLET, FILM COATED ORAL at 08:29

## 2022-01-19 RX ADMIN — APIXABAN 5 MG: 5 TABLET, FILM COATED ORAL at 08:29

## 2022-01-19 RX ADMIN — Medication 2000 UNITS: at 08:29

## 2022-01-19 RX ADMIN — FLUTICASONE PROPIONATE 220 MCG: 110 AEROSOL, METERED RESPIRATORY (INHALATION) at 06:11

## 2022-01-19 RX ADMIN — Medication 1 TABLET: at 08:29

## 2022-01-19 ASSESSMENT — ENCOUNTER SYMPTOMS
NAUSEA: 0
EYES NEGATIVE: 1
BRUISES/BLEEDS EASILY: 0
VOMITING: 0
COUGH: 0
ABDOMINAL PAIN: 0
SHORTNESS OF BREATH: 0
PALPITATIONS: 0
POLYDIPSIA: 0
CHILLS: 0
FEVER: 0

## 2022-01-19 ASSESSMENT — PAIN DESCRIPTION - PAIN TYPE: TYPE: ACUTE PAIN

## 2022-01-19 NOTE — CARE PLAN
Problem: Mobility Transfers  Goal: STG-Within one week, patient will transfer bed to chair at IND level without an AD.  Outcome: Met  Goal: STG-Within one week, patient will transfer in/out of car at IND level without an AD.  Outcome: Met     Problem: Mobility  Goal: STG-Within one week, patient will ambulate community distances of 500 feet at a gait velocity of 0.8 m/s at IND level without an AD.  Outcome: Met  Note: IND but verbal cueing for path finding prn  Goal: STG-Within one week, patient will ambulate up/down 14 stairs at IND level with use of railings.  Outcome: Met     Problem: PT-Long Term Goals  Goal: LTG-By discharge, patient will ambulate community distances of 500 feet at a gait velocity of 0.8 m/s at IND level without an AD.  Outcome: Met  Goal: LTG-By discharge, patient will transfer bed to chair at IND level without an AD.  Outcome: Met  Goal: LTG-By discharge, patient will ambulate up/down 14 stairs at IND level with use of railings.  Outcome: Met  Goal: LTG-By discharge, patient will transfer in/out of a car at IND level without an AD.  Outcome: Met  Goal: LTG-By discharge, patient will achieve a Low fall risk score on the [place] Balance Scale assessment.  Outcome: Met

## 2022-01-19 NOTE — CARE PLAN
Problem: Knowledge Deficit - Standard  Goal: Patient and family/care givers will demonstrate understanding of plan of care, disease process/condition, diagnostic tests and medications  Outcome: Met     Problem: Skin Integrity  Goal: Skin integrity is maintained or improved  Outcome: Met     Problem: Fall Risk - Rehab  Goal: Patient will remain free from falls  Outcome: Met     Problem: Respiratory  Goal: Patient will understand use and administration of respiratory medications to improve respiratory function  Outcome: Met     Problem: Bladder / Voiding  Goal: Patient will establish and maintain regular urinary output  Outcome: Met  Goal: Patient will establish and maintain bladder regimen  Outcome: Met     Problem: Bowel Elimination  Goal: Patient will participate in bowel management program  Outcome: Met     Problem: Pain - Standard  Goal: Alleviation of pain or a reduction in pain to the patient’s comfort goal  Outcome: Met   The patient is Stable - Low risk of patient condition declining or worsening    Shift Goals  Clinical Goals: safety   Patient Goals: strength

## 2022-01-19 NOTE — PROGRESS NOTES
Hospital Medicine Daily Progress Note      Chief Complaint:  Atrial Fibrillation  Hypertension  Hyponatremia    Interval History:  Denies new complaints.    Review of Systems  Review of Systems   Constitutional: Negative for chills and fever.   HENT: Negative.    Eyes: Negative.    Respiratory: Negative for cough and shortness of breath.    Cardiovascular: Negative for chest pain and palpitations.   Gastrointestinal: Negative for abdominal pain, nausea and vomiting.   Skin: Negative for itching and rash.   Endo/Heme/Allergies: Negative for polydipsia. Does not bruise/bleed easily.        Physical Exam  Temp:  [36.1 °C (97 °F)-37.1 °C (98.8 °F)] 36.1 °C (97 °F)  Pulse:  [61-82] 61  Resp:  [16-18] 17  BP: (101-157)/(66-84) 157/77  SpO2:  [92 %-94 %] 92 %    Physical Exam  Vitals reviewed.   Constitutional:       General: He is not in acute distress.     Appearance: Normal appearance. He is not ill-appearing.   HENT:      Head: Normocephalic and atraumatic.      Right Ear: External ear normal.      Left Ear: External ear normal.      Nose: Nose normal.      Mouth/Throat:      Pharynx: Oropharynx is clear.   Eyes:      General:         Right eye: No discharge.         Left eye: No discharge.      Extraocular Movements: Extraocular movements intact.      Conjunctiva/sclera: Conjunctivae normal.   Cardiovascular:      Comments: irreg irreg  Pulmonary:      Effort: No respiratory distress.      Breath sounds: No wheezing.      Comments: Decreased BS   Abdominal:      General: Bowel sounds are normal. There is no distension.      Palpations: Abdomen is soft.      Tenderness: There is no abdominal tenderness.   Musculoskeletal:      Cervical back: Normal range of motion and neck supple.      Right lower leg: No edema.      Left lower leg: No edema.   Skin:     General: Skin is warm and dry.   Neurological:      Mental Status: He is alert and oriented to person, place, and time.         Fluids    Intake/Output Summary (Last  24 hours) at 1/19/2022 1034  Last data filed at 1/19/2022 0830  Gross per 24 hour   Intake 480 ml   Output 325 ml   Net 155 ml       Laboratory      Recent Labs     01/18/22  0708   SODIUM 131*   POTASSIUM 4.4   CHLORIDE 95*   CO2 28   GLUCOSE 104*   BUN 15   CREATININE 0.67   CALCIUM 9.0                 Assessment/Plan  * Ischemic stroke (HCC)- (present on admission)  Assessment & Plan  On Eliquis and Lipitor    Vitamin D deficiency- (present on admission)  Assessment & Plan  Vit D level 10  On supplementation    Atrial fibrillation (HCC)- (present on admission)  Assessment & Plan  Not on any rate controlling agents  Monitor for tachydysrhythmias  Anticoagulated on Eliquis    Alcohol use- (present on admission)  Assessment & Plan  On Folate, MVT, and Thiamine    Hyponatremia- (present on admission)  Assessment & Plan  Na+ levels improving  Now off fluid restriction    Major depressive disorder- (present on admission)  Assessment & Plan  On Wellbutrin    Hypertension- (present on admission)  Assessment & Plan  Observe blood pressure trends on Enalapril  Monitor for orthostatic hypotension on Flomax and Proscar  Outpt meds include Enalapril    Chronic obstructive pulmonary disease (HCC)- (present on admission)  Assessment & Plan  On Flovent and Anoro Ellipta  RT protocol    Full Code    Patient seen and examined.  Chart reviewed.  Assessment and Plan as above.  No changes today from yesterday's medical decision making.

## 2022-01-19 NOTE — PROGRESS NOTES
This patient, and spouse with patient permission, received individualized medication counseling regarding the current regimen they are receiving in this facility. Potential adverse effects, monitoring parameters, and proper administration were covered in preparation for their discharge.This patient is being treated for hypertension and it is recommended that they monitor and record with a blood pressure device. The patient has been instructed to contact their primary care physician if the HR or blood pressure is abnormal. The patient asked relevant questions regarding the medications for which answers were provided. Our pharmacy hours and phone number were provided for follow up questions should they arise.  Dakotah Rae  [facility]

## 2022-01-19 NOTE — DISCHARGE INSTRUCTIONS
[facility] NURSING DISCHARGE INSTRUCTIONS    Blood Pressure : 157/77  Weight: 61 kg (134 lb 7.7 oz)  Nursing recommendations for David Cho at time of discharge are as follows:  Client verbalized understanding of all discharge instructions and prescriptions.     Review all your home medications and newly ordered medications with your doctor and/or pharmacist. Follow medication instructions as directed by your doctor and/or pharmacist.    Pain Management:   Discharge Pain Medication Instructions:  Comfort Goal: 1  Notify your primary care provider if pain is unrelieved with these measures, if the pain is new, or increased in intensity.    Discharge Skin Characteristics: Warm,Dry  Discharge Skin Exam: Clear     Skin / Wound Care Instructions: Please contact your primary care physician for any change in skin integrity.     If You Have Surgical Incisions / Wounds:  Monitor surgical site(s) for signs of increased swelling, redness or symptoms of drainage from the site or fever as this could indicate signs and symptoms of infection. If these symptoms are noted, notifiy your primary care provider.      Discharge Safety Instructions: Should Have ADULT SUPERVISION     Discharge Safety Concerns: History Of Falls  The interdisciplinary team has made recommendation that you should have adult supervision in the house due to weakness  Anti-embolic stockings are not required to increase circulation to the lower extremities.    Discharge Diet:     Regular   Discharge Liquids:  Thin   Discharge Bowel Function: Continent  Please contact your primary care physician for any changes in bowel habits.  Discharge Bowel Program:    Discharge Bladder Function: Continent  Discharge Urinary Devices: Brief      Nursing Discharge Plan:   Influenza Vaccine Indication: Not indicated: Previously immunized this influenza season and > 8 years of age        Stroke Prevention  Some medical conditions and lifestyle  choices can lead to a higher risk for a stroke. You can help to prevent a stroke by making nutrition, lifestyle, and other changes.  What nutrition changes can be made?    · Eat healthy foods.  ? Choose foods that are high in fiber. These include:  § Fresh fruits.  § Fresh vegetables.  § Whole grains.  ? Eat at least 5 or more servings of fruits and vegetables each day. Try to fill half of your plate at each meal with fruits and vegetables.  ? Choose lean protein foods. These include:  § Lowfat (lean) cuts of meat.  § Chicken without skin.  § Fish.  § Tofu.  § Beans.  § Nuts.  ? Eat low-fat dairy products.  ? Avoid foods that:  § Are high in salt (sodium).  § Have saturated fat.  § Have trans fat.  § Have cholesterol.  § Are processed.  § Are premade.  · Follow eating guidelines as told by your doctor. These may include:  ? Reducing how many calories you eat and drink each day.  ? Limiting how much salt you eat or drink each day to 1,500 milligrams (mg).  ? Using only healthy fats for cooking. These include:  § Olive oil.  § Canola oil.  § Sunflower oil.  ? Counting how many carbohydrates you eat and drink each day.  What lifestyle changes can be made?  · Try to stay at a healthy weight. Talk to your doctor about what a good weight is for you.  · Get at least 30 minutes of moderate physical activity at least 5 days a week. This can include:  ? Fast walking.  ? Biking.  ? Swimming.  · Do not use any products that have nicotine or tobacco. This includes cigarettes and e-cigarettes. If you need help quitting, ask your doctor. Avoid being around tobacco smoke in general.  · Limit how much alcohol you drink to no more than 1 drink a day for nonpregnant women and 2 drinks a day for men. One drink equals 12 oz of beer, 5 oz of wine, or 1½ oz of hard liquor.  · Do not use drugs.  · Avoid taking birth control pills. Talk to your doctor about the risks of taking birth control pills if:  ? You are over 35 years old.  ? You  "smoke.  ? You get migraines.  ? You have had a blood clot.  What other changes can be made?  · Manage your cholesterol.  ? It is important to eat a healthy diet.  ? If your cholesterol cannot be managed through your diet, you may also need to take medicines. Take medicines as told by your doctor.  · Manage your diabetes.  ? It is important to eat a healthy diet and to exercise regularly.  ? If your blood sugar cannot be managed through diet and exercise, you may need to take medicines. Take medicines as told by your doctor.  · Control your high blood pressure (hypertension).  ? Try to keep your blood pressure below 130/80. This can help lower your risk of stroke.  ? It is important to eat a healthy diet and to exercise regularly.  ? If your blood pressure cannot be managed through diet and exercise, you may need to take medicines. Take medicines as told by your doctor.  ? Ask your doctor if you should check your blood pressure at home.  ? Have your blood pressure checked every year. Do this even if your blood pressure is normal.  · Talk to your doctor about getting checked for a sleep disorder. Signs of this can include:  ? Snoring a lot.  ? Feeling very tired.  · Take over-the-counter and prescription medicines only as told by your doctor. These may include aspirin or blood thinners (antiplatelets or anticoagulants).  · Make sure that any other medical conditions you have are managed.  Where to find more information  · American Stroke Association: www.strokeassociation.org  · National Stroke Association: www.stroke.org  Get help right away if:  · You have any symptoms of stroke. \"BE FAST\" is an easy way to remember the main warning signs:  ? B - Balance. Signs are dizziness, sudden trouble walking, or loss of balance.  ? E - Eyes. Signs are trouble seeing or a sudden change in how you see.  ? F - Face. Signs are sudden weakness or loss of feeling of the face, or the face or eyelid drooping on one side.  ? A - Arms. " Signs are weakness or loss of feeling in an arm. This happens suddenly and usually on one side of the body.  ? S - Speech. Signs are sudden trouble speaking, slurred speech, or trouble understanding what people say.  ? T - Time. Time to call emergency services. Write down what time symptoms started.  · You have other signs of stroke, such as:  ? A sudden, very bad headache with no known cause.  ? Feeling sick to your stomach (nausea).  ? Throwing up (vomiting).  ? Jerky movements you cannot control (seizure).  These symptoms may represent a serious problem that is an emergency. Do not wait to see if the symptoms will go away. Get medical help right away. Call your local emergency services (911 in the [place] Do not drive yourself to the hospital.  Summary  · You can prevent a stroke by eating healthy, exercising, not smoking, drinking less alcohol, and treating other health problems, such as diabetes, high blood pressure, or high cholesterol.  · Do not use any products that contain nicotine or tobacco, such as cigarettes and e-cigarettes.  · Get help right away if you have any signs or symptoms of a stroke.  This information is not intended to replace advice given to you by your health care provider. Make sure you discuss any questions you have with your health care provider.  Document Released: 06/18/2013 Document Revised: 02/13/2020 Document Reviewed: 03/21/2018  Elsevier Patient Education © 2020 [employer]        Depression, Adult  Depression is feeling sad, low, down in the dumps, blue, gloomy, or empty. In general, there are two kinds of depression:  · Normal sadness or grief. This can happen after something upsetting. It often goes away on its own within 2 weeks. After losing a loved one (bereavement), normal sadness and grief may last longer than two weeks. It usually gets better with time.  · Clinical depression. This kind lasts longer than normal sadness or grief. It keeps you from doing the things you  normally do in life. It is often hard to function at home, work, or at school. It may affect your relationships with others. Treatment is often needed.  GET HELP RIGHT AWAY IF:  · You have thoughts about hurting yourself or others.  · You lose touch with reality (psychotic symptoms). You may:  ¨ See or hear things that are not real.  ¨ Have untrue beliefs about your life or people around you.  · Your medicine is giving you problems.  MAKE SURE YOU:  · Understand these instructions.  · Will watch your condition.  · Will get help right away if you are not doing well or get worse.     This information is not intended to replace advice given to you by your health care provider. Make sure you discuss any questions you have with your health care provider.     Document Released: 01/20/2012 Document Revised: 01/08/2016 Document Reviewed: 04/18/2013  [employer] Interactive Patient Education ©2016 [employer]    Fall Prevention in the Home, Adult  Falls can cause injuries. They can happen to people of all ages. There are many things you can do to make your home safe and to help prevent falls. Ask for help when making these changes, if needed.  What actions can I take to prevent falls?  General Instructions  · Use good lighting in all rooms. Replace any light bulbs that burn out.  · Turn on the lights when you go into a dark area. Use night-lights.  · Keep items that you use often in easy-to-reach places. Lower the shelves around your home if necessary.  · Set up your furniture so you have a clear path. Avoid moving your furniture around.  · Do not have throw rugs and other things on the floor that can make you trip.  · Avoid walking on wet floors.  · If any of your floors are uneven, fix them.  · Add color or contrast paint or tape to clearly ector and help you see:  ? Any grab bars or handrails.  ? First and last steps of stairways.  ? Where the edge of each step is.  · If you use a stepladder:  ? Make sure that it is fully  opened. Do not climb a closed stepladder.  ? Make sure that both sides of the stepladder are locked into place.  ? Ask someone to hold the stepladder for you while you use it.  · If there are any pets around you, be aware of where they are.  What can I do in the bathroom?         · Keep the floor dry. Clean up any water that spills onto the floor as soon as it happens.  · Remove soap buildup in the tub or shower regularly.  · Use non-skid mats or decals on the floor of the tub or shower.  · Attach bath mats securely with double-sided, non-slip rug tape.  · If you need to sit down in the shower, use a plastic, non-slip stool.  · Install grab bars by the toilet and in the tub and shower. Do not use towel bars as grab bars.  What can I do in the bedroom?  · Make sure that you have a light by your bed that is easy to reach.  · Do not use any sheets or blankets that are too big for your bed. They should not hang down onto the floor.  · Have a firm chair that has side arms. You can use this for support while you get dressed.  What can I do in the kitchen?  · Clean up any spills right away.  · If you need to reach something above you, use a strong step stool that has a grab bar.  · Keep electrical cords out of the way.  · Do not use floor polish or wax that makes floors slippery. If you must use wax, use non-skid floor wax.  What can I do with my stairs?  · Do not leave any items on the stairs.  · Make sure that you have a light switch at the top of the stairs and the bottom of the stairs. If you do not have them, ask someone to add them for you.  · Make sure that there are handrails on both sides of the stairs, and use them. Fix handrails that are broken or loose. Make sure that handrails are as long as the stairways.  · Install non-slip stair treads on all stairs in your home.  · Avoid having throw rugs at the top or bottom of the stairs. If you do have throw rugs, attach them to the floor with carpet tape.  · Choose a  carpet that does not hide the edge of the steps on the stairway.  · Check any carpeting to make sure that it is firmly attached to the stairs. Fix any carpet that is loose or worn.  What can I do on the outside of my home?  · Use bright outdoor lighting.  · Regularly fix the edges of walkways and driveways and fix any cracks.  · Remove anything that might make you trip as you walk through a door, such as a raised step or threshold.  · Trim any bushes or trees on the path to your home.  · Regularly check to see if handrails are loose or broken. Make sure that both sides of any steps have handrails.  · Install guardrails along the edges of any raised decks and porches.  · Clear walking paths of anything that might make someone trip, such as tools or rocks.  · Have any leaves, snow, or ice cleared regularly.  · Use sand or salt on walking paths during winter.  · Clean up any spills in your garage right away. This includes grease or oil spills.  What other actions can I take?  · Wear shoes that:  ? Have a low heel. Do not wear high heels.  ? Have rubber bottoms.  ? Are comfortable and fit you well.  ? Are closed at the toe. Do not wear open-toe sandals.  · Use tools that help you move around (mobility aids) if they are needed. These include:  ? Canes.  ? Walkers.  ? Scooters.  ? Crutches.  · Review your medicines with your doctor. Some medicines can make you feel dizzy. This can increase your chance of falling.  Ask your doctor what other things you can do to help prevent falls.  Where to find more information  · Centers for Disease Control and Prevention, STEAD[employer]: https://cdc.gov  · National [place] [employer] Aging: https://lg9ycxp.christina.nih.gov  Contact a doctor if:  · You are afraid of falling at home.  · You feel weak, drowsy, or dizzy at home.  · You fall at home.  Summary  · There are many simple things that you can do to make your home safe and to help prevent falls.  · Ways to make your home safe include removing  tripping hazards and installing grab bars in the bathroom.  · Ask for help when making these changes in your home.  This information is not intended to replace advice given to you by your health care provider. Make sure you discuss any questions you have with your health care provider.  Document Released: 10/14/2010 Document Revised: 04/09/2020 Document Reviewed: 08/02/2018  [employer] Patient Education © 2020 [employer]      COVID-19  COVID-19 is a respiratory infection that is caused by a virus called severe acute respiratory syndrome coronavirus 2 (SARS-CoV-2). The disease is also known as coronavirus disease or novel coronavirus. In some people, the virus may not cause any symptoms. In others, it may cause a serious infection. The infection can get worse quickly and can lead to complications, such as:  · Pneumonia, or infection of the lungs.  · Acute respiratory distress syndrome or ARDS. This is fluid build-up in the lungs.  · Acute respiratory failure. This is a condition in which there is not enough oxygen passing from the lungs to the body.  · Sepsis or septic shock. This is a serious bodily reaction to an infection.  · Blood clotting problems.  · Secondary infections due to bacteria or fungus.  The virus that causes COVID-19 is contagious. This means that it can spread from person to person through droplets from coughs and sneezes (respiratory secretions).  What are the causes?  This illness is caused by a virus. You may catch the virus by:  · Breathing in droplets from an infected person's cough or sneeze.  · Touching something, like a table or a doorknob, that was exposed to the virus (contaminated) and then touching your mouth, nose, or eyes.  What increases the risk?  Risk for infection  You are more likely to be infected with this virus if you:  · Live in or travel to an area with a COVID-19 outbreak.  · Come in contact with a sick person who recently traveled to an area with a COVID-19  outbreak.  · Provide care for or live with a person who is infected with COVID-19.  Risk for serious illness  You are more likely to become seriously ill from the virus if you:  · Are 65 years of age or older.  · Have a long-term disease that lowers your body's ability to fight infection (immunocompromised).  · Live in a nursing home or long-term care facility.  · Have a long-term (chronic) disease such as:  ? Chronic lung disease, including chronic obstructive pulmonary disease or asthma  ? Heart disease.  ? Diabetes.  ? Chronic kidney disease.  ? Liver disease.  · Are obese.  What are the signs or symptoms?  Symptoms of this condition can range from mild to severe. Symptoms may appear any time from 2 to 14 days after being exposed to the virus. They include:  · A fever.  · A cough.  · Difficulty breathing.  · Chills.  · Muscle pains.  · A sore throat.  · Loss of taste or smell.  Some people may also have stomach problems, such as nausea, vomiting, or diarrhea.  Other people may not have any symptoms of COVID-19.  How is this diagnosed?  This condition may be diagnosed based on:  · Your signs and symptoms, especially if:  ? You live in an area with a COVID-19 outbreak.  ? You recently traveled to or from an area where the virus is common.  ? You provide care for or live with a person who was diagnosed with COVID-19.  · A physical exam.  · Lab tests, which may include:  ? A nasal swab to take a sample of fluid from your nose.  ? A throat swab to take a sample of fluid from your throat.  ? A sample of mucus from your lungs (sputum).  ? Blood tests.  · Imaging tests, which may include, X-rays, CT scan, or ultrasound.  How is this treated?  At present, there is no medicine to treat COVID-19. Medicines that treat other diseases are being used on a trial basis to see if they are effective against COVID-19.  Your health care provider will talk with you about ways to treat your symptoms. For most people, the infection is  mild and can be managed at home with rest, fluids, and over-the-counter medicines.  Treatment for a serious infection usually takes places in a hospital intensive care unit (ICU). It may include one or more of the following treatments. These treatments are given until your symptoms improve.  · Receiving fluids and medicines through an IV.  · Supplemental oxygen. Extra oxygen is given through a tube in the nose, a face mask, or a jordan.  · Positioning you to lie on your stomach (prone position). This makes it easier for oxygen to get into the lungs.  · Continuous positive airway pressure (CPAP) or bi-level positive airway pressure (BPAP) machine. This treatment uses mild air pressure to keep the airways open. A tube that is connected to a motor delivers oxygen to the body.  · Ventilator. This treatment moves air into and out of the lungs by using a tube that is placed in your windpipe.  · Tracheostomy. This is a procedure to create a hole in the neck so that a breathing tube can be inserted.  · Extracorporeal membrane oxygenation (ECMO). This procedure gives the lungs a chance to recover by taking over the functions of the heart and lungs. It supplies oxygen to the body and removes carbon dioxide.  Follow these instructions at home:  Lifestyle  · If you are sick, stay home except to get medical care. Your health care provider will tell you how long to stay home. Call your health care provider before you go for medical care.  · Rest at home as told by your health care provider.  · Do not use any products that contain nicotine or tobacco, such as cigarettes, e-cigarettes, and chewing tobacco. If you need help quitting, ask your health care provider.  · Return to your normal activities as told by your health care provider. Ask your health care provider what activities are safe for you.  General instructions  · Take over-the-counter and prescription medicines only as told by your health care provider.  · Drink enough  fluid to keep your urine pale yellow.  · Keep all follow-up visits as told by your health care provider. This is important.  How is this prevented?    There is no vaccine to help prevent COVID-19 infection. However, there are steps you can take to protect yourself and others from this virus.  To protect yourself:   · Do not travel to areas where COVID-19 is a risk. The areas where COVID-19 is reported change often. To identify high-risk areas and travel restrictions, check the CDC travel website: wwwnc.cdc.gov/travel/notices  · If you live in, or must travel to, an area where COVID-19 is a risk, take precautions to avoid infection.  ? Stay away from people who are sick.  ? Wash your hands often with soap and water for 20 seconds. If soap and water are not available, use an alcohol-based hand .  ? Avoid touching your mouth, face, eyes, or nose.  ? Avoid going out in public, follow guidance from your state and local health authorities.  ? If you must go out in public, wear a cloth face covering or face mask.  ? Disinfect objects and surfaces that are frequently touched every day. This may include:  § Counters and tables.  § Doorknobs and light switches.  § Sinks and faucets.  § Electronics, such as phones, remote controls, keyboards, computers, and tablets.  To protect others:  If you have symptoms of COVID-19, take steps to prevent the virus from spreading to others.  · If you think you have a COVID-19 infection, contact your health care provider right away. Tell your health care team that you think you may have a COVID-19 infection.  · Stay home. Leave your house only to seek medical care. Do not use public transport.  · Do not travel while you are sick.  · Wash your hands often with soap and water for 20 seconds. If soap and water are not available, use alcohol-based hand .  · Stay away from other members of your household. Let healthy household members care for children and pets, if possible. If  you have to care for children or pets, wash your hands often and wear a mask. If possible, stay in your own room, separate from others. Use a different bathroom.  · Make sure that all people in your household wash their hands well and often.  · Cough or sneeze into a tissue or your sleeve or elbow. Do not cough or sneeze into your hand or into the air.  · Wear a cloth face covering or face mask.  Where to find more information  · Centers for Disease Control and Prevention: www.cdc.gov/coronavirus/2019-ncov/index.html  · World Health Organization: www.who.int/health-topics/coronavirus  Contact a health care provider if:  · You live in or have traveled to an area where COVID-19 is a risk and you have symptoms of the infection.  · You have had contact with someone who has COVID-19 and you have symptoms of the infection.  Get help right away if:  · You have trouble breathing.  · You have pain or pressure in your chest.  · You have confusion.  · You have bluish lips and fingernails.  · You have difficulty waking from sleep.  · You have symptoms that get worse.  These symptoms may represent a serious problem that is an emergency. Do not wait to see if the symptoms will go away. Get medical help right away. Call your local emergency services (911 in the [place] Do not drive yourself to the hospital. Let the emergency medical personnel know if you think you have COVID-19.  Summary  · COVID-19 is a respiratory infection that is caused by a virus. It is also known as coronavirus disease or novel coronavirus. It can cause serious infections, such as pneumonia, acute respiratory distress syndrome, acute respiratory failure, or sepsis.  · The virus that causes COVID-19 is contagious. This means that it can spread from person to person through droplets from coughs and sneezes.  · You are more likely to develop a serious illness if you are 65 years of age or older, have a weak immunity, live in a nursing home, or have chronic  disease.  · There is no medicine to treat COVID-19. Your health care provider will talk with you about ways to treat your symptoms.  · Take steps to protect yourself and others from infection. Wash your hands often and disinfect objects and surfaces that are frequently touched every day. Stay away from people who are sick and wear a mask if you are sick.  This information is not intended to replace advice given to you by your health care provider. Make sure you discuss any questions you have with your health care provider.  Document Released: 01/23/2020 Document Revised: 05/14/2020 Document Reviewed: 01/23/2020  [employer] Patient Education © 2020 [employer]          Case Management Discharge Instructions:   Discharge Location: Home with Outpatient Services  Agency Name/Address/Phone: [facility]: [phone].  They will call you to schedule first visit.  Home Health: Occupational Therapist,Physical Therapist,Speech Therapist  Outpatient Services:    DME Provider/Phone:    Medical Equipment Ordered:    Prescription Faxed to:        Discharge Medication Instructions:  Below are the medications your physician expects you to take upon discharge:          Occupational Therapy Discharge Instructions for David Cho    1/18/2022    Level of Assist Required for Eating: Able to Complete Eating without Assist  Level of Assist Required for Grooming: Able to Complete Grooming without Assist  Level of Assist Required for Dressing: Able to Complete Dressing without Assist  Level of Assist Required for Toileting: Able to Complete Toileting without Assist  Level of Assist Required for Toilet Transfer: Able to Complete Toilet Transfer without Assist  Level of Assist Required for Bathing: Requires Supervision with Bathing  Equipment for Bathing: Grab Bars in Tub / Shower  Level of Assist Required for Shower Transfer: Requires Supervision with Shower Transfer  Equipment for Shower Transfer: Grab Bars in Tub / Shower  Level of  Assist Required for Home Mgmt: Able to Complete Home Management without Assist  Level of Assist Required for Meal Prep: Requires Supervision with Meal Preparation  Driving: May not Drive, Please Contact Physician for Further Information  Home Exercise Program: Refer to Home Exercise Program Handout for Details    David, it was a pleasure working with you in therapy! We will miss you!   -Ursula, OT           Physical Therapy Discharge Instructions for David Alejandro Marquis    1/18/2022    Level of Assist Required for Ambulation: No Assist on Flat Surfaces,No Assist on Curbs,No Assist on Stairs (Supervision as needed due to fatigue or cognition)  Distance Patient May Ambulate: 1000 feet or as safety allows  Device Recommended for Ambulation: None  Level of Assist Required for Transfers: Requires No Assist  Device Recommended for Transfers: None  Home Exercise Program: Refer to Home Exercise Program Handout for Details

## 2022-01-19 NOTE — PROGRESS NOTES
Patient discharged to home per order.  Discharge instructions reviewed with patient and wife; they verbalize understanding and signed copies placed in chart.  Patient has all belongings; signed copy of form in chart. He received Meds to Bed. Patient left facility at 10:45 am via ambulation accompanied by rehab staff and wife. Have enjoyed working with this pleasant patient.

## 2022-01-19 NOTE — DISCHARGE SUMMARY
Admission Date: 1/12/2022    Discharge Date: 1/19/2022    Attending Provider: Eusebia Mario MD    Admission Diagnosis:   Active Hospital Problems    Diagnosis    • *Ischemic stroke (HCC)    • Urinary retention    • Vitamin D deficiency    • Pulmonary hypertension (HCC)    • Mitral regurgitation    • Bradycardia    • Atrial fibrillation (HCC)    • BPH (benign prostatic hyperplasia)    • Alcohol use    • Hyponatremia    • Major depressive disorder    • Chronic obstructive pulmonary disease (HCC)    • Hypertension        Discharge Diagnosis:  Active Hospital Problems    Diagnosis    • *Ischemic stroke (HCC)    • Urinary retention    • Vitamin D deficiency    • Pulmonary hypertension (HCC)    • Mitral regurgitation    • Bradycardia    • Atrial fibrillation (HCC)    • BPH (benign prostatic hyperplasia)    • Alcohol use    • Hyponatremia    • Major depressive disorder    • Chronic obstructive pulmonary disease (HCC)    • Hypertension        HPI per H&P:  Patient is a 81 y.o. male  with a past medical history of hypertension, BPH, COPD, hyperlipidemia, depression, admitted to [facility] on 1/10, with left-sided weakness and numbness.  Admission labs with hyponatremia sodium of 127, borderline hyperkalemia potassium of 5.1, elevated alk phos.  EKG with atrial fibrillation.  Head CT without acute findings.  Negative perfusion scan.  Negative CTA of the head.  CTA of the neck with moderate stenosis of bilateral carotid arteries, severe stenosis of the proximal right vertebral artery, occlusion of the left vertebral artery at 67 of undetermined age, moderate proximal right subclavian stenosis.  Patient also with hypertension systolics in the 220s.  He was admitted to complete stroke work-up. HgbA1c 6.1, LDL 56, ECHO EF 65 % with moderate to severe mitral regurgitation with severely dilated left atrium, moderate tricuspid regurgitation, right ventricular systolic pressure 58 mmHg.  MRI confirmed  multifocal areas of acute cortical infarcts in the right frontal and parietal lobes, multiple chronic lacunar infarcts, severe chronic microvascular ischemic disease with moderate cerebral volume loss.  Cardiology was consulted due to bradycardia arrhythmia. Recommendation for outpatient follow-up and to avoid AV woody agents.  He was started on anticoagulation for atrial fibrillation.     Patient current reports left hand weakness. He is right hand dominant. He denies any changes in his vision or numbness/tingling. He is hard of hearing and does not have hearing aides. No issues with his bowel or bladder. He denies any pain. He reports weight loss secondary to decreased oral intake as his upper dentures don't fit right, and he needs more work done on his lower teeth.     Patient was evaluated by Rehab Medicine physician and Physical Therapy, Occupational Therapy and Speech Therapy and determined to be appropriate for acute inpatient rehab and was transferred to [facility] on 1/12/2022  2:04 PM.    Rehab Hospital Course by Problem List:    Right frontal/parietal lobe ischemic stroke  Cardioembolic  Mild left-sided weakness, improved  Left sided hand incoordination, improved  Mild cognitive impairments, improved     Eliquis  Statin     Outpatient follow-up with [facility], Dr. 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 based on urine studies  Checked urine sodium and osmoles  Discontinued 1.5 L fluid restriction  Discontinued salt tabs     Alcohol use  Counseling  provided, patient does want to decrease daily amount  Vitamins     Vitamin D deficiency, 10  Continue supplementation     COPD  Ellipta  Mucinex  Flonase  As needed Robitussin DM     History of depression  Wellbutrin  Mood stable during admission     Bowel program  Continue bowel medications  Last BM 1/18     DVT prophylaxis  Eliquis       Functional Status at Discharge  Eating:  Modified Independent  Eating Description:  Increased time,Set-up of equipment or meal/tube feeding  Grooming:  Modified Independent  Grooming Description:  Standing at sink (shave, brush teeth)  Bathing:  Supervision  Bathing Description:  Grab bar,Tub bench,Increased time,Set-up of equipment,Supervision for safety (stood in shower )  Upper Body Dressing:  Modified Independent  Upper Body Dressing Description:  Supervision for safety (pt donning/doffing shirt while in standing)  Lower Body Dressing:  Supervision  Lower Body Dressing Description:  Grab bar,Increased time,Initial preparation for task,Set-up of equipment,Supervision for safety (cues needed to sit on toilet during LBD)  Discharge Location : Home  Patient Discharging with Assist of: Spouse / Significant Other  Level of Supervision Required: Intermittent Supervision  Recommended Equipment for Discharge: Grab Bars in Tub / Shower  Recommended Services Upon Discharge: Outpatient Occupational Therapy  Long Term Goals Met: 2  Long Term Goals Not Met: 1  Reason(s) for Goals Not Met: pt still requires supervision during showers/cooking  Criteria for Termination of Services: Maximum Function Achieved for Inpatient Rehabilitation  Walk:   (IND in known areas; SPV for path finding cues)  Distance Walked:   (972 ft with 6MWT; 3 other indoor reps without fatigue noted)  Number of Times Distance Was Traveled:   (per above)  Assistive Device:  None  Gait Deviation:  Decreased Heel Strike (and cues for path finding on unit)  Wheelchair:   (n/a)  Distance Propelled:   (n/a)   Wheelchair  Description:   (n/a)  Stairs Modified Independent  Stairs Description Hand rails  Discharge Location: Home  Patient Discharging with Assist of: Family;Spouse / Significant Other  Level of Supervision Required Upon Discharge: Intermittent Supervision  Recommended Equipment for Discharge: None  Recommeded Services Upon Discharge: Outpatient Physical Therapy  Long Term Goals Met: 5  Long Term Goals Not Met: 0  Reason(s) for Goals Not Met: n/a  Criteria for Termination of Services: Maximum Function Achieved for Inpatient Rehabilitation  Comprehension:  Modified Independent  Comprehension Description:  Glasses,Hearing aids/amplifiers,Verbal cues  Expression:  Independent  Expression Description:     Social Interaction:  Independent  Social Interaction Description:     Problem Solving:  Supervision  Problem Solving Description:  Verbal cueing,Therapy schedule,Supervision,Increased time  Memory:  Supervision  Memory Description:  Verbal cueing,Therapy schedule,Supervision,Increased time,Seat belt       I, Eusebia Mario M.D., personally performed a complete drug regimen review and no potential clinically significant medication issues were identified.     Discharge Medication:     Medication List      START taking these medications      Instructions   buPROPion  MG Tb12 sustained-release tablet  Commonly known as: WELLBUTRIN-SR  Replaces: buPROPion 150 MG XL tablet   Take 1 Tablet by mouth every day.  Dose: 150 mg     Cholecalciferol 2000 UNIT Tabs   Take 1 Tablet by mouth every day.  Dose: 2,000 Units     guaiFENesin dextromethorphan 100-10 MG/5ML Syrp syrup  Commonly known as: ROBITUSSIN DM   Take 5 mL by mouth every 6 hours as needed for Cough.  Dose: 5 mL     guaiFENesin  MG Tb12  Commonly known as: MUCINEX   Take 1 Tablet by mouth every 12 hours.  Dose: 600 mg     omeprazole 20 MG delayed-release capsule  Commonly known as: PRILOSEC   Take 1 Capsule by mouth every morning before breakfast.  Dose: 20  mg     senna-docusate 8.6-50 MG Tabs  Commonly known as: PERICOLACE or SENOKOT S   Take 2 Tablets by mouth 2 times a day.  Dose: 2 Tablet     tamsulosin 0.4 MG capsule  Commonly known as: FLOMAX   Take 1 Capsule by mouth 1/2 hour after dinner.  Dose: 0.4 mg     therapeutic multivitamin-minerals Tabs   Take 1 Tablet by mouth every day.  Dose: 1 Tablet        CHANGE how you take these medications      Instructions   enalapril 20 MG tablet  What changed:   · how much to take  · when to take this  Commonly known as: VASOTEC   Take 2 Tablets by mouth every day.  Dose: 40 mg     finasteride 5 MG Tabs  What changed: See the new instructions.  Commonly known as: PROSCAR   Take 1 Tablet by mouth every day.  Dose: 5 mg     thiamine 100 MG tablet  What changed:   · medication strength  · how much to take  Commonly known as: THIAMINE   Take 1 Tablet by mouth every day.  Dose: 100 mg        CONTINUE taking these medications      Instructions   atorvastatin 40 MG Tabs  Commonly known as: LIPITOR   Take 1 Tablet by mouth every evening.  Dose: 40 mg     Eliquis 5mg Tabs  Generic drug: apixaban   Take 1 Tablet by mouth 2 times a day. Indications: Thromboembolism secondary to Atrial Fibrillation  Dose: 5 mg     folic acid 1 MG Tabs  Commonly known as: FOLVITE   Take 1 Tablet by mouth every day.  Dose: 1 mg     Trelegy Ellipta 100-62.5-25 MCG/INH Aepb inhalation  Generic drug: Fluticasone-Umeclidin-Vilant   Inhale 1 Inhalation every day. Rinse mouth after use  Dose: 1 Inhalation        STOP taking these medications    albuterol 108 (90 Base) MCG/ACT Aers inhalation aerosol     buPROPion 150 MG XL tablet  Commonly known as: WELLBUTRIN XL  Replaced by: buPROPion  MG Tb12 sustained-release tablet     enalaprilat 1.25 MG/ML Inj  Commonly known as: Vasotec     hydrALAZINE 20 MG/ML Soln  Commonly known as: APRESOLINE            Discharge Diet:  Regular    Discharge Activity:  Do not return to driving until cleared by a physician.          Disposition:  Patient to discharge home with family support and community resources.     Equipment:  Follow-up & Discharge Instructions:  Outpatient: PT/OT/SLP     Equip: none needed     Follow up: PCP, stroke bridge, Dr. Trejo     Condition on Discharge:  Good    More than 35 minutes was spent on discharging this patient, including face-to-face time, prescription management, and the dictation of this note.    Eusebia Mario M.D.    Date of Service: 1/19/2022

## 2022-01-19 NOTE — THERAPY
Physical Therapy   Daily Treatment     Patient Name: David Cho  Age:  81 y.o., Sex:  male  Medical Record #: [id number]  Today's Date: 1/18/2022     Precautions  Precautions: Other (See Comments)  Comments: BP JONES LUE only, IND ambulation trial in room bed<>toilet    Subjective    Patient agreeable to PT today; pt's daughter and SO present for family training during second half of session.     Objective       01/18/22 1401   Precautions   Precautions Other (See Comments)   Comments BP JONES LUE only, IND ambulation trial in room bed<>toilet   Vitals   O2 (LPM) 0   O2 Delivery Device None - Room Air   Gait Functional Level of Assist    Gait Level Of Assist   (IND in known areas; SPV for path finding cues)   Assistive Device None   Distance (Feet)   (972 ft with 6MWT; 3 other indoor reps without fatigue noted)   # of Times Distance was Traveled   (per above)   Deviation Decreased Heel Strike  (and cues for path finding on unit)   Wheelchair Functional Level of Assist   Wheelchair Assist   (n/a)   Distance Wheelchair (Feet or Distance)   (n/a)   Wheelchair Description   (n/a)   Stairs Functional Level of Assist   Level of Assist with Stairs Modified Independent   # of Stairs Climbed 30  (mixed heights of stairs)   Stairs Description Hand rails   Transfer Functional Level of Assist   Bed, Chair, Wheelchair Transfer Independent   Toilet Transfers Modified Independent   Toilet Transfer Description Grab bar   Standing Lower Body Exercises   Standing Lower Body Exercises   (reviewed Standing HEP in // bars, SPV-IND without visual aid)   Hamstring Curl 1 set of 15   Hip Extension 1 set of 15  (pt performed Hip X instead of mini-squats today)   Hip Abduction 1 set of 15   Marching 1 set of 15   Heel Rise 1 set of 15   Toe Rise 1 set of 15   Comments Pt was able to recall 3 of 5 HEP without handout today.   Bed Mobility    Supine to Sit Independent   Sit to Supine Independent   Sit to Stand Independent   Scooting  Independent   Rolling Independent   6 Minute Walk Test   Distance (feet) 972   Gait Speed (meters per second) 0.82   Number of Rests 0   Level of Assistance 5  (Would be IND but uses cues for path finding; no AD)   Interdisciplinary Plan of Care Collaboration   IDT Collaboration with  Family / Caregiver;Occupational Therapist;Speech Therapist   Patient Position at End of Therapy Seated;Edge of Bed;Call Light within Reach;Tray Table within Reach;Phone within Reach;Family / Friend in Room;Other (Comments)   Collaboration Comments Family training with SO and Daughter for second half of session; discussed safety, SPV-IND, and POC; handoff to SLP at end of session; discussed pt's CLOF, safety, and POC with both OTs   PT Total Time Spent   PT Individual Total Time Spent (Mins) 60   PT Charge Group   PT Gait Training 2   PT Therapeutic Exercise 1   PT Therapeutic Activities 1        01/18/22 1401   Roll Left and Right   Assistance Needed Independent   CARE Score - Roll Left and Right 6   Sit to Lying   Assistance Needed Independent   CARE Score - Sit to Lying 6   Lying to Sitting on Side of Bed   Assistance Needed Independent   CARE Score - Lying to Sitting on Side of Bed 6   Sit to Stand   Assistance Needed Independent   CARE Score - Sit to Stand 6   Chair/Bed-to-Chair Transfer   Assistance Needed Independent   CARE Score - Chair/Bed-to-Chair Transfer 6   Toilet Transfer   Assistance Needed Adaptive equipment   CARE Score - Toilet Transfer 6   Car Transfer   Assistance Needed Independent   CARE Score - Car Transfer 6   Walk 10 Feet   Assistance Needed Independent   CARE Score - Walk 10 Feet 6   Walk 50 Feet with Two Turns   Assistance Needed Independent   CARE Score - Walk 50 Feet with Two Turns 6   Walk 150 Feet   Assistance Needed Verbal cues   CARE Score - Walk 150 Feet 4   1 Step (Curb)   Assistance Needed Adaptive equipment   CARE Score - 1 Step (Curb) 6   4 Steps   Assistance Needed Adaptive equipment   CARE Score -  4 Steps 6   12 Steps   Assistance Needed Adaptive equipment   CARE Score - 12 Steps 6   Wheel 50 Feet with Two Turns   Reason if not Attempted Activity not applicable   CARE Score - Wheel 50 Feet with Two Turns 9   Wheel 150 Feet   Reason if not Attempted Activity not applicable   CARE Score - Wheel 150 Feet 9       Assessment    Great support from daughter and good support from SO with training today; pt has improving activity tolerance without fatigue noted; SPV for path finding cues and occasionally cognition during session; good verbal understanding of goals, safety, and POC at this time; pt is ready for next level of care.  Strengths: Able to follow instructions,Adequate strength,Alert and oriented,Effective communication skills,Independent prior level of function,Making steady progress towards goals,Manages pain appropriately,Motivated for self care and independence,Pleasant and cooperative,Willingly participates in therapeutic activities  Barriers: Decreased endurance,Hearing impairment,Home accessibility,Impaired balance,Poor family support    Plan    D/C home tomorrow    Passport items to be completed: Completed      Physical Therapy Problems (Active)     Problem: Mobility     Dates: Start: 01/13/22       Goal: STG-Within one week, patient will ambulate community distances of 500 feet at a gait velocity of 0.8 m/s at IND level without an AD.     Dates: Start: 01/13/22       Goal Note filed on 01/17/22 1151 by Jimbo Tanner, PT     SPV for 1000 feet indoors without an AD.            Goal: STG-Within one week, patient will ambulate up/down 14 stairs at IND level with use of railings.     Dates: Start: 01/13/22       Goal Note filed on 01/17/22 1151 by Jimbo Tanner, PT     SBA-SPV for 20 stairs with railings.               Problem: Mobility Transfers     Dates: Start: 01/13/22       Goal: STG-Within one week, patient will transfer bed to chair at IND level without an AD.     Dates: Start:  01/13/22       Goal Note filed on 01/17/22 1151 by Jimbo Tanner, PT     SPV with bed transfers at this time.            Goal: STG-Within one week, patient will transfer in/out of car at IND level without an AD.     Dates: Start: 01/13/22       Goal Note filed on 01/17/22 1151 by Jimbo Tanner, PT     SBA with car transfers at this time.               Problem: PT-Long Term Goals     Dates: Start: 01/13/22       Goal: LTG-By discharge, patient will ambulate community distances of 500 feet at a gait velocity of 0.8 m/s at IND level without an AD.     Dates: Start: 01/13/22          Goal: LTG-By discharge, patient will transfer bed to chair at IND level without an AD.     Dates: Start: 01/13/22          Goal: LTG-By discharge, patient will ambulate up/down 14 stairs at IND level with use of railings.     Dates: Start: 01/13/22          Goal: LTG-By discharge, patient will transfer in/out of a car at IND level without an AD.     Dates: Start: 01/13/22          Goal: LTG-By discharge, patient will achieve a Low fall risk score on the [place] Balance Scale assessment.     Dates: Start: 01/13/22

## 2022-01-19 NOTE — CARE PLAN
Blood pressure stable,afebrile, denies pain , continent of stool ,refused senna, had loose stools as per pt. Pt for discharge in am.Teachings of safety measures , meds uses and indications and importance of clinic visit to Md reviewed. Verbalized understanding.    Problem: Fall Risk - Rehab  Goal: Patient will remain free from falls  Outcome: Progressing  Note: Kitty Velasquez Fall risk Assessment Score: 8    - Call light within reach   - Yellow  socks   - Belongings within reach   - Bed in the lowest position        The patient is Stable - Low risk of patient condition declining or worsening    Shift Goals  Clinical Goals: Monitor BP, BM  Patient Goals: Gain strength    Progress made toward(s) clinical / shift goals: Pt free from fall and injury.

## 2022-01-21 NOTE — DISCHARGE PLANNING
CM called patient to let him know the closest OP Therapy Clinic other than [facility] is out near [facility].  AMRIT LM on  to call CM for further instructions and whether or not he would like referral sent.  CM did not cancel [facility] yet.  CM available as needs arise.

## 2022-01-26 ENCOUNTER — OFFICE VISIT (OUTPATIENT)
Dept: PHYSICAL MEDICINE AND REHAB | Facility: REHABILITATION | Age: 82
End: 2022-01-26
Payer: MEDICARE

## 2022-01-26 VITALS
WEIGHT: 136.6 LBS | OXYGEN SATURATION: 94 % | HEIGHT: 70 IN | HEART RATE: 80 BPM | TEMPERATURE: 98.5 F | SYSTOLIC BLOOD PRESSURE: 155 MMHG | BODY MASS INDEX: 19.56 KG/M2 | RESPIRATION RATE: 18 BRPM | DIASTOLIC BLOOD PRESSURE: 80 MMHG

## 2022-01-26 DIAGNOSIS — Z74.09 IMPAIRED MOBILITY AND ACTIVITIES OF DAILY LIVING: ICD-10-CM

## 2022-01-26 DIAGNOSIS — G81.94 LEFT HEMIPARESIS (HCC): ICD-10-CM

## 2022-01-26 DIAGNOSIS — Z78.9 IMPAIRED MOBILITY AND ACTIVITIES OF DAILY LIVING: ICD-10-CM

## 2022-01-26 DIAGNOSIS — I63.411 CEREBROVASCULAR ACCIDENT (CVA) DUE TO EMBOLISM OF RIGHT MIDDLE CEREBRAL ARTERY (HCC): Primary | ICD-10-CM

## 2022-01-26 PROCEDURE — 99215 OFFICE O/P EST HI 40 MIN: CPT | Performed by: PHYSICAL MEDICINE & REHABILITATION

## 2022-01-26 ASSESSMENT — FIBROSIS 4 INDEX: FIB4 SCORE: 1.4
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
## 2022-01-27 NOTE — TELEPHONE ENCOUNTER
Patient was discharged on 1/12/22. He has many therapy appointments ans would like to hold off on the Bronchoscopy until he returns to see us in March. I will alert the provider./ap

## 2022-01-28 ENCOUNTER — TELEPHONE (OUTPATIENT)
Dept: CARDIOLOGY | Facility: MEDICAL CENTER | Age: 82
End: 2022-01-28

## 2022-01-28 NOTE — TELEPHONE ENCOUNTER
Spoke with pt who confirmed NP with Andreia DAVENPORT. Per pt has not seen any other cardiology outside of [facility]. Per pt has not has any recent hospitalizations outside of [facility]. Confirmed with pt that recent lab work and cardiac testing is in pts chart.   Pt is jarrett to see 02/04/22 on 1330.   Appointment time, location and date confirmed with pt.

## 2022-02-02 ENCOUNTER — OFFICE VISIT (OUTPATIENT)
Dept: NEUROLOGY | Facility: MEDICAL CENTER | Age: 82
End: 2022-02-02
Attending: NURSE PRACTITIONER
Payer: MEDICARE

## 2022-02-02 VITALS
DIASTOLIC BLOOD PRESSURE: 84 MMHG | HEART RATE: 85 BPM | OXYGEN SATURATION: 96 % | HEIGHT: 70 IN | BODY MASS INDEX: 20.52 KG/M2 | TEMPERATURE: 97.7 F | WEIGHT: 143.3 LBS | SYSTOLIC BLOOD PRESSURE: 136 MMHG | RESPIRATION RATE: 12 BRPM

## 2022-02-02 DIAGNOSIS — E78.2 MIXED HYPERLIPIDEMIA: ICD-10-CM

## 2022-02-02 DIAGNOSIS — R73.03 PREDIABETES: ICD-10-CM

## 2022-02-02 DIAGNOSIS — I48.91 ATRIAL FIBRILLATION, UNSPECIFIED TYPE (HCC): ICD-10-CM

## 2022-02-02 DIAGNOSIS — I69.30 LATE EFFECT OF STROKE: ICD-10-CM

## 2022-02-02 DIAGNOSIS — I10 PRIMARY HYPERTENSION: Chronic | ICD-10-CM

## 2022-02-02 PROCEDURE — 99215 OFFICE O/P EST HI 40 MIN: CPT | Performed by: NURSE PRACTITIONER

## 2022-02-02 PROCEDURE — 99212 OFFICE O/P EST SF 10 MIN: CPT | Performed by: NURSE PRACTITIONER

## 2022-02-02 ASSESSMENT — ENCOUNTER SYMPTOMS
TREMORS: 0
TINGLING: 0
FEVER: 0
SPEECH CHANGE: 0
BRUISES/BLEEDS EASILY: 0
BLURRED VISION: 0
HEARTBURN: 0
WEAKNESS: 0
SENSORY CHANGE: 1
SHORTNESS OF BREATH: 1
NAUSEA: 0
PALPITATIONS: 0
DIZZINESS: 0
FALLS: 1
VOMITING: 0
FOCAL WEAKNESS: 1
COUGH: 1
NERVOUS/ANXIOUS: 0
DEPRESSION: 0
HEADACHES: 0

## 2022-02-02 ASSESSMENT — FIBROSIS 4 INDEX: FIB4 SCORE: 1.4

## 2022-02-02 NOTE — PROGRESS NOTES
Subjective     HPI  David Cho is an 81 y.o. right handed male who presents to [facility] for evaluation of right frontal and right parietal lobe infarcts.     He presented to [facility] on 1/10/2022  with complaints of transient facial droop, slurred speech, numbness and weakness of LUE. In the hospital, he was found to have atrial fibrillation and started on Eliquis 5mg BID.      PMH includes HTN, HLD, COPD, CAP, hypomagnesemia, anemia, AAA,   Social History: Alcohol abuse (now states he drinks 2 beers per day) , former smoker 1 ppd x 50 yrs, quit 8/12/20216,       He is here alone today, he has referral to therapy but he has not been able to get a appointment.  He was discharged from rehab about 2 weeks ago.   He is doing his home exercises. His home blood pressure machine is not working. He still has some issues with left arm coordination and strength.     Review of Systems   Constitutional: Negative for fever.   HENT: Negative for nosebleeds.    Eyes: Negative for blurred vision.   Respiratory: Positive for cough and shortness of breath.    Cardiovascular: Negative for palpitations.   Gastrointestinal: Negative for heartburn, nausea and vomiting.   Genitourinary: Negative for hematuria.   Musculoskeletal: Positive for falls.        1 fall in December off of the steps, no other falls.    Skin: Negative for rash.   Neurological: Positive for sensory change and focal weakness. Negative for dizziness, tingling, tremors, speech change, weakness and headaches.   Endo/Heme/Allergies: Does not bruise/bleed easily.   Psychiatric/Behavioral: Negative for depression. The patient is not nervous/anxious.        Objective      I personally reviewed imaging below and agree with the findings    MRI brain 1/11/2022     1.  Multifocal areas of acute cortical infarcts in the right frontal and parietal lobes.  2.  Multiple chronic lacunar infarcts.  3.  Severe chronic microvascular ischemic  "disease.  4.  Moderate cerebral volume loss.    CTA head 1/10/2022.     No thrombosis is seen within the [place].  Atherosclerotic plaque bilateral ICAs, less than 50% stenosis     CTA neck   1.  Moderate stenoses of the carotid arteries bilaterally.  2.  Severe stenosis of the proximal right vertebral artery.  3.  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 This is of indeterminate age.  4.  Moderate proximal right subclavian stenosis.    TTE 1/10/2022:    LVEF 60%, mild concentric LVH, LA severely dilated, MARISSA 63.    Stroke Labs:   Creat 0.67, LDL 56, A1C 6.1    /84 (BP Location: Right arm, Patient Position: Sitting, BP Cuff Size: Adult)   Pulse 85   Temp 36.5 °C (97.7 °F) (Temporal)   Resp 12   Ht 1.778 m (5' 10\")   Wt 65 kg (143 lb 4.8 oz)   SpO2 96%   BMI 20.56 kg/m²      PHYSICAL ASSESSMENT  Constitutional:  Alert, no apparent distress,  Psych:   mood and affect WNL  Muskuloskeletal:  Moves all extremities equally, strength 5/5 bilaterally, no drift  NEUROLOGICAL ASSESSMENT  Oriented X 4, speech fluent, naming and memory intact  CN II: Visual fields are full to confrontation. Fundoscopic exam is normal with sharp discs and no vascular changes. Pupils are3 mm and briskly reactive to light.   CN III: IV, VI  EOMs intact, no ptosis  CN V: Facial sensation is intact to pinprick in all 3 divisions bilaterally. Corneal responses are intact.  CN VII: Face is symmetric with normal eye closure and smile.  CN VIII Hearing is normal to rubbing fingers  CN IX, X: Palate elevates symmetrically. Phonation is normal.  CN XI: Head turning and shoulder shrug are intact  CN XII: Tongue is midline with normal movements and no atrophy.                           Sensation to PP equal bilaterally except for patchy differences in left hand (reports previous injury to nerve).                  No limb ataxia with finger " to nose and heel to shin                 Fine finger movements decreased on the left                  Ambulates with steady gait.                 Rhomberg negative                Biceps,brachioradialis, tricep, and patellar reflexex all 2+     Cardiovascular:    S1S2, no abnormal rhythm auscultated, no peripheral edema  Neck:                     No carotid bruits noted   Pulmonary:            Respirations easy, lungs clear to auscultation all fields.     Skin:                     No obvious rashes.    Iniital NIHSS   Unknown       Current NIHSS    1a. LOC: 0   1b. LOC Questions: 0  1c. LOC Commands: 0  2. Best Gaze:0  3. Visual Fields: 0  4. Facial Paresis: 0  5a. Motor arm left: 0  5b. Motor arm right: 0  6a. Motor leg left: 0  6b. Motor leg right: 0  7. Sensory: 0  8. Best Language: 0  9. Limb Ataxia: 0  0. Dysarthria: 0  11. Extinction/Inattention: 0    Total Score Current  0          Current mRS 2    Assessment & Plan     1. Late effect of stroke  Right parietal and frontal lobe infarcts likely secondary to new Dx of atrial fibrillation.  He still has some difficulty with L hand coordination and strength.   Presumed Mechanism by Toast:  __  Large Artery Atherosclerosis  __  Small Vessel (lacunar)  _XX_   Cardioembolic  __   Other (Sickle cell, vasculitis, hypercoagulable)  __   Unknown  __   ESUS    Stroke risk factors include:  PAF, HTN, HLD, prediabetes.     2. Atrial fibrillation, unspecified type (HCC)  Continue Eliquis 5mg, BID,  Wt 65kg,  Age 81  Creat. 0.67,   CHADS-VAS 5    He has follow up with cardiology scheduled for 2/4/2021, his weight will need to be monitored closely as he will need a lower dose if he goes below 60kg.    Avoid NSAIDS, may take Acetaminphen for pain      3. Primary hypertension    Blood pressure goal less than 130/80, slightly over goal today.  Monitor at home and follow up with PCP.      4. Mixed hyperlipidemia    LDL goal < 70, current 56  continue Atorvastatin 40mg, discussed  medication side effects, will need follow up with primary care evaluate liver function at intervals and refill  Exercise at least 30 minutes daily, avoid red meat, fried foods, butter, cheese.   Eat 5-6 servings of vegetables and fruits daily, choose lean white meat without skin (chicken, turkey, white fish)--baked, broiled or grilled.  5. Prediabetes      A1C 6.1, discussed dietary and exercise modifications.         Recommend decreasing beer (drinks 2 beers per day) or eliminating beer, recommended eliminating fruit juice. I explained that these not only raise the blood sugar but the end product of carboohydrate metabolism is CO2 and this can be associated with worsening COPD.        If any new signs of stroke:  sudden weakness, numbness, speech difficulty (slurring or difficulty finding words), imbalance, incoordination, worse headache of life or vision loss occur, call 911.      Take all medications as prescribed unless instructed by your provider.      I spent a total of 61  minutes caring for patient,  my time includes counseling, review of systems, HPI and assessment, review of images, labs and testing as above.  I reviewed the hospital records, PMH, social and family history.   I have counseled patient on stroke prevention strategies, stroke symptoms and mimics.  Diet and exercise modifications.  We discussed medication side effects and instructions.       I have provided patient a written personalized stroke prevention plan, it is filed under the media tab under ‘[facility]”.    Follow up PRN

## 2022-02-02 NOTE — PATIENT INSTRUCTIONS
If any new signs of stroke:  sudden weakness, numbness, speech difficulty (slurring or difficulty finding words), imbalance, incoordination, worse headache of life or vision loss occur, call 911.      Take all medications as prescribed unless instructed by your provider.        [facility]  [address]  [place] [address]  Phone: [phone]  Fax: [phone]        Stroke Prevention  Some medical conditions and lifestyle choices can lead to a higher risk for a stroke. You can help to prevent a stroke by making nutrition, lifestyle, and other changes.  What nutrition changes can be made?    · Eat healthy foods.  ? Choose foods that are high in fiber. These include:  § Fresh fruits.  § Fresh vegetables.  § Whole grains.  ? Eat at least 5 or more servings of fruits and vegetables each day. Try to fill half of your plate at each meal with fruits and vegetables.  ? Choose lean protein foods. These include:  § Lowfat (lean) cuts of meat.  § Chicken without skin.  § Fish.  § Tofu.  § Beans.  § Nuts.  ? Eat low-fat dairy products.  ? Avoid foods that:  § Are high in salt (sodium).  § Have saturated fat.  § Have trans fat.  § Have cholesterol.  § Are processed.  § Are premade.  · Follow eating guidelines as told by your doctor. These may include:  ? Reducing how many calories you eat and drink each day.  ? Limiting how much salt you eat or drink each day to 1,500 milligrams (mg).  ? Using only healthy fats for cooking. These include:  § Olive oil.  § Canola oil.  § Sunflower oil.  ? Counting how many carbohydrates you eat and drink each day.  What lifestyle changes can be made?  · Try to stay at a healthy weight. Talk to your doctor about what a good weight is for you.  · Get at least 30 minutes of moderate physical activity at least 5 days a week. This can include:  ? Fast walking.  ? Biking.  ? Swimming.  · Do not use any products that have nicotine or tobacco. This includes cigarettes and e-cigarettes.  If you need help quitting, ask your doctor. Avoid being around tobacco smoke in general.  · Limit how much alcohol you drink to no more than 1 drink a day for nonpregnant women and 2 drinks a day for men. One drink equals 12 oz of beer, 5 oz of wine, or 1½ oz of hard liquor.  · Do not use drugs.  · Avoid taking birth control pills. Talk to your doctor about the risks of taking birth control pills if:  ? You are over 35 years old.  ? You smoke.  ? You get migraines.  ? You have had a blood clot.  What other changes can be made?  · Manage your cholesterol.  ? It is important to eat a healthy diet.  ? If your cholesterol cannot be managed through your diet, you may also need to take medicines. Take medicines as told by your doctor.  · Manage your diabetes.  ? It is important to eat a healthy diet and to exercise regularly.  ? If your blood sugar cannot be managed through diet and exercise, you may need to take medicines. Take medicines as told by your doctor.  · Control your high blood pressure (hypertension).  ? Try to keep your blood pressure below 130/80. This can help lower your risk of stroke.  ? It is important to eat a healthy diet and to exercise regularly.  ? If your blood pressure cannot be managed through diet and exercise, you may need to take medicines. Take medicines as told by your doctor.  ? Ask your doctor if you should check your blood pressure at home.  ? Have your blood pressure checked every year. Do this even if your blood pressure is normal.  · Talk to your doctor about getting checked for a sleep disorder. Signs of this can include:  ? Snoring a lot.  ? Feeling very tired.  · Take over-the-counter and prescription medicines only as told by your doctor. These may include aspirin or blood thinners (antiplatelets or anticoagulants).  · Make sure that any other medical conditions you have are managed.  Where to find more information  · American Stroke Association:  "www.strokeassociation.org  · National Stroke Association: www.stroke.org  Get help right away if:  · You have any symptoms of stroke. \"BE FAST\" is an easy way to remember the main warning signs:  ? B - Balance. Signs are dizziness, sudden trouble walking, or loss of balance.  ? E - Eyes. Signs are trouble seeing or a sudden change in how you see.  ? F - Face. Signs are sudden weakness or loss of feeling of the face, or the face or eyelid drooping on one side.  ? A - Arms. Signs are weakness or loss of feeling in an arm. This happens suddenly and usually on one side of the body.  ? S - Speech. Signs are sudden trouble speaking, slurred speech, or trouble understanding what people say.  ? T - Time. Time to call emergency services. Write down what time symptoms started.  · You have other signs of stroke, such as:  ? A sudden, very bad headache with no known cause.  ? Feeling sick to your stomach (nausea).  ? Throwing up (vomiting).  ? Jerky movements you cannot control (seizure).  These symptoms may represent a serious problem that is an emergency. Do not wait to see if the symptoms will go away. Get medical help right away. Call your local emergency services (911 in the [place] Do not drive yourself to the hospital.  Summary  · You can prevent a stroke by eating healthy, exercising, not smoking, drinking less alcohol, and treating other health problems, such as diabetes, high blood pressure, or high cholesterol.  · Do not use any products that contain nicotine or tobacco, such as cigarettes and e-cigarettes.  · Get help right away if you have any signs or symptoms of a stroke.  This information is not intended to replace advice given to you by your health care provider. Make sure you discuss any questions you have with your health care provider.  Document Released: 06/18/2013 Document Revised: 02/13/2020 Document Reviewed: 03/21/2018  Elsevier Patient Education © 2020 [employer]    "

## 2022-02-04 ENCOUNTER — OFFICE VISIT (OUTPATIENT)
Dept: CARDIOLOGY | Facility: MEDICAL CENTER | Age: 82
End: 2022-02-04
Attending: INTERNAL MEDICINE
Payer: MEDICARE

## 2022-02-04 VITALS
WEIGHT: 142 LBS | HEIGHT: 70 IN | DIASTOLIC BLOOD PRESSURE: 92 MMHG | RESPIRATION RATE: 14 BRPM | BODY MASS INDEX: 20.33 KG/M2 | OXYGEN SATURATION: 96 % | HEART RATE: 70 BPM | SYSTOLIC BLOOD PRESSURE: 128 MMHG

## 2022-02-04 DIAGNOSIS — I63.111 CEREBROVASCULAR ACCIDENT (CVA) DUE TO EMBOLISM OF RIGHT VERTEBRAL ARTERY (HCC): ICD-10-CM

## 2022-02-04 DIAGNOSIS — I10 PRIMARY HYPERTENSION: Chronic | ICD-10-CM

## 2022-02-04 DIAGNOSIS — I48.11 LONGSTANDING PERSISTENT ATRIAL FIBRILLATION (HCC): ICD-10-CM

## 2022-02-04 DIAGNOSIS — I34.0 NONRHEUMATIC MITRAL VALVE REGURGITATION: ICD-10-CM

## 2022-02-04 PROCEDURE — 99214 OFFICE O/P EST MOD 30 MIN: CPT | Performed by: NURSE PRACTITIONER

## 2022-02-04 ASSESSMENT — FIBROSIS 4 INDEX: FIB4 SCORE: 1.4

## 2022-02-04 NOTE — PATIENT INSTRUCTIONS
Checking Blood Pressure:  -Blood pressure cuff, spend in the $40-65, with good return policy  -It should be automatic, upper arm, measure your arm to get the correct size, probably adult Large but your arm should be under 16.5 cm. If you need an XL cuff, you will have to have it special ordered from a pharmacy or durable medical equipment company.  -Put the cuff in place, rest arm on table near height of your heart, sit quietly for 5 min, legs uncrossed, with back support, then take your blood pressure, write it down, keep a log  -Check no more than 1 time day, maybe 4-5 times per week, try different times of day.  -Can bring your cuff to at least one appointment where it can be calibrated to a manual cuff if you are concerned.  -Goal blood pressure is at least under 130/80, ideally under 120/80.  If you think your BP is overall too high, let us know in the office, we can adjust medications, can use [facility] or call the [facility] office: [phone].    Salt=sodium=sea salt, guidelines say stay under 2,500 mg daily but I ask for under 4,000 mg daily.  Get salt smart, start looking at labels, count it up.  Salt is hidden in everything, salad dressing, sauces, cheese, most canned food, any processed meat.

## 2022-02-04 NOTE — PROGRESS NOTES
Cardiology New Consultation Note    Date of note:    2/4/2022  Primary Care Provider: Katelyn Olea D.O.    Name:             David Cho  YOB: 1940  MRN:               [id number]    CC: Hospital follow-up, atrial fibrillation    Patient HPI:   David Cho is a 81 y.o. male with current medical problems including COPD, HTN, alcohol use, BPH, pulmonary hypertension, mitral regurgitation, bradycardia, and recent CVA with A. Fib.    He was recently admitted for ischemic stroke on 1/10/22.  MRI confirmed multifocal areas of acute cortical infarcts in the right frontal and parietal lobes, multiple chronic lacunar infarcts, severe chronic microvascular ischemic disease with moderate cerebral volume loss.  Found to be in atrial fibrillation at the time and was started on Eliquis.  He resided at [facility] for 1 week post discharge.     Today he presents feeling much improved. He is going to Hand therapy for decreased strength in Left hand (his only sequelae post CVA).    Contributes his SOB to COPD, walked 1 mile the other day and did fine without stopping. He uses 2L of O2 at night.     He denies palpitations, chest pain, dizziness or syncopal episodes, orthopnea, PND, lower extremity swelling, and recent weight gain.     Patient endorses medication compliance.    Review of systems:  All others systems reviewed and negative except for what is outlined in the above HPI    Past Medical History:   Diagnosis Date   • Alcohol use    • Cholesterol serum elevated    • COPD (chronic obstructive pulmonary disease) (HCC)    • Depression    • Enlarged prostate    • Hypertension      Past Surgical History:   Procedure Laterality Date   • APPENDECTOMY     • OTHER  appendectomy     Family History   Problem Relation Age of Onset   • Heart Disease Mother    • Other Mother         turberculosis   • Cancer Father         colon cancer   • Heart Disease Step-Sister      Social History      Socioeconomic History   • Marital status:      Spouse name: Not on file   • Number of children: Not on file   • Years of education: Not on file   • Highest education level: Not on file   Occupational History   • Not on file   Tobacco Use   • Smoking status: Former Smoker     Packs/day: 1.00     Years: 50.00     Pack years: 50.00     Types: Cigarettes     Quit date: 2016     Years since quittin.4   • Smokeless tobacco: Never Used   • Tobacco comment: continued abstinance   Vaping Use   • Vaping Use: Never used   Substance and Sexual Activity   • Alcohol use: Yes   • Drug use: No   • Sexual activity: Not on file   Other Topics Concern   • Not on file   Social History Narrative   • Not on file     Social Determinants of Health     Financial Resource Strain:    • Difficulty of Paying Living Expenses: Not on file   Food Insecurity:    • Worried About Running Out of Food in the Last Year: Not on file   • Ran Out of Food in the Last Year: Not on file   Transportation Needs:    • Lack of Transportation (Medical): Not on file   • Lack of Transportation (Non-Medical): Not on file   Physical Activity:    • Days of Exercise per Week: Not on file   • Minutes of Exercise per Session: Not on file   Stress:    • Feeling of Stress : Not on file   Social Connections:    • Frequency of Communication with Friends and Family: Not on file   • Frequency of Social Gatherings with Friends and Family: Not on file   • Attends Christianity Services: Not on file   • Active Member of Clubs or Organizations: Not on file   • Attends Club or Organization Meetings: Not on file   • Marital Status: Not on file   Intimate Partner Violence:    • Fear of Current or Ex-Partner: Not on file   • Emotionally Abused: Not on file   • Physically Abused: Not on file   • Sexually Abused: Not on file   Housing Stability:    • Unable to Pay for Housing in the Last Year: Not on file   • Number of Places Lived in the Last Year: Not on file   •  "Unstable Housing in the Last Year: Not on file     No Known Allergies  Current Outpatient Medications   Medication Sig Dispense Refill   • apixaban (ELIQUIS) 5mg Tab Take 1 Tablet by mouth 2 times a day. Indications: Thromboembolism secondary to Atrial Fibrillation 60 Tablet 0   • atorvastatin (LIPITOR) 40 MG Tab Take 1 Tablet by mouth every evening. 30 Tablet 0   • folic acid (FOLVITE) 1 MG Tab Take 1 Tablet by mouth every day. 30 Tablet 0   • buPROPion SR (WELLBUTRIN-SR) 150 MG TABLET SR 12 HR sustained-release tablet Take 1 Tablet by mouth every day. 30 Tablet 0   • enalapril (VASOTEC) 20 MG tablet Take 2 Tablets by mouth every day. 60 Tablet 0   • finasteride (PROSCAR) 5 MG Tab Take 1 Tablet by mouth every day. 30 Tablet 0   • thiamine (THIAMINE) 100 MG tablet Take 1 Tablet by mouth every day. 30 Tablet 0   • Fluticasone-Umeclidin-Vilant (TRELEGY ELLIPTA) 100-62.5-25 MCG/INH AEROSOL POWDER, BREATH ACTIVATED inhalation Inhale 1 Inhalation every day. Rinse mouth after use 60 Each 0   • guaiFENesin ER (MUCINEX) 600 MG TABLET SR 12 HR Take 1 Tablet by mouth every 12 hours. 60 Tablet 0   • omeprazole (PRILOSEC) 20 MG delayed-release capsule Take 1 Capsule by mouth every morning before breakfast. 30 Capsule 0   • tamsulosin (FLOMAX) 0.4 MG capsule Take 1 Capsule by mouth 1/2 hour after dinner. 30 Capsule 0     No current facility-administered medications for this visit.       Physical Exam:  Ambulatory Vitals  /92 (BP Location: Left arm, Patient Position: Sitting, BP Cuff Size: Adult)   Pulse 70   Resp 14   Ht 1.778 m (5' 10\")   Wt 64.4 kg (142 lb)   SpO2 96%    BP Readings from Last 4 Encounters:   02/04/22 128/92   02/02/22 136/84   01/26/22 155/80   01/19/22 157/77       Weight/BMI: Body mass index is 20.37 kg/m².  Wt Readings from Last 4 Encounters:   02/04/22 64.4 kg (142 lb)   02/02/22 65 kg (143 lb 4.8 oz)   01/26/22 62 kg (136 lb 9.6 oz)   01/16/22 61 kg (134 lb 7.7 oz)     General: No apparent " distress. Well nourished.   Neck: No JVD. No caroid bruits, trachea midline  Lungs: CTAB. Normal effort, without crackles/rhonchi, no wheezing  Heart: irregularly irregular. Normal S1/S2, no murmur, no rub. no lower extremity edema. 2+ radial pulses, 2+ DT pulses  Ext: No clubbing or cyanosis.  Abdomen: soft, non tender, non distended, no devora hepatomegaly.  Neurological: No focal deficits, no facial asymmetry.  Normal speech.  Psychiatric: Appropriate affect, alert and oriented x 4.   Skin: Warm and dry, no rash.    Lab Data Review:  Lab Results   Component Value Date/Time    CHOLSTRLTOT 145 2022 12:09 AM    LDL 56 2022 12:09 AM    HDL 71 2022 12:09 AM    TRIGLYCERIDE 88 2022 12:09 AM       Lab Results   Component Value Date/Time    SODIUM 131 (L) 2022 07:08 AM    POTASSIUM 4.4 2022 07:08 AM    CHLORIDE 95 (L) 2022 07:08 AM    CO2 28 2022 07:08 AM    GLUCOSE 104 (H) 2022 07:08 AM    BUN 15 2022 07:08 AM    CREATININE 0.67 2022 07:08 AM     Lab Results   Component Value Date/Time    ALKPHOSPHAT 101 (H) 2022 05:33 AM    ASTSGOT 16 2022 05:33 AM    ALTSGPT 8 2022 05:33 AM    TBILIRUBIN 1.7 (H) 2022 05:33 AM      Lab Results   Component Value Date/Time    WBC 8.5 2022 05:33 AM     Cardiac Imaging and Procedures Review:      EKG 2022: My Personal interpretation reveals Afib 58    Echo 22:  CONCLUSIONS  Normal left ventricular systolic function.  Moderate to severe mitral regurgitation - consider KIRILL to better   characterize severitySeverely dilated left atrium.     Moderate tricuspid regurgitation.  Right ventricular systolic pressure is estimated to be 58 mmHg.    Assessment and Clinical Decision Makin. Longstanding persistent atrial fibrillation (HCC)  Holter Monitor Study   2. Primary hypertension     3. 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 Nonrheumatic  mitral valve regurgitation       The following treatment plan was discussed    Atrial fibrillation  -Type: Most likely chronic given severely dilated left atrium on echocardiogram  -Rhythm and Rate: Afib   -Asymptomatic  -[id number] Score: 5  -Risk Factors: advanced age, HTN  -Medications: No AV woody blockers given bradycardia in hospital  -Anticoagulation: Continue apixaban 5 mg bid, discussed issues with cost and possible alternative with warfarin therapy and INR checking at anticoagulated  -Rate v. Rhythm Control: self rate controlled  -48-hour Holter monitor to assess rates    Primary hypertension  -We will be checking home BPs more regularly now that he has purchased new cuff  -Goal BP < 130/80    S/p CVA  -Residual left hand weakness, continuing with hand therapy  -Continue apixaban 5 mg twice daily  -Atorvastatin 40 mg    Nonrheumatic mitral valve regurgitation  -Moderate to severe on recent echo  -Continue yearly echocardiograms for  -Patient currently asymptomatic    Plan reviewed in detail with the patient, verbalizes understanding and is in agreement.  Pt is to follow up with myself in 3 months    Encouraged Pt to follow up with us over the phone or electronically using my [facility] as cardiac issues/concerns arise.      PLEASE NOTE: This dictation was created using voice recognition software. I have made every reasonable attempt to correct obvious errors, but I expect that there are errors of grammar and possibly content that I did not discover before finalizing the note.       YFN Khanna.   [facility]  [phone]    Collaborating Physician: Dr Bond

## 2022-02-09 ENCOUNTER — NON-PROVIDER VISIT (OUTPATIENT)
Dept: CARDIOLOGY | Facility: MEDICAL CENTER | Age: 82
End: 2022-02-09
Payer: MEDICARE

## 2022-02-09 DIAGNOSIS — I48.11 LONGSTANDING PERSISTENT ATRIAL FIBRILLATION (HCC): ICD-10-CM

## 2022-02-09 DIAGNOSIS — I49.3 PVCS (PREMATURE VENTRICULAR CONTRACTIONS): ICD-10-CM

## 2022-02-12 NOTE — PROGRESS NOTES
Subjective:     CC: post-hospital follow-up    HPI:   David presents today with    Per patient and medical chart review, patient was hospitalized from 1/12-1/19/22 due to ischemic stroke.  He had presented with new onset left sided weakness and numbness and was noted to be hyponatremic with borderline hyperkalemic upon admission with EKG showing afib.   CTA of the neck showed moderate stenosis of the bilateral common carotid arteries, severe stenosis of the proximal right vertebral artery, and occlusion of the left vertebral artery. He also had moderate proximal right subclavian stenosis. He had an echo that showed EF of 65% with moderate to severe mitral regurgitation with severe dilation of the left atrium, moderate tricuspid regurg, right ventricular systolic pressure was 58. MRI shows multifocal areas of acute cortical infarcts in the right frontal and parietal lobes, multiple chronic lacunar infarcts, and severe chronic microvascular ischemic disease with moderate cerebral volume loss. He was discharged to [facility] for inpatient PT, OT and speech therapy given his left hand weakness.      Cardiology saw patient during admission and recommended outpatient follow-up.  Patient saw cardiology on 2/4/2022 at which time he was given Holter monitor which he has been using at home and plans to return today.  No chest pain palpitations or trouble breathing since hospital discharge.  Cardiology has recommended continuing Eliquis given patient's atrial fibrillation, with plan for follow-up with cardiology in 3 months.  Cardiology has also recommended yearly echocardiograms given his mitral valve regurgitation.    Patient is seen in stroke clinic since hospital discharge with no adjustments to therapy recommended and follow-up as needed    Patient states that his left hand weakness is progressively improving with his occupational therapy.  He now only has residual left second finger weakness.  No new numbness or  weakness since hospital discharge    Denies constipation, so not taking senna-docusate.    States his cold symptoms resolved, so he is no longer taking mucinex.      Past Medical History:   Diagnosis Date   • Alcohol use    • Cholesterol serum elevated    • COPD (chronic obstructive pulmonary disease) (HCC)    • Depression    • Enlarged prostate    • Hypertension        Social History     Tobacco Use   • Smoking status: Former Smoker     Packs/day: 1.00     Years: 50.00     Pack years: 50.00     Types: Cigarettes     Quit date: 2016     Years since quittin.5   • Smokeless tobacco: Never Used   • Tobacco comment: continued abstinance   Vaping Use   • Vaping Use: Never used   Substance Use Topics   • Alcohol use: Yes   • Drug use: No       Current Outpatient Medications Ordered in Epic   Medication Sig Dispense Refill   • Vitamin D, Cholecalciferol, 50 MCG ( UT) Cap Take 1 Capsule by mouth every day.     • apixaban (ELIQUIS) 5mg Tab Take 1 Tablet by mouth 2 times a day. Indications: Thromboembolism secondary to Atrial Fibrillation 180 Tablet 0   • atorvastatin (LIPITOR) 40 MG Tab Take 1 Tablet by mouth every evening. 90 Tablet 2   • buPROPion SR (WELLBUTRIN-SR) 150 MG TABLET SR 12 HR sustained-release tablet Take 1 Tablet by mouth every day. 90 Tablet 2   • enalapril (VASOTEC) 20 MG tablet Take 2 Tablets by mouth every day. 180 Tablet 2   • finasteride (PROSCAR) 5 MG Tab Take 1 Tablet by mouth every day. 90 Tablet 2   • omeprazole (PRILOSEC) 20 MG delayed-release capsule Take 1 Capsule by mouth every morning before breakfast. 90 Capsule 2   • tamsulosin (FLOMAX) 0.4 MG capsule Take 1 Capsule by mouth 1/2 hour after dinner. 90 Capsule 2   • albuterol 108 (90 Base) MCG/ACT Aero Soln inhalation aerosol Inhale 2 Puffs every four hours as needed for Shortness of Breath. 1 Each 2   • folic acid (FOLVITE) 1 MG Tab Take 1 Tablet by mouth every day. 30 Tablet 0   • thiamine (THIAMINE) 100 MG tablet Take 1 Tablet  "by mouth every day. 30 Tablet 0   • Fluticasone-Umeclidin-Vilant (TRELEGY ELLIPTA) 100-62.5-25 MCG/INH AEROSOL POWDER, BREATH ACTIVATED inhalation Inhale 1 Inhalation every day. Rinse mouth after use 60 Each 0     No current [facility]-ordered facility-administered medications on file.       Allergies:  Patient has no known allergies.    ROS:  Gen: no fevers/chills  Pulm: no sob, no cough  CV: no chest pain, no palpitations  GI: no nausea/vomiting, no diarrhea  : no dysuria  MSk: no myalgias  Skin: no rash  Neuro: no headaches  Heme/Lymph: no easy bruising, denies abnormal bleeding      Objective:       Exam:  /68 (BP Location: Left arm, Patient Position: Sitting, BP Cuff Size: Adult)   Pulse 88   Temp 36.3 °C (97.3 °F) (Temporal)   Resp 14   Ht 1.778 m (5' 10\")   Wt 66.2 kg (146 lb)   SpO2 94%   BMI 20.95 kg/m²  Body mass index is 20.95 kg/m².    Gen: Alert and oriented, No apparent distress.  Neck: Neck is supple without lymphadenopathy.  Lungs: Normal effort, CTA bilaterally, no wheezes, rhonchi, or rales  CV: Iregular rate and rhythm. No murmurs, rubs, or gallops.  Ext: No clubbing, cyanosis, edema.  Neuro: normal gait.  Strength 5/5 UE bilaterally except for 4/5 left hand strength, sensation intact bilaterally to light touch    Labs: 1/12-17/22 labs reviewed    Assessment & Plan:     81 y.o. male with the following -     1. Atrial fibrillation, unspecified type (HCC)  Chronic issue, asymptomatic.  Continue Eliquis.  Hold AV woody agents.  Patient to follow-up with cardiology as recommended for continuing monitoring and treatment and Holter results.  - apixaban (ELIQUIS) 5mg Tab; Take 1 Tablet by mouth 2 times a day. Indications: Thromboembolism secondary to Atrial Fibrillation  Dispense: 180 Tablet; Refill: 0    2. Hypercoagulable state, secondary (HCC)  Chronic issue secondary to atrial fibrillation.  Patient has Holter monitor which he will return today for cardiology review.  We will continue " Eliquis.  Patient states that he may want to switch to Coumadin in the future due to cost and will let me now.  Bleeding risk discussed and follow-up precautions given    3. Cerebrovascular accident (CVA), unspecified mechanism (HCC)  Acute issue, improving symptoms.  Patient only has lingering left second finger weakness.  Continue statin and treatment for atrial fibrillation as per above.  Vascular surgery referral given due to his carotid stenosis.  ER precautions given  - Referral to Vascular Surgery  - atorvastatin (LIPITOR) 40 MG Tab; Take 1 Tablet by mouth every evening.  Dispense: 90 Tablet; Refill: 2    4. Major depressive disorder, remission status unspecified, unspecified whether recurrent  Chronic, stable, well controlled.  Continue current medication.  Will monitor  - buPROPion SR (WELLBUTRIN-SR) 150 MG TABLET SR 12 HR sustained-release tablet; Take 1 Tablet by mouth every day.  Dispense: 90 Tablet; Refill: 2    5. Primary hypertension  Chronic, well controlled.  Blood pressure goal is 130/80 or less.  We will continue current medications and monitor  - enalapril (VASOTEC) 20 MG tablet; Take 2 Tablets by mouth every day.  Dispense: 180 Tablet; Refill: 2  - Basic Metabolic Panel; Future    6. Urinary retention  Chronic issue, well controlled with current medications.  Refill given.  Will monitor  - finasteride (PROSCAR) 5 MG Tab; Take 1 Tablet by mouth every day.  Dispense: 90 Tablet; Refill: 2  - tamsulosin (FLOMAX) 0.4 MG capsule; Take 1 Capsule by mouth 1/2 hour after dinner.  Dispense: 90 Capsule; Refill: 2    7. Bilateral carotid artery stenosis  Chronic issue.  Given his moderate carotid artery stenosis bilaterally and recent stroke, vascular referral given.  Follow-up precautions given  - Referral to Vascular Surgery    8. Chronic obstructive pulmonary disease, unspecified COPD type (HCC)  Chronic, overall well controlled, however sometimes patient does wake up feeling slightly short of breath at  which time he uses albuterol and symptoms resolved.  Refill given.  Patient to follow-up with pulmonary as scheduled  - albuterol 108 (90 Base) MCG/ACT Aero Soln inhalation aerosol; Inhale 2 Puffs every four hours as needed for Shortness of Breath.  Dispense: 1 Each; Refill: 2    9. Benign prostatic hyperplasia, unspecified whether lower urinary tract symptoms present  Chronic issue, well controlled with current medications.  Refill given.  Will monitor    10. Late effect of stroke  New issue with left second finger weakness.  Proving.  Patient to continue therapy and will monitor    12. Mixed hyperlipidemia  Chronic, well controlled.  Continue statin and will monitor    13. Hyponatremia  Chronic, stable.  Likely secondary to alcohol use vs SIADH.  Recent TSH was normal.  Recommended continuing to cut back on alcohol and minimal increase in salt use with the understanding this can increase his BP.  Will monitor.      Return in about 6 weeks (around 3/28/2022) for Medication review.    Please note that this dictation was created using voice recognition software. I have made every reasonable attempt to correct obvious errors, but I expect that there are errors of grammar and possibly content that I did not discover before finalizing the note.

## 2022-02-14 ENCOUNTER — OFFICE VISIT (OUTPATIENT)
Dept: MEDICAL GROUP | Facility: MEDICAL CENTER | Age: 82
End: 2022-02-14
Payer: MEDICARE

## 2022-02-14 VITALS
DIASTOLIC BLOOD PRESSURE: 68 MMHG | HEART RATE: 88 BPM | BODY MASS INDEX: 20.9 KG/M2 | HEIGHT: 70 IN | WEIGHT: 146 LBS | SYSTOLIC BLOOD PRESSURE: 124 MMHG | RESPIRATION RATE: 14 BRPM | OXYGEN SATURATION: 94 % | TEMPERATURE: 97.3 F

## 2022-02-14 DIAGNOSIS — J44.9 CHRONIC OBSTRUCTIVE PULMONARY DISEASE, UNSPECIFIED COPD TYPE (HCC): ICD-10-CM

## 2022-02-14 DIAGNOSIS — I10 PRIMARY HYPERTENSION: Chronic | ICD-10-CM

## 2022-02-14 DIAGNOSIS — E87.1 HYPONATREMIA: ICD-10-CM

## 2022-02-14 DIAGNOSIS — E78.2 MIXED HYPERLIPIDEMIA: ICD-10-CM

## 2022-02-14 DIAGNOSIS — R33.9 URINARY RETENTION: ICD-10-CM

## 2022-02-14 DIAGNOSIS — D68.69 HYPERCOAGULABLE STATE, SECONDARY (HCC): ICD-10-CM

## 2022-02-14 DIAGNOSIS — I63.9 CEREBROVASCULAR ACCIDENT (CVA), UNSPECIFIED MECHANISM (HCC): ICD-10-CM

## 2022-02-14 DIAGNOSIS — I65.23 BILATERAL CAROTID ARTERY STENOSIS: ICD-10-CM

## 2022-02-14 DIAGNOSIS — N40.0 BENIGN PROSTATIC HYPERPLASIA, UNSPECIFIED WHETHER LOWER URINARY TRACT SYMPTOMS PRESENT: ICD-10-CM

## 2022-02-14 DIAGNOSIS — F32.9 MAJOR DEPRESSIVE DISORDER, REMISSION STATUS UNSPECIFIED, UNSPECIFIED WHETHER RECURRENT: ICD-10-CM

## 2022-02-14 DIAGNOSIS — I63.411 CEREBROVASCULAR ACCIDENT (CVA) DUE TO EMBOLISM OF RIGHT MIDDLE CEREBRAL ARTERY (HCC): ICD-10-CM

## 2022-02-14 DIAGNOSIS — I48.91 ATRIAL FIBRILLATION, UNSPECIFIED TYPE (HCC): ICD-10-CM

## 2022-02-14 DIAGNOSIS — I69.30 LATE EFFECT OF STROKE: ICD-10-CM

## 2022-02-14 PROCEDURE — 99214 OFFICE O/P EST MOD 30 MIN: CPT | Performed by: FAMILY MEDICINE

## 2022-02-14 RX ORDER — ALBUTEROL SULFATE 90 UG/1
2 AEROSOL, METERED RESPIRATORY (INHALATION) EVERY 4 HOURS PRN
Qty: 1 EACH | Refills: 2 | Status: SHIPPED | OUTPATIENT
Start: 2022-02-14

## 2022-02-14 RX ORDER — MULTIVIT-MIN/IRON/FOLIC ACID/K 18-600-40
1 CAPSULE ORAL DAILY
COMMUNITY
End: 2022-03-30 | Stop reason: SDUPTHER

## 2022-02-14 RX ORDER — FINASTERIDE 5 MG/1
5 TABLET, FILM COATED ORAL DAILY
Qty: 90 TABLET | Refills: 2 | Status: SHIPPED | OUTPATIENT
Start: 2022-02-14

## 2022-02-14 RX ORDER — ENALAPRIL MALEATE 20 MG/1
40 TABLET ORAL DAILY
Qty: 180 TABLET | Refills: 2 | Status: SHIPPED | OUTPATIENT
Start: 2022-02-14

## 2022-02-14 RX ORDER — BUPROPION HYDROCHLORIDE 150 MG/1
150 TABLET, EXTENDED RELEASE ORAL DAILY
Qty: 90 TABLET | Refills: 2 | Status: SHIPPED | OUTPATIENT
Start: 2022-02-14

## 2022-02-14 RX ORDER — OMEPRAZOLE 20 MG/1
20 CAPSULE, DELAYED RELEASE ORAL
Qty: 90 CAPSULE | Refills: 2 | Status: SHIPPED | OUTPATIENT
Start: 2022-02-14

## 2022-02-14 RX ORDER — ATORVASTATIN CALCIUM 40 MG/1
40 TABLET, FILM COATED ORAL EVERY EVENING
Qty: 90 TABLET | Refills: 2 | Status: SHIPPED | OUTPATIENT
Start: 2022-02-14

## 2022-02-14 RX ORDER — TAMSULOSIN HYDROCHLORIDE 0.4 MG/1
0.4 CAPSULE ORAL
Qty: 90 CAPSULE | Refills: 2 | Status: SHIPPED | OUTPATIENT
Start: 2022-02-14 | End: 2022-08-19

## 2022-02-14 ASSESSMENT — FIBROSIS 4 INDEX: FIB4 SCORE: 1.4

## 2022-02-14 NOTE — PATIENT INSTRUCTIONS
Go immediately to the ER if having severe bleeding, a head injury, difficulty balancing, slurred speech, weakness, numbness, facial droop or other symptoms of stroke.

## 2022-02-15 LAB — EKG IMPRESSION: NORMAL

## 2022-02-15 PROCEDURE — 93224 XTRNL ECG REC UP TO 48 HRS: CPT | Performed by: INTERNAL MEDICINE

## 2022-02-22 ENCOUNTER — PATIENT MESSAGE (OUTPATIENT)
Dept: CARDIOLOGY | Facility: MEDICAL CENTER | Age: 82
End: 2022-02-22
Payer: MEDICARE

## 2022-03-08 DIAGNOSIS — I71.43 ANEURYSM OF INFRARENAL ABDOMINAL AORTA (HCC): ICD-10-CM

## 2022-03-08 DIAGNOSIS — I72.3 ILIAC ARTERY ANEURYSM (HCC): ICD-10-CM

## 2022-03-09 ENCOUNTER — HOSPITAL ENCOUNTER (OUTPATIENT)
Dept: RADIOLOGY | Facility: MEDICAL CENTER | Age: 82
End: 2022-03-09
Attending: FAMILY MEDICINE
Payer: MEDICARE

## 2022-03-09 DIAGNOSIS — I72.3 ILIAC ARTERY ANEURYSM (HCC): ICD-10-CM

## 2022-03-09 DIAGNOSIS — I71.43 ANEURYSM OF INFRARENAL ABDOMINAL AORTA (HCC): ICD-10-CM

## 2022-03-09 DIAGNOSIS — Z78.9 ALCOHOL USE: ICD-10-CM

## 2022-03-09 PROCEDURE — 76705 ECHO EXAM OF ABDOMEN: CPT

## 2022-03-14 ENCOUNTER — OFFICE VISIT (OUTPATIENT)
Dept: SLEEP MEDICINE | Facility: MEDICAL CENTER | Age: 82
End: 2022-03-14
Payer: MEDICARE

## 2022-03-14 VITALS
RESPIRATION RATE: 16 BRPM | DIASTOLIC BLOOD PRESSURE: 84 MMHG | OXYGEN SATURATION: 96 % | SYSTOLIC BLOOD PRESSURE: 124 MMHG | BODY MASS INDEX: 20.04 KG/M2 | HEIGHT: 70 IN | HEART RATE: 70 BPM | WEIGHT: 140 LBS

## 2022-03-14 DIAGNOSIS — Z87.891 FORMER SMOKER: ICD-10-CM

## 2022-03-14 DIAGNOSIS — R91.8 PULMONARY NODULES: ICD-10-CM

## 2022-03-14 DIAGNOSIS — G47.34 NOCTURNAL HYPOXIA: ICD-10-CM

## 2022-03-14 DIAGNOSIS — J44.9 CHRONIC OBSTRUCTIVE PULMONARY DISEASE, UNSPECIFIED COPD TYPE (HCC): ICD-10-CM

## 2022-03-14 DIAGNOSIS — R91.8 ABNORMAL CT SCAN, LUNG: ICD-10-CM

## 2022-03-14 PROCEDURE — 99214 OFFICE O/P EST MOD 30 MIN: CPT | Performed by: PHYSICIAN ASSISTANT

## 2022-03-14 ASSESSMENT — FIBROSIS 4 INDEX: FIB4 SCORE: 1.4

## 2022-03-14 ASSESSMENT — ENCOUNTER SYMPTOMS
HEARTBURN: 0
PALPITATIONS: 0
SPUTUM PRODUCTION: 0
SHORTNESS OF BREATH: 1
COUGH: 0
FEVER: 0
INSOMNIA: 0
SORE THROAT: 0
ORTHOPNEA: 0
DIZZINESS: 1
CHILLS: 0
WHEEZING: 0
SINUS PAIN: 0
WEIGHT LOSS: 0

## 2022-03-14 NOTE — PATIENT INSTRUCTIONS
1-health concerns including fall in December and stroke in January  2-abnormal chest CT, did not get bronchoscopy scheduled  3-repeat chest CT and re-evaluate on blood thinners   4-also check on nasal swab study  5-no refills at this time  6-follow up in 3 months

## 2022-03-14 NOTE — PROGRESS NOTES
CC: COPD    HPI:  David Cho is a 81 y.o. year old male here today for follow-up on COPD.  Patient reports a 50 pack year smoking history, quit date in 2016.  Last seen in clinic 12/13/2021.    Pertinent past medical history includes nocturnal hypoxia, community-acquired pneumonia, abnormal CT scan with cavitary mass, hypertension, infrarenal abdominal aortic aneurysm, atrial fibrillation, pulmonary hypertension, urinary retention, alcohol use and CVA 1/11/2022.  Patient canceled bronchoscopy scheduled for December 2021.    Reviewed in clinic vitals including /84, HR 70, O2 sat 96% on room air and BMI of 20.09 kg/m².    Reviewed home medication regimen including Trelegy, albuterol.  He does wear O2 at 2 L at night.    Reviewed most recent imaging including echocardiogram obtained 1/11/2022 demonstrating normal left ventricular chamber size and systolic function, mild concentric left ventricular hypertrophy, LVEF estimated 60%.  Indeterminant diastolic function due to mitral valve disease.  Normal right ventricular size and systolic function, enlarged right atrium, severely dilated left atrium, moderate mitral annular calcification with bulky calcification on the posterior leaflet, moderate to severe mitral regurgitation, moderate tricuspid regurgitation with RVSP estimated 58 mmHg.    Chest x-ray obtained 1/10/2022 demonstrated cardiomegaly with interstitial prominence, linear bibasilar atelectasis.    Chest CT obtained 12/7/2021 demonstrated ill-defined bilobed cavitary mass now measuring 2.8 cm x 2.9 cm.  Interval increase in surrounding consolidation and thickening cavitary wall, calcified granuloma right lung apex, stable 3 mm and 4 mm nodules within right upper lobe, stable 6 mm nodule within right middle lobe, other smaller nodules within the right middle lobe.  Emphysema and multiple areas of bullous change.  Cardiomegaly.    Echocardiogram obtained 12/25/2018 demonstrating normal left  ventricular systolic function, LVEF estimated at 60%, grade 2 diastolic dysfunction, mildly dilated left atrium, moderate to severe mitral annular calcification, mild aortic insufficiency, estimated RVSP 55 mmHg, plaque seen in the ascending aorta.  Estimated RVSP significantly increased from 2016 estimation of 25 mmHg.     Pulmonary function testing obtained 6/11/2021 as compared to 9/18/2019 demonstrated an FEV1 of 1.64 L or 58% predicted was 66% prior, FVC of 2.84 L or 75% predicted was 77% predicted prior, FEV1/FVC ratio 58, residual volume 119% predicted value 142 prior total lung capacity 90% predicted was 100% prior, DLCO 66% predicted with 73% prior.  Per pulmonologist interpretation baseline spirometry shows airflow obstruction, borderline air trapping, reduced diffusion capacity consistent with diagnosis of COPD.         Review of Systems   Constitutional: Negative for chills, fever, malaise/fatigue and weight loss.   HENT: Positive for hearing loss. Negative for congestion, nosebleeds, sinus pain, sore throat and tinnitus.    Respiratory: Positive for shortness of breath (with activity ). Negative for cough, sputum production and wheezing.    Cardiovascular: Negative for chest pain, palpitations, orthopnea and leg swelling.   Gastrointestinal: Negative for heartburn.        Upper dentures, working on partial on bottom, having some difficulty with swallowing   Neurological: Positive for dizziness (last few weeks, practice the pause ).   Psychiatric/Behavioral: The patient does not have insomnia.        Past Medical History:   Diagnosis Date   • Alcohol use    • Cholesterol serum elevated    • COPD (chronic obstructive pulmonary disease) (HCC)    • Depression    • Enlarged prostate    • Hypertension        Past Surgical History:   Procedure Laterality Date   • APPENDECTOMY     • OTHER  appendectomy       Family History   Problem Relation Age of Onset   • Heart Disease Mother    • Other Mother          "turberculosis   • Cancer Father         colon cancer   • Heart Disease Step-Sister        Social History     Socioeconomic History   • Marital status:      Spouse name: Not on file   • Number of children: Not on file   • Years of education: Not on file   • Highest education level: Not on file   Occupational History   • Not on file   Tobacco Use   • Smoking status: Former Smoker     Packs/day: 1.00     Years: 50.00     Pack years: 50.00     Types: Cigarettes     Quit date: 2016     Years since quittin.5   • Smokeless tobacco: Never Used   • Tobacco comment: continued abstinance   Vaping Use   • Vaping Use: Never used   Substance and Sexual Activity   • Alcohol use: Yes   • Drug use: No   • Sexual activity: Not on file   Other Topics Concern   • Not on file   Social History Narrative   • Not on file     Social Determinants of Health     Financial Resource Strain: Not on file   Food Insecurity: Not on file   Transportation Needs: Not on file   Physical Activity: Not on file   Stress: Not on file   Social Connections: Not on file   Intimate Partner Violence: Not on file   Housing Stability: Not on file       Allergies as of 2022   • (No Known Allergies)        @Vital signs for this encounter:  /84   Pulse 70   Resp 16   Ht 1.778 m (5' 10\")   Wt 63.5 kg (140 lb)   SpO2 96%     Current medications as of today   Current Outpatient Medications   Medication Sig Dispense Refill   • Vitamin D, Cholecalciferol, 50 MCG ( UT) Cap Take 1 Capsule by mouth every day.     • apixaban (ELIQUIS) 5mg Tab Take 1 Tablet by mouth 2 times a day. Indications: Thromboembolism secondary to Atrial Fibrillation 180 Tablet 0   • atorvastatin (LIPITOR) 40 MG Tab Take 1 Tablet by mouth every evening. 90 Tablet 2   • buPROPion SR (WELLBUTRIN-SR) 150 MG TABLET SR 12 HR sustained-release tablet Take 1 Tablet by mouth every day. 90 Tablet 2   • enalapril (VASOTEC) 20 MG tablet Take 2 Tablets by mouth every day. 180 " Tablet 2   • finasteride (PROSCAR) 5 MG Tab Take 1 Tablet by mouth every day. 90 Tablet 2   • omeprazole (PRILOSEC) 20 MG delayed-release capsule Take 1 Capsule by mouth every morning before breakfast. 90 Capsule 2   • tamsulosin (FLOMAX) 0.4 MG capsule Take 1 Capsule by mouth 1/2 hour after dinner. 90 Capsule 2   • albuterol 108 (90 Base) MCG/ACT Aero Soln inhalation aerosol Inhale 2 Puffs every four hours as needed for Shortness of Breath. 1 Each 2   • folic acid (FOLVITE) 1 MG Tab Take 1 Tablet by mouth every day. 30 Tablet 0   • thiamine (THIAMINE) 100 MG tablet Take 1 Tablet by mouth every day. 30 Tablet 0   • Fluticasone-Umeclidin-Vilant (TRELEGY ELLIPTA) 100-62.5-25 MCG/INH AEROSOL POWDER, BREATH ACTIVATED inhalation Inhale 1 Inhalation every day. Rinse mouth after use 60 Each 0     No current facility-administered medications for this visit.         Physical Exam:   Gen:           Alert and oriented, No apparent distress. Mood and affect appropriate, normal interaction with provider.  Eyes:          sclere white, conjunctive moist.  Hearing:     Grossly intact.  Dentition:    Upper dentures, fair lower dentition  Oropharynx:   Tongue normal, posterior pharynx without erythema or exudate.  Neck:        Supple, trachea midline, no masses.  Respiratory Effort: No intercostal retractions or use of accessory muscles.   Lung Auscultation:     decreased greater bases; no rales, rhonchi or wheezing.  CV:            Regular rate and rhythm. No edema. No murmurs, rubs or gallops.  Digits, Nails, Ext: No clubbing, cyanosis, petechiae, or nodes.   Skin:        No rashes, lesions or ulcers noted on exposed skin surfaces.                     Assessment:  1. Chronic obstructive pulmonary disease, unspecified COPD type (HCC)  Fluticasone-Umeclidin-Vilant (TRELEGY ELLIPTA) 100-62.5-25 MCG/INH AEROSOL POWDER, BREATH ACTIVATED inhalation   2. Pulmonary nodules  CT-CHEST (THORAX) W/O   3. Abnormal CT scan, lung     4. Nocturnal  hypoxia     5. Former smoker         Immunizations:    Flu: 10/8/2021  Pneumovax 23: 8/8/2018  Prevnar 13: 10/1/2020, 10/6/2016  Pfizer SARS-CoV-2 vaccine: 10/8/2021, 3/17/2021, 2/23/2021    Plan:  81 y.o. year old male here today for follow-up on COPD.     Former smoker: Continued abstinence of tobacco.    Pulmonary nodules/abnormal chest CT: Patient previously scheduled for bronchoscopy but opted to defer given concerns regarding other health issues including fall in December and stroke in January.  Discussed rescheduling, consideration for nasal swab study.  Update CT scan.    Nocturnal hypoxia: Patient continues to use and benefit from use of oxygen at 2 L at night.    COPD: Tolerating Trelegy, reports use of albuterol approximately once per week.  No signs of thrush.  No refills needed at this time.  Last PFT in June 2021.    Pulmonary hypertension: Patient is followed by cardiology.  He is on oxygen at 2 L at night.    Follow-up in 3 months.    This dictation was created using voice recognition software. The accuracy of the dictation is limited to the abilities of the software. I expect there may be some errors of grammar and possibly content.

## 2022-03-29 NOTE — PROGRESS NOTES
Subjective:     CC: vertigo    HPI:   David presents today with     HTN- last visit, patient was continued on current medications with plan for repeat BMP given hx of hyponatremia, but he has not done the lab, yet.  He is taking his medication as prescribed without negative SEs.    Carotid stenosis- vascular referral was given last visit. States he has an appointment next week at [facility] with the vascular surgeon.    Pulmonary nodules- patient is established with pulmonary who ordered follow-up CT chest on 3/14/22.  Pt plans to get this done as recommended.  No SOB or wheezing.    Vertigo- unable to walk steady for a few minutes after standing from laying down position due to feeling lightheaded.  No visual changes.  Symptoms resolve within about a minute if he stands still.  No syncope.   No new medications, ear pain, fevers,chills, new weakness, chest pain, palpitations, SOB.  He is going to OT and feels that his left hand strength is improving.  Home Bps range 120//100 with no consistency in readings.  No falls.    Drinks a couple cans of beer per day.      Past Medical History:   Diagnosis Date   • Alcohol use    • Cholesterol serum elevated    • COPD (chronic obstructive pulmonary disease) (HCC)    • Depression    • Enlarged prostate    • Hypertension        Social History     Tobacco Use   • Smoking status: Former Smoker     Packs/day: 1.00     Years: 50.00     Pack years: 50.00     Types: Cigarettes     Quit date: 2016     Years since quittin.6   • Smokeless tobacco: Never Used   • Tobacco comment: continued abstinance   Vaping Use   • Vaping Use: Never used   Substance Use Topics   • Alcohol use: Yes   • Drug use: No       Current Outpatient Medications Ordered in Epic   Medication Sig Dispense Refill   • Vitamin D, Cholecalciferol, 50 MCG (2000 UT) Cap Take 1 Capsule by mouth every day. 90 Capsule 1   • Fluticasone-Umeclidin-Vilant (TRELEGY ELLIPTA) 100-62.5-25 MCG/INH AEROSOL  "POWDER, BREATH ACTIVATED inhalation Inhale 1 Inhalation every day. Rinse mouth after use 90 Each 3   • apixaban (ELIQUIS) 5mg Tab Take 1 Tablet by mouth 2 times a day. Indications: Thromboembolism secondary to Atrial Fibrillation 180 Tablet 0   • atorvastatin (LIPITOR) 40 MG Tab Take 1 Tablet by mouth every evening. 90 Tablet 2   • buPROPion SR (WELLBUTRIN-SR) 150 MG TABLET SR 12 HR sustained-release tablet Take 1 Tablet by mouth every day. 90 Tablet 2   • enalapril (VASOTEC) 20 MG tablet Take 2 Tablets by mouth every day. 180 Tablet 2   • finasteride (PROSCAR) 5 MG Tab Take 1 Tablet by mouth every day. 90 Tablet 2   • omeprazole (PRILOSEC) 20 MG delayed-release capsule Take 1 Capsule by mouth every morning before breakfast. 90 Capsule 2   • tamsulosin (FLOMAX) 0.4 MG capsule Take 1 Capsule by mouth 1/2 hour after dinner. 90 Capsule 2   • albuterol 108 (90 Base) MCG/ACT Aero Soln inhalation aerosol Inhale 2 Puffs every four hours as needed for Shortness of Breath. 1 Each 2   • folic acid (FOLVITE) 1 MG Tab Take 1 Tablet by mouth every day. 30 Tablet 0   • thiamine (THIAMINE) 100 MG tablet Take 1 Tablet by mouth every day. 30 Tablet 0     No current Epic-ordered facility-administered medications on file.       Allergies:  Patient has no known allergies.    ROS:  Gen: no fevers/chills, no changes in weight  Eyes: no changes in vision  Pulm: no sob, no cough  CV: no chest pain, no palpitations  GI: no nausea/vomiting, no diarrhea  : no dysuria  Skin: no rash  Neuro: no headaches, no numbness/tingling      Objective:       Exam:  /64 (BP Location: Left arm, Patient Position: Sitting, BP Cuff Size: Adult)   Pulse 87   Temp 36.4 °C (97.6 °F) (Temporal)   Resp 12   Ht 1.778 m (5' 10\")   Wt 66.2 kg (146 lb)   SpO2 91%   BMI 20.95 kg/m²  Body mass index is 20.95 kg/m².  BP laying down 124/82 HR 85  BP standing 120/74 HR 90    Gen: Alert and oriented, No apparent distress.  Neck: Neck is supple without " lymphadenopathy.  Lungs: Normal effort, CTA bilaterally, no wheezes, rhonchi, or rales  CV: Regular rate and rhythm. No murmurs, rubs, or gallops.  Ext: No clubbing, cyanosis, edema.  Neuro: CN II-XII intact, strength 5/5 in all muscle groups, sensation intact bilaterally to light touch, coordination intact bilaterally, Romberg negative, pronator drift negative      Assessment & Plan:     81 y.o. male with the following -     1. Vitamin D deficiency  Chronic issue, recently uncontrolled.  Patient has not been taking his vitamin D supplement.  Refill given.  We will plan to order follow-up labs after next visit.  - Vitamin D, Cholecalciferol, 50 MCG (2000 UT) Cap; Take 1 Capsule by mouth every day.  Dispense: 90 Capsule; Refill: 1    2. Other specified disorders of kidney and ureter  Patient's right upper quadrant ultrasound from 3/9/2022 showed mild interval enlargement of a right renal cyst that is complex.  Discussed recommendation for follow-up MRI to rule out renal cell carcinoma, however patient would like to defer this testing with plan for reevaluation next visit.  He expressed understanding that deferring treatment may cause delay in care and worsening symptoms.    3. Vertigo  Newly noted issue.  Onset within the last 1 to 2 weeks.  Symptoms occur every morning when he stands up.  Symptoms resolved within a minute and do not recur.  We will recheck labs per below.  His orthostatics in office were normal.  Given the timing of his symptoms, this is likely due to orthostatic hypotension versus his carotid stenosis.  Recommended he follow-up with vascular next week as planned and stay well-hydrated with sip of water prior to standing up.  Recommended he stand up slowly and while holding onto objects to avoid falls.  Follow-up precautions given for worsening symptoms  - Basic Metabolic Panel; Future  - CBC WITH DIFFERENTIAL; Future    4. Pulmonary hypertension (HCC)  Chronic, asymptomatic.  Patient is followed by  pulmonary and cardiology.  Patient is currently using supplemental oxygen at night and is following up with pulmonary for titration.  Will monitor    5. Aneurysm of infrarenal abdominal aorta (HCC)  Chronic, asymptomatic.  Patient has vascular appointment scheduled for next week.  A copy of his most recent ultrasound was given to him, so he can bring this for review with his vascular specialist.  Will monitor    6. Hyponatremia  Chronic, unclear control.  Recommended he get repeat BMP given his vertigo, continue to cut back on alcohol.  Will monitor        Return in about 3 months (around 6/30/2022) for medication review or sooner if symptoms worsen.    Please note that this dictation was created using voice recognition software. I have made every reasonable attempt to correct obvious errors, but I expect that there are errors of grammar and possibly content that I did not discover before finalizing the note.

## 2022-03-30 ENCOUNTER — HOSPITAL ENCOUNTER (OUTPATIENT)
Dept: LAB | Facility: MEDICAL CENTER | Age: 82
End: 2022-03-30
Attending: FAMILY MEDICINE
Payer: MEDICARE

## 2022-03-30 ENCOUNTER — OFFICE VISIT (OUTPATIENT)
Dept: MEDICAL GROUP | Facility: MEDICAL CENTER | Age: 82
End: 2022-03-30
Payer: MEDICARE

## 2022-03-30 VITALS
HEIGHT: 70 IN | TEMPERATURE: 97.6 F | SYSTOLIC BLOOD PRESSURE: 122 MMHG | HEART RATE: 87 BPM | OXYGEN SATURATION: 91 % | DIASTOLIC BLOOD PRESSURE: 64 MMHG | RESPIRATION RATE: 12 BRPM | WEIGHT: 146 LBS | BODY MASS INDEX: 20.9 KG/M2

## 2022-03-30 DIAGNOSIS — E55.9 VITAMIN D DEFICIENCY: ICD-10-CM

## 2022-03-30 DIAGNOSIS — I27.20 PULMONARY HYPERTENSION (HCC): ICD-10-CM

## 2022-03-30 DIAGNOSIS — R42 VERTIGO: ICD-10-CM

## 2022-03-30 DIAGNOSIS — I71.43 ANEURYSM OF INFRARENAL ABDOMINAL AORTA (HCC): ICD-10-CM

## 2022-03-30 DIAGNOSIS — N28.89 OTHER SPECIFIED DISORDERS OF KIDNEY AND URETER: ICD-10-CM

## 2022-03-30 DIAGNOSIS — E87.1 HYPONATREMIA: ICD-10-CM

## 2022-03-30 LAB
ANION GAP SERPL CALC-SCNC: 12 MMOL/L (ref 7–16)
BASOPHILS # BLD AUTO: 0.9 % (ref 0–1.8)
BASOPHILS # BLD: 0.06 K/UL (ref 0–0.12)
BUN SERPL-MCNC: 9 MG/DL (ref 8–22)
CALCIUM SERPL-MCNC: 9.6 MG/DL (ref 8.5–10.5)
CHLORIDE SERPL-SCNC: 95 MMOL/L (ref 96–112)
CO2 SERPL-SCNC: 27 MMOL/L (ref 20–33)
CREAT SERPL-MCNC: 0.7 MG/DL (ref 0.5–1.4)
EOSINOPHIL # BLD AUTO: 0.31 K/UL (ref 0–0.51)
EOSINOPHIL NFR BLD: 4.7 % (ref 0–6.9)
ERYTHROCYTE [DISTWIDTH] IN BLOOD BY AUTOMATED COUNT: 49.4 FL (ref 35.9–50)
GFR SERPLBLD CREATININE-BSD FMLA CKD-EPI: 92 ML/MIN/1.73 M 2
GLUCOSE SERPL-MCNC: 166 MG/DL (ref 65–99)
HCT VFR BLD AUTO: 42 % (ref 42–52)
HGB BLD-MCNC: 13.8 G/DL (ref 14–18)
IMM GRANULOCYTES # BLD AUTO: 0.02 K/UL (ref 0–0.11)
IMM GRANULOCYTES NFR BLD AUTO: 0.3 % (ref 0–0.9)
LYMPHOCYTES # BLD AUTO: 1.54 K/UL (ref 1–4.8)
LYMPHOCYTES NFR BLD: 23.2 % (ref 22–41)
MCH RBC QN AUTO: 32.3 PG (ref 27–33)
MCHC RBC AUTO-ENTMCNC: 32.9 G/DL (ref 33.7–35.3)
MCV RBC AUTO: 98.4 FL (ref 81.4–97.8)
MONOCYTES # BLD AUTO: 0.69 K/UL (ref 0–0.85)
MONOCYTES NFR BLD AUTO: 10.4 % (ref 0–13.4)
NEUTROPHILS # BLD AUTO: 4.03 K/UL (ref 1.82–7.42)
NEUTROPHILS NFR BLD: 60.5 % (ref 44–72)
NRBC # BLD AUTO: 0 K/UL
NRBC BLD-RTO: 0 /100 WBC
PLATELET # BLD AUTO: 253 K/UL (ref 164–446)
PMV BLD AUTO: 9.6 FL (ref 9–12.9)
POTASSIUM SERPL-SCNC: 4.7 MMOL/L (ref 3.6–5.5)
RBC # BLD AUTO: 4.27 M/UL (ref 4.7–6.1)
SODIUM SERPL-SCNC: 134 MMOL/L (ref 135–145)
WBC # BLD AUTO: 6.7 K/UL (ref 4.8–10.8)

## 2022-03-30 PROCEDURE — 85025 COMPLETE CBC W/AUTO DIFF WBC: CPT

## 2022-03-30 PROCEDURE — 80048 BASIC METABOLIC PNL TOTAL CA: CPT

## 2022-03-30 PROCEDURE — 36415 COLL VENOUS BLD VENIPUNCTURE: CPT

## 2022-03-30 PROCEDURE — 99214 OFFICE O/P EST MOD 30 MIN: CPT | Performed by: FAMILY MEDICINE

## 2022-03-30 RX ORDER — MULTIVIT-MIN/IRON/FOLIC ACID/K 18-600-40
1 CAPSULE ORAL DAILY
Qty: 90 CAPSULE | Refills: 1 | Status: SHIPPED | OUTPATIENT
Start: 2022-03-30

## 2022-03-30 ASSESSMENT — FIBROSIS 4 INDEX: FIB4 SCORE: 1.4

## 2022-04-27 ENCOUNTER — OFFICE VISIT (OUTPATIENT)
Dept: PHYSICAL MEDICINE AND REHAB | Facility: REHABILITATION | Age: 82
End: 2022-04-27
Payer: MEDICARE

## 2022-04-27 VITALS
DIASTOLIC BLOOD PRESSURE: 78 MMHG | RESPIRATION RATE: 15 BRPM | TEMPERATURE: 97.7 F | HEART RATE: 89 BPM | SYSTOLIC BLOOD PRESSURE: 126 MMHG | WEIGHT: 146 LBS | OXYGEN SATURATION: 94 % | BODY MASS INDEX: 20.95 KG/M2

## 2022-04-27 DIAGNOSIS — G81.94 LEFT HEMIPARESIS (HCC): ICD-10-CM

## 2022-04-27 DIAGNOSIS — I63.411 CEREBROVASCULAR ACCIDENT (CVA) DUE TO EMBOLISM OF RIGHT MIDDLE CEREBRAL ARTERY (HCC): Primary | ICD-10-CM

## 2022-04-27 PROCEDURE — 99213 OFFICE O/P EST LOW 20 MIN: CPT | Performed by: PHYSICAL MEDICINE & REHABILITATION

## 2022-04-27 ASSESSMENT — PATIENT HEALTH QUESTIONNAIRE - PHQ9
CLINICAL INTERPRETATION OF PHQ2 SCORE: 1
5. POOR APPETITE OR OVEREATING: 0 - NOT AT ALL
SUM OF ALL RESPONSES TO PHQ QUESTIONS 1-9: 1

## 2022-04-27 ASSESSMENT — FIBROSIS 4 INDEX: FIB4 SCORE: 1.81

## 2022-04-27 NOTE — PROGRESS NOTES
[facility]  [facility]  [facility] [place] [address]  Ph: [phone]    FOLLOW UP PATIENT EVALUATION    Patient Name: David Cho   Patient : 1940  Patient Age: 81 y.o.     Examining Physician: Dr. Adilene Trejo, DO    PM&R History to Date - Background Information:  Dates of Admission/Discharge to ARU: 2022-2022    SUBJECTIVE:   Patient Identification: David Cho is a 81 y.o. RHD male with PMH significant for hypertension, BPH, COPD, hyperlipidemia, depression and rehabilitation history significant for right frontal/parietal ischemic stroke 1/10/2022 suspected cardioembolic on Eliquis and is presenting to PM&R clinic for a FOLLOW UP OUTPATIENT evaluation with the following chief complaint/s:    Chief Complaint: Follow up stroke    History of Present Illness:   - Has returned to driving without issue.   - His left hand feels weak still, but everything else feels about the same.   - Does get dizziness when sitting up to fast.   - Will be getting carotid endarterectomy May 11  - The hand therapy location is really helping him.   - Normal BMs and urinating volitionally.   - No nerve pain. Every once in a while his pointer on left is numb from past injury.   - Hand movement significantly improved.   - Had a fall before the stroke, but not after.   - His shoes hang up on the carpeting.   - Still on Eliquis because he does not want to go to lab more frequently.   - Saw neurology, no follow up needed.     Review of Systems:  All other pertinent positive review of systems are noted above in HPI.   All other systems reviewed and are negative.    Past Medical History:  Past Medical History:   Diagnosis Date   • Alcohol use    • Cholesterol serum elevated    • COPD (chronic obstructive pulmonary disease) (HCC)    • Depression    • Enlarged prostate    • Hypertension       Past Surgical History:   Procedure Laterality Date   • APPENDECTOMY     • OTHER   appendectomy        Current Outpatient Medications:   •  Vitamin D, Cholecalciferol, 50 MCG (2000 UT) Cap, Take 1 Capsule by mouth every day., Disp: 90 Capsule, Rfl: 1  •  Fluticasone-Umeclidin-Vilant (TRELEGY ELLIPTA) 100-62.5-25 MCG/INH AEROSOL POWDER, BREATH ACTIVATED inhalation, Inhale 1 Inhalation every day. Rinse mouth after use, Disp: 90 Each, Rfl: 3  •  apixaban (ELIQUIS) 5mg Tab, Take 1 Tablet by mouth 2 times a day. Indications: Thromboembolism secondary to Atrial Fibrillation, Disp: 180 Tablet, Rfl: 0  •  atorvastatin (LIPITOR) 40 MG Tab, Take 1 Tablet by mouth every evening., Disp: 90 Tablet, Rfl: 2  •  buPROPion SR (WELLBUTRIN-SR) 150 MG TABLET SR 12 HR sustained-release tablet, Take 1 Tablet by mouth every day., Disp: 90 Tablet, Rfl: 2  •  enalapril (VASOTEC) 20 MG tablet, Take 2 Tablets by mouth every day., Disp: 180 Tablet, Rfl: 2  •  finasteride (PROSCAR) 5 MG Tab, Take 1 Tablet by mouth every day., Disp: 90 Tablet, Rfl: 2  •  omeprazole (PRILOSEC) 20 MG delayed-release capsule, Take 1 Capsule by mouth every morning before breakfast., Disp: 90 Capsule, Rfl: 2  •  tamsulosin (FLOMAX) 0.4 MG capsule, Take 1 Capsule by mouth 1/2 hour after dinner., Disp: 90 Capsule, Rfl: 2  •  albuterol 108 (90 Base) MCG/ACT Aero Soln inhalation aerosol, Inhale 2 Puffs every four hours as needed for Shortness of Breath., Disp: 1 Each, Rfl: 2  •  thiamine (THIAMINE) 100 MG tablet, Take 1 Tablet by mouth every day., Disp: 30 Tablet, Rfl: 0  •  folic acid (FOLVITE) 1 MG Tab, Take 1 Tablet by mouth every day. (Patient not taking: Reported on 4/27/2022), Disp: 30 Tablet, Rfl: 0  No Known Allergies     Past Social History:  Social History     Socioeconomic History   • Marital status:      Spouse name: Not on file   • Number of children: Not on file   • Years of education: Not on file   • Highest education level: Not on file   Occupational History   • Not on file   Tobacco Use   • Smoking status: Former Smoker      Packs/day: 1.00     Years: 50.00     Pack years: 50.00     Types: Cigarettes     Quit date: 2016     Years since quittin.7   • Smokeless tobacco: Never Used   • Tobacco comment: continued abstinance   Vaping Use   • Vaping Use: Never used   Substance and Sexual Activity   • Alcohol use: Yes   • Drug use: No   • Sexual activity: Not on file   Other Topics Concern   • Not on file   Social History Narrative   • Not on file     Social Determinants of Health     Financial Resource Strain: Not on file   Food Insecurity: Not on file   Transportation Needs: Not on file   Physical Activity: Not on file   Stress: Not on file   Social Connections: Not on file   Intimate Partner Violence: Not on file   Housing Stability: Not on file        Family History:  Family History   Problem Relation Age of Onset   • Heart Disease Mother    • Other Mother         turberculosis   • Cancer Father         colon cancer   • Heart Disease Step-Sister        Depression and Opioid Screening  PHQ-9:  Depression Screen (PHQ-2/PHQ-9) 1/10/2022 2022 2022   PHQ-2 Total Score 0 2 -   PHQ-2 Total Score - - 1   PHQ-9 Total Score - 4 -   PHQ-9 Total Score - - 1     Interpretation of PHQ-9 Total Score   Score Severity   1-4 No Depression   5-9 Mild Depression   10-14 Moderate Depression   15-19 Moderately Severe Depression   20-27 Severe Depression     OBJECTIVE:   Vital Signs:  Vitals:    22 1334   BP: 126/78   Pulse: 89   Resp: 15   Temp: 36.5 °C (97.7 °F)   SpO2: 94%        Pertinent Labs:  Lab Results   Component Value Date/Time    SODIUM 134 (L) 2022 11:22 AM    POTASSIUM 4.7 2022 11:22 AM    CHLORIDE 95 (L) 2022 11:22 AM    CO2 27 2022 11:22 AM    GLUCOSE 166 (H) 2022 11:22 AM    BUN 9 2022 11:22 AM    CREATININE 0.70 2022 11:22 AM       Lab Results   Component Value Date/Time    HBA1C 6.1 (H) 01/10/2022 03:35 PM       Lab Results   Component Value Date/Time    WBC 6.7 2022  11:22 AM    RBC 4.27 (L) 03/30/2022 11:22 AM    HEMOGLOBIN 13.8 (L) 03/30/2022 11:22 AM    HEMATOCRIT 42.0 03/30/2022 11:22 AM    MCV 98.4 (H) 03/30/2022 11:22 AM    MCH 32.3 03/30/2022 11:22 AM    MCHC 32.9 (L) 03/30/2022 11:22 AM    MPV 9.6 03/30/2022 11:22 AM    NEUTSPOLYS 60.50 03/30/2022 11:22 AM    LYMPHOCYTES 23.20 03/30/2022 11:22 AM    MONOCYTES 10.40 03/30/2022 11:22 AM    EOSINOPHILS 4.70 03/30/2022 11:22 AM    BASOPHILS 0.90 03/30/2022 11:22 AM       Lab Results   Component Value Date/Time    ASTSGOT 16 01/13/2022 05:33 AM    ALTSGPT 8 01/13/2022 05:33 AM        Physical Exam:   GEN: No apparent distress  HEENT: Head normocephalic, atraumatic.  Sclera nonicteric bilaterally, no ocular discharge appreciated bilaterally.  CV: Extremities warm and well-perfused, no peripheral edema appreciated bilaterally.  PULMONARY: Breathing nonlabored on room air, no respiratory accessory muscle use.  Not requiring supplemental oxygen.  SKIN: No appreciable skin breakdown on exposed areas of skin.  PSYCH: Mood and affect within normal limits.  NEURO: Awake alert.  Conversational.  Logical thought content.  Ambulatory without assistive device.  Improved muscle wasting FDI.    ASSESSMENT/PLAN: David Cho  is a 81 y.o. male with rehabilitation history significant for right frontal/parietal ischemic stroke 1/10/2022 suspected cardioembolic on Eliquis, here for evaluation. The following plan was discussed with the patient who is in agreement.     Visit Diagnoses     ICD-10-CM   1. Cerebrovascular accident (CVA) due to embolism of right middle cerebral artery (HCC)  I63.411   2. Left hemiparesis (HCC)  G81.94        Rehab/Neuro:   1. Right frontal/parietal ischemic stroke 1/10/2022 suspected cardioembolic newly on Eliquis  2. Chronic lacunar infarcts seen on MRI  3. Moderate stenosis bilateral carotid arteries  4. 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 Left  hemiparesis  -Sleep study completed.  -Neurology appointment completed.  Follow-up as needed.  -Established with cardiology, remaining on Eliquis.  -Therapy: Continue outpatient PT/OT.  -Driving status: Has returned to driving, self initiated.    Assessment of functional status: Patient has made a remarkable recovery.  He continues to have some distal left upper extremity weakness but this is improved significantly.  He otherwise has no deficits from his stroke in January.      Follow up: As needed    Total time spent was 14 minutes.  Included in this time is the time spent preparing for the visit including record review, my exam and evaluation, counseling and education regarding that which is aforementioned in the assessment and plan.  Time was spent documenting into patient's electronic health record. Some of the time included occurred outside of charting time.  Discussion involved the patient.    Please note that this dictation was created using voice recognition software. I have made every reasonable attempt to correct obvious errors but there may be errors of grammar and content that I may have overlooked prior to finalization of this note.    Dr. Adilene Trejo DO, MS  Department of Physical Medicine & Rehabilitation  Neuro Rehabilitation Clinic  [facility]

## 2022-05-04 ENCOUNTER — PRE-ADMISSION TESTING (OUTPATIENT)
Dept: ADMISSIONS | Facility: MEDICAL CENTER | Age: 82
DRG: 039 | End: 2022-05-04
Attending: SURGERY
Payer: MEDICARE

## 2022-05-04 DIAGNOSIS — Z01.812 PRE-OPERATIVE LABORATORY EXAMINATION: ICD-10-CM

## 2022-05-04 DIAGNOSIS — Z01.810 PRE-OPERATIVE CARDIOVASCULAR EXAMINATION: ICD-10-CM

## 2022-05-04 LAB
ABO GROUP BLD: NORMAL
ANION GAP SERPL CALC-SCNC: 12 MMOL/L (ref 7–16)
BLD GP AB SCN SERPL QL: NORMAL
BUN SERPL-MCNC: 11 MG/DL (ref 8–22)
CALCIUM SERPL-MCNC: 9.6 MG/DL (ref 8.5–10.5)
CHLORIDE SERPL-SCNC: 95 MMOL/L (ref 96–112)
CO2 SERPL-SCNC: 27 MMOL/L (ref 20–33)
CREAT SERPL-MCNC: 0.86 MG/DL (ref 0.5–1.4)
EKG IMPRESSION: NORMAL
ERYTHROCYTE [DISTWIDTH] IN BLOOD BY AUTOMATED COUNT: 47.4 FL (ref 35.9–50)
GFR SERPLBLD CREATININE-BSD FMLA CKD-EPI: 87 ML/MIN/1.73 M 2
GLUCOSE SERPL-MCNC: 109 MG/DL (ref 65–99)
HCT VFR BLD AUTO: 43.3 % (ref 42–52)
HGB BLD-MCNC: 14.4 G/DL (ref 14–18)
MCH RBC QN AUTO: 32.4 PG (ref 27–33)
MCHC RBC AUTO-ENTMCNC: 33.3 G/DL (ref 33.7–35.3)
MCV RBC AUTO: 97.5 FL (ref 81.4–97.8)
PLATELET # BLD AUTO: 221 K/UL (ref 164–446)
PMV BLD AUTO: 9.6 FL (ref 9–12.9)
POTASSIUM SERPL-SCNC: 4 MMOL/L (ref 3.6–5.5)
RBC # BLD AUTO: 4.44 M/UL (ref 4.7–6.1)
RH BLD: NORMAL
SODIUM SERPL-SCNC: 134 MMOL/L (ref 135–145)
WBC # BLD AUTO: 6 K/UL (ref 4.8–10.8)

## 2022-05-04 PROCEDURE — 86850 RBC ANTIBODY SCREEN: CPT

## 2022-05-04 PROCEDURE — 80048 BASIC METABOLIC PNL TOTAL CA: CPT

## 2022-05-04 PROCEDURE — 85027 COMPLETE CBC AUTOMATED: CPT

## 2022-05-04 PROCEDURE — 93005 ELECTROCARDIOGRAM TRACING: CPT

## 2022-05-04 PROCEDURE — 93010 ELECTROCARDIOGRAM REPORT: CPT | Performed by: INTERNAL MEDICINE

## 2022-05-04 PROCEDURE — 86900 BLOOD TYPING SEROLOGIC ABO: CPT

## 2022-05-04 PROCEDURE — 86901 BLOOD TYPING SEROLOGIC RH(D): CPT

## 2022-05-04 PROCEDURE — 36415 COLL VENOUS BLD VENIPUNCTURE: CPT

## 2022-05-04 ASSESSMENT — FIBROSIS 4 INDEX: FIB4 SCORE: 1.81

## 2022-05-11 ENCOUNTER — ANESTHESIA (OUTPATIENT)
Dept: SURGERY | Facility: MEDICAL CENTER | Age: 82
DRG: 039 | End: 2022-05-11
Payer: MEDICARE

## 2022-05-11 ENCOUNTER — ANESTHESIA EVENT (OUTPATIENT)
Dept: SURGERY | Facility: MEDICAL CENTER | Age: 82
DRG: 039 | End: 2022-05-11
Payer: MEDICARE

## 2022-05-11 ENCOUNTER — HOSPITAL ENCOUNTER (INPATIENT)
Facility: MEDICAL CENTER | Age: 82
LOS: 1 days | DRG: 039 | End: 2022-05-12
Attending: SURGERY | Admitting: SURGERY
Payer: MEDICARE

## 2022-05-11 DIAGNOSIS — G89.18 POST-OPERATIVE PAIN: ICD-10-CM

## 2022-05-11 LAB — EKG IMPRESSION: NORMAL

## 2022-05-11 PROCEDURE — 700105 HCHG RX REV CODE 258: Performed by: SURGERY

## 2022-05-11 PROCEDURE — 700102 HCHG RX REV CODE 250 W/ 637 OVERRIDE(OP): Performed by: NURSE PRACTITIONER

## 2022-05-11 PROCEDURE — 4A11X4G MONITORING OF PERIPHERAL NERVOUS ELECTRICAL ACTIVITY, INTRAOPERATIVE, EXTERNAL APPROACH: ICD-10-PCS | Performed by: SURGERY

## 2022-05-11 PROCEDURE — 500257: Performed by: SURGERY

## 2022-05-11 PROCEDURE — 770001 HCHG ROOM/CARE - MED/SURG/GYN PRIV*

## 2022-05-11 PROCEDURE — 93010 ELECTROCARDIOGRAM REPORT: CPT | Performed by: INTERNAL MEDICINE

## 2022-05-11 PROCEDURE — 501838 HCHG SUTURE GENERAL: Performed by: SURGERY

## 2022-05-11 PROCEDURE — 700111 HCHG RX REV CODE 636 W/ 250 OVERRIDE (IP): Performed by: SURGERY

## 2022-05-11 PROCEDURE — 700101 HCHG RX REV CODE 250: Performed by: NURSE PRACTITIONER

## 2022-05-11 PROCEDURE — 03CM0ZZ EXTIRPATION OF MATTER FROM RIGHT EXTERNAL CAROTID ARTERY, OPEN APPROACH: ICD-10-PCS | Performed by: SURGERY

## 2022-05-11 PROCEDURE — 95940 IONM IN OPERATNG ROOM 15 MIN: CPT | Performed by: SURGERY

## 2022-05-11 PROCEDURE — 03UM0KZ SUPPLEMENT RIGHT EXTERNAL CAROTID ARTERY WITH NONAUTOLOGOUS TISSUE SUBSTITUTE, OPEN APPROACH: ICD-10-PCS | Performed by: SURGERY

## 2022-05-11 PROCEDURE — 03UK0JZ SUPPLEMENT RIGHT INTERNAL CAROTID ARTERY WITH SYNTHETIC SUBSTITUTE, OPEN APPROACH: ICD-10-PCS | Performed by: SURGERY

## 2022-05-11 PROCEDURE — 95955 EEG DURING SURGERY: CPT | Performed by: SURGERY

## 2022-05-11 PROCEDURE — 160036 HCHG PACU - EA ADDL 30 MINS PHASE I: Performed by: SURGERY

## 2022-05-11 PROCEDURE — A9270 NON-COVERED ITEM OR SERVICE: HCPCS | Performed by: SURGERY

## 2022-05-11 PROCEDURE — 95938 SOMATOSENSORY TESTING: CPT | Performed by: SURGERY

## 2022-05-11 PROCEDURE — 700101 HCHG RX REV CODE 250: Performed by: ANESTHESIOLOGY

## 2022-05-11 PROCEDURE — 03CH0ZZ EXTIRPATION OF MATTER FROM RIGHT COMMON CAROTID ARTERY, OPEN APPROACH: ICD-10-PCS | Performed by: SURGERY

## 2022-05-11 PROCEDURE — 160009 HCHG ANES TIME/MIN: Performed by: SURGERY

## 2022-05-11 PROCEDURE — 93005 ELECTROCARDIOGRAM TRACING: CPT | Performed by: SURGERY

## 2022-05-11 PROCEDURE — 700105 HCHG RX REV CODE 258: Performed by: ANESTHESIOLOGY

## 2022-05-11 PROCEDURE — C1725 CATH, TRANSLUMIN NON-LASER: HCPCS | Performed by: SURGERY

## 2022-05-11 PROCEDURE — 700111 HCHG RX REV CODE 636 W/ 250 OVERRIDE (IP): Performed by: ANESTHESIOLOGY

## 2022-05-11 PROCEDURE — 700101 HCHG RX REV CODE 250: Performed by: SURGERY

## 2022-05-11 PROCEDURE — A9270 NON-COVERED ITEM OR SERVICE: HCPCS | Performed by: NURSE PRACTITIONER

## 2022-05-11 PROCEDURE — 160028 HCHG SURGERY MINUTES - 1ST 30 MINS LEVEL 3: Performed by: SURGERY

## 2022-05-11 PROCEDURE — C1781 MESH (IMPLANTABLE): HCPCS | Performed by: SURGERY

## 2022-05-11 PROCEDURE — 160002 HCHG RECOVERY MINUTES (STAT): Performed by: SURGERY

## 2022-05-11 PROCEDURE — 160035 HCHG PACU - 1ST 60 MINS PHASE I: Performed by: SURGERY

## 2022-05-11 PROCEDURE — 00350 ANES PX MAJOR VSL NECK NOS: CPT | Performed by: ANESTHESIOLOGY

## 2022-05-11 PROCEDURE — 36556 INSERT NON-TUNNEL CV CATH: CPT | Performed by: ANESTHESIOLOGY

## 2022-05-11 PROCEDURE — 110454 HCHG SHELL REV 250: Performed by: SURGERY

## 2022-05-11 PROCEDURE — 03CK0ZZ EXTIRPATION OF MATTER FROM RIGHT INTERNAL CAROTID ARTERY, OPEN APPROACH: ICD-10-PCS | Performed by: SURGERY

## 2022-05-11 PROCEDURE — 700102 HCHG RX REV CODE 250 W/ 637 OVERRIDE(OP): Performed by: SURGERY

## 2022-05-11 PROCEDURE — 99100 ANES PT EXTEME AGE<1 YR&>70: CPT | Performed by: ANESTHESIOLOGY

## 2022-05-11 PROCEDURE — 501837 HCHG SUTURE CV: Performed by: SURGERY

## 2022-05-11 PROCEDURE — 03UH0KZ SUPPLEMENT RIGHT COMMON CAROTID ARTERY WITH NONAUTOLOGOUS TISSUE SUBSTITUTE, OPEN APPROACH: ICD-10-PCS | Performed by: SURGERY

## 2022-05-11 PROCEDURE — 160039 HCHG SURGERY MINUTES - EA ADDL 1 MIN LEVEL 3: Performed by: SURGERY

## 2022-05-11 PROCEDURE — 160048 HCHG OR STATISTICAL LEVEL 1-5: Performed by: SURGERY

## 2022-05-11 DEVICE — PATCH .8X8CM XENOSURE BIOLOGIC VASCULAR---ORDER IN MULTIPLES OF 5---: Type: IMPLANTABLE DEVICE | Status: FUNCTIONAL

## 2022-05-11 RX ORDER — OXYCODONE HCL 5 MG/5 ML
10 SOLUTION, ORAL ORAL
Status: DISCONTINUED | OUTPATIENT
Start: 2022-05-11 | End: 2022-05-11 | Stop reason: HOSPADM

## 2022-05-11 RX ORDER — FLUTICASONE PROPIONATE 44 UG/1
2 AEROSOL, METERED RESPIRATORY (INHALATION)
Status: DISCONTINUED | OUTPATIENT
Start: 2022-05-11 | End: 2022-05-12 | Stop reason: HOSPADM

## 2022-05-11 RX ORDER — HALOPERIDOL 5 MG/ML
1 INJECTION INTRAMUSCULAR
Status: DISCONTINUED | OUTPATIENT
Start: 2022-05-11 | End: 2022-05-11 | Stop reason: HOSPADM

## 2022-05-11 RX ORDER — DIPHENHYDRAMINE HYDROCHLORIDE 50 MG/ML
12.5 INJECTION INTRAMUSCULAR; INTRAVENOUS
Status: DISCONTINUED | OUTPATIENT
Start: 2022-05-11 | End: 2022-05-11 | Stop reason: HOSPADM

## 2022-05-11 RX ORDER — OXYCODONE HYDROCHLORIDE 10 MG/1
10 TABLET ORAL
Status: DISCONTINUED | OUTPATIENT
Start: 2022-05-11 | End: 2022-05-12 | Stop reason: HOSPADM

## 2022-05-11 RX ORDER — SCOLOPAMINE TRANSDERMAL SYSTEM 1 MG/1
1 PATCH, EXTENDED RELEASE TRANSDERMAL
Status: DISCONTINUED | OUTPATIENT
Start: 2022-05-11 | End: 2022-05-12 | Stop reason: HOSPADM

## 2022-05-11 RX ORDER — DEXTROSE MONOHYDRATE, SODIUM CHLORIDE, AND POTASSIUM CHLORIDE 50; 1.49; 4.5 G/1000ML; G/1000ML; G/1000ML
INJECTION, SOLUTION INTRAVENOUS CONTINUOUS
Status: DISPENSED | OUTPATIENT
Start: 2022-05-11 | End: 2022-05-11

## 2022-05-11 RX ORDER — CEFAZOLIN SODIUM 1 G/3ML
INJECTION, POWDER, FOR SOLUTION INTRAMUSCULAR; INTRAVENOUS PRN
Status: DISCONTINUED | OUTPATIENT
Start: 2022-05-11 | End: 2022-05-11 | Stop reason: SURG

## 2022-05-11 RX ORDER — HALOPERIDOL 5 MG/ML
1 INJECTION INTRAMUSCULAR EVERY 6 HOURS PRN
Status: DISCONTINUED | OUTPATIENT
Start: 2022-05-11 | End: 2022-05-12 | Stop reason: HOSPADM

## 2022-05-11 RX ORDER — PHENYLEPHRINE HCL IN 0.9% NACL 0.5 MG/5ML
SYRINGE (ML) INTRAVENOUS PRN
Status: DISCONTINUED | OUTPATIENT
Start: 2022-05-11 | End: 2022-05-11 | Stop reason: SURG

## 2022-05-11 RX ORDER — ACETAMINOPHEN 500 MG
1000 TABLET ORAL EVERY 6 HOURS PRN
Status: DISCONTINUED | OUTPATIENT
Start: 2022-05-11 | End: 2022-05-12 | Stop reason: HOSPADM

## 2022-05-11 RX ORDER — VITAMIN B COMPLEX
2000 TABLET ORAL DAILY
Status: DISCONTINUED | OUTPATIENT
Start: 2022-05-11 | End: 2022-05-12 | Stop reason: HOSPADM

## 2022-05-11 RX ORDER — BUPROPION HYDROCHLORIDE 150 MG/1
150 TABLET, EXTENDED RELEASE ORAL DAILY
Status: DISCONTINUED | OUTPATIENT
Start: 2022-05-12 | End: 2022-05-12 | Stop reason: HOSPADM

## 2022-05-11 RX ORDER — HEPARIN SODIUM,PORCINE 1000/ML
VIAL (ML) INJECTION
Status: DISCONTINUED | OUTPATIENT
Start: 2022-05-11 | End: 2022-05-11 | Stop reason: HOSPADM

## 2022-05-11 RX ORDER — OXYCODONE HYDROCHLORIDE 5 MG/1
5 TABLET ORAL
Status: DISCONTINUED | OUTPATIENT
Start: 2022-05-11 | End: 2022-05-12 | Stop reason: HOSPADM

## 2022-05-11 RX ORDER — ONDANSETRON 2 MG/ML
4 INJECTION INTRAMUSCULAR; INTRAVENOUS EVERY 4 HOURS PRN
Status: DISCONTINUED | OUTPATIENT
Start: 2022-05-11 | End: 2022-05-12 | Stop reason: HOSPADM

## 2022-05-11 RX ORDER — DEXAMETHASONE SODIUM PHOSPHATE 4 MG/ML
4 INJECTION, SOLUTION INTRA-ARTICULAR; INTRALESIONAL; INTRAMUSCULAR; INTRAVENOUS; SOFT TISSUE
Status: DISCONTINUED | OUTPATIENT
Start: 2022-05-11 | End: 2022-05-12 | Stop reason: HOSPADM

## 2022-05-11 RX ORDER — SODIUM CHLORIDE, SODIUM GLUCONATE, SODIUM ACETATE, POTASSIUM CHLORIDE AND MAGNESIUM CHLORIDE 526; 502; 368; 37; 30 MG/100ML; MG/100ML; MG/100ML; MG/100ML; MG/100ML
INJECTION, SOLUTION INTRAVENOUS
Status: DISCONTINUED | OUTPATIENT
Start: 2022-05-11 | End: 2022-05-11 | Stop reason: SURG

## 2022-05-11 RX ORDER — BUPIVACAINE HYDROCHLORIDE AND EPINEPHRINE 5; 5 MG/ML; UG/ML
INJECTION, SOLUTION EPIDURAL; INTRACAUDAL; PERINEURAL
Status: DISCONTINUED | OUTPATIENT
Start: 2022-05-11 | End: 2022-05-11 | Stop reason: HOSPADM

## 2022-05-11 RX ORDER — HYDROMORPHONE HYDROCHLORIDE 1 MG/ML
0.5 INJECTION, SOLUTION INTRAMUSCULAR; INTRAVENOUS; SUBCUTANEOUS
Status: DISCONTINUED | OUTPATIENT
Start: 2022-05-11 | End: 2022-05-12 | Stop reason: HOSPADM

## 2022-05-11 RX ORDER — ENALAPRIL MALEATE 10 MG/1
40 TABLET ORAL DAILY
Status: DISCONTINUED | OUTPATIENT
Start: 2022-05-12 | End: 2022-05-12 | Stop reason: HOSPADM

## 2022-05-11 RX ORDER — MAGNESIUM SULFATE HEPTAHYDRATE 40 MG/ML
INJECTION, SOLUTION INTRAVENOUS PRN
Status: DISCONTINUED | OUTPATIENT
Start: 2022-05-11 | End: 2022-05-11 | Stop reason: SURG

## 2022-05-11 RX ORDER — HYDRALAZINE HYDROCHLORIDE 20 MG/ML
5 INJECTION INTRAMUSCULAR; INTRAVENOUS
Status: DISCONTINUED | OUTPATIENT
Start: 2022-05-11 | End: 2022-05-11 | Stop reason: HOSPADM

## 2022-05-11 RX ORDER — ONDANSETRON 2 MG/ML
4 INJECTION INTRAMUSCULAR; INTRAVENOUS
Status: DISCONTINUED | OUTPATIENT
Start: 2022-05-11 | End: 2022-05-11 | Stop reason: HOSPADM

## 2022-05-11 RX ORDER — LABETALOL HYDROCHLORIDE 5 MG/ML
10 INJECTION, SOLUTION INTRAVENOUS EVERY 4 HOURS PRN
Status: DISCONTINUED | OUTPATIENT
Start: 2022-05-11 | End: 2022-05-12 | Stop reason: HOSPADM

## 2022-05-11 RX ORDER — FINASTERIDE 5 MG/1
5 TABLET, FILM COATED ORAL DAILY
Status: DISCONTINUED | OUTPATIENT
Start: 2022-05-12 | End: 2022-05-12 | Stop reason: HOSPADM

## 2022-05-11 RX ORDER — ATORVASTATIN CALCIUM 40 MG/1
40 TABLET, FILM COATED ORAL EVERY EVENING
Status: DISCONTINUED | OUTPATIENT
Start: 2022-05-11 | End: 2022-05-12 | Stop reason: HOSPADM

## 2022-05-11 RX ORDER — OXYCODONE HCL 5 MG/5 ML
5 SOLUTION, ORAL ORAL
Status: DISCONTINUED | OUTPATIENT
Start: 2022-05-11 | End: 2022-05-11 | Stop reason: HOSPADM

## 2022-05-11 RX ORDER — OMEPRAZOLE 20 MG/1
20 CAPSULE, DELAYED RELEASE ORAL
Status: DISCONTINUED | OUTPATIENT
Start: 2022-05-12 | End: 2022-05-12 | Stop reason: HOSPADM

## 2022-05-11 RX ORDER — SODIUM CHLORIDE, SODIUM LACTATE, POTASSIUM CHLORIDE, CALCIUM CHLORIDE 600; 310; 30; 20 MG/100ML; MG/100ML; MG/100ML; MG/100ML
INJECTION, SOLUTION INTRAVENOUS CONTINUOUS
Status: ACTIVE | OUTPATIENT
Start: 2022-05-11 | End: 2022-05-11

## 2022-05-11 RX ORDER — PROTAMINE SULFATE 10 MG/ML
INJECTION, SOLUTION INTRAVENOUS PRN
Status: DISCONTINUED | OUTPATIENT
Start: 2022-05-11 | End: 2022-05-11 | Stop reason: SURG

## 2022-05-11 RX ORDER — ACETAMINOPHEN 10 MG/ML
1000 INJECTION, SOLUTION INTRAVENOUS ONCE
Status: COMPLETED | OUTPATIENT
Start: 2022-05-11 | End: 2022-05-11

## 2022-05-11 RX ORDER — CLONIDINE HYDROCHLORIDE 0.1 MG/1
0.1 TABLET ORAL
Status: DISCONTINUED | OUTPATIENT
Start: 2022-05-11 | End: 2022-05-12 | Stop reason: HOSPADM

## 2022-05-11 RX ORDER — DEXMEDETOMIDINE HYDROCHLORIDE 100 UG/ML
INJECTION, SOLUTION INTRAVENOUS PRN
Status: DISCONTINUED | OUTPATIENT
Start: 2022-05-11 | End: 2022-05-11 | Stop reason: SURG

## 2022-05-11 RX ORDER — METOPROLOL TARTRATE 1 MG/ML
INJECTION, SOLUTION INTRAVENOUS PRN
Status: DISCONTINUED | OUTPATIENT
Start: 2022-05-11 | End: 2022-05-11 | Stop reason: SURG

## 2022-05-11 RX ORDER — CLONIDINE HYDROCHLORIDE 0.1 MG/1
0.2 TABLET ORAL
Status: COMPLETED | OUTPATIENT
Start: 2022-05-11 | End: 2022-05-11

## 2022-05-11 RX ORDER — ROCURONIUM BROMIDE 10 MG/ML
INJECTION, SOLUTION INTRAVENOUS PRN
Status: DISCONTINUED | OUTPATIENT
Start: 2022-05-11 | End: 2022-05-11 | Stop reason: SURG

## 2022-05-11 RX ORDER — ALBUTEROL SULFATE 90 UG/1
2 AEROSOL, METERED RESPIRATORY (INHALATION) EVERY 4 HOURS PRN
Status: DISCONTINUED | OUTPATIENT
Start: 2022-05-11 | End: 2022-05-12 | Stop reason: HOSPADM

## 2022-05-11 RX ORDER — HYDROCODONE BITARTRATE AND ACETAMINOPHEN 5; 325 MG/1; MG/1
1-2 TABLET ORAL EVERY 4 HOURS PRN
Qty: 10 TABLET | Refills: 0 | Status: SHIPPED | OUTPATIENT
Start: 2022-05-11 | End: 2022-05-13

## 2022-05-11 RX ORDER — HEPARIN SODIUM 1000 [USP'U]/ML
INJECTION, SOLUTION INTRAVENOUS; SUBCUTANEOUS PRN
Status: DISCONTINUED | OUTPATIENT
Start: 2022-05-11 | End: 2022-05-11 | Stop reason: SURG

## 2022-05-11 RX ORDER — ALBUTEROL SULFATE 2.5 MG/3ML
2.5 SOLUTION RESPIRATORY (INHALATION)
Status: DISCONTINUED | OUTPATIENT
Start: 2022-05-11 | End: 2022-05-11 | Stop reason: HOSPADM

## 2022-05-11 RX ORDER — TAMSULOSIN HYDROCHLORIDE 0.4 MG/1
0.4 CAPSULE ORAL
Status: DISCONTINUED | OUTPATIENT
Start: 2022-05-11 | End: 2022-05-12 | Stop reason: HOSPADM

## 2022-05-11 RX ORDER — DIPHENHYDRAMINE HYDROCHLORIDE 50 MG/ML
25 INJECTION INTRAMUSCULAR; INTRAVENOUS EVERY 6 HOURS PRN
Status: DISCONTINUED | OUTPATIENT
Start: 2022-05-11 | End: 2022-05-12 | Stop reason: HOSPADM

## 2022-05-11 RX ADMIN — Medication 2000 UNITS: at 17:56

## 2022-05-11 RX ADMIN — FENTANYL CITRATE 50 MCG: 50 INJECTION, SOLUTION INTRAMUSCULAR; INTRAVENOUS at 09:31

## 2022-05-11 RX ADMIN — ROCURONIUM BROMIDE 50 MG: 10 INJECTION, SOLUTION INTRAVENOUS at 09:05

## 2022-05-11 RX ADMIN — FLUTICASONE PROPIONATE 88 MCG: 44 AEROSOL, METERED RESPIRATORY (INHALATION) at 19:12

## 2022-05-11 RX ADMIN — CEFAZOLIN 1 G: 330 INJECTION, POWDER, FOR SOLUTION INTRAMUSCULAR; INTRAVENOUS at 09:07

## 2022-05-11 RX ADMIN — TAMSULOSIN HYDROCHLORIDE 0.4 MG: 0.4 CAPSULE ORAL at 19:11

## 2022-05-11 RX ADMIN — ACETAMINOPHEN 1000 MG: 500 TABLET ORAL at 19:11

## 2022-05-11 RX ADMIN — CLONIDINE HYDROCHLORIDE 0.2 MG: 0.1 TABLET ORAL at 19:53

## 2022-05-11 RX ADMIN — ACETAMINOPHEN 1000 MG: 10 INJECTION, SOLUTION INTRAVENOUS at 11:11

## 2022-05-11 RX ADMIN — HYDRALAZINE HYDROCHLORIDE 5 MG: 20 INJECTION INTRAMUSCULAR; INTRAVENOUS at 11:39

## 2022-05-11 RX ADMIN — SUGAMMADEX 200 MG: 100 INJECTION, SOLUTION INTRAVENOUS at 10:25

## 2022-05-11 RX ADMIN — HEPARIN SODIUM 7000 UNITS: 1000 INJECTION, SOLUTION INTRAVENOUS; SUBCUTANEOUS at 09:38

## 2022-05-11 RX ADMIN — PROPOFOL 20 MG: 10 INJECTION, EMULSION INTRAVENOUS at 10:18

## 2022-05-11 RX ADMIN — SODIUM CHLORIDE, SODIUM GLUCONATE, SODIUM ACETATE, POTASSIUM CHLORIDE AND MAGNESIUM CHLORIDE: 526; 502; 368; 37; 30 INJECTION, SOLUTION INTRAVENOUS at 10:24

## 2022-05-11 RX ADMIN — Medication 50 MCG: at 09:41

## 2022-05-11 RX ADMIN — DEXMEDETOMIDINE 10 MCG: 200 INJECTION, SOLUTION INTRAVENOUS at 10:18

## 2022-05-11 RX ADMIN — PROTAMINE SULFATE 50 MG: 10 INJECTION, SOLUTION INTRAVENOUS at 10:07

## 2022-05-11 RX ADMIN — DEXMEDETOMIDINE 5 MCG: 200 INJECTION, SOLUTION INTRAVENOUS at 09:33

## 2022-05-11 RX ADMIN — FENTANYL CITRATE 50 MCG: 50 INJECTION, SOLUTION INTRAMUSCULAR; INTRAVENOUS at 10:12

## 2022-05-11 RX ADMIN — POTASSIUM CHLORIDE, DEXTROSE MONOHYDRATE AND SODIUM CHLORIDE: 150; 5; 450 INJECTION, SOLUTION INTRAVENOUS at 11:04

## 2022-05-11 RX ADMIN — PROPOFOL 120 MG: 10 INJECTION, EMULSION INTRAVENOUS at 09:05

## 2022-05-11 RX ADMIN — METOPROLOL TARTRATE 2 MG: 5 INJECTION, SOLUTION INTRAVENOUS at 09:28

## 2022-05-11 RX ADMIN — MAGNESIUM SULFATE HEPTAHYDRATE 2 G: 40 INJECTION, SOLUTION INTRAVENOUS at 10:24

## 2022-05-11 RX ADMIN — FENTANYL CITRATE 100 MCG: 50 INJECTION, SOLUTION INTRAMUSCULAR; INTRAVENOUS at 09:05

## 2022-05-11 RX ADMIN — DEXMEDETOMIDINE 5 MCG: 200 INJECTION, SOLUTION INTRAVENOUS at 09:25

## 2022-05-11 RX ADMIN — SODIUM CHLORIDE, POTASSIUM CHLORIDE, SODIUM LACTATE AND CALCIUM CHLORIDE: 600; 310; 30; 20 INJECTION, SOLUTION INTRAVENOUS at 08:47

## 2022-05-11 RX ADMIN — EPHEDRINE SULFATE 10 MG: 50 INJECTION, SOLUTION INTRAVENOUS at 09:41

## 2022-05-11 RX ADMIN — ATORVASTATIN CALCIUM 40 MG: 40 TABLET, FILM COATED ORAL at 17:56

## 2022-05-11 RX ADMIN — HYDRALAZINE HYDROCHLORIDE 5 MG: 20 INJECTION INTRAMUSCULAR; INTRAVENOUS at 11:22

## 2022-05-11 ASSESSMENT — COGNITIVE AND FUNCTIONAL STATUS - GENERAL
STANDING UP FROM CHAIR USING ARMS: A LITTLE
CLIMB 3 TO 5 STEPS WITH RAILING: A LITTLE
DRESSING REGULAR LOWER BODY CLOTHING: A LITTLE
WALKING IN HOSPITAL ROOM: A LITTLE
SUGGESTED CMS G CODE MODIFIER DAILY ACTIVITY: CI
MOBILITY SCORE: 21
DAILY ACTIVITIY SCORE: 23
SUGGESTED CMS G CODE MODIFIER MOBILITY: CJ

## 2022-05-11 ASSESSMENT — LIFESTYLE VARIABLES
TOTAL SCORE: 0
ON A TYPICAL DAY WHEN YOU DRINK ALCOHOL HOW MANY DRINKS DO YOU HAVE: 0
ALCOHOL_USE: NO
CONSUMPTION TOTAL: NEGATIVE
TOTAL SCORE: 0
EVER HAD A DRINK FIRST THING IN THE MORNING TO STEADY YOUR NERVES TO GET RID OF A HANGOVER: NO
HAVE PEOPLE ANNOYED YOU BY CRITICIZING YOUR DRINKING: NO
HOW MANY TIMES IN THE PAST YEAR HAVE YOU HAD 5 OR MORE DRINKS IN A DAY: 0
EVER FELT BAD OR GUILTY ABOUT YOUR DRINKING: NO
TOTAL SCORE: 0
AVERAGE NUMBER OF DAYS PER WEEK YOU HAVE A DRINK CONTAINING ALCOHOL: 0
HAVE YOU EVER FELT YOU SHOULD CUT DOWN ON YOUR DRINKING: NO

## 2022-05-11 ASSESSMENT — PAIN DESCRIPTION - PAIN TYPE
TYPE: SURGICAL PAIN
TYPE: ACUTE PAIN
TYPE: SURGICAL PAIN

## 2022-05-11 ASSESSMENT — PAIN SCALES - GENERAL: PAIN_LEVEL: 2

## 2022-05-11 NOTE — OR NURSING
1034: Patient arrived in PACU VSS. Neuro intact. Verified with Dr. Gomez ok with bradycardia in the 40s.    1122: messaged Dr. Gomez about BP over 160. Instructed to treat >160. Treated per MAR.    1200: called significant otherCaterina, no answer x2. Attempted calling  but not in waiting area.     1230: spoke with Caterina, updated POC.     1350: Patient moved into normal [facility] bed.     1430: Discontinued A-Line per order.     1515: updated significant other, Caterina, of room # patient going to.    1520: Called report to MAGY Casiano. For room #T409

## 2022-05-11 NOTE — H&P
Date of Service:  22     PCP: Pcp Pt States None     CC: Right carotid artery stenosis     HPI: This is a 44 y.o. male who presents for elective right carotid endarterectomy.  The patient had suffered a right hemispheric stroke several months ago.  It was suspected that this was either related to atrial fibrillation or moderate carotid artery stenosis.  The patient has been engaging in physical therapy and is slowly improving with respect to the function of his left hand.  The patient presents today for right carotid endarterectomy.     ROS: As above. The remainder of a complete review of systems is negative in all systems except as noted.     PMHx:       Active Ambulatory Problems     Diagnosis Date Noted   • No Active Ambulatory Problems           Resolved Ambulatory Problems     Diagnosis Date Noted   • No Resolved Ambulatory Problems           Past Medical History:   Diagnosis Date   • Arthritis     • Diabetes (HCC)     • Environmental and seasonal allergies     • Foot drop, right foot     • Hemorrhagic disorder (HCC)     • High cholesterol     • Hypothyroidism     • Pain 2022   • Snoring           SHx:  Social History               Socioeconomic History   • Marital status:        Spouse name: Not on file   • Number of children: Not on file   • Years of education: Not on file   • Highest education level: Not on file   Occupational History   • Not on file   Tobacco Use   • Smoking status: Former Smoker       Years: 10.00       Types: Cigarettes       Quit date: 2008       Years since quittin.3   • Smokeless tobacco: Current User       Types: Chew   Vaping Use   • Vaping Use: Never used   Substance and Sexual Activity   • Alcohol use: Not Currently   • Drug use: Not Currently   • Sexual activity: Never   Other Topics Concern   • Not on file   Social History Narrative   • Not on file      Social Determinants of Health      Financial Resource Strain: Not on file   Food Insecurity: Not on  "file   Transportation Needs: Not on file   Physical Activity: Not on file   Stress: Not on file   Social Connections: Not on file   Intimate Partner Violence: Not on file   Housing Stability: Not on file            FHx:  family history is not on file.     Allergies:  No Known Allergies     Medications:  No current facility-administered medications on file prior to encounter.             Current Outpatient Medications on File Prior to Encounter   Medication Sig Dispense Refill   • amoxicillin (AMOXIL) 875 MG tablet Take  by mouth 2 times a day.       • methocarbamol (ROBAXIN) 750 MG Tab Take 1 Tablet by mouth 3 times a day as needed (muscle spasm). 90 Tablet 5   • metFORMIN (GLUCOPHAGE) 500 MG Tab Take 1,000 mg by mouth 2 times a day.       • levothyroxine (SYNTHROID) 100 MCG Tab Take 100 mcg by mouth every morning on an empty stomach. TAKE 1 TABLET BY MOUTH DAILY             Objective Exam:  Vitals        Vitals:     04/21/22 0832 05/11/22 0527   BP:   137/86   Pulse:   72   Resp:   18   Temp:   36.4 °C (97.5 °F)   TempSrc:   Temporal   SpO2:   95%   Weight: (!) 148 kg (325 lb 9.9 oz) (!) 142 kg (312 lb 13.3 oz)   Height: 1.905 m (6' 3\") 1.905 m (6' 3\")            Review of Systems  Negative except left upper extremity weakness        General: Awake and Alert  HEENT: normocephalic, atraumatic  Chest: Clear and equal bilaterally  CV: RRR  Abd: Soft, non-tender, nondistended  Ext: Warm, well perfused   Vasc: Diminished  Neuro: Left-sided weakness   skin: normal     Laboratory--reviewed personally and are as follows:        Lab Results   Component Value Date/Time     WBC 7.8 04/21/2022 09:17 AM     RBC 5.11 04/21/2022 09:17 AM     HEMOGLOBIN 15.3 04/21/2022 09:17 AM     HEMATOCRIT 45.1 04/21/2022 09:17 AM     MCV 88.3 04/21/2022 09:17 AM     MCH 29.9 04/21/2022 09:17 AM     MCHC 33.9 04/21/2022 09:17 AM     MPV 11.9 04/21/2022 09:17 AM     NEUTSPOLYS 69.80 04/21/2022 09:17 AM     LYMPHOCYTES 20.00 (L) 04/21/2022 09:17 " AM     MONOCYTES 8.20 04/21/2022 09:17 AM     EOSINOPHILS 0.80 04/21/2022 09:17 AM     BASOPHILS 0.80 04/21/2022 09:17 AM            Lab Results   Component Value Date/Time     SODIUM 140 04/21/2022 09:17 AM     POTASSIUM 4.2 04/21/2022 09:17 AM     CHLORIDE 103 04/21/2022 09:17 AM     CO2 23 04/21/2022 09:17 AM     GLUCOSE 204 (H) 04/21/2022 09:17 AM     BUN 18 04/21/2022 09:17 AM     CREATININE 0.84 04/21/2022 09:17 AM            Lab Results   Component Value Date/Time     PROTHROMBTM 12.7 04/21/2022 09:17 AM     INR 0.98 04/21/2022 09:17 AM         Radiology        MDM:     Right carotid stenosis     Plan-right carotid endarterectomy  Risk benefits alternatives expectations were discussed patient agrees to proceed.

## 2022-05-11 NOTE — OP REPORT
OPERATIVE NOTE    05/11/22      PRE-OPERATIVE DIAGNOSIS -     1. Right Carotid Artery Stenosis   2. History of CVA     POST-OPERATIVE DIAGNOSIS - Same     PROCEDURE PERFORMED - Right Carotid Endarterectomy     SURGEON - Augustin Valente M.D.    ASSISTANT - ZHANG Pascual    The nature of the surgical procedure warranted additional skilled operative assistance from an Advanced Registered Nurse Practitioner (ARNP). The assistant was present during the entire operation. The surgical assistant performed the following provided assistance with optimal surgical exposure of the operative field and provided high complexity, subspecialty decision making input.    TYPE OF ANESTHESIA -  General endotracheal with EEG monitoring    ANESTHESIOLOGIST  - Anesthesiologist: Augustin Gomez M.D.     SPECIMENS -none    PROCEDURE IN DETAIL -     Patient was taken to the operating suite placed in the supine position.  After adequate anesthesia the patient's right neck was prepped and draped in sterile fashion.  1% lidocaine with epinephrine was infiltrated into the subcutaneous tissue along the anterior border of the sternocleidomastoid muscle.  An incision was carried along the anterior border and through the platysma layer.  Sternocleidomastoid muscle was retracted laterally exposing the jugular vein.  Careful dissection was made along the anterior aspect of the jugular vein with multiple branches being ligated between ties including the facial vein.  Jugular vein was retracted laterally exposing the common carotid artery.  Dissection took place up past the bifurcation in the proximal internal and external carotid arteries were circumferentially dissected and isolated.  Adrien tourniquet was placed around the common carotid artery.  7000 units of heparin was administered and after an appropriate period of time the vessels were sequentially clamped starting with the internal carotid.  There was no EEG change at the time of carotid  crossclamping and therefore shunting was not employed.  A longitudinal arteriotomy was made in the common carotid artery and carried up onto the internal carotid artery using Robison scissors.  The patient had dense significant atheromatous calcified plaque throughout the bifurcation and proximal internal.  An endarterectomy was performed of the distal common carotid and proximal internal carotid artery.  An eversion endarterectomy was performed on the external carotid artery.  The distal endpoint was tacked using multiple 6-0 Prolene sutures.  Once the artery was free of all atheromatous debris a bovine patch was sewn in circumferentially using 5-0 Prolene sutures in a running fashion.  Just prior to completing the patch all vessels were vigorously irrigated and flushed.  Flow was established first to the external carotid and subsequently to the internal carotid artery.  Hemostasis was assured.  30 mg of protamine was administered.  A 7 fluted drain was placed within the neck and brought out through a separate stab incision and secured to the skin using a 3-0 nylon.  2-0 Vicryl interrupted sutures were used for the platysma layer and a 4-0 STRATAFIX suture was used for the skin.  Sterile dressings were applied and patient was taken to the recovery room.      _______________________  Augustin Valente M.D.

## 2022-05-11 NOTE — ANESTHESIA PROCEDURE NOTES
Airway    Date/Time: 5/11/2022 9:06 AM  Performed by: Augustin Gomez M.D.  Authorized by: Augustin Gomez M.D.     Location:  OR  Urgency:  Elective  Indications for Airway Management:  Anesthesia      Spontaneous Ventilation: absent    Sedation Level:  Deep  Preoxygenated: Yes    Patient Position:  Sniffing  Final Airway Type:  Endotracheal airway  Final Endotracheal Airway:  ETT  Cuffed: Yes    Technique Used for Successful ETT Placement:  Direct laryngoscopy    Insertion Site:  Oral  Blade Type:  Murphy  Laryngoscope Blade/Videolaryngoscope Blade Size:  3  ETT Size (mm):  7.5  Measured from:  Teeth  ETT to Teeth (cm):  24  Placement Verified by: auscultation and capnometry    Cormack-Lehane Classification:  Grade I - full view of glottis  Number of Attempts at Approach:  1

## 2022-05-11 NOTE — PROGRESS NOTES
Report received from PACU RN  Assessment complete.  A&O x 4. Patient calls appropriately.  Patient ambulates with SB assist.    Patient has 5/10 pain. Pain managed with prescribed medications.  Denies N&V. Regular diet ordered.  Right neck incision with dressing and JEANETTE, CDI   - void, - flatus, - BM. Last BM 5/9  Patient denies SOB.  SCD's off.    Review plan with of care with patient. Call light and personal belongings within reach. Hourly rounding in place. All needs met at this time.

## 2022-05-11 NOTE — ANESTHESIA TIME REPORT
Anesthesia Start and Stop Event Times     Date Time Event    5/11/2022 0846 Ready for Procedure     0855 Anesthesia Start     1038 Anesthesia Stop        Responsible Staff  05/11/22    Name Role Begin End    Augustin Gomez M.D. Anesth 0855 1038        Overtime Reason:  no overtime (within assigned shift)    Comments:

## 2022-05-11 NOTE — ANESTHESIA PREPROCEDURE EVALUATION
Case: [id number] Date/Time: 05/11/22 0845    Procedures:       ENDARTERECTOMY, CAROTID (Right )      EEG (ELECTROENCEPHALOGRAM)    Pre-op diagnosis: SYMPTOMATIC CAROTID ARTERY STENOSIS    Location: [facility] OR 06 / SURGERY [facility]    Surgeons: Augustin Valente M.D.          Relevant Problems   PULMONARY   (positive) CAP (community acquired pneumonia)   (positive) Chronic obstructive pulmonary disease (HCC)      NEURO   (positive) CVA (cerebral vascular accident) (HCC)   (positive) Ischemic stroke (HCC)      CARDIAC   (positive) Aneurysm of infrarenal abdominal aorta (HCC)   (positive) Atrial fibrillation (HCC)   (positive) Hypertension   (positive) Hypertensive urgency   (positive) 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HCC)   (positive) Mitral regurgitation   (positive) Pulmonary hypertension (HCC)       Physical Exam    Airway   Mallampati: II  TM distance: >3 FB  Neck ROM: full       Cardiovascular - normal exam  Rhythm: regular  Rate: normal  (-) murmur     Dental - normal exam           Pulmonary - normal exam  Breath sounds clear to auscultation  (+) decreased breath sounds     Abdominal    Neurological - abnormal exam         Other findings: LUE weakness            Anesthesia Plan    ASA 3   ASA physical status 3 criteria: CVA or TIA - history (> 3 months)    Plan - general       Airway plan will be ETT        Plan Factors:   Patient was previously instructed to abstain from smoking on day of procedure.  Patient did not smoke on day of procedure.      Induction: intravenous    Postoperative Plan: Postoperative administration of opioids is intended.    Pertinent diagnostic labs and testing reviewed    Informed Consent:    Anesthetic plan and risks discussed with patient.    Use of blood products discussed with: patient whom consented to blood products.

## 2022-05-11 NOTE — PROGRESS NOTES
4 Eyes Skin Assessment Completed by Stephenie, RN and MAGY Palm.    Head WDL  Ears WDL  Nose WDL  Mouth WDL  Neck right neck incision with ricardo and dresing, CDI  Breast/Chest WDL  Shoulder Blades WDL  Spine WDL  (R) Arm/Elbow/Hand WDL  (L) Arm/Elbow/Hand Redness and Blanching  Abdomen WDL  Groin WDL  Scrotum/Coccyx/Buttocks Redness and Blanching  (R) Leg WDL  (L) Leg WDL  (R) Heel/Foot/Toe Redness and Blanching  (L) Heel/Foot/Toe Redness and Blanching          Devices In Places Pulse Ox, SCD's and Nasal Cannula      Interventions In Place Pillows and Pressure Redistribution Mattress    Possible Skin Injury No    Pictures Uploaded Into Epic N/A  Wound Consult Placed N/A  RN Wound Prevention Protocol Ordered No

## 2022-05-11 NOTE — ANESTHESIA PROCEDURE NOTES
Arterial Line  Performed by: Augustin Gomez M.D.  Authorized by: Augustin Gomez M.D.     Start Time:  5/11/2022 9:00 AM  Localization: surface landmarks    Patient Location:  OR  Indication: continuous blood pressure monitoring        Catheter Size:  20 G  Seldinger Technique?: Yes    Laterality:  Left  Site:  Radial artery  Line Secured:  Antimicrobial disc, tape and transparent dressing  Events: patient tolerated procedure well with no complications

## 2022-05-11 NOTE — ANESTHESIA POSTPROCEDURE EVALUATION
Patient: David Cho    Procedure Summary     Date: 05/11/22 Room / Location: [facility] OR 06 / SURGERY [facility]    Anesthesia Start: 0855 Anesthesia Stop: 1038    Procedures:       ENDARTERECTOMY, CAROTID (Right Neck)      EEG (ELECTROENCEPHALOGRAM) (N/A ) Diagnosis: (SYMPTOMATIC CAROTID ARTERY STENOSIS)    Surgeons: Augustin Valente M.D. Responsible Provider: Augustin Gomez M.D.    Anesthesia Type: general ASA Status: 3          Final Anesthesia Type: general  Last vitals  BP   Blood Pressure : (!) 187/94    Temp   36.7 °C (98 °F)    Pulse   (!) 59   Resp   16    SpO2   95 %      Anesthesia Post Evaluation    Patient location during evaluation: PACU  Patient participation: complete - patient participated  Level of consciousness: awake and alert  Pain score: 2    Airway patency: patent  Anesthetic complications: no  Cardiovascular status: hemodynamically stable  Respiratory status: acceptable  Hydration status: euvolemic    PONV: none          No complications documented.     Nurse Pain Score: 0 (NPRS)

## 2022-05-12 VITALS
OXYGEN SATURATION: 90 % | HEIGHT: 70 IN | SYSTOLIC BLOOD PRESSURE: 131 MMHG | BODY MASS INDEX: 20.83 KG/M2 | TEMPERATURE: 98.2 F | HEART RATE: 70 BPM | WEIGHT: 145.5 LBS | RESPIRATION RATE: 18 BRPM | DIASTOLIC BLOOD PRESSURE: 85 MMHG

## 2022-05-12 PROCEDURE — 94664 DEMO&/EVAL PT USE INHALER: CPT

## 2022-05-12 PROCEDURE — 700102 HCHG RX REV CODE 250 W/ 637 OVERRIDE(OP): Performed by: NURSE PRACTITIONER

## 2022-05-12 PROCEDURE — A9270 NON-COVERED ITEM OR SERVICE: HCPCS | Performed by: NURSE PRACTITIONER

## 2022-05-12 RX ADMIN — FINASTERIDE 5 MG: 5 TABLET, FILM COATED ORAL at 05:02

## 2022-05-12 RX ADMIN — OXYCODONE 5 MG: 5 TABLET ORAL at 10:25

## 2022-05-12 RX ADMIN — Medication 2000 UNITS: at 05:02

## 2022-05-12 RX ADMIN — ENALAPRIL MALEATE 40 MG: 10 TABLET ORAL at 05:03

## 2022-05-12 RX ADMIN — OMEPRAZOLE 20 MG: 20 CAPSULE, DELAYED RELEASE ORAL at 05:02

## 2022-05-12 RX ADMIN — BUPROPION HYDROCHLORIDE 150 MG: 150 TABLET, EXTENDED RELEASE ORAL at 05:02

## 2022-05-12 RX ADMIN — OXYCODONE 5 MG: 5 TABLET ORAL at 00:20

## 2022-05-12 ASSESSMENT — PAIN DESCRIPTION - PAIN TYPE: TYPE: ACUTE PAIN

## 2022-05-12 NOTE — PROGRESS NOTES
Patient complaining of a headache. Dr. Valente updated by this RN and requested PRN tylenol. Orders received for tylenol 1000 mg Q6H PRN

## 2022-05-12 NOTE — CARE PLAN
The patient is Stable - Low risk of patient condition declining or worsening    Shift Goals  Clinical Goals: pain management  Patient Goals: rest    Progress made toward(s) clinical / shift goals: assess pain Q2-4H, administer PRNs as indicated, encourage patient to voice pain to staff    Patient is not progressing towards the following goals:      Problem: Knowledge Deficit - Standard  Goal: Patient and family/care givers will demonstrate understanding of plan of care, disease process/condition, diagnostic tests and medications  Outcome: Progressing     Problem: Pain - Standard  Goal: Alleviation of pain or a reduction in pain to the patient’s comfort goal  Outcome: Progressing

## 2022-05-12 NOTE — CARE PLAN
The patient is a watcher    Shift Goals  Clinical Goals: pain mgmt  Patient Goals: rest    Progress made toward(s) clinical / shift goals: Educate patient on plan of care and encourage pt to ask questions.     Problem: Knowledge Deficit - Standard  Goal: Patient and family/care givers will demonstrate understanding of plan of care, disease process/condition, diagnostic tests and medications  Outcome: Progressing     Problem: Pain - Standard  Goal: Alleviation of pain or a reduction in pain to the patient’s comfort goal  Outcome: Progressing

## 2022-05-12 NOTE — RESPIRATORY CARE
"  COPD EDUCATION by COPD CLINICAL EDUCATOR  (Phone: [phone])  5/12/2022 at 3:14 PM by Audrey Hairston RRT     Patient was interviewed by Respiratory Education team for COPD program. Patient declined our program. He has a good understanding of his COPD. He see's [facility].    COPD Assessment  COPD Clinical Specialists ONLY  COPD Education Initiated: Yes--Short Intervention (Pleasant good understanding of his COPD See's [facility] review meds)  DME Company: [employer] Type: 2 lpm at night  Physician Follow Up Appointment: 06/29/22  Appt Time: 1030  Physician Name: PRATIK Olea MD  Pulmonologist Name: [facility]; KURTIS Collins  Referrals Initiated: Yes  Pulmonary Rehab: N/A  Smoking Cessation: N/A  Home Health Care: N/A  Dispatch Health: Yes (will send)  Is this a COPD exacerbation patient?: No  $ Demo/Eval of SVN's, MDI's and Aerosols: Yes (review of all meds and delivery devices.)    (OP) Pulmonary Function Testing: Yes (6/2021 FEV1-58; FEV1/ FVC ratio-58)    Meds to Beds  Would the patient like to opt in for Bedside Medication Delivery at Discharge?: Yes, interested     MY COPD ACTION PLAN     It is recommended that patients and physicians /healthcare providers complete this action plan together. This plan should be discussed at each physician visit and updated as needed.    The green, yellow and red zones show groups of symptoms of COPD. This list of symptoms is not comprehensive, and you may experience other symptoms. In the \"Actions\" column, your healthcare provider has recommended actions for you to take based on your symptoms.    Patient Name: David Cho   YOB: 1940   Last Updated on: 10/6/2016 12:10 PM   Green Zone:  I am doing well today Actions   •  Usual activitiy and exercise level •  Take daily medications   •  Usual amounts of cough and phlegm/mucus •  Use oxygen as prescribed   •  Sleep well at night •  Continue regular exercise/diet plan   •  Appetite is " "good •  At all times avoid cigarette smoke, inhaled irritants     Daily Medications (these medications are taken every day):   Fluticasone and Umeclidinium and Vilanterol (Trelogy) 1 Puff Once daily     Additional Information:  Remember to rinse mouth after using this medication    Yellow Zone:  I am having a bad day or a COPD flare Actions   •  More breathless than usual •  Continue daily medications   •  I have less energy for my daily activities •  Use quick relief inhaler as ordered   •  Increased or thicker phlegm/mucus •  Use oxygen as prescribed   •  Using quick relief inhaler/nebulizer more often •  Get plenty of rest   •  Swelling of ankles more than usual •  Use pursed lip breathing   •  More coughing than usual •  At all times avoid cigarette smoke, inhaled irritants   •  I feel like I have a \"chest cold\"   •  Poor sleep and my symptoms woke me up   •  My appetite is not good   •  My medicine is not helping    •  Call provider immediately if symptoms don’t improve     Continue daily medications, add rescue medications:   Albuterol  Albuterol 3mL via nebulizer  2 Puffs Every 4 hours PRN  Every 4 hours PRN       Medications to be used during a flare up, (as Discussed with Provider):           Additional Information:  Use nebulizer for rescue medication!!!    Red Zone:  I need urgent medical care Actions   •  Severe shortness of breath even at rest •  Call 911 or seek medical care immediately   •  Not able to do any activity because of breathing    •  Fever or shaking chills    •  Feeling confused or very drowsy     •  Chest pains    •  Coughing up blood                "

## 2022-05-12 NOTE — PROGRESS NOTES
Patient down to discharge lounge. Discharge lounge RN to do discharge education and provide discharge paperwork.

## 2022-05-12 NOTE — PROGRESS NOTES
Report received from day shift RN, assumed Care.   Patient is AOx4, responds appropriately.      Pain controlled at this time.  Patient is tolerating regular diet, denies nausea/vomiting. + flatus. Dressing in place to right neck - ricardo drain to bulb suction.   Up stand by assist with steady gait.    Plan of care discussed, all questions answered.    Educated on use of call light and importance of calling before getting out of bed. Pt verbalizes understanding.    Call light and belongings within reach, treaded slipper socks on, SCDs in use, bed in lowest locked position.  All needs met at this time.

## 2022-05-12 NOTE — CARE PLAN
The patient is Stable - Low risk of patient condition declining or worsening         Progress made toward(s) clinical / shift goals: pt educated on plan of care      Problem: Knowledge Deficit - Standard  Goal: Patient and family/care givers will demonstrate understanding of plan of care, disease process/condition, diagnostic tests and medications  Outcome: Progressing

## 2022-05-12 NOTE — DISCHARGE SUMMARY
DISCHARGE SUMMARY    David Cho  [id number]  [id number]  _____________________________________    DATE OF ADMISSION: 5/11/2022    DATE OF DISCHARGE:     ADMISSION DIAGNOSIS (ES):  Occlusion and stenosis of unspecified carotid artery [I65.29]  Carotid artery obstruction [I65.29]    DISCHARGE DIAGNOSIS (ES):  Occlusion and stenosis of unspecified carotid artery [I65.29]  Carotid artery obstruction [I65.29]    DISCHARGE CONDITION:  stable    CONSULTATIONS:  none    PROCEDURES:  5/11/2022  Right Carotid endarterectomy     HOSPITAL COURSE:  David Cho is a 81 y.o. male who underwent elective right carotid endarterectomy yesterday.  Patient's procedure was performed without complication.  Patient is neurologically intact..    Patient underwent the above-mentioned procedure without complication and was admitted to [facility] postoperatively. he had an uneventful recovery and was discharged intact on postoperative day #1.    MEDICATIONS:  Current Outpatient Medications   Medication Sig Dispense Refill   • HYDROcodone-acetaminophen (NORCO) 5-325 MG Tab per tablet Take 1-2 Tablets by mouth every four hours as needed (post op pain) for up to 2 days. 10 Tablet 0       FOLLOW-UP:  Please call my office at [phone] to make an appointment in 2 week(s)    DISCHARGE INSTRUCTIONS:  Resume preadmission diet.  Light activity for 4 weeks.  OK to shower and get incisions wet.  Call or go to ER if you have chest pain, shortness of breath, lightheadedness, stroke-like symptoms, fever, worsening pain, or any other concerns.  Augustin Valente MD   General and Vascular Surgery  [facility]

## 2022-05-12 NOTE — PROGRESS NOTES
Bedside report received.  Assessment complete.  A&O x 4. Patient calls appropriately.  Patient ambulates with SB assist.    Patient has 5/10 pain. Pain managed with prescribed medications.  Denies N&V. Tolerating regular diet.  Right neck incision with dressing and JEANETTE, CDI  + void, - flatus, - BM.  Patient denies SOB.  SCD's off.    Review plan with of care with patient. Call light and personal belongings within reach. Hourly rounding in place. All needs met at this time.

## 2022-05-12 NOTE — DISCHARGE INSTRUCTIONS
Discharge Instructions  FOLLOW-UP:  Please call my office at [phone] to make an appointment in 2 week(s)     DISCHARGE INSTRUCTIONS:  Resume preadmission diet.  Light activity for 4 weeks.  OK to shower and get incisions wet.  Call or go to ER if you have chest pain, shortness of breath, lightheadedness, stroke-like symptoms, fever, worsening pain, or any other concerns.  Augustin Valente MD   General and Vascular Surgery  [facility]    Discharged to home by car with relative. Discharged via wheelchair, hospital escort: Yes.  Special equipment needed: Not Applicable    Be sure to schedule a follow-up appointment with your primary care doctor or any specialists as instructed.     Discharge Plan:        I understand that a diet low in cholesterol, fat, and sodium is recommended for good health. Unless I have been given specific instructions below for another diet, I accept this instruction as my diet prescription.       Special Instructions: None    Is patient discharged on Warfarin / Coumadin?   No     Discharge Instructions:  Remove bandage after 48 hours  May shower after bandage removed     Carotid Endarterectomy, Care After  This sheet gives you information about how to care for yourself after your procedure. Your health care provider may also give you more specific instructions. If you have problems or questions, contact your health care provider.  What can I expect after the procedure?  After the procedure, it is common to have some pain or an ache in your neck for up to 2 weeks. This is normal.  Follow these instructions at home:  Medicines  Take over-the-counter and prescription medicines only as told by your health care provider.  If you are given a blood thinner (anticoagulant) after surgery, take it exactly as told.  Do not drive for 24 hours if you were given a sedative during your procedure.  Do not drive or use heavy machinery while taking prescription pain medicine.  Incision  care    Follow instructions from your health care provider about how to take care of your incision. Make sure you:  Wash your hands with soap and water before and after you change your bandage (dressing). If soap and water are not available, use hand .  Change your dressing as told by your health care provider.  Leave stitches (sutures), skin glue, or adhesive strips in place. These skin closures may need to stay in place for 2 weeks or longer. If adhesive strip edges start to loosen and curl up, you may trim the loose edges. Do not remove adhesive strips completely unless your health care provider tells you to do that.  Check your incision area every day for signs of infection. Check for:  Redness, swelling, or pain.  Fluid or blood.  Warmth.  Pus or a bad smell.  Do not take baths, swim, or use a hot tub until your health care provider approves. Ask your health care provider if you may take showers. You may only be allowed to take sponge baths.  Activity  Do not lift anything that is heavier than 10 lb (4.5 kg), or the limit that you are told, until your health care provider says that it is safe.  Return to your normal activities as told by your health care provider. Ask your health care provider what activities are safe for you.  Recovery time varies depending on your age, general health, and other factors. You will likely be able to return to a normal lifestyle within a few weeks. While you recover, you may need help with some activities, such as cleaning the house or shopping.  Exercise regularly, or as told by your health care provider.  Eating and drinking    Follow instructions from your health care provider about eating or drinking restrictions.  Drink enough fluid to keep your urine pale yellow.  Eat a heart-healthy diet. This includes foods like fresh fruits and vegetables, whole grains, low-fat dairy products, and low-fat (lean) meats. Avoid foods that are:  High in salt, saturated fat, or  "sugar.  Canned or highly processed.  Fried.  Lifestyle  If you drink alcohol:  Limit how much you use to:  0-1 drink a day for women.  0-2 drinks a day for men.  Be aware of how much alcohol is in your drink. In the [place] one drink equals one 12 oz bottle of beer (355 mL), one 5 oz glass of wine (148 mL), or one 1½ oz glass of hard liquor (44 mL).  Maintain a healthy weight.  General instructions  Avoid wearing tight clothing around your neck and your sutures.  Work with your health care provider to keep your blood pressure under control.  Do not use any products that contain nicotine or tobacco, such as cigarettes, e-cigarettes, and chewing tobacco. If you need help quitting, ask your health care provider.  Keep all follow-up visits as told by your health care provider. This is important.  Contact a health care provider if you have:  Signs of infection, such as:  Redness, swelling, or pain around your incision.  Fluid or blood coming from your incision.  Your incision feeling warm to the touch.  Pus or a bad smell coming from your incision.  A fever.  A rash.  Difficulty speaking or you have changes in your voice.  Get help right away if you have:  Difficulty breathing.  Chest pain or shortness of breath.  Any symptoms of a stroke. Certain factors may put you at risk for a stroke even after this procedure. These may include chronic lung disease, chronic kidney disease, narrowed arteries (peripheral arterial disease), previous history of a stroke, and being 60 years of age or older. \"BE FAST\" is an easy way to remember the main warning signs of a stroke:  B - Balance. Signs are dizziness, sudden trouble walking, or loss of balance.  E - Eyes. Signs are trouble seeing or a sudden change in vision.  F - Face. Signs are sudden weakness or numbness of the face, or the face or eyelid drooping on one side.  A - Arms. Signs are weakness or numbness in an arm. This happens suddenly and usually on one side of the body.  S - " Speech. Signs are sudden trouble speaking, slurred speech, or trouble understanding what people say.  T - Time. Time to call emergency services. Write down what time symptoms started.  Other signs of a stroke, such as:  A sudden, severe headache with no known cause.  Nausea or vomiting.  Seizure.  These symptoms may represent a serious problem that is an emergency. Do not wait to see if the symptoms will go away. Get medical help right away. Call your local emergency services (911 in the [place] Do not drive yourself to the hospital.  Summary  After the procedure, it is common to have some pain or an ache in your neck for up to 2 weeks.  Follow instructions from your health care provider about how to take care of your incision.  Take over-the-counter and prescription medicines only as told by your health care provider.  Do not use any products that contain nicotine or tobacco, such as cigarettes, e-cigarettes, and chewing tobacco. If you need help quitting, ask your health care provider.  Keep all follow-up visits as told by your health care provider. This is important.  This information is not intended to replace advice given to you by your health care provider. Make sure you discuss any questions you have with your health care provider.  Document Released: 07/07/2006 Document Revised: 01/27/2020 Document Reviewed: 08/27/2019  Elsevier Patient Education © 2020 [employer]    Depression / Suicide Risk    As you are discharged from this [facility] facility, it is important to learn how to keep safe from harming yourself.    Recognize the warning signs:  Abrupt changes in personality, positive or negative- including increase in energy   Giving away possessions  Change in eating patterns- significant weight changes-  positive or negative  Change in sleeping patterns- unable to sleep or sleeping all the time   Unwillingness or inability to communicate  Depression  Unusual sadness, discouragement and loneliness  Talk  of wanting to die  Neglect of personal appearance   Rebelliousness- reckless behavior  Withdrawal from people/activities they love  Confusion- inability to concentrate     If you or a loved one observes any of these behaviors or has concerns about self-harm, here's what you can do:  Talk about it- your feelings and reasons for harming yourself  Remove any means that you might use to hurt yourself (examples: pills, rope, extension cords, firearm)  Get professional help from the community (Mental Health, Substance Abuse, psychological counseling)  Do not be alone:Call your Safe Contact- someone whom you trust who will be there for you.  Call your local CRISIS HOTLINE 910-8839 or 876-897-0758  Call your local Children's Mobile Crisis Response Team [place] (117) 675-3630 or www.[employer]s  Call the toll free National Suicide Prevention Hotlines   National Suicide Prevention Lifeline 591-865-LVGQ (5275)  National Hope Line Network 800-SUICIDE (901-3850)

## 2022-05-13 ENCOUNTER — HOSPITAL ENCOUNTER (EMERGENCY)
Facility: MEDICAL CENTER | Age: 82
End: 2022-05-13
Attending: EMERGENCY MEDICINE
Payer: MEDICARE

## 2022-05-13 VITALS
WEIGHT: 144.4 LBS | RESPIRATION RATE: 13 BRPM | HEIGHT: 70 IN | SYSTOLIC BLOOD PRESSURE: 244 MMHG | HEART RATE: 74 BPM | DIASTOLIC BLOOD PRESSURE: 139 MMHG | TEMPERATURE: 97.1 F | BODY MASS INDEX: 20.67 KG/M2 | OXYGEN SATURATION: 93 %

## 2022-05-13 DIAGNOSIS — Z51.89 ENCOUNTER FOR WOUND RE-CHECK: ICD-10-CM

## 2022-05-13 PROCEDURE — 99284 EMERGENCY DEPT VISIT MOD MDM: CPT

## 2022-05-13 ASSESSMENT — FIBROSIS 4 INDEX: FIB4 SCORE: 2.07

## 2022-05-13 NOTE — ED PROVIDER NOTES
ED Provider Note    Scribed for [place] Park M.D. by Cathy Sung. 5/13/2022  1:11 PM    Primary care provider: Katelyn Olea D.O.  Means of arrival: Walk-in  History obtained from: Patient  History limited by: None     CHIEF COMPLAINT  Chief Complaint   Patient presents with    Wound Check     Patient was discharged yesterday after he had a right sided carotid endarterectomy by MD aVlente. The dressing is soaked with blood, no drainage outside of dressing. He is concerned at level of bleeding. Denies pain.        HPI  David Cho is a 81 y.o. male who presents to the Emergency Department for evaluation of Post-op complications onset yesterday night prior to arrival. Patient states that he had a right sided carotid enterectomy performed by  (MD) on 5/11/22. He states that he accidentally pulled on his drain and may have pulled some stitches yesterday. Patient reports waking up this morning and seeing his bandage covered in blood. He did not noticed blood leaking prior to that. He denies associated symptoms of swelling. No alleviating factors were reported.       REVIEW OF SYSTEMS  Pertinent positives include Post-op complications.   Pertinent negatives include no swelling.    See HPI for further details.     PAST MEDICAL HISTORY   has a past medical history of Alcohol use, Arrhythmia, Blood clotting disorder (HCC) (01/2022), Breath shortness, Cholesterol serum elevated, COPD (chronic obstructive pulmonary disease) ([facility]), Dental disorder, Depression, Emphysema of lung (HCC), Enlarged prostate, High cholesterol, Hypertension (05/04/2022), and Stroke ([facility]).    SURGICAL HISTORY   has a past surgical history that includes appendectomy; other (appendectomy); thromboendartectmy neck,neck incis (Right, 5/11/2022); and eeg (N/A, 5/11/2022).    SOCIAL HISTORY  Social History     Tobacco Use    Smoking status: Former Smoker     Packs/day: 1.00     Years: 50.00     Pack years: 50.00     Types:  "Cigarettes     Quit date: 2016     Years since quittin.7    Smokeless tobacco: Never Used    Tobacco comment: continued abstinance   Vaping Use    Vaping Use: Never used   Substance Use Topics    Alcohol use: Yes     Comment: 2 beers per day    Drug use: No      Social History     Substance and Sexual Activity   Drug Use No       FAMILY HISTORY  Family History   Problem Relation Age of Onset    Heart Disease Mother     Other Mother         turberculosis    Cancer Father         colon cancer    Heart Disease Step-Sister        CURRENT MEDICATIONS  Home Medications       Reviewed by Patricia Scales R.N. (Registered Nurse) on 22 at 1106  Med List Status: Partial     Medication Last Dose Status   albuterol 108 (90 Base) MCG/ACT Aero Soln inhalation aerosol  Active   apixaban (ELIQUIS) 5mg Tab  Active   atorvastatin (LIPITOR) 40 MG Tab  Active   buPROPion SR (WELLBUTRIN-SR) 150 MG TABLET SR 12 HR sustained-release tablet  Active   enalapril (VASOTEC) 20 MG tablet  Active   finasteride (PROSCAR) 5 MG Tab  Active   Fluticasone-Umeclidin-Vilant (TRELEGY ELLIPTA) 100-62.5-25 MCG/INH AEROSOL POWDER, BREATH ACTIVATED inhalation  Active   HYDROcodone-acetaminophen (NORCO) 5-325 MG Tab per tablet  Active   omeprazole (PRILOSEC) 20 MG delayed-release capsule  Active   tamsulosin (FLOMAX) 0.4 MG capsule  Active   Vitamin D, Cholecalciferol, 50 MCG (2000 UT) Cap  Active                    ALLERGIES  No Known Allergies    PHYSICAL EXAM  VITAL SIGNS: BP (!) 167/96   Pulse 97   Temp 36.2 °C (97.2 °F) (Temporal)   Resp 16   Ht 1.778 m (5' 10\")   Wt 65.5 kg (144 lb 6.4 oz)   SpO2 92%   BMI 20.72 kg/m²     Nursing note and vitals reviewed.  Constitutional: Well-developed and well-nourished. No distress.   HENT: Head is normocephalic and atraumatic. Oropharynx is clear and moist without exudate or erythema. Post op dressing on right side of neck soaked with dark red blood, No pulsatile mass, No significant " hematoma.  Eyes: Pupils are equal, round, and reactive to light. Conjunctiva are normal.   Cardiovascular: Normal rate and regular rhythm. No murmur heard. Normal radial pulses.  Pulmonary/Chest: Breath sounds normal. No wheezes or rales.   Abdominal: Soft and non-tender. No distention    Musculoskeletal: Extremities exhibit normal range of motion without edema or tenderness.   Neurological: Awake, alert and oriented to person, place, and time. No focal deficits noted.  Skin: Skin is warm and dry. No rash.   Psychiatric: Normal mood and affect. Appropriate for clinical situation.    COURSE & MEDICAL DECISION MAKING  Nursing notes, VS, PMSFHx reviewed in chart.     1:11 PM - Patient seen and examined at bedside. I paged  at this time to evaluate patient further. Discussed plan of care with patient. Patient is understanding and agreeable with plan.     1:37 PM - Talked to Dr. Tiwari who responded to page.     Vascular surgery presented to the bedside.  Took down the dressing.  There is no hematoma.  Examination is reassuring.  Discharge as per vascular surgery recommendations.  Patient to follow-up with his surgeon.    1:51 PM - I reevaluated the patient at bedside. I discussed plan for discharge and follow up as outlined below. The patient is stable for discharge at this time and will return for any new or worsening symptoms. Patient verbalizes understanding and support with my plan for discharge.       DISPOSITION:  Patient will be discharged home in stable condition.    FOLLOW UP:  Katelyn Olea D.O.  [address]  [address]  [facility] [id number] [phone]    Schedule an appointment as soon as possible for a visit       [facility], Emergency Dept  [address] [address] [phone]    If symptoms worsen      FINAL IMPRESSION  1. Encounter for wound re-check          I, Cathy Sung (Scribe), am scribing for, and in the presence of, [place] Park,  M.D..    Electronically signed by: Cathy Sung (Scribe), 5/13/2022    I, Dallas Park M.D. personally performed the services described in this documentation, as scribed by Cathy Sung in my presence, and it is both accurate and complete. E.     The note accurately reflects work and decisions made by me.  D[place] Park M.D.  5/13/2022  2:46 PM

## 2022-05-13 NOTE — ED TRIAGE NOTES
"Chief Complaint   Patient presents with   • Wound Check     Patient was discharged yesterday after he had a right sided carotid endarterectomy by MD Valente. The dressing is soaked with blood, no drainage outside of dressing. He is concerned at level of bleeding. Denies pain.        Patient to triage ambulatory with a steady gait, AAOx4, Appropriate precautions in place.     Explained wait time and triage process. Placed back in lobby. Told to notify ED tech or RN of any changes, verbalized understanding.    BP (!) 167/96   Pulse 97   Temp 36.2 °C (97.2 °F) (Temporal)   Resp 16   Ht 1.778 m (5' 10\")   Wt 65.5 kg (144 lb 6.4 oz)   SpO2 92%   BMI 20.72 kg/m²     "

## 2022-05-13 NOTE — ED NOTES
Patient discharged home per ERP.  Discharge teaching and education discussed with patient. POC discussed.   Patient verbalized understanding of discharge teaching and education. No other questions at this time.     VSS. Patient alert and oriented. Patient arranged ride for self. Able to ambulate off unit safely with steady gait.

## 2022-05-13 NOTE — ED NOTES
Pt ambulated to yellow 59 with steady gait. Pt able  to manage secretions and denies excessive swelling at this time. Pt states that he was taking Eliquis (for hx of stroke [id number] ago) up until Monday for procedure. Pt changed into a gown and placed on the monitor. Pt provided blankets

## 2022-06-14 ENCOUNTER — OFFICE VISIT (OUTPATIENT)
Dept: SLEEP MEDICINE | Facility: MEDICAL CENTER | Age: 82
End: 2022-06-14
Payer: MEDICARE

## 2022-06-14 VITALS
BODY MASS INDEX: 22.13 KG/M2 | HEART RATE: 75 BPM | RESPIRATION RATE: 16 BRPM | WEIGHT: 146 LBS | HEIGHT: 68 IN | DIASTOLIC BLOOD PRESSURE: 80 MMHG | SYSTOLIC BLOOD PRESSURE: 122 MMHG | OXYGEN SATURATION: 94 %

## 2022-06-14 DIAGNOSIS — J44.9 CHRONIC OBSTRUCTIVE PULMONARY DISEASE, UNSPECIFIED COPD TYPE (HCC): ICD-10-CM

## 2022-06-14 DIAGNOSIS — Z87.891 FORMER SMOKER: ICD-10-CM

## 2022-06-14 DIAGNOSIS — R91.8 ABNORMAL CT SCAN, LUNG: ICD-10-CM

## 2022-06-14 DIAGNOSIS — G47.34 NOCTURNAL HYPOXIA: ICD-10-CM

## 2022-06-14 PROCEDURE — 99213 OFFICE O/P EST LOW 20 MIN: CPT | Performed by: PHYSICIAN ASSISTANT

## 2022-06-14 ASSESSMENT — ENCOUNTER SYMPTOMS
INSOMNIA: 0
WEIGHT LOSS: 0
DIZZINESS: 0
FEVER: 0
SORE THROAT: 0
PALPITATIONS: 0
ORTHOPNEA: 0
TREMORS: 0
COUGH: 1
WHEEZING: 0
SPUTUM PRODUCTION: 1
HEADACHES: 0
SINUS PAIN: 0
SHORTNESS OF BREATH: 1
HEARTBURN: 0
CHILLS: 0

## 2022-06-14 ASSESSMENT — FIBROSIS 4 INDEX: FIB4 SCORE: 2.07

## 2022-06-14 NOTE — PROGRESS NOTES
CC: Follow-up COPD    HPI:  David Cho is a 81 y.o. year old male here today for follow-up on COPD, nocturnal hypoxemia.  Patient is a former smoker with reported quit date in 2016 and 50-pack-year history.  Last seen in clinic 3/14/2022.    Pertinent past medical history includes community-acquired pneumonia, abnormal chest CT scan with cavitary mass, hypertension, infrarenal abdominal aortic aneurysm, atrial fibs, pulmonary hypertension, urinary retention, alcohol use and CVA 1/11/2022.  He was scheduled for a bronchoscopy of his cavitary lesion in December 2021.  He reports canceling/delaying this due to not being able to address multiple health issues at once including a fall in December and stroke in January.     Reviewed in clinic vitals including /80, HR 75, O2 sat 94% on room air and BMI of 22.2 kg/m².    Reviewed home medication regimen including Eliquis 5 mg twice daily, Trelegy 100, omeprazole, albuterol approximately twice per week, oxygen at 2 L at night.    Reviewed most recent imaging including echocardiogram obtained 1/10/2022 demonstrating normal left ventricular chamber size and systolic function, LVEF estimated at 60%, indeterminant diastolic function.  Normal right ventricular chamber size and systolic function, enlarged right atrium, severely dilated left atrium, moderate mitral annular calcification with bulky calcification on posterior leaflet, moderate to severe mitral regurgitation, moderate tricuspid regurgitation, estimated RVSP of 58 mmHg.    Chest x-ray obtained 1/10/2022 demonstrated cardiomegaly with interstitial prominence, linear bibasilar atelectasis.    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    CT cerebral perfusion analysis 1/10/2022 demonstrated cerebral blood flow less than 30%, T-max suggesting completed infarct and ischemia, mismatch volume likely representing ischemic brain.'    Chest CT obtained 12/7/2021 demonstrated ill-defined bilobed cavitary mass superior segment of right lower lobe increased in size measuring 2.9 x 2.7 cm.  Multiple other stable pulmonary nodules measuring up to 6 mm.  Emphysematous and bullous changes of the lungs.  Cardiomegaly.  Prominent central pulmonary arteries.    Pulmonary function testing last obtained 6/11/2021 as compared to 9/18/2019 demonstrated an FEV1 of 1.64 L or 58% predicted was 66% prior, FVC of 2.84 L or 75% predicted was 77% predicted prior, FEV1/FVC ratio 58, residual volume 119% predicted value 142 prior total lung capacity 90% predicted was 100% prior, DLCO 66% predicted with 73% prior.  Per pulmonologist interpretation baseline spirometry shows airflow obstruction, borderline air trapping, reduced diffusion capacity consistent with diagnosis of COPD.       Review of Systems   Constitutional: Negative for chills, fever, malaise/fatigue and weight loss.   HENT: Negative for congestion, hearing loss, nosebleeds, sinus pain, sore throat and tinnitus.    Respiratory: Positive for cough, sputum production (occasional c lear ) and shortness of breath (exertional, stairs ). Negative for wheezing.    Cardiovascular: Negative for chest pain, palpitations, orthopnea and leg swelling.   Gastrointestinal: Negative for heartburn.        Upper dentures, some swallowing difficulty with crumbly foods like crackers causes coughing    Neurological: Negative for dizziness, tremors and headaches.   Psychiatric/Behavioral: The patient does not have insomnia.        Past Medical History:   Diagnosis Date   • Alcohol use    • Arrhythmia     a fib   • Blood clotting disorder (HCC) 01/2022   • Breath shortness    • Cholesterol serum elevated    • COPD (chronic  obstructive pulmonary disease) (HCC)    • Dental disorder     upper   • Depression    • Emphysema of lung (HCC)    • Enlarged prostate    • High cholesterol    • Hypertension 2022    white coat syndrome   • Stroke (HCC)      left hand still seeing OT       Past Surgical History:   Procedure Laterality Date   • MS THROMBOENDARTECTMY NECK,NECK INCIS Right 2022    Procedure: ENDARTERECTOMY, CAROTID;  Surgeon: Augustin Valente M.D.;  Location: SURGERY [facility];  Service: General   • EEG N/A 2022    Procedure: EEG (ELECTROENCEPHALOGRAM);  Surgeon: Augustin Valente M.D.;  Location: SURGERY [facility];  Service: General   • APPENDECTOMY     • OTHER  appendectomy       Family History   Problem Relation Age of Onset   • Heart Disease Mother    • Other Mother         turberculosis   • Cancer Father         colon cancer   • Heart Disease Step-Sister        Social History     Socioeconomic History   • Marital status:      Spouse name: Not on file   • Number of children: Not on file   • Years of education: Not on file   • Highest education level: Not on file   Occupational History   • Not on file   Tobacco Use   • Smoking status: Former Smoker     Packs/day: 1.00     Years: 50.00     Pack years: 50.00     Types: Cigarettes     Quit date: 2016     Years since quittin.8   • Smokeless tobacco: Never Used   • Tobacco comment: continued abstinance   Vaping Use   • Vaping Use: Never used   Substance and Sexual Activity   • Alcohol use: Yes     Comment: 2 beers per day   • Drug use: No   • Sexual activity: Not on file   Other Topics Concern   • Not on file   Social History Narrative   • Not on file     Social Determinants of Health     Financial Resource Strain: Not on file   Food Insecurity: Not on file   Transportation Needs: Not on file   Physical Activity: Not on file   Stress: Not on file   Social Connections: Not on file   Intimate Partner Violence: Not on file   Housing Stability: Not on file  "      Allergies as of 06/14/2022   • (No Known Allergies)        @Vital signs for this encounter:  /80   Pulse 75   Resp 16   Ht 1.727 m (5' 8\")   Wt 66.2 kg (146 lb)   SpO2 94%     Current medications as of today   Current Outpatient Medications   Medication Sig Dispense Refill   • ELIQUIS 5 MG Tab TAKE 1 TABLET BY MOUTH TWICE DAILY. INDICATIONS: THROMBOEMBOLISM SECONDARY TO ATRIAL FIBRILLATION 180 Tablet 3   • Vitamin D, Cholecalciferol, 50 MCG (2000 UT) Cap Take 1 Capsule by mouth every day. 90 Capsule 1   • Fluticasone-Umeclidin-Vilant (TRELEGY ELLIPTA) 100-62.5-25 MCG/INH AEROSOL POWDER, BREATH ACTIVATED inhalation Inhale 1 Inhalation every day. Rinse mouth after use 90 Each 3   • atorvastatin (LIPITOR) 40 MG Tab Take 1 Tablet by mouth every evening. 90 Tablet 2   • buPROPion SR (WELLBUTRIN-SR) 150 MG TABLET SR 12 HR sustained-release tablet Take 1 Tablet by mouth every day. 90 Tablet 2   • enalapril (VASOTEC) 20 MG tablet Take 2 Tablets by mouth every day. 180 Tablet 2   • finasteride (PROSCAR) 5 MG Tab Take 1 Tablet by mouth every day. 90 Tablet 2   • omeprazole (PRILOSEC) 20 MG delayed-release capsule Take 1 Capsule by mouth every morning before breakfast. 90 Capsule 2   • tamsulosin (FLOMAX) 0.4 MG capsule Take 1 Capsule by mouth 1/2 hour after dinner. 90 Capsule 2   • albuterol 108 (90 Base) MCG/ACT Aero Soln inhalation aerosol Inhale 2 Puffs every four hours as needed for Shortness of Breath. 1 Each 2     No current facility-administered medications for this visit.         Physical Exam:   Gen:           Alert and oriented, No apparent distress. Mood and affect appropriate, normal interaction with provider.  Eyes:          sclere white, conjunctive moist.  Hearing:     Grossly intact.  Dentition:    Upper dentures, poor lower dentition  Oropharynx:   Tongue normal, posterior pharynx without erythema or exudate.  Neck:        Supple, trachea midline, no masses.  Respiratory Effort: No " intercostal retractions or use of accessory muscles.   Lung Auscultation:     decreased slightly throughout; left lower lobe rales, no rhonchi or wheezing.  CV:            Regular rate and rhythm. No edema. No murmurs, rubs or gallops.  Digits, Nails, Ext: No clubbing, cyanosis, petechiae, or nodes.   Skin:        No rashes, lesions or ulcers noted on exposed skin surfaces.                     Assessment:  1. Abnormal CT scan, lung     2. Chronic obstructive pulmonary disease, unspecified COPD type (HCC)     3. Former smoker     4. Nocturnal hypoxia         Immunizations:    Flu: 10/8/2021  Pneumovax 23: 8/8/2018  Prevnar 13: 10/1/2020, 10/6/2016  Pfizer SARS-CoV-2 vaccine: 6/6/2022, 10/8/2021, 3/17/2021, 2/23/2021    Plan:  81-year-old male, here to follow-up on COPD, nocturnal hypoxia, abnormal chest CT.    Former smoker: Patient quit smoking in 2016, remains abstinent of tobacco.    Abnormal chest CT: Concerning cavitary lesion, previously scheduled for bronchoscopy which he canceled due to other significant health issues including fall, stroke with residual debility in his left hand, pending left carotid endarterectomy, had right.  Counseled to obtain follow-up CT scan and consider rescheduling bronchoscopy if indicated.    COPD: Continued benefit with Trelegy use.  Requiring albuterol approximately twice per week.  Continue current regimen.    Nocturnal hypoxia: Patient remains on O2 2 L at night with stated benefit.  Reports some mechanical issue with his oxygen concentrator, advised to call [facility] regarding replacement.  We will also check with Andrey in his behalf.    Patient to follow-up in 3 months, sooner if needed.       This dictation was created using voice recognition software. The accuracy of the dictation is limited to the abilities of the software. I expect there may be some errors of grammar and possibly content.

## 2022-06-14 NOTE — PATIENT INSTRUCTIONS
1-needs follow up imaging previously ordered  2-patient cancelled previously scheduled bronchoscopy due to other health issues  3-some residual left hand debility from stroke  4-reports pending left carotid endarectomy, had right   5-on blood thinner for life  6-CALL [facility] REGARDING REPLACEMENT CONCENTRATOR  7-follow up in 3 months

## 2022-06-28 NOTE — PROGRESS NOTES
Subjective:     CC: medication follow-up    HPI:   David presents today with    Abdnormal chest CT: patient had a cavitary lesion noted on chest CT and was previously scheduled for bronchoscopy but he cancelled this due to other medical issue.  Pulmonary has ordered a follow-up CT scan with plan to reschedule bronchoscopy if indicated.  He has not made the CT appointment, yet.    HTN- no chest pain, palpitations, SOB, headaches or dizziness.    Carotid stenosis- patient is followed by vascular and states he is pending US of carotids as ordered by them.    Vertigo has resolved s/p carotid surgery.  No incision site bleeding or discomfort.    No abnormal bleeding.  No falls since last appointment.     Alcohol abuse- he has cut back on alcohol and is drinking 2 beer per day currently.  He is taking a vitamin b supplement daily.        Past Medical History:   Diagnosis Date   • Alcohol use    • Arrhythmia     a fib   • Blood clotting disorder ([facility]) 2022   • Breath shortness    • Cholesterol serum elevated    • COPD (chronic obstructive pulmonary disease) ([facility])    • Dental disorder     upper   • Depression    • Emphysema of lung ([facility])    • Enlarged prostate    • High cholesterol    • Hypertension 2022    white coat syndrome   • Stroke ([facility])      left hand still seeing OT       Social History     Tobacco Use   • Smoking status: Former Smoker     Packs/day: 1.00     Years: 50.00     Pack years: 50.00     Types: Cigarettes     Quit date: 2016     Years since quittin.8   • Smokeless tobacco: Never Used   • Tobacco comment: continued abstinance   Vaping Use   • Vaping Use: Never used   Substance Use Topics   • Alcohol use: Yes     Comment: 2 beers per day   • Drug use: No       Current Outpatient Medications Ordered in Epic   Medication Sig Dispense Refill   • ELIQUIS 5 MG Tab TAKE 1 TABLET BY MOUTH TWICE DAILY. INDICATIONS: THROMBOEMBOLISM SECONDARY TO ATRIAL FIBRILLATION 180 Tablet 3   • Vitamin D,  "Cholecalciferol, 50 MCG (2000 UT) Cap Take 1 Capsule by mouth every day. 90 Capsule 1   • Fluticasone-Umeclidin-Vilant (TRELEGY ELLIPTA) 100-62.5-25 MCG/INH AEROSOL POWDER, BREATH ACTIVATED inhalation Inhale 1 Inhalation every day. Rinse mouth after use 90 Each 3   • atorvastatin (LIPITOR) 40 MG Tab Take 1 Tablet by mouth every evening. 90 Tablet 2   • buPROPion SR (WELLBUTRIN-SR) 150 MG TABLET SR 12 HR sustained-release tablet Take 1 Tablet by mouth every day. 90 Tablet 2   • enalapril (VASOTEC) 20 MG tablet Take 2 Tablets by mouth every day. 180 Tablet 2   • finasteride (PROSCAR) 5 MG Tab Take 1 Tablet by mouth every day. 90 Tablet 2   • omeprazole (PRILOSEC) 20 MG delayed-release capsule Take 1 Capsule by mouth every morning before breakfast. 90 Capsule 2   • tamsulosin (FLOMAX) 0.4 MG capsule Take 1 Capsule by mouth 1/2 hour after dinner. 90 Capsule 2   • albuterol 108 (90 Base) MCG/ACT Aero Soln inhalation aerosol Inhale 2 Puffs every four hours as needed for Shortness of Breath. 1 Each 2     No current Epic-ordered facility-administered medications on file.       Allergies:  Patient has no known allergies.    Health Maintenance: pt states he will get PFTs from pulmonary    ROS:  Gen: no fevers/chills, no changes in weight  Eyes: no changes in vision  ENT: no sore throat, no hearing loss, no bloody nose  Pulm: no sob, no cough  CV: no chest pain, no palpitations  GI: no nausea/vomiting, no diarrhea  : no dysuria  MSk: no myalgias  Skin: no rash  Neuro: no headaches, no numbness/tingling  Heme/Lymph: no easy bruising      Objective:       Exam:  /60 (BP Location: Left arm, Patient Position: Sitting, BP Cuff Size: Adult)   Pulse 93   Temp 36.4 °C (97.6 °F) (Temporal)   Resp 12   Ht 1.727 m (5' 8\")   Wt 66.2 kg (145 lb 13.4 oz)   SpO2 95%   BMI 22.17 kg/m²  Body mass index is 22.17 kg/m².    Gen: Alert and oriented, No apparent distress.  Neck: Neck is supple without lymphadenopathy.  Right neck " incision well approximated with no abnormal bleeding or erythema.  Lungs: Normal effort, CTA bilaterally, no wheezes, rhonchi, or rales  CV: Regular rate and rhythm. No murmurs, rubs, or gallops.  Ext: No clubbing, cyanosis, edema.    Labs: 5/4/22 labs reviewed    Assessment & Plan:     81 y.o. male with the following -     1. Vitamin D deficiency  Chronic.  Patient is taking his supplement as prescribed.  We will recheck levels and redose supplement as indicated.  - VITAMIN D,25 HYDROXY; Future    2. Mixed hyperlipidemia  Chronic, well controlled.  Continue statin    3. Hyponatremia  Chronic, improving with patient cutting back on alcohol use.  Recommended he continue to slowly cut back on alcohol.  We will plan to order follow-up labs next visit    4. Primary hypertension  Chronic, well controlled.  Continue current medications    5. Chronic obstructive pulmonary disease, unspecified COPD type (HCC)  Chronic, well controlled.  Continue current medications.    6. Abnormal CT scan, lung  Patient reports his breathing is currently normal and no associated chest pain or weight loss.  Reviewed his previous CT results with him and strongly recommended he get the follow-up CT scan as ordered by pulmonary with close pulmonary follow-up for likely biopsy. Patient expressed understanding and agreement with plan.    Recommend cardiology follow-up appointment.    Return in about 3 months (around 9/29/2022) for Medication review.    Please note that this dictation was created using voice recognition software. I have made every reasonable attempt to correct obvious errors, but I expect that there are errors of grammar and possibly content that I did not discover before finalizing the note.

## 2022-06-29 ENCOUNTER — PATIENT OUTREACH (OUTPATIENT)
Dept: HEALTH INFORMATION MANAGEMENT | Facility: OTHER | Age: 82
End: 2022-06-29

## 2022-06-29 ENCOUNTER — OFFICE VISIT (OUTPATIENT)
Dept: MEDICAL GROUP | Facility: MEDICAL CENTER | Age: 82
End: 2022-06-29
Payer: MEDICARE

## 2022-06-29 VITALS
WEIGHT: 145.83 LBS | OXYGEN SATURATION: 95 % | SYSTOLIC BLOOD PRESSURE: 120 MMHG | BODY MASS INDEX: 22.1 KG/M2 | DIASTOLIC BLOOD PRESSURE: 60 MMHG | TEMPERATURE: 97.6 F | HEIGHT: 68 IN | HEART RATE: 93 BPM | RESPIRATION RATE: 12 BRPM

## 2022-06-29 DIAGNOSIS — R91.8 ABNORMAL CT SCAN, LUNG: ICD-10-CM

## 2022-06-29 DIAGNOSIS — J44.9 CHRONIC OBSTRUCTIVE PULMONARY DISEASE, UNSPECIFIED COPD TYPE (HCC): Chronic | ICD-10-CM

## 2022-06-29 DIAGNOSIS — E55.9 VITAMIN D DEFICIENCY: ICD-10-CM

## 2022-06-29 DIAGNOSIS — E87.1 HYPONATREMIA: ICD-10-CM

## 2022-06-29 DIAGNOSIS — I10 PRIMARY HYPERTENSION: Chronic | ICD-10-CM

## 2022-06-29 DIAGNOSIS — J44.9 CHRONIC OBSTRUCTIVE PULMONARY DISEASE, UNSPECIFIED COPD TYPE (HCC): ICD-10-CM

## 2022-06-29 DIAGNOSIS — E78.2 MIXED HYPERLIPIDEMIA: ICD-10-CM

## 2022-06-29 PROCEDURE — 99214 OFFICE O/P EST MOD 30 MIN: CPT | Performed by: FAMILY MEDICINE

## 2022-06-29 ASSESSMENT — FIBROSIS 4 INDEX: FIB4 SCORE: 2.07

## 2022-06-29 NOTE — PROGRESS NOTES
6/29/22 Kortney (CHW) and I presented CCM program to patient prior to his PCP OV.  Provided pamphlet and contact information.  Patient declined enrollment at this time.  Explained he could enroll at any time in the future if he needs change.

## 2022-07-18 ENCOUNTER — OFFICE VISIT (OUTPATIENT)
Dept: MEDICAL GROUP | Facility: MEDICAL CENTER | Age: 82
End: 2022-07-18
Payer: MEDICARE

## 2022-07-18 VITALS
OXYGEN SATURATION: 91 % | DIASTOLIC BLOOD PRESSURE: 68 MMHG | HEIGHT: 68 IN | BODY MASS INDEX: 23.14 KG/M2 | RESPIRATION RATE: 12 BRPM | HEART RATE: 78 BPM | TEMPERATURE: 97.9 F | WEIGHT: 152.67 LBS | SYSTOLIC BLOOD PRESSURE: 122 MMHG

## 2022-07-18 DIAGNOSIS — R05.9 COUGH: ICD-10-CM

## 2022-07-18 DIAGNOSIS — R09.82 POST-NASAL DRIP: ICD-10-CM

## 2022-07-18 PROCEDURE — 99214 OFFICE O/P EST MOD 30 MIN: CPT | Performed by: FAMILY MEDICINE

## 2022-07-18 RX ORDER — FLUTICASONE PROPIONATE 50 MCG
1 SPRAY, SUSPENSION (ML) NASAL DAILY
Qty: 16 G | Refills: 1 | Status: SHIPPED | OUTPATIENT
Start: 2022-07-18

## 2022-07-18 ASSESSMENT — FIBROSIS 4 INDEX: FIB4 SCORE: 2.07

## 2022-07-18 NOTE — PROGRESS NOTES
Subjective:     CC: mucous    HPI:   David presents today with     Patient reports having a lot of mucous for a couple weeks in his throat that causes him to cough.  No associated sinus pressure, headaches, ear pain, sore throat, chest discomfort, palpitations, nausea, vomiting, diarrhea, constipation, wheezing.  +mild runny nose with green/yellow discharge.  +fatigue.  He took a covid19 test this AM and it was negative.  He is taking mucinex and robitussin DM and alkaseltzer.  [place] seltzer provides good improvement. He is using albuterol 3 times per day.  Home oxygens are 98%.  He is using 2L of supplemental o2 at night.  He is eating and drinking normally.    GERD- well controlled with PPI.      Past Medical History:   Diagnosis Date   • Alcohol use    • Arrhythmia     a fib   • Blood clotting disorder ([facility]) 2022   • Breath shortness    • Cholesterol serum elevated    • COPD (chronic obstructive pulmonary disease) ([facility])    • Dental disorder     upper   • Depression    • Emphysema of lung ([facility])    • Enlarged prostate    • High cholesterol    • Hypertension 2022    white coat syndrome   • Stroke ([facility])      left hand still seeing OT       Social History     Tobacco Use   • Smoking status: Former Smoker     Packs/day: 1.00     Years: 50.00     Pack years: 50.00     Types: Cigarettes     Quit date: 2016     Years since quittin.9   • Smokeless tobacco: Never Used   • Tobacco comment: continued abstinance   Vaping Use   • Vaping Use: Never used   Substance Use Topics   • Alcohol use: Yes     Comment: 2 beers per day   • Drug use: No       Current Outpatient Medications Ordered in Epic   Medication Sig Dispense Refill   • fluticasone (FLONASE) 50 MCG/ACT nasal spray Administer 1 Spray into affected nostril(S) every day. 16 g 1   • ELIQUIS 5 MG Tab TAKE 1 TABLET BY MOUTH TWICE DAILY. INDICATIONS: THROMBOEMBOLISM SECONDARY TO ATRIAL FIBRILLATION 180 Tablet 3   • Vitamin D, Cholecalciferol, 50 MCG (  "UT) Cap Take 1 Capsule by mouth every day. 90 Capsule 1   • Fluticasone-Umeclidin-Vilant (TRELEGY ELLIPTA) 100-62.5-25 MCG/INH AEROSOL POWDER, BREATH ACTIVATED inhalation Inhale 1 Inhalation every day. Rinse mouth after use 90 Each 3   • atorvastatin (LIPITOR) 40 MG Tab Take 1 Tablet by mouth every evening. 90 Tablet 2   • buPROPion SR (WELLBUTRIN-SR) 150 MG TABLET SR 12 HR sustained-release tablet Take 1 Tablet by mouth every day. 90 Tablet 2   • enalapril (VASOTEC) 20 MG tablet Take 2 Tablets by mouth every day. 180 Tablet 2   • finasteride (PROSCAR) 5 MG Tab Take 1 Tablet by mouth every day. 90 Tablet 2   • omeprazole (PRILOSEC) 20 MG delayed-release capsule Take 1 Capsule by mouth every morning before breakfast. 90 Capsule 2   • tamsulosin (FLOMAX) 0.4 MG capsule Take 1 Capsule by mouth 1/2 hour after dinner. 90 Capsule 2   • albuterol 108 (90 Base) MCG/ACT Aero Soln inhalation aerosol Inhale 2 Puffs every four hours as needed for Shortness of Breath. 1 Each 2     No current Epic-ordered facility-administered medications on file.       Allergies:  Patient has no known allergies.  ROS:  Gen: no fevers/chills, no changes in weight  Eyes: no changes in vision  ENT: no sore throat, no hearing loss, +bloody nose last week which has resolved  Pulm: no sob, + cough when having post nasal drip  CV: no chest pain, no palpitations  GI: no nausea/vomiting, no diarrhea  : no dysuria  Neuro: no headaches, no numbness/tingling      Objective:       Exam:  /68 (BP Location: Left arm, Patient Position: Sitting, BP Cuff Size: Adult)   Pulse 78   Temp 36.6 °C (97.9 °F) (Temporal)   Resp 12   Ht 1.727 m (5' 8\")   Wt 69.3 kg (152 lb 10.7 oz)   SpO2 91%   BMI 23.21 kg/m²  Body mass index is 23.21 kg/m².    Gen: Alert and oriented, No apparent distress.  HEENT: No sinus tenderness to palpation.  TMs normal bilaterally.  Nasal turbinates boggy and pale with clear nasal discharge.  Visible postnasal drip noted in his " oropharynx.  No oropharyngeal erythema or exudates.  Neck: Neck is supple without lymphadenopathy.  Lungs: Normal effort, CTA bilaterally, no wheezes, rhonchi, or rales  CV: Regular rate and rhythm. No murmurs, rubs, or gallops.  Ext: No clubbing, cyanosis, edema.    Assessment & Plan:     81 y.o. male with the following -     1. Post-nasal drip  New issue.  Likely secondary to allergies versus postviral syndrome.  Given duration since onset of symptoms, no COVID-19 testing ordered today.  Recommended nasal saline followed by Flonase daily.  Recommended home humidifier use.  Follow-up precautions given  - fluticasone (FLONASE) 50 MCG/ACT nasal spray; Administer 1 Spray into affected nostril(S) every day.  Dispense: 16 g; Refill: 1    2. Cough  New issue with associated fatigue.  Most likely due to postnasal drip.  We will treat postnasal drip as per above.  Chest x-ray ordered, however after I explained that this CT chest which was ordered by his pulmonologist would be a superior test given his history of abnormal imaging in the past.  Chest x-ray ordered to expedite evaluation of possible pneumonia.  Continue [place] as needed since it is providing good relief.  Continue PPI.  Follow-up precautions given  - DX-CHEST-2 VIEWS; Future      Return if symptoms worsen or fail to improve.    Please note that this dictation was created using voice recognition software. I have made every reasonable attempt to correct obvious errors, but I expect that there are errors of grammar and possibly content that I did not discover before finalizing the note.

## 2022-07-25 ENCOUNTER — HOSPITAL ENCOUNTER (OUTPATIENT)
Dept: RADIOLOGY | Facility: MEDICAL CENTER | Age: 82
End: 2022-07-25
Attending: FAMILY MEDICINE
Payer: MEDICARE

## 2022-07-25 DIAGNOSIS — R05.9 COUGH: ICD-10-CM

## 2022-07-25 PROCEDURE — 71046 X-RAY EXAM CHEST 2 VIEWS: CPT

## 2022-08-19 DIAGNOSIS — R33.9 URINARY RETENTION: ICD-10-CM

## 2022-08-19 RX ORDER — TAMSULOSIN HYDROCHLORIDE 0.4 MG/1
CAPSULE ORAL
Qty: 90 CAPSULE | Refills: 2 | Status: SHIPPED | OUTPATIENT
Start: 2022-08-19

## 2022-08-31 ENCOUNTER — TELEPHONE (OUTPATIENT)
Dept: MEDICAL GROUP | Facility: MEDICAL CENTER | Age: 82
End: 2022-08-31
Payer: MEDICARE

## 2022-08-31 NOTE — TELEPHONE ENCOUNTER
Returned Meenu's call.  She stated that he started to feel SOB about 2 days prior to his death.  He was significantly SOB the night prior to his death but declined going to the ER and his wife found that he  after she awoke on the day of his death.    I do not have a login to their system, so she sent me the login request information but stated it would take about 2-3 weeks get a login.  She will have the Medical Examiner's office physicians process his death certificate because I cannot login.  -Dr. Katelyn Olea

## 2022-08-31 NOTE — TELEPHONE ENCOUNTER
----- Message from Cj Jean Ass't sent at 8/29/2022  5:03 PM PDT -----  Regarding: Death Certificate  Pt. Passed away today of natural death.  Meenu with Med Examiners asked if you'd sign the death certificate?  Ph. [phone]

## 2022-09-16 ENCOUNTER — APPOINTMENT (OUTPATIENT)
Dept: SLEEP MEDICINE | Facility: MEDICAL CENTER | Age: 82
End: 2022-09-16
Payer: MEDICARE

## 2022-11-08 ENCOUNTER — PATIENT MESSAGE (OUTPATIENT)
Dept: HEALTH INFORMATION MANAGEMENT | Facility: OTHER | Age: 82
End: 2022-11-08

## 2023-11-29 ENCOUNTER — PATIENT MESSAGE (OUTPATIENT)
Dept: HEALTH INFORMATION MANAGEMENT | Facility: OTHER | Age: 83
End: 2023-11-29

## 2024-08-23 NOTE — ED NOTES
Discharge instructions reviewed with patient. AVS signed by patient. No new prescriptions. Patient understands need for follow-up appointment with healthcare team and to return to ED for worsening symptoms. All questions answered at this time. Patient ambulated to exit with belongings. Patient in stable condition with no signs of distress. Patient agreeable to discharge instructions.   Fall Risk; Allergy;

## (undated) DEVICE — SODIUM CHL IRRIGATION 0.9% 1000ML (12EA/CA)

## (undated) DEVICE — SENSOR SPO2 ADULT LNCS ADTX (20/BX) ORDER ITEM #19593

## (undated) DEVICE — SUCTION INSTRUMENT YANKAUER BULBOUS TIP W/O VENT (50EA/CA)

## (undated) DEVICE — NEPTUNE 4 PORT MANIFOLD - (20/PK)

## (undated) DEVICE — GOWN WARMING STANDARD FLEX - (30/CA)

## (undated) DEVICE — RESERVOIR SUCTION 100 CC - SILICONE (20EA/CA)

## (undated) DEVICE — KIT RADIAL ARTERY 20GA W/MAX BARRIER AND BIOPATCH  (5EA/CA) #10740 IS FOR THE SET RADIAL ARTERIAL

## (undated) DEVICE — KIT SURGIFLO W/OUT THROMBIN - (6EA/CA)

## (undated) DEVICE — SET LEADWIRE 5 LEAD BEDSIDE DISPOSABLE ECG (1SET OF 5/EA)

## (undated) DEVICE — SENSOR SPO2 NEO LNCS ADHESIVE (20/BX) SEE USER NOTES

## (undated) DEVICE — GLOVE BIOGEL PI ORTHO SZ 6 SURGICAL PF LF (40PR/BX)

## (undated) DEVICE — SUTURE CV

## (undated) DEVICE — GOWN SURGEONS LARGE - (32/CA)

## (undated) DEVICE — SUTURE GENERAL

## (undated) DEVICE — ELECTRODE 850 FOAM ADHESIVE - HYDROGEL RADIOTRNSPRNT (50/PK)

## (undated) DEVICE — BAG SPONGE COUNT 10.25 X 32 - BLUE (250/CA)

## (undated) DEVICE — CLIP SM INTNL HRZN TI ESCP LGT - (24EA/PK 25PK/BX)

## (undated) DEVICE — HEAD HOLDER JUNIOR/ADULT

## (undated) DEVICE — SUTURE 2-0 VICRYL PLUS CT-1 - 8 X 18 INCH(12/BX)

## (undated) DEVICE — CLIP MED INTNL HRZN TI ESCP - (25/BX)

## (undated) DEVICE — SUTURE 6-0 PROLENE BV-1 D/A 24 (36PK/BX)"

## (undated) DEVICE — DECANTER FLD BLS - (50/CA)

## (undated) DEVICE — LACTATED RINGERS INJ 1000 ML - (14EA/CA 60CA/PF)

## (undated) DEVICE — TOWELS CLOTH SURGICAL - (4/PK 20PK/CA)

## (undated) DEVICE — SPONGE GAUZESTER 4 X 4 4PLY - (128PK/CA)

## (undated) DEVICE — ELECTRODE DUAL RETURN W/ CORD - (50/PK)

## (undated) DEVICE — DRESSING TRANSPARENT FILM TEGADERM 4 X 4.75" (50EA/BX)"

## (undated) DEVICE — DRAPE MAGNETIC (INSTRA-MAG) - (30/CA)

## (undated) DEVICE — GLOVE BIOGEL SZ 6 PF LATEX - (50EA/BX 4BX/CA)

## (undated) DEVICE — CANISTER SUCTION 3000ML MECHANICAL FILTER AUTO SHUTOFF MEDI-VAC NONSTERILE LF DISP  (40EA/CA)

## (undated) DEVICE — SUTURE 4-0 30CM STRATAFIX SPIRAL PS-2 (12EA/BX)

## (undated) DEVICE — SUTURE 3-0 SILK 12 X 18 IN - (36/BX)

## (undated) DEVICE — VESSELOOP MINI BLUE STERILE - SURG-I-LOOP (10EA/BX)

## (undated) DEVICE — BLADE SURGICAL #11 - (50/BX)

## (undated) DEVICE — MASK ANESTHESIA ADULT  - (100/CA)

## (undated) DEVICE — SLEEVE, VASO, THIGH, MED

## (undated) DEVICE — WIPE INSTRUMENT MICROWIPE (20EA/SP)

## (undated) DEVICE — SOD. CHL. INJ. 0.9% 1000 ML - (14EA/CA 60CA/PF)

## (undated) DEVICE — SYRINGE 20 ML LL (50EA/BX 4BX/CA)

## (undated) DEVICE — TUBE E-T HI-LO CUFF 7.5MM (10EA/PK)

## (undated) DEVICE — SYRINGE 30 ML LL (56/BX)

## (undated) DEVICE — PROTECTOR ULNA NERVE - (36PR/CA)

## (undated) DEVICE — TUBING CLEARLINK DUO-VENT - C-FLO (48EA/CA)

## (undated) DEVICE — STAPLER SKIN DISP - (6/BX 10BX/CA) VISISTAT

## (undated) DEVICE — SUTURE 2-0 ETHILON FS - (36/BX) 18 INCH

## (undated) DEVICE — DRAPE SURGICAL U 77X120 - (10/CA)

## (undated) DEVICE — SODIUM CHL. INJ. 0.9% 500ML (24EA/CA 50CA/PF)

## (undated) DEVICE — CHLORAPREP 26 ML APPLICATOR - ORANGE TINT(25/CA)

## (undated) DEVICE — VESSELOOP MAXI BLUE STERILE- SURG-I-LOOP (10EA/BX)

## (undated) DEVICE — INTRAOP NEURO IN OR 1:1 PER 15 MIN

## (undated) DEVICE — Device

## (undated) DEVICE — PACK MINOR BASIN - (2EA/CA)

## (undated) DEVICE — POLY UMBILICAL TAPE 1/8X30 - (36/BX)

## (undated) DEVICE — GLOVE BIOGEL SZ 8 SURGICAL PF LTX - (50PR/BX 4BX/CA)

## (undated) DEVICE — TOWEL STOP TIMEOUT SAFETY FLAG (40EA/CA)

## (undated) DEVICE — KIT ANESTHESIA W/CIRCUIT & 3/LT BAG W/FILTER (20EA/CA)

## (undated) DEVICE — SET EXTENSION WITH 2 PORTS (48EA/CA) ***PART #2C8610 IS A SUBSTITUTE*****